# Patient Record
Sex: MALE | Race: WHITE | NOT HISPANIC OR LATINO | Employment: OTHER | URBAN - METROPOLITAN AREA
[De-identification: names, ages, dates, MRNs, and addresses within clinical notes are randomized per-mention and may not be internally consistent; named-entity substitution may affect disease eponyms.]

---

## 2017-01-03 ENCOUNTER — ALLSCRIPTS OFFICE VISIT (OUTPATIENT)
Dept: OTHER | Facility: OTHER | Age: 77
End: 2017-01-03

## 2017-06-26 ENCOUNTER — ALLSCRIPTS OFFICE VISIT (OUTPATIENT)
Dept: OTHER | Facility: OTHER | Age: 77
End: 2017-06-26

## 2017-07-03 DIAGNOSIS — H81.90 DISORDER OF VESTIBULAR FUNCTION: ICD-10-CM

## 2017-07-03 DIAGNOSIS — Z00.00 ENCOUNTER FOR GENERAL ADULT MEDICAL EXAMINATION WITHOUT ABNORMAL FINDINGS: ICD-10-CM

## 2017-07-03 DIAGNOSIS — E55.9 VITAMIN D DEFICIENCY: ICD-10-CM

## 2017-07-03 DIAGNOSIS — R73.01 IMPAIRED FASTING GLUCOSE: ICD-10-CM

## 2017-07-03 DIAGNOSIS — N52.9 MALE ERECTILE DYSFUNCTION: ICD-10-CM

## 2017-07-03 DIAGNOSIS — E78.5 HYPERLIPIDEMIA: ICD-10-CM

## 2017-07-03 DIAGNOSIS — I10 ESSENTIAL (PRIMARY) HYPERTENSION: ICD-10-CM

## 2017-07-05 ENCOUNTER — ALLSCRIPTS OFFICE VISIT (OUTPATIENT)
Dept: OTHER | Facility: OTHER | Age: 77
End: 2017-07-05

## 2018-01-01 DIAGNOSIS — E78.5 HYPERLIPIDEMIA: ICD-10-CM

## 2018-01-01 DIAGNOSIS — E55.9 VITAMIN D DEFICIENCY: ICD-10-CM

## 2018-01-01 DIAGNOSIS — R20.2 PARESTHESIA OF SKIN: ICD-10-CM

## 2018-01-01 DIAGNOSIS — I10 ESSENTIAL (PRIMARY) HYPERTENSION: ICD-10-CM

## 2018-01-01 DIAGNOSIS — N52.9 MALE ERECTILE DYSFUNCTION: ICD-10-CM

## 2018-01-01 DIAGNOSIS — R73.01 IMPAIRED FASTING GLUCOSE: ICD-10-CM

## 2018-01-09 NOTE — PROGRESS NOTES
Assessment    1  Erectile dysfunction (607 84) (N52 9)   2  Benign essential hypertension (401 1) (I10)   3  Vitamin D deficiency (268 9) (E55 9)   4  Hyperlipidemia (272 4) (E78 5)   5  Vestibular dizziness (386 9) (H81 90)   6  Encounter for preventive health examination (V70 0) (Z00 00)   7  Impaired fasting glucose (790 21) (R73 01)    Plan  Benign essential hypertension    · Atenolol-Chlorthalidone 50-25 MG Oral Tablet; TAKE 1 TABLET DAILY  Benign essential hypertension, Health Maintenance, Erectile dysfunction,  Hyperlipidemia, Impaired fasting glucose, Vestibular dizziness, Vitamin D deficiency    · (1) HEMOGLOBIN A1C; Status:Active; Requested for:64Jmv8727;   Benign essential hypertension, Health Maintenance, Erectile dysfunction,  Hyperlipidemia, Vestibular dizziness, Vitamin D deficiency    · (1) CBC/PLT/DIFF; Status:Active; Requested for:48Lui9172;    · (1) COMPREHENSIVE METABOLIC PANEL; Status:Active; Requested for:46Aab6935;    · (1) LIPID PANEL, FASTING; Status:Active; Requested for:41Xkz0302;    · (1) TSH WITH FT4 REFLEX; Status:Active; Requested for:67Nbr0747;    · *(Q) VITAMIN D, 25-HYDROXY, LC/MS/MS; Status:Active; Requested for:94Nhf4421;   Erectile dysfunction    · Cialis 20 MG Oral Tablet; TAKE 1 TABLET DAILY 1 HOUR BEFORE NEEDED    Discussion/Summary    - Subsequent annual wellness visit performed   -Hypertension is well controlled on current dose of atenolol/chlorthalidone  Refills provided  -Impaired fasting glucose remains the same  Patient's fasting glucose was 110  Check hemoglobin A1c with next set of labs in 6 months  -Patient is to followup with urologist as scheduled in May in regards to history of prostate cancer   - I did contact the gastroenterologist office and he is due for colonoscopy now  Last was done in 2012  - Refill provided for Cialis  - Followup in 6 loss of labs     Impression: Initial Annual Wellness Visit, with preventive exam as well as age and risk appropriate counseling completed  Cardiovascular screening and counseling: the risks and benefits of screening were discussed and screening is current  Diabetes screening and counseling: screening is current, counseling was given on maintaining a healthy diet, counseling was given on maintaining a healthy weight and counseling was given on ways to improve physical activity  Colorectal cancer screening and counseling: screening is current  Prostate cancer screening and counseling: screening is current  Glaucoma screening and counseling: screening is current  HIV screening and counseling: screening not indicated  History of Present Illness  HPI: 57-year-old male presents with his wife for subsequent annual wellness visit  Patient does have a history of hypertension, prostate cancer, hyperlipidemia and impaired fasting glucose  Patient generally feels well  Patient has been following up with [facility] in regards to his history of prostate cancer  PSAs have been undetectable  Patient does take Cialis on a daily basis for intercourse  Labs reviewed which showed essentially normal CMP within impaired fasting glucose of 110, total cholesterol 194, triglycerides 85, HDL 45,   Vitamin D slightly low at 29  He is taking 2000 international units of vitamin D on a daily basis  Last colonoscopy was in 2012   Welcome to Medicare and Wellness Visits: The patient is being seen for the subsequent annual wellness visit  Medicare Screening and Risk Factors   Hospitalizations: he has been previously hospitalizied  Once per lifetime medicare screening tests: ECG (sinus vijaya)  Medicare Screening Tests Risk Questions   Osteoporosis risk assessment: none indicated  HIV risk assessment: none indicated  Drug and Alcohol Use: The patient is a former cigarette smoker  The patient reports occasional alcohol use  He has never used illicit drugs     Diet and Physical Activity: Current diet includes well balanced meals, low fat food choices and low salt food choices  He exercises daily  Exercise: walking  Mood Disorder and Cognitive Impairment Screening: He denies feeling down, depressed, or hopeless over the past two weeks  He denies feeling little interest or pleasure in doing things over the past two weeks  Cognitive impairment screening: denies difficulty learning/retaining new information, denies difficulty handling complex tasks, denies difficulty with reasoning, denies difficulty with spatial ability and orientation, denies difficulty with language and denies difficulty with behavior  Functional Ability/Level of Safety: Hearing is normal bilaterally and a hearing aid is not used  The patient is currently able to do activities of daily living without limitations, able to do instrumental activities of daily living without limitations, able to participate in social activities without limitations and able to drive without limitations  Activities of daily living details: does not need help using the phone, no transportation help needed, does not need help shopping, no meal preparation help needed, does not need help doing housework, does not need help doing laundry, does not need help managing medications and does not need help managing money  Fall risk factors:  antihypertensive use, but no polypharmacy, no alcohol use, no mobility impairment, no antidepressant use, no deconditioning, no postural hypotension, no sedative use, no visual impairment, no urinary incontinence, no cognitive impairment, up and go test was normal and no previous fall  Home safety risk factors:  no unfamiliar surroundings, no loose rugs, no poor household lighting, no uneven floors, no household clutter, grab bars in the bathroom and handrails on the stairs  Advance Directives: Advance directives: living will and durable power of  for health care directives  end of life decisions were reviewed with the patient     Co-Managers and Medical Equipment/Suppliers: See Patient Care Team   Reviewed Updated [facility]:   Last Medicare Wellness Visit Information was reviewed, patient interviewed, no change since last AWV  Preventive Quality Program 65 and Older: Falls Risk: The patient fell none times in the past 12 months  The patient is currently asymptomatic Symptoms Include: The patient currently has no urinary incontinence symptoms  Patient Care Team    Care Team Member Role Specialty Office Number   Cameron Baird DO  Gastroenterology Adult [phone]   Crispin Lucas MD  Urology [phone]     Review of Systems    Constitutional: negative  Eyes: negative  ENT: negative  Cardiovascular: negative  Respiratory: negative  Gastrointestinal: negative  Genitourinary: negative  Musculoskeletal: negative  Integumentary and Breasts: negative  Neurological: negative  Psychiatric: negative  Endocrine: negative  Hematologic and Lymphatic: negative  Over the past 2 weeks, how often have you been bothered by the following problems? 1 ) Little interest or pleasure in doing things? Not at all    2 ) Feeling down, depressed or hopeless? Not at all    3 ) Trouble falling asleep or sleeping too much? Not at all    4 ) Feeling tired or having little energy? Not at all    5 ) Poor appetite or overeating? Not at all    6 ) Feeling bad about yourself, or that you are a failure, or have let yourself or your family down? Not at all    7 ) Trouble concentrating on things, such as reading a newspaper or watching television? Not at all    8 ) Moving or speaking so slowly that other people could have noticed, or the opposite, moving or speaking faster than usual? Not at all    9 ) Thoughts that you would be better off dead or of hurting yourself in some way? 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)   11  Vestibular dizziness (386 9) (H81 90)   12  Vitamin D deficiency (268 9) (E55 9)    Past Medical History    · History of actinic keratosis (V13 3) (Z87 2)   · History of sinus bradycardia (V12 59) (Z86 79)   · History of Hypokalemia (276 8) (E87 6)    The active problems and past medical history were reviewed and updated today  Surgical History    · History of Hernia Repair   · History of Sigmoidoscopy (Fiberoptic)    The surgical history was reviewed and updated today  Family History  Father    · Family history of Prostate Cancer (V16 39)  Brother    · Family history of Adenocarcinoma Of The Liver    The family history was reviewed and updated today  Social History    · Alcohol Use (History)   · Seldomly   · Caffeine Use   · 2 cups daily   · Former smoker (V15 82) (Z87 891)   · History of Marital History -    ·    · Occupation:   · Retired  The social history was reviewed and updated today  The social history was reviewed and is unchanged  Current Meds   1  Atenolol-Chlorthalidone 50-25 MG Oral Tablet; TAKE 1 TABLET DAILY  Requested for:   57BOF2285; Last Rx:29Jun2016 Ordered   2  Cialis 20 MG Oral Tablet; 1 po pre episode and not to use more than one per 48 hours; Therapy: 04GMO7274 to (Last Rx:90Xdt8064) Ordered   3  Cialis 20 MG Oral Tablet; TAKE 1 TABLET DAILY 1 HOUR BEFORE NEEDED; Therapy: 69Bjg4067 to (Cait Sabine)  Requested for: 80ZQS5044; Last   BX:[id number] Ordered   4  Omega-3-acid Ethyl Esters 1 GM Oral Capsule; 1gm twice daily; Therapy: 80ZFH2080 to (Last Rx:05Tzs9908)  Requested for: 39Xob1300 Ordered   5   Vitamin D3 2000 UNIT Oral Tablet; take one tablet once daily; Therapy: 37BRV3928 to (Last Rx:15Oct2014) Ordered    The medication list was reviewed and updated today  Allergies    1  No Known Drug Allergies    2  Soy    Immunizations   1 2    Influenza  20-Oct-2015 03-Oct-2016    PPSV  12-Feb-2014 03-Oct-2016    Zoster  02-Aug-2016      Vitals  Signs    O2 Saturation: 99  Temperature: 97 1 F  Heart Rate: 52  Respiration: 16  Systolic: 110  Diastolic: 64  Height: 5 ft 9 in  Weight: 172 lb   BMI Calculated: 25 4  BSA Calculated: 1 93    Physical Exam    Constitutional   General appearance: No acute distress, well appearing and well nourished  Eyes   Conjunctiva and lids: No erythema, swelling or discharge  Pupils and irises: Equal, round, reactive to light  Ophthalmoscopic examination: Normal fundi and optic discs  Ears, Nose, Mouth, and Throat   External inspection of ears and nose: Normal     Otoscopic examination: Tympanic membranes translucent with normal light reflex  Canals patent without erythema  Hearing: Normal     Nasal mucosa, septum, and turbinates: Normal without edema or erythema  Lips, teeth, and gums: Normal, good dentition  Oropharynx: Normal with no erythema, edema, exudate or lesions  Neck   Neck: Supple, symmetric, trachea midline, no masses  Thyroid: Normal, no thyromegaly  Pulmonary   Respiratory effort: No increased work of breathing or signs of respiratory distress  Percussion of chest: Normal     Palpation of chest: Normal     Auscultation of lungs: Clear to auscultation  Cardiovascular   Palpation of heart: Normal PMI, no thrills  Auscultation of heart: Normal rate and rhythm, normal S1 and S2, no murmurs  The heart rate was bradycardic at 55 bpm  The rhythm was regular  Carotid pulses: 2+ bilaterally  Abdominal aorta: Normal     Femoral pulses: 2+ bilaterally  Pedal pulses: 2+ bilaterally      Examination of extremities for edema and/or varicosities: Normal     Chest   Breasts: Normal, no dimpling or skin changes appreciated  Palpation of breasts and axillae: Normal, no masses palpated  Chest: Normal     Abdomen   Abdomen: Non-tender, no masses  Liver and spleen: No hepatomegaly or splenomegaly  Lymphatic   Palpation of lymph nodes in neck: No lymphadenopathy  Palpation of lymph nodes in axillae: No lymphadenopathy  Palpation of lymph nodes in groin: No lymphadenopathy  Palpation of lymph nodes in other areas: No lymphadenopathy  Musculoskeletal   Gait and station: Normal     Inspection/palpation of digits and nails: Normal without clubbing or cyanosis  Inspection/palpation of joints, bones, and muscles: Normal     Range of motion: Normal     Stability: Normal     Muscle strength/tone: Normal     Skin   Skin and subcutaneous tissue: Normal without rashes or lesions  Palpation of skin and subcutaneous tissue: Normal turgor  Neurologic   Cranial nerves: Cranial nerves 2-12 intact  Reflexes: 2+ and symmetric  Sensation: No sensory loss  Psychiatric   Judgment and insight: Normal     Orientation to person, place and time: Normal     Recent and remote memory: Intact  Mood and affect: Normal        Health Management  Health Maintenance   Medicare Annual Wellness Visit; every 1 year; Next Due: 39MXP4485;  Overdue    Future Appointments    Date/Time Provider Specialty Site   07/05/2017 10:40 AM Sowmya Toribio DO Family Medicine [facility]   06/26/2017 10:45 AM [facility]     Signatures   Electronically signed by : Karlie Lew DO; Peter  3 2017 12:38PM EST                       (Author)

## 2018-01-13 VITALS
BODY MASS INDEX: 25.48 KG/M2 | WEIGHT: 172 LBS | HEART RATE: 52 BPM | RESPIRATION RATE: 16 BRPM | HEIGHT: 69 IN | OXYGEN SATURATION: 99 % | DIASTOLIC BLOOD PRESSURE: 64 MMHG | SYSTOLIC BLOOD PRESSURE: 128 MMHG | TEMPERATURE: 97.1 F

## 2018-01-13 VITALS
HEART RATE: 66 BPM | DIASTOLIC BLOOD PRESSURE: 60 MMHG | SYSTOLIC BLOOD PRESSURE: 112 MMHG | BODY MASS INDEX: 25.48 KG/M2 | HEIGHT: 69 IN | WEIGHT: 172 LBS | RESPIRATION RATE: 16 BRPM

## 2018-01-13 VITALS
HEART RATE: 78 BPM | HEIGHT: 69 IN | BODY MASS INDEX: 25.45 KG/M2 | DIASTOLIC BLOOD PRESSURE: 82 MMHG | SYSTOLIC BLOOD PRESSURE: 122 MMHG | WEIGHT: 171.8 LBS

## 2018-01-16 ENCOUNTER — ALLSCRIPTS OFFICE VISIT (OUTPATIENT)
Dept: OTHER | Facility: OTHER | Age: 78
End: 2018-01-16

## 2018-01-19 ENCOUNTER — TRANSCRIBE ORDERS (OUTPATIENT)
Dept: ADMINISTRATIVE | Facility: HOSPITAL | Age: 78
End: 2018-01-19

## 2018-01-19 DIAGNOSIS — R06.83 SNORING: ICD-10-CM

## 2018-01-19 DIAGNOSIS — R06.81 APNEA: ICD-10-CM

## 2018-01-19 DIAGNOSIS — N52.9 IMPOTENCE OF ORGANIC ORIGIN: Primary | ICD-10-CM

## 2018-01-23 VITALS
SYSTOLIC BLOOD PRESSURE: 122 MMHG | TEMPERATURE: 96.8 F | RESPIRATION RATE: 16 BRPM | HEIGHT: 69 IN | DIASTOLIC BLOOD PRESSURE: 74 MMHG | HEART RATE: 68 BPM | BODY MASS INDEX: 25.48 KG/M2 | OXYGEN SATURATION: 98 % | WEIGHT: 172 LBS

## 2018-01-23 NOTE — PROGRESS NOTES
Assessment    1  Snoring (786 09) (R06 83)   2  Witnessed episode of apnea (786 03) (R06 81)   3  Paresthesia of right arm (782 0) (R20 2)   4  Benign essential hypertension (401 1) (I10)   5  Vitamin D deficiency (268 9) (E55 9)   6  Decreased radial pulse (785 9) (R09 89)   7  Hyperlipidemia (272 4) (E78 5)    Plan  Benign essential hypertension    · Atenolol-Chlorthalidone 50-25 MG Oral Tablet; TAKE 1 TABLET DAILY  Benign essential hypertension, Hyperlipidemia, Paresthesia of right arm, Vitamin D  deficiency    · (1) CBC/PLT/DIFF; Status:Active; Requested for:09Jul2018;    · (1) COMPREHENSIVE METABOLIC PANEL; Status:Active; Requested for:09Jul2018;    · (1) LIPID PANEL, FASTING; Status:Active; Requested for:09Jul2018;    · (1) TSH WITH FT4 REFLEX; Status:Active; Requested for:09Jul2018;    · *(Q) VITAMIN D, 25-HYDROXY, LC/MS/MS; Status:Active; Requested for:09Jul2018;   Erectile dysfunction, Snoring, Witnessed episode of apnea    · Sleep Study Unattended - Home; Status:Need Information - Financial Authorization; Requested JGS:[id number]; Health Maintenance    · *VB - Urinary Incontinence Screen (Dx Z13 89 Screen for UI); Status:Complete;   Done:  51MNX1211 09:30AM  Paresthesia of right arm    · VAS UPPER LIMB ARTERIAL DUPLEX, UNILATERAL/LIMITED, GRAFT; Unilateral  Side:Right; Status:Active; Requested EUO:[id number]; Discussion/Summary    - Subsequent annual wellness visit performed   -Hypertension is well controlled on current dose of atenolol/chlorthalidone  Refills provided  -Impaired fasting glucose remains the same  Patient's fasting glucose was 105  Hemoglobin A1c was 6 0%   -Patient is to followup with urologist as scheduled in May in regards to history of prostate cancer   -patient given referral for home-based sleep study to evaluate snoring and witnessed apneas  -patient given order for right upper extremity arterial Doppler for episodic decreased radial pulse   Will contact patient with results  -follow-up in 6 months with labs  Impression: Initial Annual Wellness Visit, with preventive exam as well as age and risk appropriate counseling completed  History of Present Illness  HPI: 80-year-old male presents with his wife for subsequent annual wellness visit  Patient does have a history of hypertension, prostate cancer, hyperlipidemia and impaired fasting glucose  Patient generally feels well  Patient has been following up with [facility] in regards to his history of prostate cancer  PSAs have been undetectable  Patient does take Cialis on a daily basis for intercourse  Labs reviewed which showed essentially normal CMP within impaired fasting glucose of 105 and well controlled total cholesterol  Patient does complain of 2 isolated episodes where his right arm became very pale and he had difficulty moving it  These were transient episodes  One episode occurred while he was sleeping  Last episode was approximately 2 months ago  Patient's wife also notes that the patient does snore heavily at night and does occasionally have episodes of apnea  Patient continues to volunteer at [employer]   Welcome to [employer] and Wellness Visits: The patient is being seen for the subsequent annual wellness visit  Medicare Screening and Risk Factors   Hospitalizations: he has been previously hospitalizied  Once per lifetime medicare screening tests: ECG (sinus vijaya)  Medicare Screening Tests Risk Questions   Osteoporosis risk assessment: none indicated  HIV risk assessment: none indicated  Drug and Alcohol Use: The patient is a former cigarette smoker  The patient reports occasional alcohol use  He has never used illicit drugs  Diet and Physical Activity: Current diet includes well balanced meals, low fat food choices and low salt food choices  He exercises daily  Exercise: walking     Mood Disorder and Cognitive Impairment Screening: He denies feeling down, depressed, or hopeless over the past two weeks  He denies feeling little interest or pleasure in doing things over the past two weeks  Cognitive impairment screening: denies difficulty learning/retaining new information, denies difficulty handling complex tasks, denies difficulty with reasoning, denies difficulty with spatial ability and orientation, denies difficulty with language and denies difficulty with behavior  Advance Directives: Advance directives: living will and durable power of  for health care directives  end of life decisions were reviewed with the patient  Co-Managers and Medical Equipment/Suppliers: See Patient Care Team   Reviewed Updated Geoffry Jeremie:   Last Medicare Wellness Visit Information was reviewed, patient interviewed, no change since last AWV  Preventive Quality Program 65 and Older: Falls Risk: The patient fell none times in the past 12 months  The patient is currently asymptomatic Symptoms Include: The patient currently has no urinary incontinence symptoms  Patient Care Team    Care Team Member Role Specialty Office Number   Redia Ee DO  Gastroenterology Adult [phone]   Pop Hernández MD  Urology [phone]   Nina Rae MD  Dermatology [phone]     Review of Systems    Constitutional: negative  Eyes: negative  ENT: negative  Cardiovascular: negative  Respiratory: negative  Gastrointestinal: negative  Genitourinary: negative  Musculoskeletal: negative  Integumentary and Breasts: negative  Neurological: negative  Psychiatric: negative  Endocrine: negative  Hematologic and Lymphatic: negative  Over the past 2 weeks, how often have you been bothered by the following problems? 1 ) Little interest or pleasure in doing things? Not at all    2 ) Feeling down, depressed or hopeless? Not at all    3 ) Trouble falling asleep or sleeping too much? Not at all    4 ) Feeling tired or having little energy?  Not at all    5 ) Poor appetite or overeating? Not at all    6 ) Feeling bad about yourself, or that you are a failure, or have let yourself or your family down? Not at all    7 ) Trouble concentrating on things, such as reading a newspaper or watching television? Not at all    8 ) Moving or speaking so slowly that other people could have noticed, or the opposite, moving or speaking faster than usual? Not at all    9 ) Thoughts that you would be better off dead or of hurting yourself in some way? 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)   11  Vestibular dizziness (386 9) (R42)   12  Vitamin D deficiency (268 9) (E55 9)    Past Medical History    · History of actinic keratosis (V13 3) (Z87 2)   · History of sinus bradycardia (V12 59) (Z86 79)   · History of Hypokalemia (276 8) (E87 6)    The active problems and past medical history were reviewed and updated today  Surgical History    · History of Hernia Repair   · History of Sigmoidoscopy (Fiberoptic, Therapeutic )    The surgical history was reviewed and updated today  Family History  Father    · Family history of Prostate Cancer (V16 39)  Brother    · Family history of Adenocarcinoma Of The Liver    The family history was reviewed and updated today  Social History    · Alcohol Use (History)   · Seldomly   · Caffeine Use   · 2 cups daily   · Former smoker (V15 82) (Z87 891)   · History of Marital History -    ·    · Occupation:   · Retired  The social history was reviewed and updated today  The social history was reviewed and is unchanged        Current Meds   1  Atenolol-Chlorthalidone 50-25 MG Oral Tablet; TAKE 1 TABLET DAILY  Requested for:   76SRL9357; Last AQ:07XBJ1109 Ordered   2  Cialis 20 MG Oral Tablet; TAKE 1 TABLET DAILY 1 HOUR BEFORE NEEDED; Therapy: 81Lhh0559 to (Blaze Simmering)  Requested for: 28FLW9768; Last   Rx:30Gwx4230 Ordered   3  Omega-3-acid Ethyl Esters 1 GM Oral Capsule; 1gm twice daily; Therapy: 12KSW3610 to (Last Rx:25Ilk8558)  Requested for: 32Pql0288 Ordered   4  Vitamin D3 2000 UNIT Oral Tablet; take one tablet once daily; Therapy: 87VJF7085 to (Last Rx:44Lyt8880) Ordered    The medication list was reviewed and updated today  Allergies    1  No Known Drug Allergies    2  Soy    Immunizations   1 2    Influenza  20-Oct-2015 03-Oct-2016    PPSV  12-Feb-2014 03-Oct-2016    Zoster  02-Aug-2016      Vitals  Signs    Temperature: 96 8 F  Heart Rate: 68  Respiration: 16  Systolic: 641  Diastolic: 74  Height: 5 ft 9 in  Weight: 172 lb   BMI Calculated: 25 4  BSA Calculated: 1 94  O2 Saturation: 98    Physical Exam    Constitutional   General appearance: No acute distress, well appearing and well nourished  Eyes   Conjunctiva and lids: No erythema, swelling or discharge  Pupils and irises: Equal, round, reactive to light  Ophthalmoscopic examination: Normal fundi and optic discs  Ears, Nose, Mouth, and Throat   External inspection of ears and nose: Normal     Otoscopic examination: Tympanic membranes translucent with normal light reflex  Canals patent without erythema  Hearing: Normal     Nasal mucosa, septum, and turbinates: Normal without edema or erythema  Lips, teeth, and gums: Normal, good dentition  Oropharynx: Normal with no erythema, edema, exudate or lesions  Neck   Neck: Supple, symmetric, trachea midline, no masses  Thyroid: Normal, no thyromegaly  Pulmonary   Respiratory effort: No increased work of breathing or signs of respiratory distress      Percussion of chest: Normal     Palpation of chest: Normal     Auscultation of lungs: Clear to auscultation  Cardiovascular   Palpation of heart: Normal PMI, no thrills  Auscultation of heart: Normal rate and rhythm, normal S1 and S2, no murmurs  The heart rate was bradycardic at 55 bpm  The rhythm was regular  Carotid pulses: 2+ bilaterally  Abdominal aorta: Normal     Femoral pulses: 2+ bilaterally  Pedal pulses: 2+ bilaterally  Examination of extremities for edema and/or varicosities: Normal     Chest   Breasts: Normal, no dimpling or skin changes appreciated  Palpation of breasts and axillae: Normal, no masses palpated  Chest: Normal     Abdomen   Abdomen: Non-tender, no masses  Liver and spleen: No hepatomegaly or splenomegaly  Lymphatic   Palpation of lymph nodes in neck: No lymphadenopathy  Palpation of lymph nodes in axillae: No lymphadenopathy  Palpation of lymph nodes in groin: No lymphadenopathy  Palpation of lymph nodes in other areas: No lymphadenopathy  Musculoskeletal   Gait and station: Normal     Inspection/palpation of digits and nails: Normal without clubbing or cyanosis  Inspection/palpation of joints, bones, and muscles: Normal     Range of motion: Normal     Stability: Normal     Muscle strength/tone: Normal     Skin   Skin and subcutaneous tissue: Normal without rashes or lesions  Palpation of skin and subcutaneous tissue: Normal turgor  Neurologic   Cranial nerves: Cranial nerves 2-12 intact  Reflexes: 2+ and symmetric  Sensation: No sensory loss  Psychiatric   Judgment and insight: Normal     Orientation to person, place and time: Normal     Recent and remote memory: Intact      Mood and affect: Normal        Results/Data  *VB - Urinary Incontinence Screen (Dx Z13 89 Screen for UI) [id number] 09:30AM Chauncey Butler     Test Name Result Flag Reference   Urinary Incontinence Assessment [id number]         Health Management  Health Maintenance   Medicare Annual Wellness Visit; every 1 year; Next Due: 17Jun2016;  Overdue    Signatures   Electronically signed by : Sebastian Mcqueen DO; Jan 16 2018 11:18AM EST                       (Author)

## 2018-01-31 ENCOUNTER — HOSPITAL ENCOUNTER (OUTPATIENT)
Dept: SLEEP CENTER | Facility: CLINIC | Age: 78
Discharge: HOME | End: 2018-01-31
Payer: COMMERCIAL

## 2018-01-31 DIAGNOSIS — G47.33 OSA (OBSTRUCTIVE SLEEP APNEA): ICD-10-CM

## 2018-01-31 DIAGNOSIS — R06.81 APNEA: ICD-10-CM

## 2018-01-31 DIAGNOSIS — N52.9 IMPOTENCE OF ORGANIC ORIGIN: ICD-10-CM

## 2018-01-31 DIAGNOSIS — R06.83 SNORING: ICD-10-CM

## 2018-01-31 PROCEDURE — G0399 HOME SLEEP TEST/TYPE 3 PORTA: HCPCS

## 2018-02-06 DIAGNOSIS — G47.33 OSA (OBSTRUCTIVE SLEEP APNEA): Primary | ICD-10-CM

## 2018-02-08 ENCOUNTER — TELEPHONE (OUTPATIENT)
Dept: SLEEP CENTER | Facility: CLINIC | Age: 78
End: 2018-02-08

## 2018-02-08 DIAGNOSIS — G47.33 OBSTRUCTIVE SLEEP APNEA HYPOPNEA, MILD: Primary | ICD-10-CM

## 2018-03-01 ENCOUNTER — TRANSCRIBE ORDERS (OUTPATIENT)
Dept: SLEEP CENTER | Facility: CLINIC | Age: 78
End: 2018-03-01

## 2018-03-27 ENCOUNTER — OFFICE VISIT (OUTPATIENT)
Dept: SLEEP CENTER | Facility: CLINIC | Age: 78
End: 2018-03-27
Payer: COMMERCIAL

## 2018-03-27 VITALS
BODY MASS INDEX: 25.89 KG/M2 | DIASTOLIC BLOOD PRESSURE: 72 MMHG | HEART RATE: 68 BPM | WEIGHT: 174.8 LBS | HEIGHT: 69 IN | SYSTOLIC BLOOD PRESSURE: 102 MMHG

## 2018-03-27 DIAGNOSIS — G47.33 OSA (OBSTRUCTIVE SLEEP APNEA): ICD-10-CM

## 2018-03-27 PROCEDURE — 99244 OFF/OP CNSLTJ NEW/EST MOD 40: CPT | Performed by: INTERNAL MEDICINE

## 2018-03-27 RX ORDER — TADALAFIL 20 MG/1
1 TABLET ORAL
COMMUNITY
Start: 2015-04-16 | End: 2019-03-05 | Stop reason: SDUPTHER

## 2018-03-27 RX ORDER — ATENOLOL AND CHLORTHALIDONE TABLET 50; 25 MG/1; MG/1
1 TABLET ORAL DAILY
Refills: 3 | COMMUNITY
Start: 2018-01-16 | End: 2018-07-17 | Stop reason: SDUPTHER

## 2018-03-27 NOTE — PROGRESS NOTES
Consultation - Sleep Center   Ludy Douglas : 1940  MRN: [id number]      Assessment:  The patient has mild obstructive sleep apnea with significant daytime sleepiness  He also has hypertension which is effectively treated with atenolol  He would benefit from positive airway pressure therapy  Plan:  Positive airway pressure titration study  That will be followed by initiation of treatment  Follow up: After testing    History of Present Illness:   66 y o male with a longstanding history of snoring and witnessed apneas  The patient is unaware of awakening during the night, but sometimes when falling asleep, he will experience a choking sensation  He has a remote history of hypertension, which is effectively treated with atenolol  He had suffered from migraine headaches, which were felt to be caused by hypertension and since being placed on beta blocker, he has had no further problems with headache  He denies any other respiratory or cardiac issues  He complains of daytime sleepiness  He will fall asleep at inappropriate times, when he is left undisturbed  For example, at his volunteer job, 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  He naps frequently during the day  On average she gets 6 to 7 hours of sleep per 24 hours  He does complain of GERD and erectile dysfunction  He underwent home sleep testing which demonstrated SIVAN = 8 7 with minimum oxygen saturation of 75%      Review of Systems:        Genitourinary erectile dysfunction   Cardiology none   Gastrointestinal heartburn/acid reflux   Neurology loss/change of vision   Constitutional none   Integumentary none   Psychiatry none   Musculoskeletal none   Pulmonary none   ENT ringing in ears and decreased hearing   Endocrine none   Hematological none             Historical Information    Past Medical History:  Hypertension    Past Surgical History:  Hernia repair    Social History  History   Alcohol use: Not on file History   Drug Use Not on file     History   Smoking Status    Not on file   Smokeless Tobacco    Not on file     Family History: non-contributory     Sleep History: See above     Sleep Schedule:   Unremarkable except for daily naps     Snoring/Apnea:   Yes to both    Medications/Allergies:    Current Outpatient Prescriptions:     Cholecalciferol (VITAMIN D3) 1000 UNIT/SPRAY LIQD, Take 1 tablet by mouth daily, Disp: , Rfl:     Omega-3-Acid Eth Est, Dietary, 1 g CAPS, Take by mouth, Disp: , Rfl:     tadalafil (CIALIS) 20 MG tablet, Take 1 tablet by mouth, Disp: , Rfl:     atenolol-chlorthalidone (TENORETIC) 50-25 mg per tablet, Take 1 tablet by mouth daily, Disp: , Rfl: 3        No notes on file                  Objective:    Vital Signs:   Vitals:    03/27/18 0800   BP: 102/72   Pulse: 68   Weight: 79 3 kg (174 lb 12 8 oz)   Height: 5' 9" (1 753 m)     Neck Circumference: 42      [place] Sleepiness Scale: Total score: 11    Physical Exam:    General: Alert, appropriate, cooperative, overweight    Head: NC/AT, moderate retrognathia    Nose:  Moderate septal deviation, nares partially obstructed, mucosa normal    Throat: Airway lumen reduced in height, tongue base thickened, no tonsils visualized    Neck: Normal, no thyromegaly or lymphadenopathy, no JVD    Heart: RR, normal S1 and S2, no murmurs    Chest: Clear bilaterally    Extremity: No clubbing, cyanosis, no edema    Skin: Warm, dry    Neuro: No motor abnormalities, cranial nerves appear intact    Sleep Study Results:   SIVAN= 8 7    Counseling / Coordination of Care  Total clinic time spent today 25 minutes  Greater than 50% of total time was spent with the patient and / or family counseling and / or coordination of care  A description of the counseling / coordination of care: We discussed the pathophysiology of obstructive sleep apnea as well as the possible treatment options   We also discussed the rationale for positive airway pressure therapy  JAMILA Chandra    Board Certified Sleep Specialist

## 2018-04-17 ENCOUNTER — TELEPHONE (OUTPATIENT)
Dept: FAMILY MEDICINE CLINIC | Facility: CLINIC | Age: 78
End: 2018-04-17

## 2018-04-17 DIAGNOSIS — M79.673 PAIN OF FOOT, UNSPECIFIED LATERALITY: Primary | ICD-10-CM

## 2018-04-17 NOTE — TELEPHONE ENCOUNTER
Patient called in requesting Dr Ching Stahl give him a call back  I asked what it was in regards to and he said he thinks he has plantar fasciitis and wanted to know if he would send him to an orthopedic doctor or if he would need to see him first  I advised that he would need to see him first and scheduled him with Ching Stahl for his next available appointment but he insisted I send a message to see if he will call him back

## 2018-04-18 NOTE — TELEPHONE ENCOUNTER
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

## 2018-04-30 ENCOUNTER — HOSPITAL ENCOUNTER (OUTPATIENT)
Dept: SLEEP CENTER | Facility: CLINIC | Age: 78
Discharge: HOME | End: 2018-04-30
Payer: COMMERCIAL

## 2018-04-30 DIAGNOSIS — G47.33 OSA (OBSTRUCTIVE SLEEP APNEA): ICD-10-CM

## 2018-04-30 PROCEDURE — 95811 POLYSOM 6/>YRS CPAP 4/> PARM: CPT

## 2018-05-01 DIAGNOSIS — G47.33 OSA (OBSTRUCTIVE SLEEP APNEA): Primary | ICD-10-CM

## 2018-05-01 NOTE — PROGRESS NOTES
Sleep Study Documentation    Pre-Sleep Study       Sleep testing procedure explained to patient:YES    Patient napped prior to study:NO    Caffeine:Dayshift worker after 12PM   Caffeine use:NO    Alcohol:Dayshift workers after 5PM: Alcohol use:NO    Typical day for patient:NO       Study Documentation  Treatment   Optimal PAP pressure:11 0 or 12 0  Leak:Small  Snore:Eliminated  REM Obtained:yes  Supplemental O2: no    Minimum SaO2 90%  Baseline SaO2 97%  PAP mask tried (list all)Resmed Air Fit F20 FFM size medium; Resmed Air Fit N20 nasal mask size medium  PAP mask choice (final)Resmed Air Fit F20 FFM Size Medium  PAP mask type:full face  PAP pressure at which snoring was eliminated 11 0  Minimum SaO2 at final PAP pressure 96%  Mode of Therapy:CPAP  ETCO2:No  CPAP changed to BiPAP:No    Mode of Therapy:CPAP    EKG abnormalities: no     EEG abnormalities: no    Study Terminated:no    Patient classification: retired       Post-Sleep Study    Medication used at bedtime or during sleep study:YES other prescription medications    Patient reports time it took to fall asleep:less than 20 minutes    Patient reports waking up during study:3 or more times  Patient reports returning to sleep in 10 to 30 minutes  Patient reports sleeping 4 to 6 hours without dreaming  Patient reports sleep during study:typical    Patient rated sleepiness: Not sleepy or tired    PAP treatment:yes: Post PAP treatment patient reports feeling unchanged and would wear PAP mask at home

## 2018-05-03 ENCOUNTER — TELEPHONE (OUTPATIENT)
Dept: SLEEP CENTER | Facility: CLINIC | Age: 78
End: 2018-05-03

## 2018-05-15 ENCOUNTER — HOSPITAL ENCOUNTER (OUTPATIENT)
Dept: SLEEP CENTER | Facility: CLINIC | Age: 78
Discharge: HOME | End: 2018-05-15

## 2018-05-16 ENCOUNTER — TELEPHONE (OUTPATIENT)
Dept: SLEEP CENTER | Facility: CLINIC | Age: 78
End: 2018-05-16

## 2018-05-16 DIAGNOSIS — G47.33 OSA (OBSTRUCTIVE SLEEP APNEA): Primary | ICD-10-CM

## 2018-06-25 ENCOUNTER — TELEPHONE (OUTPATIENT)
Dept: UROLOGY | Facility: CLINIC | Age: 78
End: 2018-06-25

## 2018-06-25 DIAGNOSIS — Z12.5 PROSTATE CANCER SCREENING: Primary | ICD-10-CM

## 2018-06-25 NOTE — TELEPHONE ENCOUNTER
Lex patient, managed by DR Erick Du,  Patient was scheduled for follow up tomorrow 6/26/18 with PSA prior to visit  Called patient to check if he had his PSA done  Patient was unaware he needed labwork for prior to appt  Rescheduled patient for 7/25/18 at 11:15 am with Blaire Cohen in [place] [address], mailed patient PSA order to be done prior to visit

## 2018-06-26 DIAGNOSIS — C61 MALIGNANT NEOPLASM OF PROSTATE (HCC): ICD-10-CM

## 2018-06-26 DIAGNOSIS — R20.2 PARESTHESIA OF SKIN: ICD-10-CM

## 2018-06-26 DIAGNOSIS — E55.9 VITAMIN D DEFICIENCY: ICD-10-CM

## 2018-06-26 DIAGNOSIS — I10 ESSENTIAL (PRIMARY) HYPERTENSION: ICD-10-CM

## 2018-06-26 DIAGNOSIS — E78.5 HYPERLIPIDEMIA: ICD-10-CM

## 2018-07-10 ENCOUNTER — TELEPHONE (OUTPATIENT)
Dept: UROLOGY | Facility: AMBULATORY SURGERY CENTER | Age: 78
End: 2018-07-10

## 2018-07-10 ENCOUNTER — APPOINTMENT (OUTPATIENT)
Dept: LAB | Facility: CLINIC | Age: 78
End: 2018-07-10
Payer: COMMERCIAL

## 2018-07-10 DIAGNOSIS — I10 ESSENTIAL (PRIMARY) HYPERTENSION: ICD-10-CM

## 2018-07-10 DIAGNOSIS — Z12.5 PROSTATE CANCER SCREENING: ICD-10-CM

## 2018-07-10 DIAGNOSIS — R20.2 PARESTHESIA OF SKIN: ICD-10-CM

## 2018-07-10 DIAGNOSIS — E78.5 HYPERLIPIDEMIA: ICD-10-CM

## 2018-07-10 DIAGNOSIS — E55.9 VITAMIN D DEFICIENCY: ICD-10-CM

## 2018-07-10 LAB
ALBUMIN SERPL BCP-MCNC: 3.7 G/DL (ref 3.5–5)
ALP SERPL-CCNC: 45 U/L (ref 46–116)
ALT SERPL W P-5'-P-CCNC: 22 U/L (ref 12–78)
ANION GAP SERPL CALCULATED.3IONS-SCNC: 2 MMOL/L (ref 4–13)
AST SERPL W P-5'-P-CCNC: 20 U/L (ref 5–45)
BASOPHILS # BLD AUTO: 0.05 THOUSANDS/ΜL (ref 0–0.1)
BASOPHILS NFR BLD AUTO: 1 % (ref 0–1)
BILIRUB SERPL-MCNC: 0.67 MG/DL (ref 0.2–1)
BUN SERPL-MCNC: 17 MG/DL (ref 5–25)
CALCIUM SERPL-MCNC: 8.8 MG/DL (ref 8.3–10.1)
CHLORIDE SERPL-SCNC: 106 MMOL/L (ref 100–108)
CHOLEST SERPL-MCNC: 193 MG/DL (ref 50–200)
CO2 SERPL-SCNC: 30 MMOL/L (ref 21–32)
CREAT SERPL-MCNC: 1.04 MG/DL (ref 0.6–1.3)
EOSINOPHIL # BLD AUTO: 0.17 THOUSAND/ΜL (ref 0–0.61)
EOSINOPHIL NFR BLD AUTO: 4 % (ref 0–6)
ERYTHROCYTE [DISTWIDTH] IN BLOOD BY AUTOMATED COUNT: 13.3 % (ref 11.6–15.1)
GFR SERPL CREATININE-BSD FRML MDRD: 68 ML/MIN/1.73SQ M
GLUCOSE P FAST SERPL-MCNC: 91 MG/DL (ref 65–99)
HCT VFR BLD AUTO: 37.4 % (ref 36.5–49.3)
HDLC SERPL-MCNC: 49 MG/DL (ref 40–60)
HGB BLD-MCNC: 12.6 G/DL (ref 12–17)
IMM GRANULOCYTES # BLD AUTO: 0.02 THOUSAND/UL (ref 0–0.2)
IMM GRANULOCYTES NFR BLD AUTO: 1 % (ref 0–2)
LDLC SERPL CALC-MCNC: 133 MG/DL (ref 0–100)
LYMPHOCYTES # BLD AUTO: 1.25 THOUSANDS/ΜL (ref 0.6–4.47)
LYMPHOCYTES NFR BLD AUTO: 31 % (ref 14–44)
MCH RBC QN AUTO: 31 PG (ref 26.8–34.3)
MCHC RBC AUTO-ENTMCNC: 33.7 G/DL (ref 31.4–37.4)
MCV RBC AUTO: 92 FL (ref 82–98)
MONOCYTES # BLD AUTO: 0.48 THOUSAND/ΜL (ref 0.17–1.22)
MONOCYTES NFR BLD AUTO: 12 % (ref 4–12)
NEUTROPHILS # BLD AUTO: 2.06 THOUSANDS/ΜL (ref 1.85–7.62)
NEUTS SEG NFR BLD AUTO: 51 % (ref 43–75)
NONHDLC SERPL-MCNC: 144 MG/DL
NRBC BLD AUTO-RTO: 0 /100 WBCS
PLATELET # BLD AUTO: 258 THOUSANDS/UL (ref 149–390)
PMV BLD AUTO: 9.7 FL (ref 8.9–12.7)
POTASSIUM SERPL-SCNC: 3.2 MMOL/L (ref 3.5–5.3)
PROT SERPL-MCNC: 6.9 G/DL (ref 6.4–8.2)
PSA SERPL-MCNC: 0.3 NG/ML (ref 0–4)
RBC # BLD AUTO: 4.06 MILLION/UL (ref 3.88–5.62)
SODIUM SERPL-SCNC: 138 MMOL/L (ref 136–145)
TRIGL SERPL-MCNC: 57 MG/DL
TSH SERPL DL<=0.05 MIU/L-ACNC: 1.37 UIU/ML (ref 0.36–3.74)
WBC # BLD AUTO: 4.03 THOUSAND/UL (ref 4.31–10.16)

## 2018-07-10 PROCEDURE — 80061 LIPID PANEL: CPT

## 2018-07-10 PROCEDURE — 84443 ASSAY THYROID STIM HORMONE: CPT

## 2018-07-10 PROCEDURE — 85025 COMPLETE CBC W/AUTO DIFF WBC: CPT

## 2018-07-10 PROCEDURE — 84153 ASSAY OF PSA TOTAL: CPT

## 2018-07-10 PROCEDURE — 36415 COLL VENOUS BLD VENIPUNCTURE: CPT

## 2018-07-10 PROCEDURE — 80053 COMPREHEN METABOLIC PANEL: CPT

## 2018-07-10 NOTE — TELEPHONE ENCOUNTER
The patient is in need of a insurance referral   Patient will be seen in Urology ([id number]) on 07/25/2018,  diagnosis code to be used is C61  Thank you for your time

## 2018-07-17 ENCOUNTER — OFFICE VISIT (OUTPATIENT)
Dept: FAMILY MEDICINE CLINIC | Facility: CLINIC | Age: 78
End: 2018-07-17
Payer: COMMERCIAL

## 2018-07-17 VITALS
SYSTOLIC BLOOD PRESSURE: 112 MMHG | RESPIRATION RATE: 16 BRPM | OXYGEN SATURATION: 96 % | DIASTOLIC BLOOD PRESSURE: 68 MMHG | WEIGHT: 171 LBS | HEART RATE: 74 BPM | HEIGHT: 69 IN | BODY MASS INDEX: 25.33 KG/M2

## 2018-07-17 DIAGNOSIS — E87.6 HYPOKALEMIA: ICD-10-CM

## 2018-07-17 DIAGNOSIS — G47.33 OSA (OBSTRUCTIVE SLEEP APNEA): ICD-10-CM

## 2018-07-17 DIAGNOSIS — I10 BENIGN ESSENTIAL HYPERTENSION: Primary | ICD-10-CM

## 2018-07-17 DIAGNOSIS — E78.2 MIXED HYPERLIPIDEMIA: ICD-10-CM

## 2018-07-17 PROCEDURE — 3074F SYST BP LT 130 MM HG: CPT | Performed by: FAMILY MEDICINE

## 2018-07-17 PROCEDURE — 1101F PT FALLS ASSESS-DOCD LE1/YR: CPT | Performed by: FAMILY MEDICINE

## 2018-07-17 PROCEDURE — 99214 OFFICE O/P EST MOD 30 MIN: CPT | Performed by: FAMILY MEDICINE

## 2018-07-17 PROCEDURE — 3078F DIAST BP <80 MM HG: CPT | Performed by: FAMILY MEDICINE

## 2018-07-17 PROCEDURE — 3725F SCREEN DEPRESSION PERFORMED: CPT | Performed by: FAMILY MEDICINE

## 2018-07-17 NOTE — PROGRESS NOTES
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 [employer]  Patient does walk approximately 6 miles per day throughout the hospital   No difficulty with strength in his knee when he is walking on flat ground  Past Medical History:   Diagnosis Date    Actinic keratosis     Last assessed - 10/15/14    Hypokalemia     Last assessed - 8/13/14    Sinus bradycardia     Last assessed - 8/13/14       Family History   Problem Relation Age of Onset    Prostate cancer Father     Liver cancer Brother         Adenocarcinoma        Past Surgical History:   Procedure Laterality Date    HERNIA REPAIR      SIGMOIDOSCOPY      (Fiberoptic, Therapeutic) 7/28/2014, 8/25/14        reports that he has never smoked  He has never used smokeless tobacco  He reports that he does not drink alcohol or use drugs        Current Outpatient Prescriptions:     atenolol-chlorthalidone (TENORETIC) 50-25 mg per tablet, Take 1 tablet by mouth daily, Disp: , Rfl: 3    Cholecalciferol (VITAMIN D3) 1000 UNIT/SPRAY LIQD, Take 1 tablet by mouth daily, Disp: , Rfl:     Omega-3-Acid Eth Est, Dietary, 1 g CAPS, Take by mouth, Disp: , Rfl:     tadalafil (CIALIS) 20 MG tablet, Take 1 tablet by mouth, Disp: , Rfl:     The following portions of the patient's history were reviewed and updated as appropriate: allergies, current medications, past family history, past medical history, past social history, past surgical history and problem list     Review of Systems   Constitutional: Negative  HENT: Negative  Eyes: Negative  Respiratory: Negative  Cardiovascular: Negative  Gastrointestinal: Negative  Endocrine: Negative  Genitourinary: Negative  Erectile dysfunction   Musculoskeletal: Positive for arthralgias  Negative for gait problem and joint swelling  Skin: Negative  Allergic/Immunologic: Negative  Neurological: Negative  Hematological: Negative  Psychiatric/Behavioral: Negative  All other systems reviewed and are negative  Objective:    /68   Pulse 74   Resp 16   Ht 5' 9" (1 753 m)   Wt 77 6 kg (171 lb)   SpO2 96%   BMI 25 25 kg/m²      Physical Exam   Constitutional: He is oriented to person, place, and time  He appears well-developed and well-nourished  HENT:   Head: Normocephalic  Eyes: Conjunctivae and EOM are normal  Pupils are equal, round, and reactive to light  Neck: Normal range of motion  Neck supple  Cardiovascular: Normal rate, regular rhythm and normal heart sounds  Pulmonary/Chest: Effort normal and breath sounds normal    Abdominal: Soft  Bowel sounds are normal    Musculoskeletal: Normal range of motion  Right knee: He exhibits normal range of motion, no swelling, no effusion, no ecchymosis, no deformity, no laceration, no erythema, normal alignment, no LCL laxity and no MCL laxity  Mild crepitation  Of the right knee   Neurological: He is alert and oriented to person, place, and time  Psychiatric: He has a normal mood and affect  His behavior is normal  Judgment and thought content normal    Nursing note and vitals reviewed          Recent Results (from the past 1008 hour(s))   CBC and differential    Collection Time: 07/10/18  7:57 AM   Result Value Ref Range    WBC 4 03 (L) 4 31 - 10 16 Thousand/uL    RBC 4 06 3 88 - 5 62 Million/uL    Hemoglobin 12 6 12 0 - 17 0 g/dL    Hematocrit 37 4 36 5 - 49 3 %    MCV 92 82 - 98 fL    MCH 31 0 26 8 - 34 3 pg    MCHC 33 7 31 4 - 37 4 g/dL    RDW 13 3 11 6 - 15 1 %    MPV 9 7 8 9 - 12 7 fL    Platelets 420 321 - 174 Thousands/uL    nRBC 0 /100 WBCs    Neutrophils Relative 51 43 - 75 %    Immat GRANS % 1 0 - 2 %    Lymphocytes Relative 31 14 - 44 %    Monocytes Relative 12 4 - 12 %    Eosinophils Relative 4 0 - 6 %    Basophils Relative 1 0 - 1 %    Neutrophils Absolute 2 06 1 85 - 7 62 Thousands/µL    Immature Grans Absolute 0 02 0 00 - 0 20 Thousand/uL    Lymphocytes Absolute 1 25 0 60 - 4 47 Thousands/µL    Monocytes Absolute 0 48 0 17 - 1 22 Thousand/µL    Eosinophils Absolute 0 17 0 00 - 0 61 Thousand/µL    Basophils Absolute 0 05 0 00 - 0 10 Thousands/µL   Comprehensive metabolic panel    Collection Time: 07/10/18  7:57 AM   Result Value Ref Range    Sodium 138 136 - 145 mmol/L    Potassium 3 2 (L) 3 5 - 5 3 mmol/L    Chloride 106 100 - 108 mmol/L    CO2 30 21 - 32 mmol/L    Anion Gap 2 (L) 4 - 13 mmol/L    BUN 17 5 - 25 mg/dL    Creatinine 1 04 0 60 - 1 30 mg/dL    Glucose, Fasting 91 65 - 99 mg/dL    Calcium 8 8 8 3 - 10 1 mg/dL    AST 20 5 - 45 U/L    ALT 22 12 - 78 U/L    Alkaline Phosphatase 45 (L) 46 - 116 U/L    Total Protein 6 9 6 4 - 8 2 g/dL    Albumin 3 7 3 5 - 5 0 g/dL    Total Bilirubin 0 67 0 20 - 1 00 mg/dL    eGFR 68 ml/min/1 73sq m   Lipid panel    Collection Time: 07/10/18  7:57 AM   Result Value Ref Range    Cholesterol 193 50 - 200 mg/dL    Triglycerides 57 <=150 mg/dL    HDL, Direct 49 40 - 60 mg/dL    LDL Calculated 133 (H) 0 - 100 mg/dL    Non-HDL-Chol (CHOL-HDL) 144 mg/dl   TSH, 3rd generation with T4 reflex    Collection Time: 07/10/18  7:57 AM   Result Value Ref Range    TSH 3RD GENERATON 1 370 0 358 - 3 740 uIU/mL   PSA Total, Diagnostic    Collection Time: 07/10/18  7:57 AM   Result Value Ref Range    PSA 0 3 0 0 - 4 0 ng/mL       Assessment/Plan:    No problem-specific Assessment & Plan notes found for this encounter  Problem List Items Addressed This Visit     ZAINAB (obstructive sleep apnea) - Primary      Patient does follow up with sleep specialist   Presently using titrating CPAP anywhere from 6-12 cm of water         Hypokalemia      Mild hypokalemia  Recommended the patient increase his potassium rich foods including bananas, oranges, dried fruits and orange juice  Repeat labs in 6 months  Hypokalemia is likely related to his chlorthalidone in his antihypertensive medication         Benign essential hypertension      Well controlled on atenolol/chlorthalidone  Continue same  Refill provided  Re-evaluate in 6 months  Hyperlipidemia       Essentially normal cholesterol profile through dietary modification  Continue to manage with diet alone  Repeat lipid profile in 6 months

## 2018-07-19 RX ORDER — ATENOLOL AND CHLORTHALIDONE TABLET 50; 25 MG/1; MG/1
1 TABLET ORAL DAILY
Qty: 90 TABLET | Refills: 3 | Status: SHIPPED | OUTPATIENT
Start: 2018-07-19 | End: 2019-07-09 | Stop reason: SDUPTHER

## 2018-07-19 NOTE — ASSESSMENT & PLAN NOTE
Essentially normal cholesterol profile through dietary modification  Continue to manage with diet alone  Repeat lipid profile in 6 months

## 2018-07-19 NOTE — ASSESSMENT & PLAN NOTE
Mild hypokalemia  Recommended the patient increase his potassium rich foods including bananas, oranges, dried fruits and orange juice  Repeat labs in 6 months    Hypokalemia is likely related to his chlorthalidone in his antihypertensive medication

## 2018-07-19 NOTE — ASSESSMENT & PLAN NOTE
Patient does follow up with sleep specialist   Presently using titrating CPAP anywhere from 6-12 cm of water

## 2018-07-19 NOTE — ASSESSMENT & PLAN NOTE
Well controlled on atenolol/chlorthalidone  Continue same  Refill provided  Re-evaluate in 6 months

## 2018-07-25 ENCOUNTER — OFFICE VISIT (OUTPATIENT)
Dept: UROLOGY | Facility: CLINIC | Age: 78
End: 2018-07-25
Payer: COMMERCIAL

## 2018-07-25 VITALS
DIASTOLIC BLOOD PRESSURE: 68 MMHG | HEART RATE: 74 BPM | WEIGHT: 169 LBS | BODY MASS INDEX: 25.03 KG/M2 | HEIGHT: 69 IN | SYSTOLIC BLOOD PRESSURE: 108 MMHG

## 2018-07-25 DIAGNOSIS — C61 PROSTATE CANCER (HCC): Primary | ICD-10-CM

## 2018-07-25 PROCEDURE — 99213 OFFICE O/P EST LOW 20 MIN: CPT | Performed by: PHYSICIAN ASSISTANT

## 2018-07-25 NOTE — PROGRESS NOTES
1  Prostate cancer ([facility] [address] )  PSA Total, Diagnostic       Assessment and plan:       1  Prostate cancer status post XRT (2010) -managed by Dr Gurmeet Deluna  - I was happy to review with the patient that his PSA remains stable  - follow-up in 1 year with a PSA prior to visit  - no refills needed on Cialis 20 mg at this time  - all questions answered  Louisa De La Rosa PA-C      Chief Complaint     F/u prostate cancer    History of Present Illness     Kilo Owusu is a 66 y o  male patient of Dr Gurmeet Deluna with a history of prostate cancer status post XRT (2010) presenting for 1 year follow-up  Patient's PSA remains stable at most recently 0 3 (07/10/2018)  Renal function remains stable at baseline at approximately 1 04  Patient states that urination remains absolutely stable  He feels like he has good stream, feels empty after urination, denies nocturia  Patient does note increase in bowel issues when he holds his bladder for long periods of time  He denies any dysuria, gross hematuria, suprapubic pressure, flank pain, bone pain, weight loss  Patient continues to use Cialis 20 mg as needed for sexual dysfunction  Denies any side effect profile  Denies any prolonged erections  Laboratory     Lab Results   Component Value Date    CREATININE 1 04 07/10/2018       Lab Results   Component Value Date    PSA 0 3 07/10/2018       Review of Systems     Review of Systems   Constitutional: Negative for activity change, appetite change, chills, diaphoresis, fatigue, fever and unexpected weight change  Respiratory: Negative for chest tightness and shortness of breath  Cardiovascular: Negative for chest pain, palpitations and leg swelling  Gastrointestinal: Negative for abdominal distention, abdominal pain, constipation, diarrhea, nausea and vomiting     Genitourinary: Negative for decreased urine volume, difficulty urinating, dysuria, enuresis, flank pain, frequency, genital sores, hematuria and urgency  Musculoskeletal: Negative for back pain, gait problem and myalgias  Skin: Negative for color change, pallor, rash and wound  Psychiatric/Behavioral: Negative for behavioral problems  The patient is not nervous/anxious  Allergies     Allergies   Allergen Reactions    Isoflavones        Physical Exam     Physical Exam   Constitutional: He is oriented to person, place, and time  He appears well-developed and well-nourished  No distress  HENT:   Head: Normocephalic and atraumatic  Eyes: Conjunctivae are normal    Neck: Normal range of motion  No tracheal deviation present  Pulmonary/Chest: Effort normal    Musculoskeletal: Normal range of motion  He exhibits no edema or deformity  Neurological: He is alert and oriented to person, place, and time  Skin: Skin is warm and dry  No rash noted  He is not diaphoretic  No erythema  Psychiatric: He has a normal mood and affect   His behavior is normal          Vital Signs     Vitals:    07/25/18 1111   BP: 108/68   BP Location: Left arm   Patient Position: Sitting   Cuff Size: Adult   Pulse: 74   Weight: 76 7 kg (169 lb)   Height: 5' 9" (1 753 m)         Current Medications       Current Outpatient Prescriptions:     atenolol-chlorthalidone (TENORETIC) 50-25 mg per tablet, Take 1 tablet by mouth daily, Disp: 90 tablet, Rfl: 3    Cholecalciferol (VITAMIN D3) 1000 UNIT/SPRAY LIQD, Take 1 tablet by mouth daily, Disp: , Rfl:     Omega-3-Acid Eth Est, Dietary, 1 g CAPS, Take by mouth, Disp: , Rfl:     tadalafil (CIALIS) 20 MG tablet, Take 1 tablet by mouth, Disp: , Rfl:       Active Problems     Patient Active Problem List   Diagnosis    Foot pain    ZAINAB (obstructive sleep apnea)    Hypokalemia    Benign essential hypertension    Hyperlipidemia    Impaired fasting glucose    Vitamin D deficiency         Past Medical History     Past Medical History:   Diagnosis Date    Actinic keratosis     Last assessed - 10/15/14    Hypokalemia Last assessed - 8/13/14    Sinus bradycardia     Last assessed - 8/13/14         Surgical History     Past Surgical History:   Procedure Laterality Date    HERNIA REPAIR      SIGMOIDOSCOPY      (Fiberoptic, Therapeutic) 7/28/2014, 8/25/14         Family History     Family History   Problem Relation Age of Onset    Prostate cancer Father     Liver cancer Brother         Adenocarcinoma          Social History     Social History       Radiology

## 2018-07-31 ENCOUNTER — OFFICE VISIT (OUTPATIENT)
Dept: SLEEP CENTER | Facility: CLINIC | Age: 78
End: 2018-07-31
Payer: COMMERCIAL

## 2018-07-31 VITALS
DIASTOLIC BLOOD PRESSURE: 62 MMHG | HEIGHT: 69 IN | SYSTOLIC BLOOD PRESSURE: 116 MMHG | BODY MASS INDEX: 25.18 KG/M2 | HEART RATE: 72 BPM | WEIGHT: 170 LBS

## 2018-07-31 DIAGNOSIS — G47.33 OSA (OBSTRUCTIVE SLEEP APNEA): Primary | ICD-10-CM

## 2018-07-31 PROCEDURE — 4040F PNEUMOC VAC/ADMIN/RCVD: CPT | Performed by: INTERNAL MEDICINE

## 2018-07-31 PROCEDURE — 99214 OFFICE O/P EST MOD 30 MIN: CPT | Performed by: INTERNAL MEDICINE

## 2018-07-31 NOTE — PROGRESS NOTES
Progress Note - Sleep Center   Ramone Vergara IML:[id number] MRN: [id number]      Reason for Visit:  66 y o male here for PAP compliance check    Assessment:  Doing well with new PAP device  Sleep quality is improved and the patient reports less daytime sleepiness  Compliance data show utilization for greater than or equal to 70% of nights for greater than or equal to 4 hours per night  Plan:  Change to CPAP 9 cm  Follow up: One year    History of Present Illness:  History of ZAINAB on PAP therapy  Fully compliant and deriving benefit  The patient's SIVAN = 8 7  He was somewhat uncomfortable on his initial pressure of 15 cm and was adjusted to APAP 6 to 15 cm  He feels that this pressure is also too high  When I reviewed the data it seemed that 9 cm may be the minimum sufficient pressure          Review of Systems      Genitourinary difficulty with erection   Cardiology none   Gastrointestinal none   Neurology difficulty with memory   Constitutional none   Integumentary none   Psychiatry none   Musculoskeletal joint pain   Pulmonary none   ENT none   Endocrine none   Hematological none         Historical Information    Past Medical History:   Past Medical History:   Diagnosis Date    Actinic keratosis     Last assessed - 10/15/14    Hypokalemia     Last assessed - 8/13/14    Sinus bradycardia     Last assessed - 8/13/14         Past Surgical History:   Past Surgical History:   Procedure Laterality Date    HERNIA REPAIR      SIGMOIDOSCOPY      (Fiberoptic, Therapeutic) 7/28/2014, 8/25/14             Family History:   Family History   Problem Relation Age of Onset    Prostate cancer Father     Liver cancer Brother         Adenocarcinoma        Medications/Allergies:      Current Outpatient Prescriptions:     atenolol-chlorthalidone (TENORETIC) 50-25 mg per tablet, Take 1 tablet by mouth daily, Disp: 90 tablet, Rfl: 3    Cholecalciferol (VITAMIN D3) 1000 UNIT/SPRAY LIQD, Take 1 tablet by mouth daily, Disp: , Rfl:     Omega-3-Acid Eth Est, Dietary, 1 g CAPS, Take by mouth, Disp: , Rfl:     tadalafil (CIALIS) 20 MG tablet, Take 1 tablet by mouth, Disp: , Rfl:       Objective    Vital Signs:   Vitals:    07/31/18 1500   BP: 116/62   Pulse: 72     [place] Sleepiness Scale: Total score: 8        Physical Exam:    General: Alert, appropriate, cooperative, overweight    Head: NC/AT    Skin: Warm, dry    Neuro: No motor abnormalities, cranial nerves appear intact    Extremity: No clubbing, cyanosis    PAP setting: To be changed to CPAP 9 cm  DME Provider: [facility]    Counseling / Coordination of Care  Total clinic time spent today 15 minutes  Greater than 50% of total time was spent with the patient and / or family counseling and / or coordination of care  A description of the counseling / coordination of care: Discussed equipment and response to treatment  JAMILA Meza    Board Certified Sleep Specialist

## 2019-01-04 ENCOUNTER — APPOINTMENT (OUTPATIENT)
Dept: LAB | Facility: CLINIC | Age: 79
End: 2019-01-04
Payer: COMMERCIAL

## 2019-01-04 DIAGNOSIS — E78.2 MIXED HYPERLIPIDEMIA: ICD-10-CM

## 2019-01-04 DIAGNOSIS — G47.33 OSA (OBSTRUCTIVE SLEEP APNEA): ICD-10-CM

## 2019-01-04 DIAGNOSIS — I10 BENIGN ESSENTIAL HYPERTENSION: ICD-10-CM

## 2019-01-04 DIAGNOSIS — E87.6 HYPOKALEMIA: ICD-10-CM

## 2019-01-04 LAB
ALBUMIN SERPL BCP-MCNC: 3.9 G/DL (ref 3.5–5)
ALP SERPL-CCNC: 50 U/L (ref 46–116)
ALT SERPL W P-5'-P-CCNC: 22 U/L (ref 12–78)
ANION GAP SERPL CALCULATED.3IONS-SCNC: 9 MMOL/L (ref 4–13)
AST SERPL W P-5'-P-CCNC: 17 U/L (ref 5–45)
BASOPHILS # BLD AUTO: 0.06 THOUSANDS/ΜL (ref 0–0.1)
BASOPHILS NFR BLD AUTO: 1 % (ref 0–1)
BILIRUB SERPL-MCNC: 0.62 MG/DL (ref 0.2–1)
BUN SERPL-MCNC: 23 MG/DL (ref 5–25)
CALCIUM SERPL-MCNC: 9 MG/DL (ref 8.3–10.1)
CHLORIDE SERPL-SCNC: 105 MMOL/L (ref 100–108)
CHOLEST SERPL-MCNC: 200 MG/DL (ref 50–200)
CO2 SERPL-SCNC: 27 MMOL/L (ref 21–32)
CREAT SERPL-MCNC: 1.01 MG/DL (ref 0.6–1.3)
EOSINOPHIL # BLD AUTO: 0.21 THOUSAND/ΜL (ref 0–0.61)
EOSINOPHIL NFR BLD AUTO: 5 % (ref 0–6)
ERYTHROCYTE [DISTWIDTH] IN BLOOD BY AUTOMATED COUNT: 13.2 % (ref 11.6–15.1)
GFR SERPL CREATININE-BSD FRML MDRD: 71 ML/MIN/1.73SQ M
GLUCOSE P FAST SERPL-MCNC: 88 MG/DL (ref 65–99)
HCT VFR BLD AUTO: 40.3 % (ref 36.5–49.3)
HDLC SERPL-MCNC: 55 MG/DL (ref 40–60)
HGB BLD-MCNC: 13.3 G/DL (ref 12–17)
IMM GRANULOCYTES # BLD AUTO: 0.01 THOUSAND/UL (ref 0–0.2)
IMM GRANULOCYTES NFR BLD AUTO: 0 % (ref 0–2)
LDLC SERPL CALC-MCNC: 132 MG/DL (ref 0–100)
LYMPHOCYTES # BLD AUTO: 1.32 THOUSANDS/ΜL (ref 0.6–4.47)
LYMPHOCYTES NFR BLD AUTO: 30 % (ref 14–44)
MCH RBC QN AUTO: 30.6 PG (ref 26.8–34.3)
MCHC RBC AUTO-ENTMCNC: 33 G/DL (ref 31.4–37.4)
MCV RBC AUTO: 93 FL (ref 82–98)
MONOCYTES # BLD AUTO: 0.56 THOUSAND/ΜL (ref 0.17–1.22)
MONOCYTES NFR BLD AUTO: 13 % (ref 4–12)
NEUTROPHILS # BLD AUTO: 2.32 THOUSANDS/ΜL (ref 1.85–7.62)
NEUTS SEG NFR BLD AUTO: 51 % (ref 43–75)
NONHDLC SERPL-MCNC: 145 MG/DL
NRBC BLD AUTO-RTO: 0 /100 WBCS
PLATELET # BLD AUTO: 225 THOUSANDS/UL (ref 149–390)
PMV BLD AUTO: 9.9 FL (ref 8.9–12.7)
POTASSIUM SERPL-SCNC: 3.1 MMOL/L (ref 3.5–5.3)
PROT SERPL-MCNC: 7 G/DL (ref 6.4–8.2)
RBC # BLD AUTO: 4.35 MILLION/UL (ref 3.88–5.62)
SODIUM SERPL-SCNC: 141 MMOL/L (ref 136–145)
TRIGL SERPL-MCNC: 65 MG/DL
TSH SERPL DL<=0.05 MIU/L-ACNC: 1.75 UIU/ML (ref 0.36–3.74)
WBC # BLD AUTO: 4.48 THOUSAND/UL (ref 4.31–10.16)

## 2019-01-04 PROCEDURE — 84443 ASSAY THYROID STIM HORMONE: CPT

## 2019-01-04 PROCEDURE — 36415 COLL VENOUS BLD VENIPUNCTURE: CPT

## 2019-01-04 PROCEDURE — 80061 LIPID PANEL: CPT

## 2019-01-04 PROCEDURE — 85025 COMPLETE CBC W/AUTO DIFF WBC: CPT

## 2019-01-04 PROCEDURE — 80053 COMPREHEN METABOLIC PANEL: CPT

## 2019-01-08 ENCOUNTER — OFFICE VISIT (OUTPATIENT)
Dept: FAMILY MEDICINE CLINIC | Facility: CLINIC | Age: 79
End: 2019-01-08
Payer: COMMERCIAL

## 2019-01-08 VITALS
WEIGHT: 171.2 LBS | HEIGHT: 69 IN | SYSTOLIC BLOOD PRESSURE: 110 MMHG | BODY MASS INDEX: 25.36 KG/M2 | RESPIRATION RATE: 16 BRPM | DIASTOLIC BLOOD PRESSURE: 66 MMHG | HEART RATE: 57 BPM | OXYGEN SATURATION: 97 %

## 2019-01-08 DIAGNOSIS — E55.9 VITAMIN D DEFICIENCY: ICD-10-CM

## 2019-01-08 DIAGNOSIS — E78.2 MIXED HYPERLIPIDEMIA: ICD-10-CM

## 2019-01-08 DIAGNOSIS — I10 BENIGN ESSENTIAL HYPERTENSION: ICD-10-CM

## 2019-01-08 DIAGNOSIS — E87.6 HYPOKALEMIA: ICD-10-CM

## 2019-01-08 DIAGNOSIS — Z00.00 MEDICARE ANNUAL WELLNESS VISIT, SUBSEQUENT: Primary | ICD-10-CM

## 2019-01-08 DIAGNOSIS — M62.81 QUADRICEPS WEAKNESS: ICD-10-CM

## 2019-01-08 PROBLEM — M79.673 FOOT PAIN: Status: RESOLVED | Noted: 2018-04-17 | Resolved: 2019-01-08

## 2019-01-08 PROCEDURE — 99213 OFFICE O/P EST LOW 20 MIN: CPT | Performed by: FAMILY MEDICINE

## 2019-01-08 PROCEDURE — G0439 PPPS, SUBSEQ VISIT: HCPCS | Performed by: FAMILY MEDICINE

## 2019-01-08 PROCEDURE — 1170F FXNL STATUS ASSESSED: CPT | Performed by: FAMILY MEDICINE

## 2019-01-08 PROCEDURE — 1125F AMNT PAIN NOTED PAIN PRSNT: CPT | Performed by: FAMILY MEDICINE

## 2019-01-08 RX ORDER — POTASSIUM CHLORIDE 750 MG/1
10 CAPSULE, EXTENDED RELEASE ORAL 2 TIMES DAILY
Refills: 0 | Status: CANCELLED | OUTPATIENT
Start: 2019-01-08

## 2019-01-08 NOTE — PROGRESS NOTES
Assessment and Plan:    Problem List Items Addressed This Visit     None        Health Maintenance Due   Topic Date Due    Medicare Annual Wellness Visit (AWV)  1940    SLP PLAN OF CARE  1940         HPI:  Isis Weldon is a 66 y o  male here for his Subsequent Wellness Visit  Patient Active Problem List   Diagnosis    Foot pain    ZAINAB (obstructive sleep apnea)    Hypokalemia    Benign essential hypertension    Hyperlipidemia    Impaired fasting glucose    Vitamin D deficiency     Past Medical History:   Diagnosis Date    Actinic keratosis     Last assessed - 10/15/14    Hypokalemia     Last assessed - 8/13/14    Sinus bradycardia     Last assessed - 8/13/14     Past Surgical History:   Procedure Laterality Date    HERNIA REPAIR      SIGMOIDOSCOPY      (Fiberoptic, Therapeutic) 7/28/2014, 8/25/14     Family History   Problem Relation Age of Onset    Prostate cancer Father     Liver cancer Brother         Adenocarcinoma      History   Smoking Status    Never Smoker   Smokeless Tobacco    Never Used     Comment: Former smoker per [employer]      History   Alcohol Use No     Comment: Seldomly per [employer]       History   Drug Use No       Current Outpatient Prescriptions   Medication Sig Dispense Refill    atenolol-chlorthalidone (TENORETIC) 50-25 mg per tablet Take 1 tablet by mouth daily 90 tablet 3    Cholecalciferol (VITAMIN D3) 1000 UNIT/SPRAY LIQD Take 1 tablet by mouth daily      Coenzyme Q10 (COQ-10) 10 MG CAPS Take 1 capsule by mouth daily      Omega-3-Acid Eth Est, Dietary, 1 g CAPS Take by mouth      POTASSIUM PO Take by mouth      tadalafil (CIALIS) 20 MG tablet Take 1 tablet by mouth       No current facility-administered medications for this visit        Allergies   Allergen Reactions    Isoflavones      Immunization History   Administered Date(s) Administered    Influenza 10/20/2015, 09/13/2016, 10/03/2016, 10/17/2017, 09/22/2018, 11/09/2018    Influenza Split High Dose Preservative Free IM 10/20/2015    Influenza TIV (IM) 10/03/2016    Pneumococcal Conjugate 13-Valent 12/18/2015    Pneumococcal Polysaccharide PPV23 02/12/2014, 10/03/2016, 03/27/2018    Tdap 01/01/2006, 10/17/2017    Zoster 08/02/2016    Zoster Vaccine Recombinant 07/03/2018, 11/09/2018       Patient Care Team:  Cristin Draper DO as PCP - General  MD Jose M Raymundo MD (Dermatology)    Medicare Screening Tests and Risk Assessments:  Yolanda Richardson is here for his Subsequent Wellness visit  Last Medicare Wellness visit information reviewed, patient interviewed, no change since last AWV  Health Risk Assessment:  Patient rates overall health as very good  Patient feels that their physical health rating is Same  Eyesight was rated as Same  Hearing was rated as Slightly worse  Patient feels that their emotional and mental health rating is Same  Pain experienced by patient in the last 7 days has been Some  Patient's pain rating has been 4/10  Emotional/Mental Health:  Patient has been feeling nervous/anxious  PHQ-9 Depression Screening:    Frequency of the following problems over the past two weeks:      1  Little interest or pleasure in doing things: 0 - not at all      2  Feeling down, depressed, or hopeless: 0 - not at all  PHQ-2 Score: 0          Broken Bones/Falls: Fall Risk Assessment:    In the past year, patient has experienced: No history of falling in past year          Bladder/Bowel:  Patient has not leaked urine accidently in the last six months  Patient reports no loss of bowel control  Immunizations:  Patient has had a flu vaccination within the last year  Patient has received a pneumonia shot  Patient has received a shingles shot  Patient has received tetanus/diphtheria shot  Home Safety:  Patient has trouble with stairs inside or outside of their home     Patient currently reports that there are no safety hazards present in home, working smoke alarms, no working carbon monoxide detectors  Preventative Screenings:   no colon cancer screen completed, cholesterol screen completed, 1/4/2019  glaucoma eye exam completed,     Nutrition:  Current diet: Regular and Limited junk food with servings of the following:    Medications:  Patient is currently taking over-the-counter supplements  Patient is able to manage medications  Lifestyle Choices:  Patient reports no tobacco use  Patient has not smoked or used tobacco in the past   Patient reports no alcohol use  Patient drives a vehicle  Patient wears seat belt  Current level of exercise of physical activity described by patient as: good  Activities of Daily Living:  Can get out of bed by his or her self, able to dress self, able to make own meals, able to do own shopping, able to bathe self, can do own laundry/housekeeping, can manage own money, pay bills and track expenses    Previous Hospitalizations:  No hospitalization or ED visit in past 12 months        Advanced Directives:  Patient has decided on a power of   Patient has spoken to designated power of   Patient has completed advanced directive          Preventative Screening/Counseling:      Cardiovascular:      General: Screening Current      Counseling: Healthy Diet, Healthy Weight and Improve Exercise Tolerance          Diabetes:      General: Screening Current and Risks and Benefits Discussed      Counseling: Healthy Diet and Healthy Weight          Colorectal Cancer:      General: Screening Current      Comments: Dr Tiffanie Deluna        Prostate Cancer:      General: Screening Current      Comments: Dr Rich Mccormack        Osteoporosis:      General: Screening Not Indicated          AAA:      General: Screening Current          Glaucoma:      General: Screening Current          HIV:      General: Screening Not Indicated          Hepatitis C:      General: Screening Not Indicated        Advanced Directives:   Patient has living will for healthcare, has durable POA for healthcare, patient has an advanced directive  Information on ACP and/or AD not provided  No 5 wishes given  End of life assessment reviewed with patient  Provider agrees with end of life decisions

## 2019-01-09 PROBLEM — Z00.00 MEDICARE ANNUAL WELLNESS VISIT, SUBSEQUENT: Status: ACTIVE | Noted: 2019-01-09

## 2019-01-09 NOTE — ASSESSMENT & PLAN NOTE
Mild hypokalemia  Recommended the patient increase his potassium rich foods including bananas, oranges, dried fruits and orange juice  Double his dose of over-the-counter potassium supplement  Repeat labs in 6 months    Hypokalemia is likely related to his chlorthalidone in his antihypertensive medication

## 2019-01-09 NOTE — PROGRESS NOTES
Subjective:      Patient ID: Diego Sebastian is a 66 y o  male  Patient presents with his wife for subsequent annual wellness visit and follow-up of chronic conditions  Patient does have history of chronic migraine headaches and hypertension for which she does take atenolol/hydrochlorothiazide  Patient is physically active as a transport at [employer]  Patient can walk upwards of 6 miles per day  Patient does complain of some decrease in strength in his right leg  He does have difficulty when going up stairs  He is not walking as many stairs as he once did  He would like to see physical therapy in regards to this  No falling  Patient does follow up with Urology in regards to his history of prostate cancer  He is current with screening colonoscopy  Labs were reviewed with patient  While we decreased potassium  Patient does have over-the-counter potassium supplement at home that he will start taking        Past Medical History:   Diagnosis Date    Actinic keratosis     Last assessed - 10/15/14    Hypokalemia     Last assessed - 8/13/14    Sinus bradycardia     Last assessed - 8/13/14       Family History   Problem Relation Age of Onset    Prostate cancer Father     Liver cancer Brother         Adenocarcinoma        Past Surgical History:   Procedure Laterality Date    HERNIA REPAIR      SIGMOIDOSCOPY      (Fiberoptic, Therapeutic) 7/28/2014, 8/25/14        reports that he has never smoked  He has never used smokeless tobacco  He reports that he does not drink alcohol or use drugs        Current Outpatient Prescriptions:     atenolol-chlorthalidone (TENORETIC) 50-25 mg per tablet, Take 1 tablet by mouth daily, Disp: 90 tablet, Rfl: 3    Cholecalciferol (VITAMIN D3) 1000 UNIT/SPRAY LIQD, Take 1 tablet by mouth daily, Disp: , Rfl:     Coenzyme Q10 (COQ-10) 10 MG CAPS, Take 1 capsule by mouth daily, Disp: , Rfl:     Omega-3-Acid Eth Est, Dietary, 1 g CAPS, Take by mouth, Disp: , Rfl:   tadalafil (CIALIS) 20 MG tablet, Take 1 tablet by mouth, Disp: , Rfl:     The following portions of the patient's history were reviewed and updated as appropriate: allergies, current medications, past family history, past medical history, past social history, past surgical history and problem list     Review of Systems   HENT: Negative  Eyes: Negative  Respiratory: Negative  Cardiovascular: Negative  Gastrointestinal: Negative  Endocrine: Negative  Genitourinary: Negative  Musculoskeletal: Negative  Skin: Negative  Negative for rash  Allergic/Immunologic: Negative  Neurological: Positive for weakness (Right leg)  Negative for headaches  Hematological: Negative  Psychiatric/Behavioral: Negative  All other systems reviewed and are negative  Objective:    /66   Pulse 57   Resp 16   Ht 5' 9" (1 753 m)   Wt 77 7 kg (171 lb 3 2 oz)   SpO2 97%   BMI 25 28 kg/m²      Physical Exam   Constitutional: He is oriented to person, place, and time  He appears well-developed and well-nourished  HENT:   Head: Normocephalic  Eyes: Pupils are equal, round, and reactive to light  Conjunctivae and EOM are normal    Neck: Normal range of motion  Neck supple  Cardiovascular: Normal rate, regular rhythm and normal heart sounds  Pulmonary/Chest: Effort normal and breath sounds normal    Abdominal: Soft  Bowel sounds are normal    Musculoskeletal: Normal range of motion  Neurological: He is alert and oriented to person, place, and time  He has normal reflexes  He displays normal reflexes  He exhibits normal muscle tone  Psychiatric: He has a normal mood and affect  His behavior is normal  Judgment and thought content normal    Nursing note and vitals reviewed          Recent Results (from the past 1008 hour(s))   CBC and differential    Collection Time: 01/04/19  7:36 AM   Result Value Ref Range    WBC 4 48 4 31 - 10 16 Thousand/uL    RBC 4 35 3 88 - 5 62 Million/uL Hemoglobin 13 3 12 0 - 17 0 g/dL    Hematocrit 40 3 36 5 - 49 3 %    MCV 93 82 - 98 fL    MCH 30 6 26 8 - 34 3 pg    MCHC 33 0 31 4 - 37 4 g/dL    RDW 13 2 11 6 - 15 1 %    MPV 9 9 8 9 - 12 7 fL    Platelets 440 139 - 329 Thousands/uL    nRBC 0 /100 WBCs    Neutrophils Relative 51 43 - 75 %    Immat GRANS % 0 0 - 2 %    Lymphocytes Relative 30 14 - 44 %    Monocytes Relative 13 (H) 4 - 12 %    Eosinophils Relative 5 0 - 6 %    Basophils Relative 1 0 - 1 %    Neutrophils Absolute 2 32 1 85 - 7 62 Thousands/µL    Immature Grans Absolute 0 01 0 00 - 0 20 Thousand/uL    Lymphocytes Absolute 1 32 0 60 - 4 47 Thousands/µL    Monocytes Absolute 0 56 0 17 - 1 22 Thousand/µL    Eosinophils Absolute 0 21 0 00 - 0 61 Thousand/µL    Basophils Absolute 0 06 0 00 - 0 10 Thousands/µL   Comprehensive metabolic panel    Collection Time: 01/04/19  7:36 AM   Result Value Ref Range    Sodium 141 136 - 145 mmol/L    Potassium 3 1 (L) 3 5 - 5 3 mmol/L    Chloride 105 100 - 108 mmol/L    CO2 27 21 - 32 mmol/L    ANION GAP 9 4 - 13 mmol/L    BUN 23 5 - 25 mg/dL    Creatinine 1 01 0 60 - 1 30 mg/dL    Glucose, Fasting 88 65 - 99 mg/dL    Calcium 9 0 8 3 - 10 1 mg/dL    AST 17 5 - 45 U/L    ALT 22 12 - 78 U/L    Alkaline Phosphatase 50 46 - 116 U/L    Total Protein 7 0 6 4 - 8 2 g/dL    Albumin 3 9 3 5 - 5 0 g/dL    Total Bilirubin 0 62 0 20 - 1 00 mg/dL    eGFR 71 ml/min/1 73sq m   Lipid panel    Collection Time: 01/04/19  7:36 AM   Result Value Ref Range    Cholesterol 200 50 - 200 mg/dL    Triglycerides 65 <=150 mg/dL    HDL, Direct 55 40 - 60 mg/dL    LDL Calculated 132 (H) 0 - 100 mg/dL    Non-HDL-Chol (CHOL-HDL) 145 mg/dl   TSH, 3rd generation with Free T4 reflex    Collection Time: 01/04/19  7:36 AM   Result Value Ref Range    TSH 3RD GENERATON 1 750 0 358 - 3 740 uIU/mL       Assessment/Plan:    Medicare annual wellness visit, subsequent  Subsequent annual wellness visit completed    Hypokalemia   Mild hypokalemia    Recommended the patient increase his potassium rich foods including bananas, oranges, dried fruits and orange juice  Double his dose of over-the-counter potassium supplement  Repeat labs in 6 months  Hypokalemia is likely related to his chlorthalidone in his antihypertensive medication    Hyperlipidemia    Essentially normal cholesterol profile through dietary modification  Continue to manage with diet alone  Repeat lipid profile in 6 months  Benign essential hypertension   Well controlled on atenolol/chlorthalidone  Continue same  Refill provided  Re-evaluate in 6 months  Quadriceps weakness  Referral to physical therapy for strengthening and evaluation  Patient does not have any significant neurologic deficit nor does he have significant weakness of his right lower extremity  Vitamin D deficiency  Patient should make certain that he is taking vitamin-D supplementation  Very fair complexion  He does avoid sunlight due to his history of skin cancer  Check vitamin-D level in 6 months          Problem List Items Addressed This Visit     Hypokalemia      Mild hypokalemia  Recommended the patient increase his potassium rich foods including bananas, oranges, dried fruits and orange juice  Double his dose of over-the-counter potassium supplement  Repeat labs in 6 months  Hypokalemia is likely related to his chlorthalidone in his antihypertensive medication         Benign essential hypertension      Well controlled on atenolol/chlorthalidone  Continue same  Refill provided  Re-evaluate in 6 months  Relevant Orders    CBC and differential    TSH, 3rd generation with Free T4 reflex    Hyperlipidemia       Essentially normal cholesterol profile through dietary modification  Continue to manage with diet alone  Repeat lipid profile in 6 months           Relevant Orders    Comprehensive metabolic panel    Lipid panel    Vitamin D deficiency     Patient should make certain that he is taking vitamin-D supplementation  Very fair complexion  He does avoid sunlight due to his history of skin cancer  Check vitamin-D level in 6 months         Relevant Orders    Vitamin D 1,25 dihydroxy    Quadriceps weakness     Referral to physical therapy for strengthening and evaluation  Patient does not have any significant neurologic deficit nor does he have significant weakness of his right lower extremity           Relevant Orders    Ambulatory referral to Physical Therapy    Medicare annual wellness visit, subsequent - Primary     Subsequent annual wellness visit completed

## 2019-01-09 NOTE — ASSESSMENT & PLAN NOTE
Patient should make certain that he is taking vitamin-D supplementation  Very fair complexion  He does avoid sunlight due to his history of skin cancer    Check vitamin-D level in 6 months

## 2019-01-09 NOTE — ASSESSMENT & PLAN NOTE
Referral to physical therapy for strengthening and evaluation  Patient does not have any significant neurologic deficit nor does he have significant weakness of his right lower extremity

## 2019-03-05 ENCOUNTER — OFFICE VISIT (OUTPATIENT)
Dept: FAMILY MEDICINE CLINIC | Facility: CLINIC | Age: 79
End: 2019-03-05
Payer: COMMERCIAL

## 2019-03-05 VITALS
OXYGEN SATURATION: 97 % | RESPIRATION RATE: 16 BRPM | WEIGHT: 170 LBS | HEART RATE: 58 BPM | DIASTOLIC BLOOD PRESSURE: 86 MMHG | HEIGHT: 69 IN | BODY MASS INDEX: 25.18 KG/M2 | SYSTOLIC BLOOD PRESSURE: 138 MMHG

## 2019-03-05 DIAGNOSIS — I10 BENIGN ESSENTIAL HYPERTENSION: Primary | ICD-10-CM

## 2019-03-05 DIAGNOSIS — N52.9 ERECTILE DYSFUNCTION, UNSPECIFIED ERECTILE DYSFUNCTION TYPE: ICD-10-CM

## 2019-03-05 PROCEDURE — 3075F SYST BP GE 130 - 139MM HG: CPT | Performed by: FAMILY MEDICINE

## 2019-03-05 PROCEDURE — 99213 OFFICE O/P EST LOW 20 MIN: CPT | Performed by: FAMILY MEDICINE

## 2019-03-05 PROCEDURE — 3079F DIAST BP 80-89 MM HG: CPT | Performed by: FAMILY MEDICINE

## 2019-03-05 PROCEDURE — 1160F RVW MEDS BY RX/DR IN RCRD: CPT | Performed by: FAMILY MEDICINE

## 2019-03-05 RX ORDER — TADALAFIL 20 MG/1
20 TABLET ORAL DAILY PRN
Qty: 32 TABLET | Refills: 3 | Status: SHIPPED | OUTPATIENT
Start: 2019-03-05 | End: 2020-07-24 | Stop reason: SDUPTHER

## 2019-03-05 RX ORDER — TADALAFIL 20 MG/1
20 TABLET ORAL DAILY PRN
Qty: 32 TABLET | Refills: 3 | Status: SHIPPED | OUTPATIENT
Start: 2019-03-05 | End: 2019-03-05 | Stop reason: SDUPTHER

## 2019-03-05 NOTE — PROGRESS NOTES
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  Patient's blood pressure readings on his home monitor were elevated  He did bring in copies of his readings as well as his machine to be calibrated  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, he did have his blood pressure checked at the hospital which was actually normal   When the patient did notice his blood pressures being elevated he stopped adding salt to his food  No chest pain, slight low level headache when his blood pressure was significantly elevated  Patient otherwise feels fine      Past Medical History:   Diagnosis Date    Actinic keratosis     Last assessed - 10/15/14    Hypertension     Hypokalemia     Last assessed - 8/13/14    Sinus bradycardia     Last assessed - 8/13/14       Family History   Problem Relation Age of Onset    Prostate cancer Father     Liver cancer Brother         Adenocarcinoma        Past Surgical History:   Procedure Laterality Date    HERNIA REPAIR      SIGMOIDOSCOPY      (Fiberoptic, Therapeutic) 7/28/2014, 8/25/14        reports that he has never smoked  He has never used smokeless tobacco  He reports that he does not drink alcohol or use drugs        Current Outpatient Medications:     atenolol-chlorthalidone (TENORETIC) 50-25 mg per tablet, Take 1 tablet by mouth daily, Disp: 90 tablet, Rfl: 3    Cholecalciferol (VITAMIN D3) 1000 UNIT/SPRAY LIQD, Take 1 tablet by mouth daily, Disp: , Rfl:     Coenzyme Q10 (COQ-10) 10 MG CAPS, Take 1 capsule by mouth daily, Disp: , Rfl:     Omega-3-Acid Eth Est, Dietary, 1 g CAPS, Take by mouth, Disp: , Rfl:     tadalafil (CIALIS) 20 MG tablet, Take 1 tablet (20 mg total) by mouth daily as needed for erectile dysfunction, Disp: 32 tablet, Rfl: 3    The following portions of the patient's history were reviewed and updated as appropriate: allergies, current medications, past family history, past medical history, past social history, past surgical history and problem list     Review of Systems   Constitutional: Negative  HENT: Negative  Eyes: Negative  Respiratory: Negative  Cardiovascular: Negative  Gastrointestinal: Negative  Endocrine: Negative  Genitourinary: Negative  Musculoskeletal: Negative  Skin: Negative  Allergic/Immunologic: Negative  Neurological: Positive for headaches  Hematological: Negative  Psychiatric/Behavioral: Negative  All other systems reviewed and are negative  Objective:    /86   Pulse 58   Resp 16   Ht 5' 9" (1 753 m)   Wt 77 1 kg (170 lb)   SpO2 97%   BMI 25 10 kg/m²      Physical Exam   Constitutional: He is oriented to person, place, and time  He appears well-developed and well-nourished  HENT:   Head: Normocephalic  Eyes: Pupils are equal, round, and reactive to light  Conjunctivae and EOM are normal    Neck: Normal range of motion  Neck supple  Cardiovascular: Normal rate, regular rhythm and normal heart sounds  Musculoskeletal: Normal range of motion  Neurological: He is alert and oriented to person, place, and time  Psychiatric: He has a normal mood and affect  His behavior is normal  Judgment and thought content normal    Nursing note and vitals reviewed  No results found for this or any previous visit (from the past 1008 hour(s))      Assessment/Plan:    Benign essential hypertension  Patient has been on atenolol/chlorthalidone for close to 30 years  His blood pressure has been very well controlled in the past   His blood pressure was checked here in the office and he did have normal blood pressure readings  I a repeat calibrated his home sphygmomanometer and showed both the patient and his wife how to recalibrate the device  The device was actually reading higher than normal   That is technology  Patient will follow up as regularly scheduled in July  Patient will continue on same dosage of Tenoretic which he has been on for the last 30 years          Problem List Items Addressed This Visit        Cardiovascular and Mediastinum    Benign essential hypertension - Primary     Patient has been on atenolol/chlorthalidone for close to 30 years  His blood pressure has been very well controlled in the past   His blood pressure was checked here in the office and he did have normal blood pressure readings  I a repeat calibrated his home sphygmomanometer and showed both the patient and his wife how to recalibrate the device  The device was actually reading higher than normal   That is technology  Patient will follow up as regularly scheduled in July    Patient will continue on same dosage of Tenoretic which he has been on for the last 30 years           Other Visit Diagnoses     Erectile dysfunction, unspecified erectile dysfunction type        Relevant Medications    tadalafil (CIALIS) 20 MG tablet

## 2019-03-06 NOTE — ASSESSMENT & PLAN NOTE
Patient has been on atenolol/chlorthalidone for close to 30 years  His blood pressure has been very well controlled in the past   His blood pressure was checked here in the office and he did have normal blood pressure readings  I a repeat calibrated his home sphygmomanometer and showed both the patient and his wife how to recalibrate the device  The device was actually reading higher than normal   That is technology  Patient will follow up as regularly scheduled in July    Patient will continue on same dosage of Tenoretic which he has been on for the last 30 years

## 2019-04-08 ENCOUNTER — OFFICE VISIT (OUTPATIENT)
Dept: URGENT CARE | Facility: CLINIC | Age: 79
End: 2019-04-08
Payer: COMMERCIAL

## 2019-04-08 VITALS
TEMPERATURE: 98.1 F | WEIGHT: 170 LBS | RESPIRATION RATE: 16 BRPM | OXYGEN SATURATION: 100 % | DIASTOLIC BLOOD PRESSURE: 72 MMHG | SYSTOLIC BLOOD PRESSURE: 136 MMHG | HEART RATE: 65 BPM | HEIGHT: 69 IN | BODY MASS INDEX: 25.18 KG/M2

## 2019-04-08 DIAGNOSIS — R05.9 COUGH: ICD-10-CM

## 2019-04-08 DIAGNOSIS — R19.7 DIARRHEA, UNSPECIFIED TYPE: Primary | ICD-10-CM

## 2019-04-08 PROCEDURE — 99213 OFFICE O/P EST LOW 20 MIN: CPT | Performed by: PHYSICIAN ASSISTANT

## 2019-04-08 PROCEDURE — S9088 SERVICES PROVIDED IN URGENT: HCPCS | Performed by: PHYSICIAN ASSISTANT

## 2019-04-08 RX ORDER — BENZONATATE 100 MG/1
100 CAPSULE ORAL 3 TIMES DAILY PRN
Qty: 30 CAPSULE | Refills: 0 | Status: SHIPPED | OUTPATIENT
Start: 2019-04-08 | End: 2019-07-09

## 2019-07-03 ENCOUNTER — TRANSCRIBE ORDERS (OUTPATIENT)
Dept: ADMINISTRATIVE | Facility: HOSPITAL | Age: 79
End: 2019-07-03

## 2019-07-03 ENCOUNTER — APPOINTMENT (OUTPATIENT)
Dept: LAB | Facility: CLINIC | Age: 79
End: 2019-07-03
Payer: COMMERCIAL

## 2019-07-03 DIAGNOSIS — E55.9 VITAMIN D DEFICIENCY: ICD-10-CM

## 2019-07-03 DIAGNOSIS — I10 BENIGN ESSENTIAL HYPERTENSION: ICD-10-CM

## 2019-07-03 DIAGNOSIS — E78.2 MIXED HYPERLIPIDEMIA: ICD-10-CM

## 2019-07-03 DIAGNOSIS — C61 PROSTATE CANCER (HCC): ICD-10-CM

## 2019-07-03 LAB
ALBUMIN SERPL BCP-MCNC: 3.9 G/DL (ref 3.5–5)
ALP SERPL-CCNC: 52 U/L (ref 46–116)
ALT SERPL W P-5'-P-CCNC: 23 U/L (ref 12–78)
ANION GAP SERPL CALCULATED.3IONS-SCNC: 9 MMOL/L (ref 4–13)
AST SERPL W P-5'-P-CCNC: 15 U/L (ref 5–45)
BASOPHILS # BLD AUTO: 0.05 THOUSANDS/ΜL (ref 0–0.1)
BASOPHILS NFR BLD AUTO: 1 % (ref 0–1)
BILIRUB SERPL-MCNC: 0.71 MG/DL (ref 0.2–1)
BUN SERPL-MCNC: 22 MG/DL (ref 5–25)
CALCIUM SERPL-MCNC: 8.8 MG/DL (ref 8.3–10.1)
CHLORIDE SERPL-SCNC: 104 MMOL/L (ref 100–108)
CHOLEST SERPL-MCNC: 197 MG/DL (ref 50–200)
CO2 SERPL-SCNC: 29 MMOL/L (ref 21–32)
CREAT SERPL-MCNC: 1.06 MG/DL (ref 0.6–1.3)
EOSINOPHIL # BLD AUTO: 0.15 THOUSAND/ΜL (ref 0–0.61)
EOSINOPHIL NFR BLD AUTO: 3 % (ref 0–6)
ERYTHROCYTE [DISTWIDTH] IN BLOOD BY AUTOMATED COUNT: 13.3 % (ref 11.6–15.1)
GFR SERPL CREATININE-BSD FRML MDRD: 66 ML/MIN/1.73SQ M
GLUCOSE P FAST SERPL-MCNC: 92 MG/DL (ref 65–99)
HCT VFR BLD AUTO: 41.2 % (ref 36.5–49.3)
HDLC SERPL-MCNC: 55 MG/DL (ref 40–60)
HGB BLD-MCNC: 13.9 G/DL (ref 12–17)
IMM GRANULOCYTES # BLD AUTO: 0.01 THOUSAND/UL (ref 0–0.2)
IMM GRANULOCYTES NFR BLD AUTO: 0 % (ref 0–2)
LDLC SERPL CALC-MCNC: 128 MG/DL (ref 0–100)
LYMPHOCYTES # BLD AUTO: 1.21 THOUSANDS/ΜL (ref 0.6–4.47)
LYMPHOCYTES NFR BLD AUTO: 27 % (ref 14–44)
MCH RBC QN AUTO: 30.8 PG (ref 26.8–34.3)
MCHC RBC AUTO-ENTMCNC: 33.7 G/DL (ref 31.4–37.4)
MCV RBC AUTO: 91 FL (ref 82–98)
MONOCYTES # BLD AUTO: 0.49 THOUSAND/ΜL (ref 0.17–1.22)
MONOCYTES NFR BLD AUTO: 11 % (ref 4–12)
NEUTROPHILS # BLD AUTO: 2.6 THOUSANDS/ΜL (ref 1.85–7.62)
NEUTS SEG NFR BLD AUTO: 58 % (ref 43–75)
NONHDLC SERPL-MCNC: 142 MG/DL
NRBC BLD AUTO-RTO: 0 /100 WBCS
PLATELET # BLD AUTO: 232 THOUSANDS/UL (ref 149–390)
PMV BLD AUTO: 10.3 FL (ref 8.9–12.7)
POTASSIUM SERPL-SCNC: 3 MMOL/L (ref 3.5–5.3)
PROT SERPL-MCNC: 7 G/DL (ref 6.4–8.2)
PSA SERPL-MCNC: 0.4 NG/ML (ref 0–4)
RBC # BLD AUTO: 4.51 MILLION/UL (ref 3.88–5.62)
SODIUM SERPL-SCNC: 142 MMOL/L (ref 136–145)
TRIGL SERPL-MCNC: 69 MG/DL
TSH SERPL DL<=0.05 MIU/L-ACNC: 1.48 UIU/ML (ref 0.36–3.74)
WBC # BLD AUTO: 4.51 THOUSAND/UL (ref 4.31–10.16)

## 2019-07-03 PROCEDURE — 84153 ASSAY OF PSA TOTAL: CPT

## 2019-07-03 PROCEDURE — 80053 COMPREHEN METABOLIC PANEL: CPT

## 2019-07-03 PROCEDURE — 82652 VIT D 1 25-DIHYDROXY: CPT

## 2019-07-03 PROCEDURE — 85025 COMPLETE CBC W/AUTO DIFF WBC: CPT

## 2019-07-03 PROCEDURE — 36415 COLL VENOUS BLD VENIPUNCTURE: CPT

## 2019-07-03 PROCEDURE — 84443 ASSAY THYROID STIM HORMONE: CPT

## 2019-07-03 PROCEDURE — 80061 LIPID PANEL: CPT

## 2019-07-04 LAB — 1,25(OH)2D3 SERPL-MCNC: 28.2 PG/ML (ref 19.9–79.3)

## 2019-07-09 ENCOUNTER — OFFICE VISIT (OUTPATIENT)
Dept: FAMILY MEDICINE CLINIC | Facility: CLINIC | Age: 79
End: 2019-07-09
Payer: COMMERCIAL

## 2019-07-09 VITALS
RESPIRATION RATE: 14 BRPM | HEART RATE: 52 BPM | SYSTOLIC BLOOD PRESSURE: 128 MMHG | BODY MASS INDEX: 25.33 KG/M2 | DIASTOLIC BLOOD PRESSURE: 62 MMHG | HEIGHT: 69 IN | OXYGEN SATURATION: 98 % | WEIGHT: 171 LBS

## 2019-07-09 DIAGNOSIS — R00.1 SINUS BRADYCARDIA: ICD-10-CM

## 2019-07-09 DIAGNOSIS — E78.2 MIXED HYPERLIPIDEMIA: ICD-10-CM

## 2019-07-09 DIAGNOSIS — I10 BENIGN ESSENTIAL HYPERTENSION: ICD-10-CM

## 2019-07-09 DIAGNOSIS — G47.33 OSA (OBSTRUCTIVE SLEEP APNEA): ICD-10-CM

## 2019-07-09 DIAGNOSIS — E87.6 HYPOKALEMIA: Primary | ICD-10-CM

## 2019-07-09 DIAGNOSIS — E55.9 VITAMIN D DEFICIENCY: ICD-10-CM

## 2019-07-09 PROBLEM — M62.81 QUADRICEPS WEAKNESS: Status: RESOLVED | Noted: 2019-01-08 | Resolved: 2019-07-09

## 2019-07-09 PROCEDURE — 3725F SCREEN DEPRESSION PERFORMED: CPT | Performed by: FAMILY MEDICINE

## 2019-07-09 PROCEDURE — 99214 OFFICE O/P EST MOD 30 MIN: CPT | Performed by: FAMILY MEDICINE

## 2019-07-09 RX ORDER — ATENOLOL AND CHLORTHALIDONE TABLET 50; 25 MG/1; MG/1
1 TABLET ORAL DAILY
Qty: 90 TABLET | Refills: 3 | Status: SHIPPED | OUTPATIENT
Start: 2019-07-09 | End: 2019-07-23

## 2019-07-09 NOTE — PROGRESS NOTES
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[facility] and is able to walk long distances without having any significant issues  Patient has had sinus bradycardia in the past   He has had numerous EKGs as he was a volunteer  for having EKGs performed on him  Past Medical History:   Diagnosis Date    Actinic keratosis     Last assessed - 10/15/14    Hypertension     Hypokalemia     Last assessed - 8/13/14    Sinus bradycardia     Last assessed - 8/13/14       Family History   Problem Relation Age of Onset    Prostate cancer Father     Liver cancer Brother         Adenocarcinoma        Past Surgical History:   Procedure Laterality Date    HERNIA REPAIR      SIGMOIDOSCOPY      (Fiberoptic, Therapeutic) 7/28/2014, 8/25/14        reports that he has never smoked  He has never used smokeless tobacco  He reports that he does not drink alcohol or use drugs        Current Outpatient Medications:     atenolol-chlorthalidone (TENORETIC) 50-25 mg per tablet, Take 1 tablet by mouth daily, Disp: 90 tablet, Rfl: 3    Cholecalciferol (VITAMIN D3) 1000 UNIT/SPRAY LIQD, Take 1 tablet by mouth daily, Disp: , Rfl:     Coenzyme Q10 (COQ-10) 10 MG CAPS, Take 1 capsule by mouth daily, Disp: , Rfl:     Coenzyme Q10 100 MG CHEW, Chew 200 mg, Disp: , Rfl:     Omega-3-Acid Eth Est, Dietary, 1 g CAPS, Take by mouth, Disp: , Rfl:     Potassium 99 MG TABS, Take 1 tablet by mouth 2 (two) times a day, Disp: , Rfl:     tadalafil (CIALIS) 20 MG tablet, Take 1 tablet (20 mg total) by mouth daily as needed for erectile dysfunction, Disp: 32 tablet, Rfl: 3    The following portions of the patient's history were reviewed and updated as appropriate: allergies, current medications, past family history, past medical history, past social history, past surgical history and problem list     Review of Systems   Constitutional: Negative  HENT: Negative  Eyes: Negative  Respiratory: Negative  Cardiovascular: Negative  Gastrointestinal: Negative  Endocrine: Negative  Genitourinary: Negative  Erectile dysfunction   Musculoskeletal: Negative  Skin: Negative  Allergic/Immunologic: Negative  Neurological: Positive for light-headedness  Negative for dizziness, syncope, weakness and headaches  Hematological: Negative  Psychiatric/Behavioral: Negative  All other systems reviewed and are negative  Objective:    /62   Pulse (!) 52   Resp 14   Ht 5' 9" (1 753 m)   Wt 77 6 kg (171 lb)   SpO2 98%   BMI 25 25 kg/m²      Physical Exam   Constitutional: He is oriented to person, place, and time  He appears well-developed and well-nourished  HENT:   Head: Normocephalic  Eyes: Pupils are equal, round, and reactive to light  Conjunctivae and EOM are normal    Neck: Normal range of motion  Neck supple  Cardiovascular: Normal rate, regular rhythm and normal heart sounds  No murmur heard  Pulmonary/Chest: Effort normal and breath sounds normal    Abdominal: Soft  Bowel sounds are normal    Musculoskeletal: Normal range of motion  He exhibits tenderness (Mild crepitation of the right shoulder)  Neurological: He is alert and oriented to person, place, and time  He displays normal reflexes  No cranial nerve deficit or sensory deficit  He exhibits normal muscle tone   Coordination normal    Psychiatric: He has a normal mood and affect  His behavior is normal  Judgment and thought content normal    Nursing note and vitals reviewed          Recent Results (from the past 1008 hour(s))   CBC and differential    Collection Time: 07/03/19  8:36 AM   Result Value Ref Range    WBC 4 51 4 31 - 10 16 Thousand/uL    RBC 4 51 3 88 - 5 62 Million/uL    Hemoglobin 13 9 12 0 - 17 0 g/dL    Hematocrit 41 2 36 5 - 49 3 %    MCV 91 82 - 98 fL    MCH 30 8 26 8 - 34 3 pg    MCHC 33 7 31 4 - 37 4 g/dL    RDW 13 3 11 6 - 15 1 %    MPV 10 3 8 9 - 12 7 fL    Platelets 571 115 - 418 Thousands/uL    nRBC 0 /100 WBCs    Neutrophils Relative 58 43 - 75 %    Immat GRANS % 0 0 - 2 %    Lymphocytes Relative 27 14 - 44 %    Monocytes Relative 11 4 - 12 %    Eosinophils Relative 3 0 - 6 %    Basophils Relative 1 0 - 1 %    Neutrophils Absolute 2 60 1 85 - 7 62 Thousands/µL    Immature Grans Absolute 0 01 0 00 - 0 20 Thousand/uL    Lymphocytes Absolute 1 21 0 60 - 4 47 Thousands/µL    Monocytes Absolute 0 49 0 17 - 1 22 Thousand/µL    Eosinophils Absolute 0 15 0 00 - 0 61 Thousand/µL    Basophils Absolute 0 05 0 00 - 0 10 Thousands/µL   Comprehensive metabolic panel    Collection Time: 07/03/19  8:36 AM   Result Value Ref Range    Sodium 142 136 - 145 mmol/L    Potassium 3 0 (L) 3 5 - 5 3 mmol/L    Chloride 104 100 - 108 mmol/L    CO2 29 21 - 32 mmol/L    ANION GAP 9 4 - 13 mmol/L    BUN 22 5 - 25 mg/dL    Creatinine 1 06 0 60 - 1 30 mg/dL    Glucose, Fasting 92 65 - 99 mg/dL    Calcium 8 8 8 3 - 10 1 mg/dL    AST 15 5 - 45 U/L    ALT 23 12 - 78 U/L    Alkaline Phosphatase 52 46 - 116 U/L    Total Protein 7 0 6 4 - 8 2 g/dL    Albumin 3 9 3 5 - 5 0 g/dL    Total Bilirubin 0 71 0 20 - 1 00 mg/dL    eGFR 66 ml/min/1 73sq m   Lipid panel    Collection Time: 07/03/19  8:36 AM   Result Value Ref Range    Cholesterol 197 50 - 200 mg/dL    Triglycerides 69 <=150 mg/dL    HDL, Direct 55 40 - 60 mg/dL    LDL Calculated 128 (H) 0 - 100 mg/dL    Non-HDL-Chol (CHOL-HDL) 142 mg/dl   Vitamin D 1,25 dihydroxy    Collection Time: 07/03/19  8:36 AM   Result Value Ref Range    Vit D, 1,25-Dihydroxy 28 2 19 9 - 79 3 pg/mL   TSH, 3rd generation with Free T4 reflex    Collection Time: 07/03/19  8:36 AM   Result Value Ref Range    TSH 3RD GENERATON 1 480 0 358 - 3 740 uIU/mL   PSA Total, Diagnostic    Collection Time: 07/03/19  8:36 AM   Result Value Ref Range    PSA, Diagnostic 0 4 0 0 - 4 0 ng/mL       Assessment/Plan:    ZAINAB (obstructive sleep apnea)  Patient does follow up with sleep specialist   Patient remains on CPAP    Sinus bradycardia  Patient does have mild sinus bradycardia  He is really asymptomatic with this  He is able to perform physical activity which is very labor intensive without having issues  I would like to decrease his dose of atenolol so that he is splitting his tablets in half and only taking 25 mg once daily  His migraine headaches have been very well controlled for years  Benign essential hypertension  Very well controlled blood pressure  Will have the patient split his tablets of atenolol/chlorthalidone in half  He will continue to monitor his blood pressures at home    Hypokalemia  Likely due to his chlorthalidone  Instead of discontinuing chlorthalidone altogether he will split his tablets in half so that he is only taking 12 5 mg once daily  He will repeat a potassium level in 1 month  Will address over the phone  Continue on potassium supplementation    Hyperlipidemia  Very well controlled with diet alone    Vitamin D deficiency  Advised to increase his vitamin-D intake to 4000 international units once daily  Repeat vitamin-D level in 6 months    Erectile dysfunction  Cialis has been very effective for patient  He does not need refills at this time            Problem List Items Addressed This Visit        Respiratory    ZAINAB (obstructive sleep apnea)     Patient does follow up with sleep specialist   Patient remains on CPAP            Cardiovascular and Mediastinum    Benign essential hypertension     Very well controlled blood pressure  Will have the patient split his tablets of atenolol/chlorthalidone in half  He will continue to monitor his blood pressures at home         Relevant Medications    atenolol-chlorthalidone (TENORETIC) 50-25 mg per tablet    Other Relevant Orders    CBC and differential    TSH, 3rd generation with Free T4 reflex    Sinus bradycardia     Patient does have mild sinus bradycardia  He is really asymptomatic with this  He is able to perform physical activity which is very labor intensive without having issues  I would like to decrease his dose of atenolol so that he is splitting his tablets in half and only taking 25 mg once daily  His migraine headaches have been very well controlled for years  Other    Hypokalemia - Primary     Likely due to his chlorthalidone  Instead of discontinuing chlorthalidone altogether he will split his tablets in half so that he is only taking 12 5 mg once daily  He will repeat a potassium level in 1 month  Will address over the phone  Continue on potassium supplementation         Relevant Orders    Potassium    Hyperlipidemia     Very well controlled with diet alone         Vitamin D deficiency     Advised to increase his vitamin-D intake to 4000 international units once daily  Repeat vitamin-D level in 6 months         Relevant Orders    Comprehensive metabolic panel    Lipid panel    Vitamin D 25 hydroxy            BMI Counseling: Body mass index is 25 25 kg/m²  Discussed the patient's BMI with him  The BMI is above average  BMI counseling and education was provided to the patient  Nutrition recommendations include reducing intake of cholesterol

## 2019-07-10 PROBLEM — R00.1 SINUS BRADYCARDIA: Status: ACTIVE | Noted: 2019-07-10

## 2019-07-10 NOTE — ASSESSMENT & PLAN NOTE
Patient does have mild sinus bradycardia  He is really asymptomatic with this  He is able to perform physical activity which is very labor intensive without having issues  I would like to decrease his dose of atenolol so that he is splitting his tablets in half and only taking 25 mg once daily  His migraine headaches have been very well controlled for years

## 2019-07-10 NOTE — ASSESSMENT & PLAN NOTE
Likely due to his chlorthalidone  Instead of discontinuing chlorthalidone altogether he will split his tablets in half so that he is only taking 12 5 mg once daily  He will repeat a potassium level in 1 month  Will address over the phone    Continue on potassium supplementation

## 2019-07-10 NOTE — ASSESSMENT & PLAN NOTE
Very well controlled blood pressure  Will have the patient split his tablets of atenolol/chlorthalidone in half    He will continue to monitor his blood pressures at home

## 2019-07-10 NOTE — ASSESSMENT & PLAN NOTE
Advised to increase his vitamin-D intake to 4000 international units once daily    Repeat vitamin-D level in 6 months

## 2019-07-12 ENCOUNTER — TRANSITIONAL CARE MANAGEMENT (OUTPATIENT)
Dept: FAMILY MEDICINE CLINIC | Facility: CLINIC | Age: 79
End: 2019-07-12

## 2019-07-23 ENCOUNTER — OFFICE VISIT (OUTPATIENT)
Dept: FAMILY MEDICINE CLINIC | Facility: CLINIC | Age: 79
End: 2019-07-23
Payer: COMMERCIAL

## 2019-07-23 VITALS
OXYGEN SATURATION: 98 % | BODY MASS INDEX: 25.62 KG/M2 | DIASTOLIC BLOOD PRESSURE: 62 MMHG | HEIGHT: 69 IN | SYSTOLIC BLOOD PRESSURE: 108 MMHG | RESPIRATION RATE: 16 BRPM | HEART RATE: 64 BPM | WEIGHT: 173 LBS

## 2019-07-23 DIAGNOSIS — E87.6 HYPOKALEMIA: ICD-10-CM

## 2019-07-23 DIAGNOSIS — I10 BENIGN ESSENTIAL HYPERTENSION: Primary | ICD-10-CM

## 2019-07-23 DIAGNOSIS — E83.42 HYPOMAGNESEMIA: ICD-10-CM

## 2019-07-23 PROBLEM — R00.1 SINUS BRADYCARDIA: Status: RESOLVED | Noted: 2019-07-10 | Resolved: 2019-07-23

## 2019-07-23 PROCEDURE — 99495 TRANSJ CARE MGMT MOD F2F 14D: CPT | Performed by: FAMILY MEDICINE

## 2019-07-23 PROCEDURE — 1111F DSCHRG MED/CURRENT MED MERGE: CPT | Performed by: FAMILY MEDICINE

## 2019-07-23 RX ORDER — ATENOLOL 50 MG/1
25 TABLET ORAL DAILY
Qty: 90 TABLET | Refills: 1 | Status: SHIPPED | OUTPATIENT
Start: 2019-07-23 | End: 2019-09-23 | Stop reason: SDUPTHER

## 2019-07-23 NOTE — PROGRESS NOTES
Subjective:      Patient ID: Kristina Finley is a 78 y o  male  TCM Call (since 6/23/2019)     Date and time call was made  7/12/2019  2:39 PM    Hospital care reviewed  Records reviewed    Patient was hospitialized at  [facility]    Date of Admission  07/10/19    Date of discharge  07/11/19    Diagnosis  GAIT DYSFUNCTION    Disposition  Home    Were the patients medications reviewed and updated  Yes    Current Symptoms  None      TCM Call (since 6/23/2019)     Post hospital issues  None    Should patient be enrolled in anticoag monitoring? No    Scheduled for follow up? Yes    Patients specialists  Neurologist    Did you obtain your prescribed medications  Yes    Do you need help managing your prescriptions or medications  No    Is transportation to your appointment needed  No    I have advised the patient to call PCP with any new or worsening symptoms    DINO GARDINER MA    Living Arrangements  Family members    Support System  Family    Do you have social support  Yes, as much as I need    Are you recieving any outpatient services  No    Are you recieving home care services  No    Are you using any community resources  No    Current waiver services  No    Have you fallen in the last 12 months  No    Interperter language line needed  No      55-year-old male presents with his wife for follow-up after admission to [facility] for disequilibrium, feeling dizzy and off balance for 1 day  Patient was advised to proceed to the hospital the day immediately following his office visit with me  He had called stating that he was feeling very lightheaded  I was concerned as the patient was bradycardic with a heart rate of 50 at my office visit however he had not been symptomatic with that bradycardia  He was able to perform physical activity and exercise  Patient was admitted for 1 day  He did have extensive testing performed  He was found to be hypokalemic and hypomagnesemic  Patient had CT scan of the brain that was unremarkable and an MRI/MRA of the brain which was also unremarkable  It was felt that the patient likely had become dehydrated while [place] in [place] which had also depleted his electrolytes  Patient was given 3 L of IV fluid along with potassium and magnesium supplementation and felt much better  The attending physician I question whether not he did need chlorthalidone in his antihypertensive regimen and it was recommended that this be discontinued  We were in the process of splitting his tablets in half  Patient feels completely normal today  Feels fine  No complaints or concerns  No further dizziness, weakness  While he was in the hospital he also had stress testing as well as echocardiogram which were reportedly unremarkable  I do not have copies of these  Patient was advised to take aspirin once daily for prophylaxis by neurologist even though his MRI was unremarkable      Past Medical History:   Diagnosis Date    Actinic keratosis     Last assessed - 10/15/14    Hypertension     Hypokalemia     Last assessed - 8/13/14    Sinus bradycardia     Last assessed - 8/13/14       Family History   Problem Relation Age of Onset    Prostate cancer Father     Liver cancer Brother         Adenocarcinoma        Past Surgical History:   Procedure Laterality Date    HERNIA REPAIR      SIGMOIDOSCOPY      (Fiberoptic, Therapeutic) 7/28/2014, 8/25/14        reports that he has never smoked  He has never used smokeless tobacco  He reports that he does not drink alcohol or use drugs        Current Outpatient Medications:     Cholecalciferol (VITAMIN D3) 1000 UNIT/SPRAY LIQD, Take 1 tablet by mouth daily, Disp: , Rfl:     Coenzyme Q10 100 MG CHEW, Chew 200 mg, Disp: , Rfl:     Omega-3-Acid Eth Est, Dietary, 1 g CAPS, Take by mouth, Disp: , Rfl:     Potassium 99 MG TABS, Take 1 tablet by mouth 2 (two) times a day, Disp: , Rfl:     tadalafil (CIALIS) 20 MG tablet, Take 1 tablet (20 mg total) by mouth daily as needed for erectile dysfunction, Disp: 32 tablet, Rfl: 3    atenolol (TENORMIN) 50 mg tablet, Take 0 5 tablets (25 mg total) by mouth daily, Disp: 90 tablet, Rfl: 1    The following portions of the patient's history were reviewed and updated as appropriate: allergies, current medications, past family history, past medical history, past social history, past surgical history and problem list     Review of Systems   Constitutional: Negative  Negative for fatigue  HENT: Negative  Eyes: Negative  Respiratory: Negative  Cardiovascular: Negative  Gastrointestinal: Negative  Endocrine: Negative  Genitourinary: Negative  Musculoskeletal: Negative  Skin: Negative  Allergic/Immunologic: Negative  Neurological: Negative  Negative for dizziness, syncope, facial asymmetry, weakness and light-headedness  Hematological: Negative  Psychiatric/Behavioral: Negative  All other systems reviewed and are negative  Objective:    /62   Pulse 64   Resp 16   Ht 5' 9" (1 753 m)   Wt 78 5 kg (173 lb)   SpO2 98%   BMI 25 55 kg/m²      Physical Exam   Constitutional: He is oriented to person, place, and time  He appears well-developed and well-nourished  HENT:   Head: Normocephalic  Eyes: Pupils are equal, round, and reactive to light  Conjunctivae and EOM are normal    Neck: Normal range of motion  Neck supple  Cardiovascular: Normal rate, regular rhythm and normal heart sounds  Pulmonary/Chest: Effort normal and breath sounds normal    Abdominal: Soft  Bowel sounds are normal    Musculoskeletal: Normal range of motion  Neurological: He is alert and oriented to person, place, and time  Psychiatric: He has a normal mood and affect  His behavior is normal  Judgment and thought content normal    Nursing note and vitals reviewed          Recent Results (from the past 1008 hour(s))   CBC and differential    Collection Time: 07/03/19  8:36 AM   Result Value Ref Range    WBC 4 51 4 31 - 10 16 Thousand/uL    RBC 4 51 3 88 - 5 62 Million/uL    Hemoglobin 13 9 12 0 - 17 0 g/dL    Hematocrit 41 2 36 5 - 49 3 %    MCV 91 82 - 98 fL    MCH 30 8 26 8 - 34 3 pg    MCHC 33 7 31 4 - 37 4 g/dL    RDW 13 3 11 6 - 15 1 %    MPV 10 3 8 9 - 12 7 fL    Platelets 657 028 - 738 Thousands/uL    nRBC 0 /100 WBCs    Neutrophils Relative 58 43 - 75 %    Immat GRANS % 0 0 - 2 %    Lymphocytes Relative 27 14 - 44 %    Monocytes Relative 11 4 - 12 %    Eosinophils Relative 3 0 - 6 %    Basophils Relative 1 0 - 1 %    Neutrophils Absolute 2 60 1 85 - 7 62 Thousands/µL    Immature Grans Absolute 0 01 0 00 - 0 20 Thousand/uL    Lymphocytes Absolute 1 21 0 60 - 4 47 Thousands/µL    Monocytes Absolute 0 49 0 17 - 1 22 Thousand/µL    Eosinophils Absolute 0 15 0 00 - 0 61 Thousand/µL    Basophils Absolute 0 05 0 00 - 0 10 Thousands/µL   Comprehensive metabolic panel    Collection Time: 07/03/19  8:36 AM   Result Value Ref Range    Sodium 142 136 - 145 mmol/L    Potassium 3 0 (L) 3 5 - 5 3 mmol/L    Chloride 104 100 - 108 mmol/L    CO2 29 21 - 32 mmol/L    ANION GAP 9 4 - 13 mmol/L    BUN 22 5 - 25 mg/dL    Creatinine 1 06 0 60 - 1 30 mg/dL    Glucose, Fasting 92 65 - 99 mg/dL    Calcium 8 8 8 3 - 10 1 mg/dL    AST 15 5 - 45 U/L    ALT 23 12 - 78 U/L    Alkaline Phosphatase 52 46 - 116 U/L    Total Protein 7 0 6 4 - 8 2 g/dL    Albumin 3 9 3 5 - 5 0 g/dL    Total Bilirubin 0 71 0 20 - 1 00 mg/dL    eGFR 66 ml/min/1 73sq m   Lipid panel    Collection Time: 07/03/19  8:36 AM   Result Value Ref Range    Cholesterol 197 50 - 200 mg/dL    Triglycerides 69 <=150 mg/dL    HDL, Direct 55 40 - 60 mg/dL    LDL Calculated 128 (H) 0 - 100 mg/dL    Non-HDL-Chol (CHOL-HDL) 142 mg/dl   Vitamin D 1,25 dihydroxy    Collection Time: 07/03/19  8:36 AM   Result Value Ref Range    Vit D, 1,25-Dihydroxy 28 2 19 9 - 79 3 pg/mL   TSH, 3rd generation with Free T4 reflex    Collection Time: 07/03/19  8:36 AM   Result Value Ref Range    TSH 3RD GENERATON 1 480 0 358 - 3 740 uIU/mL   PSA Total, Diagnostic    Collection Time: 07/03/19  8:36 AM   Result Value Ref Range    PSA, Diagnostic 0 4 0 0 - 4 0 ng/mL       Assessment/Plan:    Benign essential hypertension  It would seem that the patient's symptoms were likely related to dehydration and electrolyte depletion  Does symptoms had completely resolved with IV hydration and electrolyte supplementation     -as I was thinking and as was recommended by the physicians during his hospitalization we will discontinue his chlorthalidone altogether and I will actually only provide him with 25 mg of atenolol and will see how his blood pressure response and if he once again developed significant headaches  He was taking Tylenol for migraine prophylaxis but he has not had a migraine headache in decades    Hypomagnesemia  Likely the result of chlorthalidone  This is been discontinued  Repeat magnesium level in 3 weeks    Hypokalemia  Likely the result of chlorthalidone  This is been discontinued  Repeat potassium level in 3 weeks          Problem List Items Addressed This Visit        Cardiovascular and Mediastinum    Benign essential hypertension - Primary     It would seem that the patient's symptoms were likely related to dehydration and electrolyte depletion  Does symptoms had completely resolved with IV hydration and electrolyte supplementation     -as I was thinking and as was recommended by the physicians during his hospitalization we will discontinue his chlorthalidone altogether and I will actually only provide him with 25 mg of atenolol and will see how his blood pressure response and if he once again developed significant headaches    He was taking Tylenol for migraine prophylaxis but he has not had a migraine headache in decades         Relevant Medications    atenolol (TENORMIN) 50 mg tablet       Other    Hypokalemia     Likely the result of chlorthalidone  This is been discontinued  Repeat potassium level in 3 weeks         Hypomagnesemia     Likely the result of chlorthalidone  This is been discontinued    Repeat magnesium level in 3 weeks         Relevant Orders    Magnesium

## 2019-07-24 PROBLEM — E83.42 HYPOMAGNESEMIA: Status: ACTIVE | Noted: 2019-07-24

## 2019-07-24 NOTE — ASSESSMENT & PLAN NOTE
It would seem that the patient's symptoms were likely related to dehydration and electrolyte depletion  Does symptoms had completely resolved with IV hydration and electrolyte supplementation     -as I was thinking and as was recommended by the physicians during his hospitalization we will discontinue his chlorthalidone altogether and I will actually only provide him with 25 mg of atenolol and will see how his blood pressure response and if he once again developed significant headaches    He was taking Tylenol for migraine prophylaxis but he has not had a migraine headache in decades

## 2019-07-26 NOTE — PROGRESS NOTES
There are no diagnoses linked to this encounter  Assessment and plan:       1  Prostate cancer status post XRT (2010) -managed by Dr Sebas Patel  - I was happy to review with the patient that his PSA remains stable at 0 4  - Follow-up in 1 year with a PSA prior to visit  - No refills needed on Cialis 20 mg at this time  He has noticed an increase in time between taking the medication and its effectiveness  We discussed having him take the medication before dinner on an empty stomach for use that evening   - All questions answered  Luella Dakin, PA-C      Chief Complaint     Chief Complaint   Patient presents with    Prostate Cancer         History of Present Illness     Rashad Alejandra is a 78 y o  male patient known to Dr Sebas Patel for history of prostate cancer status post XRT (2010) presenting for 1 year follow-up  Patient's PSA continues to demonstrate stability and is currently 0 4 (07/03/2019)  Patient continues to report that his urination continues to be stable  He feels that he has a good stream, empty after urination, and denies nocturia  He denies any dysuria, gross hematuria, suprapubic pressure, flank pain, bone pain, or weight loss  He continues to use Cialis 20 mg as needed for sexual dysfunction  He does not suffer from any side effects from this medication  Laboratory     Lab Results   Component Value Date    CREATININE 1 06 07/03/2019       Lab Results   Component Value Date    PSA 0 4 07/03/2019    PSA 0 3 07/10/2018       No results found for this or any previous visit (from the past 1 hour(s))  Review of Systems     Review of Systems   Constitutional: Negative for diaphoresis and fever  HENT: Negative  Eyes: Negative  Respiratory: Negative for shortness of breath  Cardiovascular: Negative for chest pain  Gastrointestinal: Negative for constipation, diarrhea, nausea and vomiting     Genitourinary: Negative for difficulty urinating, dysuria, enuresis, flank pain, frequency, hematuria and urgency  Allergies     Allergies   Allergen Reactions    Isoflavones        Physical Exam     Physical Exam   Constitutional: He is oriented to person, place, and time  He appears well-developed and well-nourished  No distress  HENT:   Head: Normocephalic and atraumatic  Right Ear: External ear normal    Left Ear: External ear normal    Nose: Nose normal    Eyes: Right eye exhibits no discharge  Left eye exhibits no discharge  No scleral icterus  Cardiovascular: Normal rate  Pulmonary/Chest: Effort normal    Musculoskeletal:   Ambulates independently   Neurological: He is alert and oriented to person, place, and time  Skin: Skin is warm and dry  He is not diaphoretic  Psychiatric: He has a normal mood and affect  His behavior is normal  Judgment and thought content normal    Nursing note and vitals reviewed          Vital Signs     Vitals:    07/31/19 1002   BP: 128/72   BP Location: Right arm   Patient Position: Sitting   Cuff Size: Standard   Pulse: 60   Weight: 79 3 kg (174 lb 12 8 oz)   Height: 5' 9" (1 753 m)         Current Medications       Current Outpatient Medications:     atenolol (TENORMIN) 50 mg tablet, Take 0 5 tablets (25 mg total) by mouth daily, Disp: 90 tablet, Rfl: 1    Cholecalciferol (VITAMIN D3) 1000 UNIT/SPRAY LIQD, Take 1 tablet by mouth daily, Disp: , Rfl:     Coenzyme Q10 100 MG CHEW, Chew 200 mg, Disp: , Rfl:     Omega-3-Acid Eth Est, Dietary, 1 g CAPS, Take by mouth, Disp: , Rfl:     Potassium 99 MG TABS, Take 1 tablet by mouth 2 (two) times a day, Disp: , Rfl:     tadalafil (CIALIS) 20 MG tablet, Take 1 tablet (20 mg total) by mouth daily as needed for erectile dysfunction, Disp: 32 tablet, Rfl: 3      Active Problems     Patient Active Problem List   Diagnosis    ZAINAB (obstructive sleep apnea)    Hypokalemia    Benign essential hypertension    Hyperlipidemia    Vitamin D deficiency    Medicare annual wellness visit, subsequent    Erectile dysfunction    Hypomagnesemia         Past Medical History     Past Medical History:   Diagnosis Date    Actinic keratosis     Last assessed - 10/15/14    Hypertension     Hypokalemia     Last assessed - 8/13/14    Sinus bradycardia     Last assessed - 8/13/14         Surgical History     Past Surgical History:   Procedure Laterality Date    HERNIA REPAIR      SIGMOIDOSCOPY      (Fiberoptic, Therapeutic) 7/28/2014, 8/25/14         Family History     Family History   Problem Relation Age of Onset    Prostate cancer Father     Liver cancer Brother         Adenocarcinoma          Social History     Social History       Radiology

## 2019-07-30 ENCOUNTER — OFFICE VISIT (OUTPATIENT)
Dept: SLEEP CENTER | Facility: CLINIC | Age: 79
End: 2019-07-30
Payer: COMMERCIAL

## 2019-07-30 VITALS
SYSTOLIC BLOOD PRESSURE: 104 MMHG | HEIGHT: 69 IN | DIASTOLIC BLOOD PRESSURE: 68 MMHG | WEIGHT: 175.13 LBS | BODY MASS INDEX: 25.94 KG/M2

## 2019-07-30 DIAGNOSIS — G47.33 OSA (OBSTRUCTIVE SLEEP APNEA): Primary | ICD-10-CM

## 2019-07-30 PROCEDURE — 99213 OFFICE O/P EST LOW 20 MIN: CPT | Performed by: INTERNAL MEDICINE

## 2019-07-30 NOTE — PROGRESS NOTES
Progress Note - Sleep Center   Nba Powell [id number] MRN: [id number]      Reason for Visit:  78 y o male here for annual follow-up    Assessment:  Doing well on current therapy of CPAP 9 cm for previously diagnosed mild obstructive sleep apnea (SIVAN = 8 7)  Plan:  Continue same    Follow up: One year    History of Present Illness:  History of ZAINAB on PAP therapy  Fully compliant and deriving benefit  Review of Systems      Genitourinary difficulty with erection   Cardiology none   Gastrointestinal none   Neurology none   Constitutional none   Integumentary none   Psychiatry none   Musculoskeletal none   Pulmonary snoring   ENT ringing in ears   Endocrine none   Hematological blood donor         I have reviewed and updated the review of systems as necessary      Historical Information    Past Medical History:   Past Medical History:   Diagnosis Date    Actinic keratosis     Last assessed - 10/15/14    Hypertension     Hypokalemia     Last assessed - 8/13/14    Sinus bradycardia     Last assessed - 8/13/14         Past Surgical History:   Past Surgical History:   Procedure Laterality Date    HERNIA REPAIR      SIGMOIDOSCOPY      (Fiberoptic, Therapeutic) 7/28/2014, 8/25/14       Social History:   Social History     Socioeconomic History    Marital status:       Spouse name: None    Number of children: None    Years of education: None    Highest education level: None   Occupational History    Occupation: Retired   Social Needs    Financial resource strain: None    Food insecurity:     Worry: None     Inability: None    Transportation needs:     Medical: None     Non-medical: None   Tobacco Use    Smoking status: Never Smoker    Smokeless tobacco: Never Used    Tobacco comment: Former smoker per [employer]    Substance and Sexual Activity    Alcohol use: No     Comment: Seldomly per [employer]     Drug use: No    Sexual activity: None   Lifestyle    Physical activity:     Days per week: None     Minutes per session: None    Stress: None   Relationships    Social connections:     Talks on phone: None     Gets together: None     Attends Christianity service: None     Active member of club or organization: None     Attends meetings of clubs or organizations: None     Relationship status: None    Intimate partner violence:     Fear of current or ex partner: None     Emotionally abused: None     Physically abused: None     Forced sexual activity: None   Other Topics Concern    None   Social History Narrative    Caffeine use - 2 cups daily     and  noted per [employer]       Family History:   Family History   Problem Relation Age of Onset    Prostate cancer Father     Liver cancer Brother         Adenocarcinoma        Medications/Allergies:      Current Outpatient Medications:     atenolol (TENORMIN) 50 mg tablet, Take 0 5 tablets (25 mg total) by mouth daily, Disp: 90 tablet, Rfl: 1    Cholecalciferol (VITAMIN D3) 1000 UNIT/SPRAY LIQD, Take 1 tablet by mouth daily, Disp: , Rfl:     Coenzyme Q10 100 MG CHEW, Chew 200 mg, Disp: , Rfl:     Omega-3-Acid Eth Est, Dietary, 1 g CAPS, Take by mouth, Disp: , Rfl:     Potassium 99 MG TABS, Take 1 tablet by mouth 2 (two) times a day, Disp: , Rfl:     tadalafil (CIALIS) 20 MG tablet, Take 1 tablet (20 mg total) by mouth daily as needed for erectile dysfunction, Disp: 32 tablet, Rfl: 3          Objective      Vital Signs:   Vitals:    07/30/19 1057   BP: 104/68     Zoe Sleepiness Scale: Total score: 14        Physical Exam:    General: Alert, appropriate, cooperative, overweight    Head: NC/AT    Skin: Warm, dry    Neuro: No motor abnormalities, cranial nerves appear intact    Extremity: No clubbing, cyanosis      DME Provider:   [facility]        Counseling / Coordination of Care   I have spent 20 minutes with the patient today in which greater than 50% of this time was spent in counseling/coordination of care regarding: equipment and compliance  Board Certified Sleep Specialist    Portions of the record may have been created with voice recognition software  Occasional wrong word or "sound a like" substitutions may have occurred due to the inherent limitations of voice recognition software  Read the chart carefully and recognize, using context, where substitutions have occurred

## 2019-07-31 ENCOUNTER — OFFICE VISIT (OUTPATIENT)
Dept: UROLOGY | Facility: CLINIC | Age: 79
End: 2019-07-31
Payer: COMMERCIAL

## 2019-07-31 VITALS
HEIGHT: 69 IN | WEIGHT: 174.8 LBS | BODY MASS INDEX: 25.89 KG/M2 | SYSTOLIC BLOOD PRESSURE: 128 MMHG | DIASTOLIC BLOOD PRESSURE: 72 MMHG | HEART RATE: 60 BPM

## 2019-07-31 DIAGNOSIS — C61 PROSTATE CANCER (HCC): Primary | ICD-10-CM

## 2019-07-31 PROCEDURE — 99213 OFFICE O/P EST LOW 20 MIN: CPT | Performed by: PHYSICIAN ASSISTANT

## 2019-08-09 ENCOUNTER — TELEPHONE (OUTPATIENT)
Dept: SLEEP CENTER | Facility: CLINIC | Age: 79
End: 2019-08-09

## 2019-08-09 DIAGNOSIS — G47.33 OSA (OBSTRUCTIVE SLEEP APNEA): Primary | ICD-10-CM

## 2019-08-10 ENCOUNTER — LAB (OUTPATIENT)
Dept: LAB | Facility: CLINIC | Age: 79
End: 2019-08-10
Payer: COMMERCIAL

## 2019-08-10 DIAGNOSIS — E83.42 HYPOMAGNESEMIA: ICD-10-CM

## 2019-08-10 DIAGNOSIS — E87.6 HYPOKALEMIA: ICD-10-CM

## 2019-08-10 LAB
MAGNESIUM SERPL-MCNC: 2.3 MG/DL (ref 1.6–2.6)
POTASSIUM SERPL-SCNC: 4.2 MMOL/L (ref 3.5–5.3)

## 2019-08-10 PROCEDURE — 84132 ASSAY OF SERUM POTASSIUM: CPT

## 2019-08-10 PROCEDURE — 36415 COLL VENOUS BLD VENIPUNCTURE: CPT

## 2019-08-10 PROCEDURE — 83735 ASSAY OF MAGNESIUM: CPT

## 2019-08-11 NOTE — RESULT ENCOUNTER NOTE
Please call patient and notify that results are normal  No changes in medications  Normal potassium and normal magnesium  Hopefully his blood pressure is doing well without the hydrochlorothiazide    Please keep follow-up appointment as scheduled

## 2019-09-23 DIAGNOSIS — I10 BENIGN ESSENTIAL HYPERTENSION: ICD-10-CM

## 2019-09-23 RX ORDER — ATENOLOL 50 MG/1
25 TABLET ORAL DAILY
Qty: 90 TABLET | Refills: 1 | Status: CANCELLED | OUTPATIENT
Start: 2019-09-23

## 2019-09-23 RX ORDER — ATENOLOL 50 MG/1
50 TABLET ORAL DAILY
Qty: 90 TABLET | Refills: 1 | Status: SHIPPED | OUTPATIENT
Start: 2019-09-23 | End: 2020-03-17

## 2020-01-09 ENCOUNTER — APPOINTMENT (OUTPATIENT)
Dept: LAB | Facility: CLINIC | Age: 80
End: 2020-01-09
Payer: COMMERCIAL

## 2020-01-09 DIAGNOSIS — E55.9 VITAMIN D DEFICIENCY: ICD-10-CM

## 2020-01-09 DIAGNOSIS — I10 BENIGN ESSENTIAL HYPERTENSION: ICD-10-CM

## 2020-01-09 LAB
25(OH)D3 SERPL-MCNC: 44.5 NG/ML (ref 30–100)
ALBUMIN SERPL BCP-MCNC: 3.8 G/DL (ref 3.5–5)
ALP SERPL-CCNC: 66 U/L (ref 46–116)
ALT SERPL W P-5'-P-CCNC: 24 U/L (ref 12–78)
ANION GAP SERPL CALCULATED.3IONS-SCNC: 5 MMOL/L (ref 4–13)
AST SERPL W P-5'-P-CCNC: 19 U/L (ref 5–45)
BASOPHILS # BLD AUTO: 0.05 THOUSANDS/ΜL (ref 0–0.1)
BASOPHILS NFR BLD AUTO: 1 % (ref 0–1)
BILIRUB SERPL-MCNC: 0.66 MG/DL (ref 0.2–1)
BUN SERPL-MCNC: 22 MG/DL (ref 5–25)
CALCIUM SERPL-MCNC: 9.3 MG/DL (ref 8.3–10.1)
CHLORIDE SERPL-SCNC: 111 MMOL/L (ref 100–108)
CHOLEST SERPL-MCNC: 180 MG/DL (ref 50–200)
CO2 SERPL-SCNC: 26 MMOL/L (ref 21–32)
CREAT SERPL-MCNC: 1.09 MG/DL (ref 0.6–1.3)
EOSINOPHIL # BLD AUTO: 0.35 THOUSAND/ΜL (ref 0–0.61)
EOSINOPHIL NFR BLD AUTO: 6 % (ref 0–6)
ERYTHROCYTE [DISTWIDTH] IN BLOOD BY AUTOMATED COUNT: 13.4 % (ref 11.6–15.1)
GFR SERPL CREATININE-BSD FRML MDRD: 64 ML/MIN/1.73SQ M
GLUCOSE P FAST SERPL-MCNC: 85 MG/DL (ref 65–99)
HCT VFR BLD AUTO: 42.3 % (ref 36.5–49.3)
HDLC SERPL-MCNC: 49 MG/DL
HGB BLD-MCNC: 13.7 G/DL (ref 12–17)
IMM GRANULOCYTES # BLD AUTO: 0.02 THOUSAND/UL (ref 0–0.2)
IMM GRANULOCYTES NFR BLD AUTO: 0 % (ref 0–2)
LDLC SERPL CALC-MCNC: 115 MG/DL (ref 0–100)
LYMPHOCYTES # BLD AUTO: 1.31 THOUSANDS/ΜL (ref 0.6–4.47)
LYMPHOCYTES NFR BLD AUTO: 22 % (ref 14–44)
MCH RBC QN AUTO: 29.8 PG (ref 26.8–34.3)
MCHC RBC AUTO-ENTMCNC: 32.4 G/DL (ref 31.4–37.4)
MCV RBC AUTO: 92 FL (ref 82–98)
MONOCYTES # BLD AUTO: 0.74 THOUSAND/ΜL (ref 0.17–1.22)
MONOCYTES NFR BLD AUTO: 13 % (ref 4–12)
NEUTROPHILS # BLD AUTO: 3.46 THOUSANDS/ΜL (ref 1.85–7.62)
NEUTS SEG NFR BLD AUTO: 58 % (ref 43–75)
NONHDLC SERPL-MCNC: 131 MG/DL
NRBC BLD AUTO-RTO: 0 /100 WBCS
PLATELET # BLD AUTO: 223 THOUSANDS/UL (ref 149–390)
PMV BLD AUTO: 10.1 FL (ref 8.9–12.7)
POTASSIUM SERPL-SCNC: 4.1 MMOL/L (ref 3.5–5.3)
PROT SERPL-MCNC: 7.3 G/DL (ref 6.4–8.2)
RBC # BLD AUTO: 4.6 MILLION/UL (ref 3.88–5.62)
SODIUM SERPL-SCNC: 142 MMOL/L (ref 136–145)
TRIGL SERPL-MCNC: 80 MG/DL
TSH SERPL DL<=0.05 MIU/L-ACNC: 1.59 UIU/ML (ref 0.36–3.74)
WBC # BLD AUTO: 5.93 THOUSAND/UL (ref 4.31–10.16)

## 2020-01-09 PROCEDURE — 80061 LIPID PANEL: CPT

## 2020-01-09 PROCEDURE — 36415 COLL VENOUS BLD VENIPUNCTURE: CPT

## 2020-01-09 PROCEDURE — 82306 VITAMIN D 25 HYDROXY: CPT

## 2020-01-09 PROCEDURE — 84443 ASSAY THYROID STIM HORMONE: CPT

## 2020-01-09 PROCEDURE — 85025 COMPLETE CBC W/AUTO DIFF WBC: CPT

## 2020-01-09 PROCEDURE — 80053 COMPREHEN METABOLIC PANEL: CPT

## 2020-01-14 ENCOUNTER — OFFICE VISIT (OUTPATIENT)
Dept: FAMILY MEDICINE CLINIC | Facility: CLINIC | Age: 80
End: 2020-01-14
Payer: COMMERCIAL

## 2020-01-14 VITALS
SYSTOLIC BLOOD PRESSURE: 124 MMHG | DIASTOLIC BLOOD PRESSURE: 68 MMHG | HEIGHT: 68 IN | RESPIRATION RATE: 16 BRPM | BODY MASS INDEX: 26.98 KG/M2 | HEART RATE: 52 BPM | OXYGEN SATURATION: 97 % | WEIGHT: 178 LBS

## 2020-01-14 DIAGNOSIS — M79.674 TOE PAIN, RIGHT: ICD-10-CM

## 2020-01-14 DIAGNOSIS — Z00.00 MEDICARE ANNUAL WELLNESS VISIT, SUBSEQUENT: Primary | ICD-10-CM

## 2020-01-14 DIAGNOSIS — E87.6 HYPOKALEMIA: ICD-10-CM

## 2020-01-14 DIAGNOSIS — E55.9 VITAMIN D DEFICIENCY: ICD-10-CM

## 2020-01-14 DIAGNOSIS — E83.42 HYPOMAGNESEMIA: ICD-10-CM

## 2020-01-14 DIAGNOSIS — I10 BENIGN ESSENTIAL HYPERTENSION: ICD-10-CM

## 2020-01-14 DIAGNOSIS — Z12.11 COLON CANCER SCREENING: ICD-10-CM

## 2020-01-14 PROBLEM — S96.121A: Status: RESOLVED | Noted: 2020-01-14 | Resolved: 2020-01-14

## 2020-01-14 PROBLEM — S96.121A: Status: ACTIVE | Noted: 2020-01-14

## 2020-01-14 PROCEDURE — 3078F DIAST BP <80 MM HG: CPT | Performed by: FAMILY MEDICINE

## 2020-01-14 PROCEDURE — G0439 PPPS, SUBSEQ VISIT: HCPCS | Performed by: FAMILY MEDICINE

## 2020-01-14 PROCEDURE — 1170F FXNL STATUS ASSESSED: CPT | Performed by: FAMILY MEDICINE

## 2020-01-14 PROCEDURE — 99213 OFFICE O/P EST LOW 20 MIN: CPT | Performed by: FAMILY MEDICINE

## 2020-01-14 PROCEDURE — 3725F SCREEN DEPRESSION PERFORMED: CPT | Performed by: FAMILY MEDICINE

## 2020-01-14 PROCEDURE — 1125F AMNT PAIN NOTED PAIN PRSNT: CPT | Performed by: FAMILY MEDICINE

## 2020-01-14 PROCEDURE — 1101F PT FALLS ASSESS-DOCD LE1/YR: CPT | Performed by: FAMILY MEDICINE

## 2020-01-14 PROCEDURE — 3074F SYST BP LT 130 MM HG: CPT | Performed by: FAMILY MEDICINE

## 2020-01-14 PROCEDURE — 3288F FALL RISK ASSESSMENT DOCD: CPT | Performed by: FAMILY MEDICINE

## 2020-01-14 NOTE — PATIENT INSTRUCTIONS

## 2020-01-14 NOTE — PROGRESS NOTES
Assessment and Plan:     Problem List Items Addressed This Visit     None        BMI Counseling: Body mass index is 27 06 kg/m²  The BMI is above normal  Exercise recommendations include moderate physical activity 150 minutes/week  Preventive health issues were discussed with patient, and age appropriate screening tests were ordered as noted in patient's After Visit Summary  Personalized health advice and appropriate referrals for health education or preventive services given if needed, as noted in patient's After Visit Summary  History of Present Illness:     Patient presents for Medicare Annual Wellness visit    Patient Care Team:  Isrrael Andrade DO as PCP - General  Delta Keating DO as PCP - [address] [place] [address] (RTE)  MD Roselia Núñez MD (Dermatology)  Christopher Austin DO (Gastroenterology)     Problem List:     Patient Active Problem List   Diagnosis    ZAINAB (obstructive sleep apnea)    Hypokalemia    Benign essential hypertension    Hyperlipidemia    Vitamin D deficiency    Medicare annual wellness visit, subsequent    Erectile dysfunction    Hypomagnesemia      Past Medical and Surgical History:     Past Medical History:   Diagnosis Date    Actinic keratosis     Last assessed - 10/15/14    Hypertension     Hypokalemia     Last assessed - 8/13/14    Sinus bradycardia     Last assessed - 8/13/14     Past Surgical History:   Procedure Laterality Date    HERNIA REPAIR      SIGMOIDOSCOPY      (Fiberoptic, Therapeutic) 7/28/2014, 8/25/14      Family History:     Family History   Problem Relation Age of Onset    Prostate cancer Father     Liver cancer Brother         Adenocarcinoma       Social History:     Social History     Socioeconomic History    Marital status:       Spouse name: None    Number of children: None    Years of education: None    Highest education level: None   Occupational History    Occupation: Retired   Social Needs    Financial resource strain: None    Food insecurity:     Worry: None     Inability: None    Transportation needs:     Medical: None     Non-medical: None   Tobacco Use    Smoking status: Never Smoker    Smokeless tobacco: Never Used    Tobacco comment: Former smoker per [employer]    Substance and Sexual Activity    Alcohol use: No     Comment: Seldomly per [employer]     Drug use: No    Sexual activity: None   Lifestyle    Physical activity:     Days per week: None     Minutes per session: None    Stress: None   Relationships    Social connections:     Talks on phone: None     Gets together: None     Attends Sabianism service: None     Active member of club or organization: None     Attends meetings of clubs or organizations: None     Relationship status: None    Intimate partner violence:     Fear of current or ex partner: None     Emotionally abused: None     Physically abused: None     Forced sexual activity: None   Other Topics Concern    None   Social History Narrative    Caffeine use - 2 cups daily     and  noted per [employer]       Medications and Allergies:     Current Outpatient Medications   Medication Sig Dispense Refill    atenolol (TENORMIN) 50 mg tablet Take 1 tablet (50 mg total) by mouth daily 90 tablet 1    Cholecalciferol (VITAMIN D3) 1000 UNIT/SPRAY LIQD Take 1 tablet by mouth daily      Coenzyme Q10 100 MG CHEW Chew 200 mg      Omega-3-Acid Eth Est, Dietary, 1 g CAPS Take by mouth      Potassium 99 MG TABS Take 1 tablet by mouth 2 (two) times a day      tadalafil (CIALIS) 20 MG tablet Take 1 tablet (20 mg total) by mouth daily as needed for erectile dysfunction 32 tablet 3     No current facility-administered medications for this visit        Allergies   Allergen Reactions    Isoflavones       Immunizations:     Immunization History   Administered Date(s) Administered    INFLUENZA 10/20/2015, 09/13/2016, 10/03/2016, 10/17/2017, 09/22/2018, 11/09/2018, 10/09/2019  Influenza Split High Dose Preservative Free IM 10/20/2015    Influenza TIV (IM) 10/03/2016    Pneumococcal Conjugate 13-Valent 12/18/2015    Pneumococcal Polysaccharide PPV23 02/12/2014, 10/03/2016, 03/27/2018    Tdap 01/01/2006, 10/17/2017    Zoster 08/02/2016    Zoster Vaccine Recombinant 07/03/2018, 11/09/2018      Health Maintenance: There are no preventive care reminders to display for this patient  There are no preventive care reminders to display for this patient  Medicare Health Risk Assessment:     /68   Pulse (!) 52   Resp 16   Ht 5' 8" (1 727 m)   Wt 80 7 kg (178 lb)   SpO2 97%   BMI 27 06 kg/m²      Lord Aparicio is here for his Subsequent Wellness visit  Last Medicare Wellness visit information reviewed, patient interviewed, no change since last AWV  Health Risk Assessment:   Patient rates overall health as very good  Patient feels that their physical health rating is same  Eyesight was rated as slightly worse  Hearing was rated as slightly worse  Patient feels that their emotional and mental health rating is same  Pain experienced in the last 7 days has been some  Patient's pain rating has been 3/10  Patient states that he has experienced no weight loss or gain in last 6 months  Depression Screening:   PHQ-2 Score: 0      Fall Risk Screening: In the past year, patient has experienced: no history of falling in past year      Home Safety:  Patient has trouble with stairs inside or outside of their home  Patient has working smoke alarms and has no working carbon monoxide detector  Home safety hazards include: none  Nutrition:   Current diet is Regular and Limited junk food  Medications:   Patient is currently taking over-the-counter supplements  OTC medications include: see medication list  Patient is able to manage medications       Activities of Daily Living (ADLs)/Instrumental Activities of Daily Living (IADLs):   Walk and transfer into and out of bed and chair?: Yes  Dress and groom yourself?: Yes    Bathe or shower yourself?: Yes    Feed yourself? Yes  Do your laundry/housekeeping?: Yes  Manage your money, pay your bills and track your expenses?: Yes  Make your own meals?: Yes    Do your own shopping?: Yes    Previous Hospitalizations:   Any hospitalizations or ED visits within the last 12 months?: Yes    How many hospitalizations have you had in the last year?: 1-2    Advance Care Planning:   Living will: Yes    Durable POA for healthcare:  Yes    Advanced directive: Yes    Advanced directive counseling given: No    Five wishes given: No    Patient declined ACP directive: No    End of Life Decisions reviewed with patient: Yes    Provider agrees with end of life decisions: Yes      Cognitive Screening:   Provider or family/friend/caregiver concerned regarding cognition?: No    PREVENTIVE SCREENINGS      Cardiovascular Screening:    General: Screening Not Indicated and History Lipid Disorder      Diabetes Screening:     General: Screening Current      Colorectal Cancer Screening:     General: Screening Current      Prostate Cancer Screening:    General: History Prostate Cancer and Screening Not Indicated      Osteoporosis Screening:    General: Screening Current      Abdominal Aortic Aneurysm (AAA) Screening:        General: Screening Not Indicated      Hepatitis C Screening:    General: Screening Current      Shun Hutton,

## 2020-01-14 NOTE — PROGRESS NOTES
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  Also has been doing woodworking  Definitely keeping himself busy  Vitamin-D level 44 5  Patient does complain of occasional pain on the dorsum of the right great toe that can extend up the front of his calf      Past Medical History:   Diagnosis Date    Actinic keratosis     Last assessed - 10/15/14    Hypertension     Hypokalemia     Last assessed - 8/13/14    Sinus bradycardia     Last assessed - 8/13/14       Family History   Problem Relation Age of Onset    Prostate cancer Father     Liver cancer Brother         Adenocarcinoma        Past Surgical History:   Procedure Laterality Date    HERNIA REPAIR      SIGMOIDOSCOPY      (Fiberoptic, Therapeutic) 7/28/2014, 8/25/14        reports that he has never smoked  He has never used smokeless tobacco  He reports that he does not drink alcohol or use drugs        Current Outpatient Medications:     atenolol (TENORMIN) 50 mg tablet, Take 1 tablet (50 mg total) by mouth daily, Disp: 90 tablet, Rfl: 1    Cholecalciferol (VITAMIN D3) 1000 UNIT/SPRAY LIQD, Take 1 tablet by mouth daily, Disp: , Rfl:     Coenzyme Q10 100 MG CHEW, Chew 200 mg, Disp: , Rfl:     Omega-3-Acid Eth Est, Dietary, 1 g CAPS, Take by mouth, Disp: , Rfl:     Potassium 99 MG TABS, Take 1 tablet by mouth 2 (two) times a day, Disp: , Rfl:     tadalafil (CIALIS) 20 MG tablet, Take 1 tablet (20 mg total) by mouth daily as needed for erectile dysfunction, Disp: 32 tablet, Rfl: 3    The following portions of the patient's history were reviewed and updated as appropriate: allergies, current medications, past family history, past medical history, past social history, past surgical history and problem list     Review of Systems   Constitutional: Negative  HENT: Negative  Eyes: Negative  Respiratory: Negative  Cardiovascular: Negative  Gastrointestinal: Negative  Endocrine: Negative  Genitourinary: Negative  Musculoskeletal: Negative  Skin: Negative  Allergic/Immunologic: Negative  Neurological: Negative  Hematological: Negative  Psychiatric/Behavioral: Negative  All other systems reviewed and are negative  Objective:    /68   Pulse (!) 52   Resp 16   Ht 5' 8" (1 727 m)   Wt 80 7 kg (178 lb)   SpO2 97%   BMI 27 06 kg/m²      Physical Exam   Constitutional: He is oriented to person, place, and time  He appears well-developed and well-nourished  HENT:   Head: Normocephalic  Eyes: Pupils are equal, round, and reactive to light  Conjunctivae and EOM are normal    Neck: Normal range of motion  Neck supple  Cardiovascular: Normal rate, regular rhythm and normal heart sounds  Pulmonary/Chest: Effort normal and breath sounds normal    Abdominal: Soft  Bowel sounds are normal    Musculoskeletal: Normal range of motion  He exhibits tenderness (Mild tenderness along the extensor hallucis longus tendon)  Neurological: He is alert and oriented to person, place, and time  Psychiatric: He has a normal mood and affect  His behavior is normal  Judgment and thought content normal    Nursing note and vitals reviewed          Recent Results (from the past 1008 hour(s))   CBC and differential    Collection Time: 01/09/20  7:12 AM   Result Value Ref Range    WBC 5 93 4 31 - 10 16 Thousand/uL    RBC 4 60 3 88 - 5 62 Million/uL    Hemoglobin 13 7 12 0 - 17 0 g/dL    Hematocrit 42 3 36 5 - 49 3 %    MCV 92 82 - 98 fL    MCH 29 8 26 8 - 34 3 pg    MCHC 32 4 31 4 - 37 4 g/dL    RDW 13 4 11 6 - 15 1 %    MPV 10 1 8 9 - 12 7 fL    Platelets 612 559 - 737 Thousands/uL    nRBC 0 /100 WBCs    Neutrophils Relative 58 43 - 75 %    Immat GRANS % 0 0 - 2 %    Lymphocytes Relative 22 14 - 44 %    Monocytes Relative 13 (H) 4 - 12 %    Eosinophils Relative 6 0 - 6 %    Basophils Relative 1 0 - 1 %    Neutrophils Absolute 3 46 1 85 - 7 62 Thousands/µL    Immature Grans Absolute 0 02 0 00 - 0 20 Thousand/uL    Lymphocytes Absolute 1 31 0 60 - 4 47 Thousands/µL    Monocytes Absolute 0 74 0 17 - 1 22 Thousand/µL    Eosinophils Absolute 0 35 0 00 - 0 61 Thousand/µL    Basophils Absolute 0 05 0 00 - 0 10 Thousands/µL   Comprehensive metabolic panel    Collection Time: 01/09/20  7:12 AM   Result Value Ref Range    Sodium 142 136 - 145 mmol/L    Potassium 4 1 3 5 - 5 3 mmol/L    Chloride 111 (H) 100 - 108 mmol/L    CO2 26 21 - 32 mmol/L    ANION GAP 5 4 - 13 mmol/L    BUN 22 5 - 25 mg/dL    Creatinine 1 09 0 60 - 1 30 mg/dL    Glucose, Fasting 85 65 - 99 mg/dL    Calcium 9 3 8 3 - 10 1 mg/dL    AST 19 5 - 45 U/L    ALT 24 12 - 78 U/L    Alkaline Phosphatase 66 46 - 116 U/L    Total Protein 7 3 6 4 - 8 2 g/dL    Albumin 3 8 3 5 - 5 0 g/dL    Total Bilirubin 0 66 0 20 - 1 00 mg/dL    eGFR 64 ml/min/1 73sq m   Lipid panel    Collection Time: 01/09/20  7:12 AM   Result Value Ref Range    Cholesterol 180 50 - 200 mg/dL    Triglycerides 80 <=150 mg/dL    HDL, Direct 49 >=40 mg/dL    LDL Calculated 115 (H) 0 - 100 mg/dL    Non-HDL-Chol (CHOL-HDL) 131 mg/dl   TSH, 3rd generation with Free T4 reflex    Collection Time: 01/09/20  7:12 AM   Result Value Ref Range    TSH 3RD GENERATON 1 590 0 358 - 3 740 uIU/mL   Vitamin D 25 hydroxy    Collection Time: 01/09/20  7:12 AM   Result Value Ref Range    Vit D, 25-Hydroxy 44 5 30 0 - 100 0 ng/mL       Assessment/Plan:    Toe pain, right  Patient has extensor hallucis longus tendinitis which is likely related to all 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[facility] [employer]   At his request I did provide a referral to physical therapy  Could stretching exercises can help with this    Hypomagnesemia  Patient is no longer taking chlorthalidone and he is actually taking over-the-counter potassium supplement    Medicare annual wellness visit, subsequent  Subsequent annual wellness visit completed  Patient is current on screenings    Vitamin D deficiency  Therapeutic vitamin-D level on increased dose of over-the-counter vitamin-D supplement  Recheck vitamin-D level in 6 months    Benign essential hypertension  Excellent blood pressure on atenolol 50 mg once daily alone  Patient was taken off of chlorthalidone and has had no significant issues with discontinuation of medication          Problem List Items Addressed This Visit        Cardiovascular and Mediastinum    Benign essential hypertension     Excellent blood pressure on atenolol 50 mg once daily alone  Patient was taken off of chlorthalidone and has had no significant issues with discontinuation of medication         Relevant Orders    CBC and differential    Comprehensive metabolic panel    Lipid panel    TSH, 3rd generation with Free T4 reflex    Ambulatory referral to Physical Therapy       Other    RESOLVED: Hypokalemia    Hypomagnesemia     Patient is no longer taking chlorthalidone and he is actually taking over-the-counter potassium supplement         Medicare annual wellness visit, subsequent - Primary     Subsequent annual wellness visit completed  Patient is current on screenings         Toe pain, right     Patient has extensor hallucis longus tendinitis which is likely related to 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

## 2020-01-16 PROBLEM — E87.6 HYPOKALEMIA: Status: RESOLVED | Noted: 2018-07-17 | Resolved: 2020-01-16

## 2020-01-16 NOTE — ASSESSMENT & PLAN NOTE
Excellent blood pressure on atenolol 50 mg once daily alone    Patient was taken off of chlorthalidone and has had no significant issues with discontinuation of medication

## 2020-01-16 NOTE — ASSESSMENT & PLAN NOTE
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

## 2020-01-16 NOTE — ASSESSMENT & PLAN NOTE
Therapeutic vitamin-D level on increased dose of over-the-counter vitamin-D supplement    Recheck vitamin-D level in 6 months

## 2020-01-16 NOTE — ASSESSMENT & PLAN NOTE
Patient is no longer taking chlorthalidone and he is actually taking over-the-counter potassium supplement

## 2020-01-22 ENCOUNTER — OFFICE VISIT (OUTPATIENT)
Dept: GASTROENTEROLOGY | Facility: CLINIC | Age: 80
End: 2020-01-22
Payer: COMMERCIAL

## 2020-01-22 ENCOUNTER — TELEPHONE (OUTPATIENT)
Dept: GASTROENTEROLOGY | Facility: CLINIC | Age: 80
End: 2020-01-22

## 2020-01-22 VITALS
DIASTOLIC BLOOD PRESSURE: 72 MMHG | HEIGHT: 68 IN | HEART RATE: 68 BPM | BODY MASS INDEX: 27.16 KG/M2 | WEIGHT: 179.2 LBS | SYSTOLIC BLOOD PRESSURE: 130 MMHG

## 2020-01-22 DIAGNOSIS — Z86.010 HISTORY OF COLON POLYPS: Primary | ICD-10-CM

## 2020-01-22 PROCEDURE — 1160F RVW MEDS BY RX/DR IN RCRD: CPT | Performed by: PHYSICIAN ASSISTANT

## 2020-01-22 PROCEDURE — 99213 OFFICE O/P EST LOW 20 MIN: CPT | Performed by: PHYSICIAN ASSISTANT

## 2020-01-22 NOTE — PATIENT INSTRUCTIONS
Colorectal Polyps   WHAT YOU NEED TO KNOW:   Colorectal polyps are small growths of tissue in the lining of the colon and rectum  Most polyps are hyperplastic polyps and are usually benign (noncancerous)  Certain types of polyps, called adenomatous polyps, may turn into cancer  DISCHARGE INSTRUCTIONS:   Follow up with your healthcare provider or gastroenterologist as directed: You may need to return for more tests, such as another colonoscopy  Write down your questions so you remember to ask them during your visits  Reduce your risk for colorectal polyps:   · Eat a variety of healthy foods:  Healthy foods include fruit, vegetables, whole-grain breads, low-fat dairy products, beans, lean meat, and fish  Ask if you need to be on a special diet  · Maintain a healthy weight:  Ask your healthcare provider if you need to lose weight and how much you need to lose  Ask for help with a weight loss program     · Exercise:  Begin to exercise slowly and do more as you get stronger  Talk with your healthcare provider before you start an exercise program      · Limit alcohol:  Your risk for polyps increases the more you drink  · Do not smoke: If you smoke, it is never too late to quit  Ask for information about how to stop  For support and more information:   · Bryan Bautista ([facility])  [address], [place] [address]  Phone: [phone]  Web Address: www digestive  niddk nih gov  Contact your healthcare provider or gastroenterologist if:   · You have a fever  · You have chills, a cough, or feel weak and achy  · You have abdominal pain that does not go away or gets worse after you take medicine  · Your abdomen is swollen  · You are losing weight without trying  · You have questions or concerns about your condition or care  Seek care immediately or call 911 if:   · You have sudden shortness of breath       · You have a fast heart rate, fast breathing, or are too dizzy to stand up  · You have severe abdominal pain  · You see blood in your bowel movement  © 2017 [address] [facility] Information is for End User's use only and may not be sold, redistributed or otherwise used for commercial purposes  All illustrations and images included in CareNotes® are the copyrighted property of A D A M , Inc  or Glenn Rios  The above information is an  only  It is not intended as medical advice for individual conditions or treatments  Talk to your doctor, nurse or pharmacist before following any medical regimen to see if it is safe and effective for you

## 2020-01-22 NOTE — TELEPHONE ENCOUNTER
Patient called lmom - referred for colonoscopy  Referral in system   Please call Luci Proctor at [phone] ty

## 2020-01-22 NOTE — LETTER
January 22, 2020     Nancy Barlow DO  [address]    Patient: Katharine Nails   YOB: 1940   Date of Visit: 1/22/2020       Dear Dr Rito De La Cruz:    Thank you for referring Katharine Nails to me for evaluation  Below are my notes for this consultation  If you have questions, please do not hesitate to call me  I look forward to following your patient along with you  Sincerely,        Norma Durán PA-C        CC: No Recipients  Merrilyn Rocher, Army Scheuermann  1/22/2020  3:33 PM  Sign at close encounter  [address] [facility] - Outpatient Follow-up Note  Katharine Nails 78 y o  male MRN: [id number]  Encounter: [id number]          ASSESSMENT AND PLAN:      1  History of colon polyps  Will do colonoscopy    ______________________________________________________________________    SUBJECTIVE:   59-year-old male who is here for colonoscopy  Patient's last colonoscopy was in February of 2017 and he did have a tubular adenoma and hyperplastic polyp removed  Patient has had multiple colonoscopies in the past with adenomas removed  Patient denies any recent changes in his bowel routine  He does report that secondary to his radiation therapy for his prostate cancer he will occasionally have urgency when he needs to urinate  He reports that this is something that he has been struggling with for 10 years and he is just used to it  He denies any melena or rectal bleeding  He denies any significant weight loss  He denies any family history of colon cancer colon polyps  He denies any abdominal pain  REVIEW OF SYSTEMS IS OTHERWISE NEGATIVE        Historical Information   Past Medical History:   Diagnosis Date    Actinic keratosis     Last assessed - 10/15/14    Hypertension     Hypokalemia     Last assessed - 8/13/14    Sinus bradycardia     Last assessed - 8/13/14     Past Surgical History:   Procedure Laterality Date    HERNIA REPAIR      SIGMOIDOSCOPY (Fiberoptic, Therapeutic) 7/28/2014, 8/25/14     Social History   Social History     Substance and Sexual Activity   Alcohol Use No    Comment: Seldomly per [employer]      Social History     Substance and Sexual Activity   Drug Use No     Social History     Tobacco Use   Smoking Status Never Smoker   Smokeless Tobacco Never Used   Tobacco Comment    Former smoker per [employer]      Family History   Problem Relation Age of Onset    Prostate cancer Father     Liver cancer Brother         Adenocarcinoma        Meds/Allergies       Current Outpatient Medications:     atenolol (TENORMIN) 50 mg tablet    Cholecalciferol (VITAMIN D3) 1000 UNIT/SPRAY LIQD    Coenzyme Q10 100 MG CHEW    Omega-3-Acid Eth Est, Dietary, 1 g CAPS    Potassium 99 MG TABS    tadalafil (CIALIS) 20 MG tablet    Allergies   Allergen Reactions    Isoflavones            Objective     Blood pressure 130/72, pulse 68, height 5' 8" (1 727 m), weight 81 3 kg (179 lb 3 2 oz)  Body mass index is 27 25 kg/m²  PHYSICAL EXAM:      General Appearance:   Alert, cooperative, no distress   HEENT:   Normocephalic, atraumatic, anicteric      Neck:  Supple, symmetrical, trachea midline   Lungs:   Clear to auscultation bilaterally; no rales, rhonchi or wheezing; respirations unlabored    Heart[de-identified]   Regular rate and rhythm; no murmur, rub, or gallop  Abdomen:   Soft, non-tender, non-distended; normal bowel sounds; no masses, no organomegaly    Genitalia:   Deferred    Rectal:   Deferred    Extremities:  No cyanosis, clubbing or edema    Pulses:  2+ and symmetric    Skin:  No jaundice, rashes, or lesions    Lymph nodes:  No palpable cervical lymphadenopathy        Lab Results:   No visits with results within 1 Day(s) from this visit     Latest known visit with results is:   Appointment on 01/09/2020   Component Date Value    WBC 01/09/2020 5 93     RBC 01/09/2020 4 60     Hemoglobin 01/09/2020 13 7     Hematocrit 01/09/2020 42 3     MCV 01/09/2020 92  MCH 01/09/2020 29 8     MCHC 01/09/2020 32 4     RDW 01/09/2020 13 4     MPV 01/09/2020 10 1     Platelets 76/35/2886 223     nRBC 01/09/2020 0     Neutrophils Relative 01/09/2020 58     Immat GRANS % 01/09/2020 0     Lymphocytes Relative 01/09/2020 22     Monocytes Relative 01/09/2020 13*    Eosinophils Relative 01/09/2020 6     Basophils Relative 01/09/2020 1     Neutrophils Absolute 01/09/2020 3 46     Immature Grans Absolute 01/09/2020 0 02     Lymphocytes Absolute 01/09/2020 1 31     Monocytes Absolute 01/09/2020 0 74     Eosinophils Absolute 01/09/2020 0 35     Basophils Absolute 01/09/2020 0 05     Sodium 01/09/2020 142     Potassium 01/09/2020 4 1     Chloride 01/09/2020 111*    CO2 01/09/2020 26     ANION GAP 01/09/2020 5     BUN 01/09/2020 22     Creatinine 01/09/2020 1 09     Glucose, Fasting 01/09/2020 85     Calcium 01/09/2020 9 3     AST 01/09/2020 19     ALT 01/09/2020 24     Alkaline Phosphatase 01/09/2020 66     Total Protein 01/09/2020 7 3     Albumin 01/09/2020 3 8     Total Bilirubin 01/09/2020 0 66     eGFR 01/09/2020 64     Cholesterol 01/09/2020 180     Triglycerides 01/09/2020 80     HDL, Direct 01/09/2020 49     LDL Calculated 01/09/2020 115*    Non-HDL-Chol (CHOL-HDL) 01/09/2020 131     TSH 3RD GENERATON 01/09/2020 1 590     Vit D, 25-Hydroxy 01/09/2020 44 5          Radiology Results:   No results found

## 2020-01-22 NOTE — PROGRESS NOTES
[facility] - Outpatient Follow-up Note  Kumar Sanchez 78 y o  male MRN: [id number]  Encounter: [id number]          ASSESSMENT AND PLAN:      1  History of colon polyps  Will do colonoscopy    ______________________________________________________________________    SUBJECTIVE:   66-year-old male who is here for colonoscopy  Patient's last colonoscopy was in February of 2017 and he did have a tubular adenoma and hyperplastic polyp removed  Patient has had multiple colonoscopies in the past with adenomas removed  Patient denies any recent changes in his bowel routine  He does report that secondary to his radiation therapy for his prostate cancer he will occasionally have urgency when he needs to urinate  He reports that this is something that he has been struggling with for 10 years and he is just used to it  He denies any melena or rectal bleeding  He denies any significant weight loss  He denies any family history of colon cancer colon polyps  He denies any abdominal pain  REVIEW OF SYSTEMS IS OTHERWISE NEGATIVE        Historical Information   Past Medical History:   Diagnosis Date    Actinic keratosis     Last assessed - 10/15/14    Hypertension     Hypokalemia     Last assessed - 8/13/14    Sinus bradycardia     Last assessed - 8/13/14     Past Surgical History:   Procedure Laterality Date    HERNIA REPAIR      SIGMOIDOSCOPY      (Fiberoptic, Therapeutic) 7/28/2014, 8/25/14     Social History   Social History     Substance and Sexual Activity   Alcohol Use No    Comment: Seldomly per [employer]      Social History     Substance and Sexual Activity   Drug Use No     Social History     Tobacco Use   Smoking Status Never Smoker   Smokeless Tobacco Never Used   Tobacco Comment    Former smoker per [employer]      Family History   Problem Relation Age of Onset    Prostate cancer Father     Liver cancer Brother         Adenocarcinoma        Meds/Allergies Current Outpatient Medications:     atenolol (TENORMIN) 50 mg tablet    Cholecalciferol (VITAMIN D3) 1000 UNIT/SPRAY LIQD    Coenzyme Q10 100 MG CHEW    Omega-3-Acid Eth Est, Dietary, 1 g CAPS    Potassium 99 MG TABS    tadalafil (CIALIS) 20 MG tablet    Allergies   Allergen Reactions    Isoflavones            Objective     Blood pressure 130/72, pulse 68, height 5' 8" (1 727 m), weight 81 3 kg (179 lb 3 2 oz)  Body mass index is 27 25 kg/m²  PHYSICAL EXAM:      General Appearance:   Alert, cooperative, no distress   HEENT:   Normocephalic, atraumatic, anicteric      Neck:  Supple, symmetrical, trachea midline   Lungs:   Clear to auscultation bilaterally; no rales, rhonchi or wheezing; respirations unlabored    Heart[de-identified]   Regular rate and rhythm; no murmur, rub, or gallop  Abdomen:   Soft, non-tender, non-distended; normal bowel sounds; no masses, no organomegaly    Genitalia:   Deferred    Rectal:   Deferred    Extremities:  No cyanosis, clubbing or edema    Pulses:  2+ and symmetric    Skin:  No jaundice, rashes, or lesions    Lymph nodes:  No palpable cervical lymphadenopathy        Lab Results:   No visits with results within 1 Day(s) from this visit     Latest known visit with results is:   Appointment on 01/09/2020   Component Date Value    WBC 01/09/2020 5 93     RBC 01/09/2020 4 60     Hemoglobin 01/09/2020 13 7     Hematocrit 01/09/2020 42 3     MCV 01/09/2020 92     MCH 01/09/2020 29 8     MCHC 01/09/2020 32 4     RDW 01/09/2020 13 4     MPV 01/09/2020 10 1     Platelets 93/14/0984 223     nRBC 01/09/2020 0     Neutrophils Relative 01/09/2020 58     Immat GRANS % 01/09/2020 0     Lymphocytes Relative 01/09/2020 22     Monocytes Relative 01/09/2020 13*    Eosinophils Relative 01/09/2020 6     Basophils Relative 01/09/2020 1     Neutrophils Absolute 01/09/2020 3 46     Immature Grans Absolute 01/09/2020 0 02     Lymphocytes Absolute 01/09/2020 1 31     Monocytes Absolute 01/09/2020 0 74     Eosinophils Absolute 01/09/2020 0 35     Basophils Absolute 01/09/2020 0 05     Sodium 01/09/2020 142     Potassium 01/09/2020 4 1     Chloride 01/09/2020 111*    CO2 01/09/2020 26     ANION GAP 01/09/2020 5     BUN 01/09/2020 22     Creatinine 01/09/2020 1 09     Glucose, Fasting 01/09/2020 85     Calcium 01/09/2020 9 3     AST 01/09/2020 19     ALT 01/09/2020 24     Alkaline Phosphatase 01/09/2020 66     Total Protein 01/09/2020 7 3     Albumin 01/09/2020 3 8     Total Bilirubin 01/09/2020 0 66     eGFR 01/09/2020 64     Cholesterol 01/09/2020 180     Triglycerides 01/09/2020 80     HDL, Direct 01/09/2020 49     LDL Calculated 01/09/2020 115*    Non-HDL-Chol (CHOL-HDL) 01/09/2020 131     TSH 3RD GENERATON 01/09/2020 1 590     Vit D, 25-Hydroxy 01/09/2020 44 5          Radiology Results:   No results found

## 2020-01-24 ENCOUNTER — PREP FOR PROCEDURE (OUTPATIENT)
Dept: GASTROENTEROLOGY | Facility: CLINIC | Age: 80
End: 2020-01-24

## 2020-01-24 ENCOUNTER — TELEPHONE (OUTPATIENT)
Dept: GASTROENTEROLOGY | Facility: CLINIC | Age: 80
End: 2020-01-24

## 2020-01-24 DIAGNOSIS — Z86.010 HISTORY OF COLON POLYPS: Primary | ICD-10-CM

## 2020-01-24 NOTE — TELEPHONE ENCOUNTER
Luciana pt - Pt needs to switch his colon from Feb 4th to Feb 25th if possible  Please call [phone] ty

## 2020-01-29 ENCOUNTER — TELEPHONE (OUTPATIENT)
Dept: FAMILY MEDICINE CLINIC | Facility: CLINIC | Age: 80
End: 2020-01-29

## 2020-01-29 DIAGNOSIS — M54.2 CERVICALGIA: Primary | ICD-10-CM

## 2020-01-29 NOTE — TELEPHONE ENCOUNTER
Denia Schneider was seen on 1/14 by Dr John Almeida  States he discussed with Dr John Almeida his shoulder pain and how it was keeping him up at night and going for pt  Is requesting an other for pt

## 2020-02-04 ENCOUNTER — OFFICE VISIT (OUTPATIENT)
Dept: FAMILY MEDICINE CLINIC | Facility: CLINIC | Age: 80
End: 2020-02-04
Payer: COMMERCIAL

## 2020-02-04 VITALS
SYSTOLIC BLOOD PRESSURE: 142 MMHG | DIASTOLIC BLOOD PRESSURE: 68 MMHG | BODY MASS INDEX: 26.83 KG/M2 | OXYGEN SATURATION: 95 % | HEART RATE: 48 BPM | HEIGHT: 68 IN | RESPIRATION RATE: 16 BRPM | WEIGHT: 177 LBS

## 2020-02-04 DIAGNOSIS — H81.11 BENIGN PAROXYSMAL POSITIONAL VERTIGO OF RIGHT EAR: Primary | ICD-10-CM

## 2020-02-04 PROCEDURE — 1160F RVW MEDS BY RX/DR IN RCRD: CPT | Performed by: FAMILY MEDICINE

## 2020-02-04 PROCEDURE — 1036F TOBACCO NON-USER: CPT | Performed by: FAMILY MEDICINE

## 2020-02-04 PROCEDURE — 99213 OFFICE O/P EST LOW 20 MIN: CPT | Performed by: FAMILY MEDICINE

## 2020-02-04 PROCEDURE — 3077F SYST BP >= 140 MM HG: CPT | Performed by: FAMILY MEDICINE

## 2020-02-04 PROCEDURE — 3078F DIAST BP <80 MM HG: CPT | Performed by: FAMILY MEDICINE

## 2020-02-04 PROCEDURE — 1170F FXNL STATUS ASSESSED: CPT | Performed by: FAMILY MEDICINE

## 2020-02-04 PROCEDURE — 3008F BODY MASS INDEX DOCD: CPT | Performed by: FAMILY MEDICINE

## 2020-02-04 PROCEDURE — 4040F PNEUMOC VAC/ADMIN/RCVD: CPT | Performed by: FAMILY MEDICINE

## 2020-02-04 NOTE — ASSESSMENT & PLAN NOTE
- patient did seem to have more nystagmus with turning his head to the left  - patient did have reproduction of symptoms with change in position and his symptoms really did seem positional only  -referral to physical therapy for [facility] maneuvers    - advised that he can take over-the-counter meclizine    His wife does actually carry meclizine in her purse as she does occasionally get similar episodes

## 2020-02-04 NOTE — PROGRESS NOTES
Subjective:      Patient ID: Patrica Mckeon is a 78 y o  male  66-year-old male presents with his significant other complaining of a 1 day history of dizziness with change in position  Patient states that he was at work as a volunteer transport at [employer] and was not having any issues yesterday but at 3:00 p m  He had bent down to  something and when he came up he felt himself very lightheaded and dizzy  Patient did go home  He thought that the dizziness would pass as it was very transient  Patient went to bed last evening and when he got into bed he did feel lightheaded with laying down  When he went to get up this morning he can also felt lightheaded  No weakness  Patient did not fall  No change in medications  He has been drinking adequate amount of water throughout the course of the day  If patient remains in 1 position he does not have any issue  Patient did call the [facility] on-call last evening  They did spend time with him going through different symptomatology  He was noted to have elevated blood pressure but had not taken his atenolol  Took his atenolol and his blood pressure did normalize      Past Medical History:   Diagnosis Date    Actinic keratosis     Last assessed - 10/15/14    Hypertension     Hypokalemia     Last assessed - 8/13/14    Sinus bradycardia     Last assessed - 8/13/14       Family History   Problem Relation Age of Onset    Prostate cancer Father     Liver cancer Brother         Adenocarcinoma        Past Surgical History:   Procedure Laterality Date    HERNIA REPAIR      SIGMOIDOSCOPY      (Fiberoptic, Therapeutic) 7/28/2014, 8/25/14        reports that he has never smoked  He has never used smokeless tobacco  He reports that he does not drink alcohol or use drugs        Current Outpatient Medications:     atenolol (TENORMIN) 50 mg tablet, Take 1 tablet (50 mg total) by mouth daily, Disp: 90 tablet, Rfl: 1    Cholecalciferol (VITAMIN D3) 1000 UNIT/SPRAY LIQD, Take 1 tablet by mouth daily, Disp: , Rfl:     Coenzyme Q10 100 MG CHEW, Chew 200 mg, Disp: , Rfl:     Omega-3-Acid Eth Est, Dietary, 1 g CAPS, Take by mouth, Disp: , Rfl:     Potassium 99 MG TABS, Take 1 tablet by mouth 2 (two) times a day, Disp: , Rfl:     tadalafil (CIALIS) 20 MG tablet, Take 1 tablet (20 mg total) by mouth daily as needed for erectile dysfunction, Disp: 32 tablet, Rfl: 3    The following portions of the patient's history were reviewed and updated as appropriate: allergies, current medications, past family history, past medical history, past social history, past surgical history and problem list     Review of Systems   Constitutional: Negative  Eyes: Negative  Negative for visual disturbance  Neurological: Positive for dizziness and light-headedness  Negative for tremors, seizures, syncope, facial asymmetry, speech difficulty, weakness, numbness and headaches  Psychiatric/Behavioral: Negative  Objective:    /68   Pulse (!) 48   Resp 16   Ht 5' 8" (1 727 m)   Wt 80 3 kg (177 lb)   SpO2 95%   BMI 26 91 kg/m²      Physical Exam   Constitutional: He is oriented to person, place, and time  He appears well-developed and well-nourished  Cardiovascular: Regular rhythm, S1 normal, S2 normal and normal heart sounds  Bradycardia present  Exam reveals no gallop  No significant change in blood pressure in the lying, seated or standing position   Neurological: He is alert and oriented to person, place, and time  No cranial nerve deficit  He exhibits normal muscle tone  Coordination normal    Reproduction of dizziness with change in position  Hallpike Kavitha maneuver positive   Nursing note and vitals reviewed          Recent Results (from the past 1008 hour(s))   CBC and differential    Collection Time: 01/09/20  7:12 AM   Result Value Ref Range    WBC 5 93 4 31 - 10 16 Thousand/uL    RBC 4 60 3 88 - 5 62 Million/uL    Hemoglobin 13 7 12 0 - 17 0 g/dL    Hematocrit 42 3 36 5 - 49 3 %    MCV 92 82 - 98 fL    MCH 29 8 26 8 - 34 3 pg    MCHC 32 4 31 4 - 37 4 g/dL    RDW 13 4 11 6 - 15 1 %    MPV 10 1 8 9 - 12 7 fL    Platelets 947 106 - 352 Thousands/uL    nRBC 0 /100 WBCs    Neutrophils Relative 58 43 - 75 %    Immat GRANS % 0 0 - 2 %    Lymphocytes Relative 22 14 - 44 %    Monocytes Relative 13 (H) 4 - 12 %    Eosinophils Relative 6 0 - 6 %    Basophils Relative 1 0 - 1 %    Neutrophils Absolute 3 46 1 85 - 7 62 Thousands/µL    Immature Grans Absolute 0 02 0 00 - 0 20 Thousand/uL    Lymphocytes Absolute 1 31 0 60 - 4 47 Thousands/µL    Monocytes Absolute 0 74 0 17 - 1 22 Thousand/µL    Eosinophils Absolute 0 35 0 00 - 0 61 Thousand/µL    Basophils Absolute 0 05 0 00 - 0 10 Thousands/µL   Comprehensive metabolic panel    Collection Time: 01/09/20  7:12 AM   Result Value Ref Range    Sodium 142 136 - 145 mmol/L    Potassium 4 1 3 5 - 5 3 mmol/L    Chloride 111 (H) 100 - 108 mmol/L    CO2 26 21 - 32 mmol/L    ANION GAP 5 4 - 13 mmol/L    BUN 22 5 - 25 mg/dL    Creatinine 1 09 0 60 - 1 30 mg/dL    Glucose, Fasting 85 65 - 99 mg/dL    Calcium 9 3 8 3 - 10 1 mg/dL    AST 19 5 - 45 U/L    ALT 24 12 - 78 U/L    Alkaline Phosphatase 66 46 - 116 U/L    Total Protein 7 3 6 4 - 8 2 g/dL    Albumin 3 8 3 5 - 5 0 g/dL    Total Bilirubin 0 66 0 20 - 1 00 mg/dL    eGFR 64 ml/min/1 73sq m   Lipid panel    Collection Time: 01/09/20  7:12 AM   Result Value Ref Range    Cholesterol 180 50 - 200 mg/dL    Triglycerides 80 <=150 mg/dL    HDL, Direct 49 >=40 mg/dL    LDL Calculated 115 (H) 0 - 100 mg/dL    Non-HDL-Chol (CHOL-HDL) 131 mg/dl   TSH, 3rd generation with Free T4 reflex    Collection Time: 01/09/20  7:12 AM   Result Value Ref Range    TSH 3RD GENERATON 1 590 0 358 - 3 740 uIU/mL   Vitamin D 25 hydroxy    Collection Time: 01/09/20  7:12 AM   Result Value Ref Range    Vit D, 25-Hydroxy 44 5 30 0 - 100 0 ng/mL       Assessment/Plan:    Benign paroxysmal positional vertigo of right ear  - patient did seem to have more nystagmus with turning his head to the left  - patient did have reproduction of symptoms with change in position and his symptoms really did seem positional only  -referral to physical therapy for [facility] maneuvers    - advised that he can take over-the-counter meclizine  His wife does actually carry meclizine in her purse as she does occasionally get similar episodes          Problem List Items Addressed This Visit        Nervous and Auditory    Benign paroxysmal positional vertigo of right ear - Primary     - patient did seem to have more nystagmus with turning his head to the left  - patient did have reproduction of symptoms with change in position and his symptoms really did seem positional only  -referral to physical therapy for [facility] maneuvers    - advised that he can take over-the-counter meclizine    His wife does actually carry meclizine in her purse as she does occasionally get similar episodes         Relevant Orders    Ambulatory referral to Physical Therapy

## 2020-02-07 ENCOUNTER — TELEPHONE (OUTPATIENT)
Dept: GASTROENTEROLOGY | Facility: CLINIC | Age: 80
End: 2020-02-07

## 2020-02-07 NOTE — TELEPHONE ENCOUNTER
Patient called to change his colonoscopy from 02/25 to 03/03   Please call Hunter Rodrigues at [phone]

## 2020-02-10 ENCOUNTER — PREP FOR PROCEDURE (OUTPATIENT)
Dept: GASTROENTEROLOGY | Facility: CLINIC | Age: 80
End: 2020-02-10

## 2020-02-10 DIAGNOSIS — Z86.010 HISTORY OF COLON POLYPS: Primary | ICD-10-CM

## 2020-03-03 ENCOUNTER — ANESTHESIA EVENT (OUTPATIENT)
Dept: GASTROENTEROLOGY | Facility: HOSPITAL | Age: 80
End: 2020-03-03

## 2020-03-03 ENCOUNTER — ANESTHESIA (OUTPATIENT)
Dept: GASTROENTEROLOGY | Facility: HOSPITAL | Age: 80
End: 2020-03-03

## 2020-03-03 ENCOUNTER — HOSPITAL ENCOUNTER (OUTPATIENT)
Dept: GASTROENTEROLOGY | Facility: HOSPITAL | Age: 80
Setting detail: OUTPATIENT SURGERY
Discharge: HOME | End: 2020-03-03
Attending: INTERNAL MEDICINE | Admitting: INTERNAL MEDICINE
Payer: COMMERCIAL

## 2020-03-03 VITALS
WEIGHT: 175.93 LBS | BODY MASS INDEX: 26.06 KG/M2 | TEMPERATURE: 98.1 F | SYSTOLIC BLOOD PRESSURE: 123 MMHG | OXYGEN SATURATION: 97 % | HEIGHT: 69 IN | RESPIRATION RATE: 20 BRPM | HEART RATE: 62 BPM | DIASTOLIC BLOOD PRESSURE: 65 MMHG

## 2020-03-03 DIAGNOSIS — Z86.010 HISTORY OF COLON POLYPS: ICD-10-CM

## 2020-03-03 PROCEDURE — 45385 COLONOSCOPY W/LESION REMOVAL: CPT | Performed by: INTERNAL MEDICINE

## 2020-03-03 PROCEDURE — 88305 TISSUE EXAM BY PATHOLOGIST: CPT | Performed by: PATHOLOGY

## 2020-03-03 RX ORDER — SODIUM CHLORIDE, SODIUM LACTATE, POTASSIUM CHLORIDE, CALCIUM CHLORIDE 600; 310; 30; 20 MG/100ML; MG/100ML; MG/100ML; MG/100ML
125 INJECTION, SOLUTION INTRAVENOUS CONTINUOUS
Status: DISCONTINUED | OUTPATIENT
Start: 2020-03-03 | End: 2020-03-07 | Stop reason: HOSPADM

## 2020-03-03 RX ORDER — LIDOCAINE HYDROCHLORIDE 10 MG/ML
INJECTION, SOLUTION EPIDURAL; INFILTRATION; INTRACAUDAL; PERINEURAL AS NEEDED
Status: DISCONTINUED | OUTPATIENT
Start: 2020-03-03 | End: 2020-03-03 | Stop reason: SURG

## 2020-03-03 RX ORDER — PROPOFOL 10 MG/ML
INJECTION, EMULSION INTRAVENOUS AS NEEDED
Status: DISCONTINUED | OUTPATIENT
Start: 2020-03-03 | End: 2020-03-03 | Stop reason: SURG

## 2020-03-03 RX ORDER — LIDOCAINE HYDROCHLORIDE 10 MG/ML
0.5 INJECTION, SOLUTION EPIDURAL; INFILTRATION; INTRACAUDAL; PERINEURAL ONCE AS NEEDED
Status: DISCONTINUED | OUTPATIENT
Start: 2020-03-03 | End: 2020-03-07 | Stop reason: HOSPADM

## 2020-03-03 RX ADMIN — PROPOFOL 20 MG: 10 INJECTION, EMULSION INTRAVENOUS at 13:49

## 2020-03-03 RX ADMIN — LIDOCAINE HYDROCHLORIDE 50 MG: 10 INJECTION, SOLUTION EPIDURAL; INFILTRATION; INTRACAUDAL; PERINEURAL at 13:37

## 2020-03-03 RX ADMIN — PROPOFOL 40 MG: 10 INJECTION, EMULSION INTRAVENOUS at 13:47

## 2020-03-03 RX ADMIN — PROPOFOL 20 MG: 10 INJECTION, EMULSION INTRAVENOUS at 13:44

## 2020-03-03 RX ADMIN — PROPOFOL 20 MG: 10 INJECTION, EMULSION INTRAVENOUS at 13:42

## 2020-03-03 RX ADMIN — SODIUM CHLORIDE, SODIUM LACTATE, POTASSIUM CHLORIDE, AND CALCIUM CHLORIDE: .6; .31; .03; .02 INJECTION, SOLUTION INTRAVENOUS at 13:20

## 2020-03-03 RX ADMIN — PROPOFOL 80 MG: 10 INJECTION, EMULSION INTRAVENOUS at 13:37

## 2020-03-03 RX ADMIN — PROPOFOL 40 MG: 10 INJECTION, EMULSION INTRAVENOUS at 13:40

## 2020-03-03 NOTE — ANESTHESIA POSTPROCEDURE EVALUATION
Post-Op Assessment Note    CV Status:  Stable    Pain management: adequate     Mental Status:  Sleepy and arousable   Hydration Status:  Euvolemic   PONV Controlled:  Controlled   Airway Patency:  Patent   Post Op Vitals Reviewed: Yes      Staff: CRNA           /60 (03/03/20 1400)    Temp 98 1 °F (36 7 °C) (03/03/20 1400)    Pulse 61 (03/03/20 1400)   Resp 12 (03/03/20 1400)    SpO2 95 % (03/03/20 1400)

## 2020-03-03 NOTE — DISCHARGE INSTRUCTIONS
Colonoscopy   WHAT YOU NEED TO KNOW:   A colonoscopy is a procedure to examine the inside of your colon (intestine) with a scope  Polyps or tissue growths may have been removed during your colonoscopy  It is normal to feel bloated and to have some abdominal discomfort  You should be passing gas  If you have hemorrhoids or you had polyps removed, you may have a small amount of bleeding  DISCHARGE INSTRUCTIONS:   Seek care immediately if:   · You have a large amount of bright red blood in your bowel movements  · Your abdomen is hard and firm and you have severe pain  · You have sudden trouble breathing  Contact your healthcare provider if:   · You develop a rash or hives  · You have a fever within 24 hours of your procedure  · You have not had a bowel movement for 3 days after your procedure  · You have questions or concerns about your condition or care  Activity:   · Do not lift, strain, or run  for 3 days after your procedure  · Rest after your procedure  You have been given medicine to relax you  Do not  drive or make important decisions until the day after your procedure  Return to your normal activity as directed  · Relieve gas and discomfort from bloating  by lying on your right side with a heating pad on your abdomen  You may need to take short walks to help the gas move out  Eat small meals until bloating is relieved  If you had polyps removed: For 7 days after your procedure:  · Do not  take aspirin  · Do not  go on long car rides  Help prevent constipation:   · Eat a variety of healthy foods  Healthy foods include fruit, vegetables, whole-grain breads, low-fat dairy products, beans, lean meat, and fish  Ask if you need to be on a special diet  Your healthcare provider may recommend that you eat high-fiber foods such as cooked beans  Fiber helps you have regular bowel movements  · Drink liquids as directed    Adults should drink between 9 and 13 eight-ounce cups of liquid every day  Ask what amount is best for you  For most people, good liquids to drink are water, juice, and milk  · Exercise as directed  Talk to your healthcare provider about the best exercise plan for you  Exercise can help prevent constipation, decrease your blood pressure and improve your health  Follow up with your healthcare provider as directed:  Write down your questions so you remember to ask them during your visits  © 2017 [employer] Information is for End User's use only and may not be sold, redistributed or otherwise used for commercial purposes  All illustrations and images included in CareNotes® are the copyrighted property of [employer]  or [employer]  The above information is an  only  It is not intended as medical advice for individual conditions or treatments  Talk to your doctor, nurse or pharmacist before following any medical regimen to see if it is safe and effective for you

## 2020-03-03 NOTE — ANESTHESIA PREPROCEDURE EVALUATION
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, pushes wheelchairs no CP or ALONSO Hypertension , No angina , No ALONSO,    Pulmonary  Not a smoker , No recent URI , Sleep apnea CPAP,        GI/Hepatic    Bowel prep       Prostatic disorder, history of prostate cancer       Endo/Other     GYN       Hematology   Musculoskeletal  Back pain , lumbar pain, Osteoarthritis,   Arthritis     Neurology      Comment: [employer]           Physical Exam    Airway    Mallampati score: II  TM Distance: <3 FB  Neck ROM: full     Dental   upper dentures,     Cardiovascular  Cardiovascular exam normal    Pulmonary  Pulmonary exam normal     Other Findings        Anesthesia Plan  ASA Score- 2     Anesthesia Type- IV sedation with anesthesia with ASA Monitors  Additional Monitors:   Airway Plan:         Plan Factors-    Induction- intravenous  Postoperative Plan-     Informed Consent- Anesthetic plan and risks discussed with patient  I personally reviewed this patient with the CRNA  Discussed and agreed on the Anesthesia Plan with the CRNA  Piero Bowman

## 2020-03-10 ENCOUNTER — TELEPHONE (OUTPATIENT)
Dept: GASTROENTEROLOGY | Facility: CLINIC | Age: 80
End: 2020-03-10

## 2020-03-17 DIAGNOSIS — I10 BENIGN ESSENTIAL HYPERTENSION: ICD-10-CM

## 2020-03-17 RX ORDER — ATENOLOL 50 MG/1
TABLET ORAL
Qty: 90 TABLET | Refills: 1 | Status: SHIPPED | OUTPATIENT
Start: 2020-03-17 | End: 2020-07-24 | Stop reason: SDUPTHER

## 2020-07-10 ENCOUNTER — APPOINTMENT (OUTPATIENT)
Dept: LAB | Facility: CLINIC | Age: 80
End: 2020-07-10
Payer: COMMERCIAL

## 2020-07-10 DIAGNOSIS — I10 BENIGN ESSENTIAL HYPERTENSION: ICD-10-CM

## 2020-07-10 DIAGNOSIS — C61 PROSTATE CANCER (HCC): ICD-10-CM

## 2020-07-10 LAB
ALBUMIN SERPL BCP-MCNC: 3.7 G/DL (ref 3.5–5)
ALP SERPL-CCNC: 60 U/L (ref 46–116)
ALT SERPL W P-5'-P-CCNC: 25 U/L (ref 12–78)
ANION GAP SERPL CALCULATED.3IONS-SCNC: 3 MMOL/L (ref 4–13)
AST SERPL W P-5'-P-CCNC: 20 U/L (ref 5–45)
BASOPHILS # BLD AUTO: 0.05 THOUSANDS/ΜL (ref 0–0.1)
BASOPHILS NFR BLD AUTO: 1 % (ref 0–1)
BILIRUB SERPL-MCNC: 0.65 MG/DL (ref 0.2–1)
BUN SERPL-MCNC: 18 MG/DL (ref 5–25)
CALCIUM SERPL-MCNC: 8.9 MG/DL (ref 8.3–10.1)
CHLORIDE SERPL-SCNC: 113 MMOL/L (ref 100–108)
CHOLEST SERPL-MCNC: 179 MG/DL (ref 50–200)
CO2 SERPL-SCNC: 26 MMOL/L (ref 21–32)
CREAT SERPL-MCNC: 1.02 MG/DL (ref 0.6–1.3)
EOSINOPHIL # BLD AUTO: 0.13 THOUSAND/ΜL (ref 0–0.61)
EOSINOPHIL NFR BLD AUTO: 3 % (ref 0–6)
ERYTHROCYTE [DISTWIDTH] IN BLOOD BY AUTOMATED COUNT: 13.4 % (ref 11.6–15.1)
GFR SERPL CREATININE-BSD FRML MDRD: 69 ML/MIN/1.73SQ M
GLUCOSE P FAST SERPL-MCNC: 86 MG/DL (ref 65–99)
HCT VFR BLD AUTO: 41.1 % (ref 36.5–49.3)
HDLC SERPL-MCNC: 51 MG/DL
HGB BLD-MCNC: 13.7 G/DL (ref 12–17)
IMM GRANULOCYTES # BLD AUTO: 0.01 THOUSAND/UL (ref 0–0.2)
IMM GRANULOCYTES NFR BLD AUTO: 0 % (ref 0–2)
LDLC SERPL CALC-MCNC: 119 MG/DL (ref 0–100)
LYMPHOCYTES # BLD AUTO: 1.17 THOUSANDS/ΜL (ref 0.6–4.47)
LYMPHOCYTES NFR BLD AUTO: 27 % (ref 14–44)
MCH RBC QN AUTO: 30.8 PG (ref 26.8–34.3)
MCHC RBC AUTO-ENTMCNC: 33.3 G/DL (ref 31.4–37.4)
MCV RBC AUTO: 92 FL (ref 82–98)
MONOCYTES # BLD AUTO: 0.48 THOUSAND/ΜL (ref 0.17–1.22)
MONOCYTES NFR BLD AUTO: 11 % (ref 4–12)
NEUTROPHILS # BLD AUTO: 2.46 THOUSANDS/ΜL (ref 1.85–7.62)
NEUTS SEG NFR BLD AUTO: 58 % (ref 43–75)
NONHDLC SERPL-MCNC: 128 MG/DL
NRBC BLD AUTO-RTO: 0 /100 WBCS
PLATELET # BLD AUTO: 192 THOUSANDS/UL (ref 149–390)
PMV BLD AUTO: 9.9 FL (ref 8.9–12.7)
POTASSIUM SERPL-SCNC: 4.2 MMOL/L (ref 3.5–5.3)
PROT SERPL-MCNC: 6.9 G/DL (ref 6.4–8.2)
PSA SERPL-MCNC: 0.4 NG/ML (ref 0–4)
RBC # BLD AUTO: 4.45 MILLION/UL (ref 3.88–5.62)
SODIUM SERPL-SCNC: 142 MMOL/L (ref 136–145)
TRIGL SERPL-MCNC: 46 MG/DL
TSH SERPL DL<=0.05 MIU/L-ACNC: 1.2 UIU/ML (ref 0.36–3.74)
WBC # BLD AUTO: 4.3 THOUSAND/UL (ref 4.31–10.16)

## 2020-07-10 PROCEDURE — 85025 COMPLETE CBC W/AUTO DIFF WBC: CPT

## 2020-07-10 PROCEDURE — 84153 ASSAY OF PSA TOTAL: CPT

## 2020-07-10 PROCEDURE — 84443 ASSAY THYROID STIM HORMONE: CPT

## 2020-07-10 PROCEDURE — 80061 LIPID PANEL: CPT

## 2020-07-10 PROCEDURE — 80053 COMPREHEN METABOLIC PANEL: CPT

## 2020-07-10 PROCEDURE — 36415 COLL VENOUS BLD VENIPUNCTURE: CPT

## 2020-07-24 ENCOUNTER — OFFICE VISIT (OUTPATIENT)
Dept: FAMILY MEDICINE CLINIC | Facility: CLINIC | Age: 80
End: 2020-07-24
Payer: COMMERCIAL

## 2020-07-24 VITALS
HEIGHT: 69 IN | SYSTOLIC BLOOD PRESSURE: 142 MMHG | HEART RATE: 64 BPM | RESPIRATION RATE: 16 BRPM | TEMPERATURE: 98.1 F | OXYGEN SATURATION: 97 % | BODY MASS INDEX: 25.48 KG/M2 | WEIGHT: 172 LBS | DIASTOLIC BLOOD PRESSURE: 78 MMHG

## 2020-07-24 DIAGNOSIS — E83.42 HYPOMAGNESEMIA: ICD-10-CM

## 2020-07-24 DIAGNOSIS — Z91.09 ENVIRONMENTAL ALLERGIES: ICD-10-CM

## 2020-07-24 DIAGNOSIS — C61 PROSTATE CANCER (HCC): Primary | ICD-10-CM

## 2020-07-24 DIAGNOSIS — E55.9 VITAMIN D DEFICIENCY: ICD-10-CM

## 2020-07-24 DIAGNOSIS — R00.2 PALPITATIONS: ICD-10-CM

## 2020-07-24 DIAGNOSIS — I10 BENIGN ESSENTIAL HYPERTENSION: ICD-10-CM

## 2020-07-24 DIAGNOSIS — N52.9 ERECTILE DYSFUNCTION, UNSPECIFIED ERECTILE DYSFUNCTION TYPE: ICD-10-CM

## 2020-07-24 PROCEDURE — 3008F BODY MASS INDEX DOCD: CPT | Performed by: FAMILY MEDICINE

## 2020-07-24 PROCEDURE — 3077F SYST BP >= 140 MM HG: CPT | Performed by: FAMILY MEDICINE

## 2020-07-24 PROCEDURE — 4040F PNEUMOC VAC/ADMIN/RCVD: CPT | Performed by: FAMILY MEDICINE

## 2020-07-24 PROCEDURE — 3078F DIAST BP <80 MM HG: CPT | Performed by: FAMILY MEDICINE

## 2020-07-24 PROCEDURE — 99214 OFFICE O/P EST MOD 30 MIN: CPT | Performed by: FAMILY MEDICINE

## 2020-07-24 PROCEDURE — 1160F RVW MEDS BY RX/DR IN RCRD: CPT | Performed by: FAMILY MEDICINE

## 2020-07-24 PROCEDURE — 1036F TOBACCO NON-USER: CPT | Performed by: FAMILY MEDICINE

## 2020-07-24 RX ORDER — TADALAFIL 20 MG/1
20 TABLET ORAL DAILY PRN
Qty: 32 TABLET | Refills: 3 | Status: SHIPPED | OUTPATIENT
Start: 2020-07-24 | End: 2021-08-24 | Stop reason: SDUPTHER

## 2020-07-24 RX ORDER — AZELASTINE 1 MG/ML
1 SPRAY, METERED NASAL 2 TIMES DAILY
Qty: 1 BOTTLE | Refills: 1 | Status: SHIPPED | OUTPATIENT
Start: 2020-07-24 | End: 2021-01-26

## 2020-07-24 RX ORDER — ATENOLOL 50 MG/1
50 TABLET ORAL DAILY
Qty: 90 TABLET | Refills: 1 | Status: SHIPPED | OUTPATIENT
Start: 2020-07-24 | End: 2021-01-26 | Stop reason: SDUPTHER

## 2020-07-24 RX ORDER — SILDENAFIL 50 MG/1
50 TABLET, FILM COATED ORAL DAILY PRN
Qty: 5 TABLET | Refills: 0 | Status: SHIPPED | OUTPATIENT
Start: 2020-07-24 | End: 2022-03-01

## 2020-07-24 NOTE — ASSESSMENT & PLAN NOTE
Negative stress testing  P this does not happen with great frequency    If it does happen to be more frequent than the patient can be sent for Holter monitor or even Zio monitor

## 2020-07-24 NOTE — ASSESSMENT & PLAN NOTE
Patient has been on Cialis which has been effective  Refill provided    Patient would like to try limited supply of Viagra as he does get a significant headache following taking Cialis which does tend the last throughout the entire day

## 2020-07-24 NOTE — ASSESSMENT & PLAN NOTE
Patient did have prostate cancer resected  Diagnostic PSA remains detectable but has not increased    He does follow up with urologist once a year

## 2020-07-24 NOTE — PROGRESS NOTES
Subjective:      Patient ID: Christoph Cota is a [de-identified] y o  male  Six month follow-up of chronic conditions including hypertension, history of chronic migraines  Overall feeling well  Prior history of prostate cancer  Followed by Urology  Diagnostic PSA remains stable at 0 4  Patient did have repeat colonoscopy and 2 small benign polyps were removed  No further colonoscopies are needed due to age  Overall he has been feeling well  He is not volunteering at [facility] in [facility] due to corona virus pandemic  Labs were performed which showed normal CBC, CMP, TSH, diagnostic PSA  Patient does occasionally experience brief episodes of palpitations  He has had cardiac workup in the past which was unremarkable including stress testing recently  Patient does complain of environmental allergies with nasal congestion  Since having his prostate cancer treated he has had erectile dysfunction  He does get Cialis from [place] but would like to try taking Viagra  Past Medical History:   Diagnosis Date    Actinic keratosis     Last assessed - 10/15/14    Hypertension     Hypokalemia     Last assessed - 8/13/14    Sinus bradycardia     Last assessed - 8/13/14       Family History   Problem Relation Age of Onset    Prostate cancer Father     Liver cancer Brother         Adenocarcinoma        Past Surgical History:   Procedure Laterality Date    HERNIA REPAIR      SIGMOIDOSCOPY      (Fiberoptic, Therapeutic) 7/28/2014, 8/25/14        reports that he has never smoked  He has never used smokeless tobacco  He reports that he does not drink alcohol or use drugs        Current Outpatient Medications:     atenolol (TENORMIN) 50 mg tablet, Take 1 tablet (50 mg total) by mouth daily, Disp: 90 tablet, Rfl: 1    Cholecalciferol (VITAMIN D3) 1000 UNIT/SPRAY LIQD, Take 1 tablet by mouth daily, Disp: , Rfl:     Coenzyme Q10 100 MG CHEW, Chew 200 mg, Disp: , Rfl:     Omega-3-Acid Eth Est, Dietary, 1 g CAPS, Take by mouth, Disp: , Rfl:     Potassium 99 MG TABS, Take 1 tablet by mouth 2 (two) times a day, Disp: , Rfl:     tadalafil (Cialis) 20 MG tablet, Take 1 tablet (20 mg total) by mouth daily as needed for erectile dysfunction, Disp: 32 tablet, Rfl: 3    azelastine (ASTELIN) 0 1 % nasal spray, 1 spray into each nostril 2 (two) times a day Use in each nostril as directed, Disp: 1 Bottle, Rfl: 1    sildenafil (VIAGRA) 50 MG tablet, Take 1 tablet (50 mg total) by mouth daily as needed for erectile dysfunction, Disp: 5 tablet, Rfl: 0    The following portions of the patient's history were reviewed and updated as appropriate: allergies, current medications, past family history, past medical history, past social history, past surgical history and problem list     Review of Systems   Constitutional: Negative  HENT: Negative  Eyes: Negative  Respiratory: Negative  Cardiovascular: Negative  Gastrointestinal: Negative  Endocrine: Negative  Genitourinary: Negative  Erectile dysfunction   Musculoskeletal: Negative  Skin: Negative  History of skin cancer   Allergic/Immunologic: Negative  Neurological: Negative  Hematological: Negative  Psychiatric/Behavioral: Negative  All other systems reviewed and are negative  Objective:    /78   Pulse 64   Temp 98 1 °F (36 7 °C) (Tympanic)   Resp 16   Ht 5' 9" (1 753 m)   Wt 78 kg (172 lb)   SpO2 97%   BMI 25 40 kg/m²      Physical Exam   Constitutional: He is oriented to person, place, and time  He appears well-developed and well-nourished  HENT:   Head: Normocephalic and atraumatic  Right Ear: External ear normal    Left Ear: External ear normal    Nose: Nose normal    Mouth/Throat: Oropharynx is clear and moist    Eyes: Pupils are equal, round, and reactive to light  Conjunctivae and EOM are normal    Neck: Normal range of motion  Neck supple     Cardiovascular: Normal rate, regular rhythm and normal heart sounds  No murmur heard  Pulmonary/Chest: Effort normal and breath sounds normal    Abdominal: Soft  Bowel sounds are normal    Musculoskeletal: Normal range of motion  He exhibits no edema  Neurological: He is alert and oriented to person, place, and time  Psychiatric: He has a normal mood and affect  His behavior is normal  Judgment and thought content normal    Nursing note and vitals reviewed          Recent Results (from the past 1008 hour(s))   PSA Total, Diagnostic    Collection Time: 07/10/20  8:27 AM   Result Value Ref Range    PSA, Diagnostic 0 4 0 0 - 4 0 ng/mL   CBC and differential    Collection Time: 07/10/20  8:27 AM   Result Value Ref Range    WBC 4 30 (L) 4 31 - 10 16 Thousand/uL    RBC 4 45 3 88 - 5 62 Million/uL    Hemoglobin 13 7 12 0 - 17 0 g/dL    Hematocrit 41 1 36 5 - 49 3 %    MCV 92 82 - 98 fL    MCH 30 8 26 8 - 34 3 pg    MCHC 33 3 31 4 - 37 4 g/dL    RDW 13 4 11 6 - 15 1 %    MPV 9 9 8 9 - 12 7 fL    Platelets 570 398 - 470 Thousands/uL    nRBC 0 /100 WBCs    Neutrophils Relative 58 43 - 75 %    Immat GRANS % 0 0 - 2 %    Lymphocytes Relative 27 14 - 44 %    Monocytes Relative 11 4 - 12 %    Eosinophils Relative 3 0 - 6 %    Basophils Relative 1 0 - 1 %    Neutrophils Absolute 2 46 1 85 - 7 62 Thousands/µL    Immature Grans Absolute 0 01 0 00 - 0 20 Thousand/uL    Lymphocytes Absolute 1 17 0 60 - 4 47 Thousands/µL    Monocytes Absolute 0 48 0 17 - 1 22 Thousand/µL    Eosinophils Absolute 0 13 0 00 - 0 61 Thousand/µL    Basophils Absolute 0 05 0 00 - 0 10 Thousands/µL   Comprehensive metabolic panel    Collection Time: 07/10/20  8:27 AM   Result Value Ref Range    Sodium 142 136 - 145 mmol/L    Potassium 4 2 3 5 - 5 3 mmol/L    Chloride 113 (H) 100 - 108 mmol/L    CO2 26 21 - 32 mmol/L    ANION GAP 3 (L) 4 - 13 mmol/L    BUN 18 5 - 25 mg/dL    Creatinine 1 02 0 60 - 1 30 mg/dL    Glucose, Fasting 86 65 - 99 mg/dL    Calcium 8 9 8 3 - 10 1 mg/dL    AST 20 5 - 45 U/L    ALT 25 12 - 78 U/L    Alkaline Phosphatase 60 46 - 116 U/L    Total Protein 6 9 6 4 - 8 2 g/dL    Albumin 3 7 3 5 - 5 0 g/dL    Total Bilirubin 0 65 0 20 - 1 00 mg/dL    eGFR 69 ml/min/1 73sq m   Lipid panel    Collection Time: 07/10/20  8:27 AM   Result Value Ref Range    Cholesterol 179 50 - 200 mg/dL    Triglycerides 46 <=150 mg/dL    HDL, Direct 51 >=40 mg/dL    LDL Calculated 119 (H) 0 - 100 mg/dL    Non-HDL-Chol (CHOL-HDL) 128 mg/dl   TSH, 3rd generation with Free T4 reflex    Collection Time: 07/10/20  8:27 AM   Result Value Ref Range    TSH 3RD GENERATON 1 200 0 358 - 3 740 uIU/mL       Assessment/Plan:    Benign essential hypertension  Blood pressure is very well controlled on atenolol 50 mg once daily  He does need refills  Prostate cancer [facility])  Patient did have prostate cancer resected  Diagnostic PSA remains detectable but has not increased  He does follow up with urologist once a year    Environmental allergies  Astelin nasal spray prescribed    Erectile dysfunction  Patient has been on Cialis which has been effective  Refill provided  Patient would like to try limited supply of Viagra as he does get a significant headache following taking Cialis which does tend the last throughout the entire day    Palpitations  Negative stress testing  P this does not happen with great frequency  If it does happen to be more frequent than the patient can be sent for Holter monitor or even Zio monitor          Problem List Items Addressed This Visit        Cardiovascular and Mediastinum    Benign essential hypertension     Blood pressure is very well controlled on atenolol 50 mg once daily  He does need refills  Relevant Medications    atenolol (TENORMIN) 50 mg tablet    Other Relevant Orders    CBC and differential    Comprehensive metabolic panel    Lipid panel    TSH, 3rd generation with Free T4 reflex       Genitourinary    Prostate cancer ([facility] [address] ) - Primary     Patient did have prostate cancer resected  Diagnostic PSA remains detectable but has not increased  He does follow up with urologist once a year         Relevant Orders    Comprehensive metabolic panel    Lipid panel    PSA Total, Diagnostic       Other    Environmental allergies     Astelin nasal spray prescribed         Relevant Medications    azelastine (ASTELIN) 0 1 % nasal spray    Erectile dysfunction     Patient has been on Cialis which has been effective  Refill provided  Patient would like to try limited supply of Viagra as he does get a significant headache following taking Cialis which does tend the last throughout the entire day         Relevant Medications    sildenafil (VIAGRA) 50 MG tablet    tadalafil (Cialis) 20 MG tablet    Other Relevant Orders    Comprehensive metabolic panel    Lipid panel    Hypomagnesemia    Relevant Orders    Comprehensive metabolic panel    Lipid panel    Palpitations     Negative stress testing  P this does not happen with great frequency    If it does happen to be more frequent than the patient can be sent for Holter monitor or even Zio monitor         Relevant Orders    Comprehensive metabolic panel    Lipid panel    Vitamin D deficiency    Relevant Orders    Comprehensive metabolic panel    Lipid panel    Vitamin D 1,25 dihydroxy

## 2020-08-06 NOTE — PROGRESS NOTES
Assessment and plan:       1  Prostate cancer status post XRT (2010) -managed by Dr Hudson  - I was happy to review with the patient that his PSA remains stable at 0 4  - Follow-up in 1 year with a PSA prior to visit  - No refills needed on Cialis 20 mg at this time  - All questions answered      Malka Whitaker PA-C      Chief Complaint     Chief Complaint   Patient presents with    Prostate Cancer         History of Present Illness     Margaret Rasheed is a [de-identified] y o  male patient known to Dr Jaleel Lopez for history of prostate cancer status post XRT (2010) presenting for 1 year follow-up      Patient's PSA continues to demonstrate stability and is currently 0 4 (07/10/2020)     Patient continues to report that his urination continues to be stable  He does notice slowing of his 1st urination in the morning as well as some issues with possible incontinence if trying to postpone urination  He feels that he has a good stream, empty after urination, and denies nocturia  He denies any dysuria, gross hematuria, suprapubic pressure, flank pain, bone pain, or weight loss      He continues to use Cialis 20 mg as needed for sexual dysfunction  He does not suffer from any side effects from this medication  Laboratory     Lab Results   Component Value Date    CREATININE 1 02 07/10/2020       Lab Results   Component Value Date    PSA 0 4 07/10/2020    PSA 0 4 07/03/2019    PSA 0 3 07/10/2018       No results found for this or any previous visit (from the past 1 hour(s))  Review of Systems     Review of Systems   Constitutional: Negative for chills and fever  HENT: Negative  Eyes: Negative  Respiratory: Negative for cough and shortness of breath  Cardiovascular: Negative for chest pain  Gastrointestinal: Negative for constipation, diarrhea, nausea and vomiting  Endocrine: Negative      Genitourinary: Negative for difficulty urinating, dysuria, enuresis, flank pain, frequency, hematuria and urgency  Musculoskeletal: Negative  Skin: Negative  Allergies     Allergies   Allergen Reactions    Soy Isoflavones        Physical Exam     Physical Exam  Vitals signs and nursing note reviewed  Constitutional:       General: He is not in acute distress  Appearance: He is well-developed  He is not diaphoretic  HENT:      Head: Normocephalic and atraumatic  Right Ear: External ear normal       Left Ear: External ear normal       Nose: Nose normal    Eyes:      General: No scleral icterus  Right eye: No discharge  Left eye: No discharge  Cardiovascular:      Rate and Rhythm: Normal rate  Pulmonary:      Effort: Pulmonary effort is normal    Musculoskeletal:      Comments: Ambulates independently   Skin:     General: Skin is warm and dry  Neurological:      Mental Status: He is alert and oriented to person, place, and time  Psychiatric:         Behavior: Behavior normal          Thought Content:  Thought content normal          Judgment: Judgment normal            Vital Signs     Vitals:    08/11/20 1014   BP: 142/82   BP Location: Left arm   Patient Position: Sitting   Cuff Size: Standard   Pulse: 60   Temp: 97 9 °F (36 6 °C)   Weight: 79 3 kg (174 lb 12 8 oz)   Height: 5' 9" (1 753 m)         Current Medications       Current Outpatient Medications:     atenolol (TENORMIN) 50 mg tablet, Take 1 tablet (50 mg total) by mouth daily, Disp: 90 tablet, Rfl: 1    azelastine (ASTELIN) 0 1 % nasal spray, 1 spray into each nostril 2 (two) times a day Use in each nostril as directed, Disp: 1 Bottle, Rfl: 1    Cholecalciferol (VITAMIN D3) 1000 UNIT/SPRAY LIQD, Take 1 tablet by mouth daily, Disp: , Rfl:     Coenzyme Q10 100 MG CHEW, Chew 200 mg, Disp: , Rfl:     Omega-3-Acid Eth Est, Dietary, 1 g CAPS, Take by mouth, Disp: , Rfl:     Potassium 99 MG TABS, Take 1 tablet by mouth 2 (two) times a day, Disp: , Rfl:     sildenafil (VIAGRA) 50 MG tablet, Take 1 tablet (50 mg total) by mouth daily as needed for erectile dysfunction, Disp: 5 tablet, Rfl: 0    tadalafil (Cialis) 20 MG tablet, Take 1 tablet (20 mg total) by mouth daily as needed for erectile dysfunction, Disp: 32 tablet, Rfl: 3      Active Problems     Patient Active Problem List   Diagnosis    ZAINAB (obstructive sleep apnea)    Benign essential hypertension    Vitamin D deficiency    Medicare annual wellness visit, subsequent    Erectile dysfunction    Hypomagnesemia    Arthritis of lumbar spine    Low back pain    [facility] cancer ([facility] [address]    Squamous cell carcinoma of skin    Toe pain, right    Benign paroxysmal positional vertigo of right ear    Palpitations    Environmental allergies         Past Medical History     Past Medical History:   Diagnosis Date    Actinic keratosis     Last assessed - 10/15/14    Hypertension     Hypokalemia     Last assessed - 8/13/14    Sinus bradycardia     Last assessed - 8/13/14         Surgical History     Past Surgical History:   Procedure Laterality Date    HERNIA REPAIR      SIGMOIDOSCOPY      (Fiberoptic, Therapeutic) 7/28/2014, 8/25/14         Family History     Family History   Problem Relation Age of Onset    Prostate cancer Father     Liver cancer Brother         Adenocarcinoma          Social History     Social History       Radiology

## 2020-08-11 ENCOUNTER — OFFICE VISIT (OUTPATIENT)
Dept: UROLOGY | Facility: CLINIC | Age: 80
End: 2020-08-11
Payer: COMMERCIAL

## 2020-08-11 VITALS
HEIGHT: 69 IN | HEART RATE: 60 BPM | SYSTOLIC BLOOD PRESSURE: 142 MMHG | BODY MASS INDEX: 25.89 KG/M2 | DIASTOLIC BLOOD PRESSURE: 82 MMHG | TEMPERATURE: 97.9 F | WEIGHT: 174.8 LBS

## 2020-08-11 DIAGNOSIS — C61 PROSTATE CANCER (HCC): Primary | ICD-10-CM

## 2020-08-11 PROCEDURE — 3008F BODY MASS INDEX DOCD: CPT | Performed by: PHYSICIAN ASSISTANT

## 2020-08-11 PROCEDURE — 4040F PNEUMOC VAC/ADMIN/RCVD: CPT | Performed by: PHYSICIAN ASSISTANT

## 2020-08-11 PROCEDURE — 3077F SYST BP >= 140 MM HG: CPT | Performed by: PHYSICIAN ASSISTANT

## 2020-08-11 PROCEDURE — 99213 OFFICE O/P EST LOW 20 MIN: CPT | Performed by: PHYSICIAN ASSISTANT

## 2020-08-11 PROCEDURE — 3079F DIAST BP 80-89 MM HG: CPT | Performed by: PHYSICIAN ASSISTANT

## 2020-08-11 PROCEDURE — 1036F TOBACCO NON-USER: CPT | Performed by: PHYSICIAN ASSISTANT

## 2020-08-11 PROCEDURE — 1160F RVW MEDS BY RX/DR IN RCRD: CPT | Performed by: PHYSICIAN ASSISTANT

## 2021-01-12 ENCOUNTER — LAB (OUTPATIENT)
Dept: LAB | Facility: CLINIC | Age: 81
End: 2021-01-12
Payer: COMMERCIAL

## 2021-01-12 DIAGNOSIS — E83.42 HYPOMAGNESEMIA: ICD-10-CM

## 2021-01-12 DIAGNOSIS — E55.9 VITAMIN D DEFICIENCY: ICD-10-CM

## 2021-01-12 DIAGNOSIS — C61 PROSTATE CANCER (HCC): ICD-10-CM

## 2021-01-12 DIAGNOSIS — R00.2 PALPITATIONS: ICD-10-CM

## 2021-01-12 DIAGNOSIS — I10 BENIGN ESSENTIAL HYPERTENSION: ICD-10-CM

## 2021-01-12 DIAGNOSIS — N52.9 ERECTILE DYSFUNCTION, UNSPECIFIED ERECTILE DYSFUNCTION TYPE: ICD-10-CM

## 2021-01-12 LAB
ALBUMIN SERPL BCP-MCNC: 4 G/DL (ref 3.5–5)
ALP SERPL-CCNC: 64 U/L (ref 46–116)
ALT SERPL W P-5'-P-CCNC: 36 U/L (ref 12–78)
ANION GAP SERPL CALCULATED.3IONS-SCNC: 5 MMOL/L (ref 4–13)
AST SERPL W P-5'-P-CCNC: 23 U/L (ref 5–45)
BASOPHILS # BLD AUTO: 0.05 THOUSANDS/ΜL (ref 0–0.1)
BASOPHILS NFR BLD AUTO: 1 % (ref 0–1)
BILIRUB SERPL-MCNC: 0.73 MG/DL (ref 0.2–1)
BUN SERPL-MCNC: 24 MG/DL (ref 5–25)
CALCIUM SERPL-MCNC: 9.2 MG/DL (ref 8.3–10.1)
CHLORIDE SERPL-SCNC: 112 MMOL/L (ref 100–108)
CHOLEST SERPL-MCNC: 196 MG/DL (ref 50–200)
CO2 SERPL-SCNC: 27 MMOL/L (ref 21–32)
CREAT SERPL-MCNC: 0.99 MG/DL (ref 0.6–1.3)
EOSINOPHIL # BLD AUTO: 0.19 THOUSAND/ΜL (ref 0–0.61)
EOSINOPHIL NFR BLD AUTO: 3 % (ref 0–6)
ERYTHROCYTE [DISTWIDTH] IN BLOOD BY AUTOMATED COUNT: 13.3 % (ref 11.6–15.1)
GFR SERPL CREATININE-BSD FRML MDRD: 72 ML/MIN/1.73SQ M
GLUCOSE P FAST SERPL-MCNC: 93 MG/DL (ref 65–99)
HCT VFR BLD AUTO: 42.9 % (ref 36.5–49.3)
HDLC SERPL-MCNC: 56 MG/DL
HGB BLD-MCNC: 14.3 G/DL (ref 12–17)
IMM GRANULOCYTES # BLD AUTO: 0.02 THOUSAND/UL (ref 0–0.2)
IMM GRANULOCYTES NFR BLD AUTO: 0 % (ref 0–2)
LDLC SERPL CALC-MCNC: 127 MG/DL (ref 0–100)
LYMPHOCYTES # BLD AUTO: 1.45 THOUSANDS/ΜL (ref 0.6–4.47)
LYMPHOCYTES NFR BLD AUTO: 25 % (ref 14–44)
MCH RBC QN AUTO: 30.8 PG (ref 26.8–34.3)
MCHC RBC AUTO-ENTMCNC: 33.3 G/DL (ref 31.4–37.4)
MCV RBC AUTO: 92 FL (ref 82–98)
MONOCYTES # BLD AUTO: 0.61 THOUSAND/ΜL (ref 0.17–1.22)
MONOCYTES NFR BLD AUTO: 11 % (ref 4–12)
NEUTROPHILS # BLD AUTO: 3.38 THOUSANDS/ΜL (ref 1.85–7.62)
NEUTS SEG NFR BLD AUTO: 60 % (ref 43–75)
NONHDLC SERPL-MCNC: 140 MG/DL
NRBC BLD AUTO-RTO: 0 /100 WBCS
PLATELET # BLD AUTO: 211 THOUSANDS/UL (ref 149–390)
PMV BLD AUTO: 9.7 FL (ref 8.9–12.7)
POTASSIUM SERPL-SCNC: 3.8 MMOL/L (ref 3.5–5.3)
PROT SERPL-MCNC: 7.2 G/DL (ref 6.4–8.2)
PSA SERPL-MCNC: 0.4 NG/ML (ref 0–4)
RBC # BLD AUTO: 4.65 MILLION/UL (ref 3.88–5.62)
SODIUM SERPL-SCNC: 144 MMOL/L (ref 136–145)
TRIGL SERPL-MCNC: 67 MG/DL
TSH SERPL DL<=0.05 MIU/L-ACNC: 1.45 UIU/ML (ref 0.36–3.74)
WBC # BLD AUTO: 5.7 THOUSAND/UL (ref 4.31–10.16)

## 2021-01-12 PROCEDURE — 82652 VIT D 1 25-DIHYDROXY: CPT

## 2021-01-12 PROCEDURE — 84153 ASSAY OF PSA TOTAL: CPT

## 2021-01-12 PROCEDURE — 80053 COMPREHEN METABOLIC PANEL: CPT

## 2021-01-12 PROCEDURE — 85025 COMPLETE CBC W/AUTO DIFF WBC: CPT

## 2021-01-12 PROCEDURE — 80061 LIPID PANEL: CPT

## 2021-01-12 PROCEDURE — 84443 ASSAY THYROID STIM HORMONE: CPT

## 2021-01-12 PROCEDURE — 36415 COLL VENOUS BLD VENIPUNCTURE: CPT

## 2021-01-14 LAB — 1,25(OH)2D3 SERPL-MCNC: 42.2 PG/ML (ref 19.9–79.3)

## 2021-01-26 ENCOUNTER — OFFICE VISIT (OUTPATIENT)
Dept: FAMILY MEDICINE CLINIC | Facility: CLINIC | Age: 81
End: 2021-01-26
Payer: COMMERCIAL

## 2021-01-26 VITALS
WEIGHT: 179 LBS | HEART RATE: 68 BPM | HEIGHT: 69 IN | TEMPERATURE: 97.3 F | BODY MASS INDEX: 26.51 KG/M2 | RESPIRATION RATE: 16 BRPM | DIASTOLIC BLOOD PRESSURE: 78 MMHG | OXYGEN SATURATION: 98 % | SYSTOLIC BLOOD PRESSURE: 124 MMHG

## 2021-01-26 DIAGNOSIS — Z00.00 MEDICARE ANNUAL WELLNESS VISIT, SUBSEQUENT: Primary | ICD-10-CM

## 2021-01-26 DIAGNOSIS — I10 BENIGN ESSENTIAL HYPERTENSION: ICD-10-CM

## 2021-01-26 DIAGNOSIS — Z91.09 ENVIRONMENTAL ALLERGIES: ICD-10-CM

## 2021-01-26 DIAGNOSIS — E55.9 VITAMIN D DEFICIENCY: ICD-10-CM

## 2021-01-26 DIAGNOSIS — C61 PROSTATE CANCER (HCC): ICD-10-CM

## 2021-01-26 PROBLEM — E83.42 HYPOMAGNESEMIA: Status: RESOLVED | Noted: 2019-07-24 | Resolved: 2021-01-26

## 2021-01-26 PROCEDURE — 1036F TOBACCO NON-USER: CPT | Performed by: FAMILY MEDICINE

## 2021-01-26 PROCEDURE — 1100F PTFALLS ASSESS-DOCD GE2>/YR: CPT | Performed by: FAMILY MEDICINE

## 2021-01-26 PROCEDURE — 3074F SYST BP LT 130 MM HG: CPT | Performed by: FAMILY MEDICINE

## 2021-01-26 PROCEDURE — 3078F DIAST BP <80 MM HG: CPT | Performed by: FAMILY MEDICINE

## 2021-01-26 PROCEDURE — 1170F FXNL STATUS ASSESSED: CPT | Performed by: FAMILY MEDICINE

## 2021-01-26 PROCEDURE — 3288F FALL RISK ASSESSMENT DOCD: CPT | Performed by: FAMILY MEDICINE

## 2021-01-26 PROCEDURE — 3725F SCREEN DEPRESSION PERFORMED: CPT | Performed by: FAMILY MEDICINE

## 2021-01-26 PROCEDURE — 99213 OFFICE O/P EST LOW 20 MIN: CPT | Performed by: FAMILY MEDICINE

## 2021-01-26 PROCEDURE — 1160F RVW MEDS BY RX/DR IN RCRD: CPT | Performed by: FAMILY MEDICINE

## 2021-01-26 PROCEDURE — 1125F AMNT PAIN NOTED PAIN PRSNT: CPT | Performed by: FAMILY MEDICINE

## 2021-01-26 PROCEDURE — G0439 PPPS, SUBSEQ VISIT: HCPCS | Performed by: FAMILY MEDICINE

## 2021-01-26 RX ORDER — AZELASTINE 1 MG/ML
SPRAY, METERED NASAL
Qty: 1 BOTTLE | Refills: 1 | Status: SHIPPED | OUTPATIENT
Start: 2021-01-26

## 2021-01-26 RX ORDER — ATENOLOL 50 MG/1
50 TABLET ORAL DAILY
Qty: 90 TABLET | Refills: 3 | Status: SHIPPED | OUTPATIENT
Start: 2021-01-26 | End: 2022-03-01 | Stop reason: SDUPTHER

## 2021-01-26 NOTE — PATIENT INSTRUCTIONS

## 2021-01-26 NOTE — PROGRESS NOTES
Assessment and Plan:     Problem List Items Addressed This Visit     None        BMI Counseling: Body mass index is 26 43 kg/m²  The BMI is above normal  Nutrition recommendations include reducing intake of cholesterol  Exercise recommendations include moderate physical activity 150 minutes/week and exercising 3-5 times per week  Preventive health issues were discussed with patient, and age appropriate screening tests were ordered as noted in patient's After Visit Summary  Personalized health advice and appropriate referrals for health education or preventive services given if needed, as noted in patient's After Visit Summary       History of Present Illness:     Patient presents for Medicare Annual Wellness visit    Patient Care Team:  Andre aJffe DO as PCP - General  Andre Jaffe DO as PCP - [address] [place], [place] [address] [facility] ([facility])  MD Sheryl Baker MD (Dermatology)  Gema Be DO (Gastroenterology)  Darlene Hair PA-C as Physician Assistant (Physician Assistant)     Problem List:     Patient Active Problem List   Diagnosis    ZAINAB (obstructive sleep apnea)    Benign essential hypertension    Vitamin D deficiency    Medicare annual wellness visit, subsequent    Erectile dysfunction    Hypomagnesemia    Arthritis of lumbar spine    Low back pain    [facility] ([facility] [address]    Squamous cell carcinoma of skin    Toe pain, right    Benign paroxysmal positional vertigo of right ear    Palpitations    Environmental allergies      Past Medical and Surgical History:     Past Medical History:   Diagnosis Date    Actinic keratosis     Last assessed - 10/15/14    Hypertension     Hypokalemia     Last assessed - 8/13/14    Sinus bradycardia     Last assessed - 8/13/14     Past Surgical History:   Procedure Laterality Date    HERNIA REPAIR      SIGMOIDOSCOPY      (Fiberoptic, Therapeutic) 7/28/2014, 8/25/14      Family History:     Family History   Problem Relation Age of Onset  Prostate cancer Father     Liver cancer Brother         Adenocarcinoma       Social History:     Social History     Socioeconomic History    Marital status:       Spouse name: None    Number of children: None    Years of education: None    Highest education level: None   Occupational History    Occupation: Retired   Social Needs    Financial resource strain: None    Food insecurity     Worry: None     Inability: None    Transportation needs     Medical: None     Non-medical: None   Tobacco Use    Smoking status: Never Smoker    Smokeless tobacco: Never Used    Tobacco comment: Former smoker per [employer]    Substance and Sexual Activity    Alcohol use: No     Comment: Seldomly per [employer]     Drug use: No    Sexual activity: None   Lifestyle    Physical activity     Days per week: None     Minutes per session: None    Stress: None   Relationships    Social connections     Talks on phone: None     Gets together: None     Attends Jew service: None     Active member of club or organization: None     Attends meetings of clubs or organizations: None     Relationship status: None    Intimate partner violence     Fear of current or ex partner: None     Emotionally abused: None     Physically abused: None     Forced sexual activity: None   Other Topics Concern    None   Social History Narrative    Caffeine use - 2 cups daily     and  noted per [employer]       Medications and Allergies:     Current Outpatient Medications   Medication Sig Dispense Refill    atenolol (TENORMIN) 50 mg tablet Take 1 tablet (50 mg total) by mouth daily 90 tablet 1    azelastine (ASTELIN) 0 1 % nasal spray SPRAY 1 SPRAY INTO EACH NOSTRIL 2 (TWO) TIMES A DAY USE IN EACH NOSTRIL AS DIRECTED 1 Bottle 1    Cholecalciferol (VITAMIN D3) 1000 UNIT/SPRAY LIQD Take 1 tablet by mouth daily      Coenzyme Q10 100 MG CHEW Chew 200 mg      Omega-3-Acid Eth Est, Dietary, 1 g CAPS Take by mouth      Potassium 99 MG TABS Take 1 tablet by mouth 2 (two) times a day      sildenafil (VIAGRA) 50 MG tablet Take 1 tablet (50 mg total) by mouth daily as needed for erectile dysfunction 5 tablet 0    tadalafil (Cialis) 20 MG tablet Take 1 tablet (20 mg total) by mouth daily as needed for erectile dysfunction 32 tablet 3     No current facility-administered medications for this visit  Allergies   Allergen Reactions    Soy Isoflavones       Immunizations:     Immunization History   Administered Date(s) Administered    INFLUENZA 10/20/2015, 09/13/2016, 10/03/2016, 10/17/2017, 09/22/2018, 11/09/2018, 10/09/2019    Influenza Split High Dose Preservative Free IM 10/20/2015    Influenza, seasonal, injectable 10/03/2016    Pneumococcal Conjugate 13-Valent 12/18/2015    Pneumococcal Polysaccharide PPV23 02/12/2014, 10/03/2016, 03/27/2018    SARS-CoV-2 / COVID-19 mRNA IM (Moderna) 01/06/2021    Tdap 01/01/2006, 10/17/2017    Zoster 08/02/2016    Zoster Vaccine Recombinant 07/03/2018, 11/09/2018    influenza, trivalent, adjuvanted 10/09/2019      Health Maintenance:         Topic Date Due    Colonoscopy Surveillance  03/03/2023         Topic Date Due    Influenza Vaccine (1) 09/01/2020      Medicare Health Risk Assessment:     /78   Pulse 68   Temp (!) 97 3 °F (36 3 °C) (Tympanic)   Resp 16   Ht 5' 9" (1 753 m)   Wt 81 2 kg (179 lb)   SpO2 98%   BMI 26 43 kg/m²      Marija Browne is here for his Subsequent Wellness visit  Last Medicare Wellness visit information reviewed, patient interviewed, no change since last AWV  Health Risk Assessment:   Patient rates overall health as very good  Patient feels that their physical health rating is same  Eyesight was rated as same  Hearing was rated as same  Patient feels that their emotional and mental health rating is same  Pain experienced in the last 7 days has been some  Patient's pain rating has been 3/10   Patient states that he has experienced no weight loss or gain in last 6 months  Fall Risk Screening: In the past year, patient has experienced: no history of falling in past year      Home Safety:  Patient has trouble with stairs inside or outside of their home  Patient has working smoke alarms and has no working carbon monoxide detector  Home safety hazards include: none  Nutrition:   Current diet is Regular and Limited junk food  Medications:   Patient is currently taking over-the-counter supplements  OTC medications include: see medication list  Patient is able to manage medications  Activities of Daily Living (ADLs)/Instrumental Activities of Daily Living (IADLs):   Walk and transfer into and out of bed and chair?: Yes  Dress and groom yourself?: Yes    Bathe or shower yourself?: Yes    Feed yourself? Yes  Do your laundry/housekeeping?: Yes  Manage your money, pay your bills and track your expenses?: Yes  Make your own meals?: Yes    Do your own shopping?: Yes    Previous Hospitalizations:   Any hospitalizations or ED visits within the last 12 months?: Yes    How many hospitalizations have you had in the last year?: 1-2    Advance Care Planning:   Living will: Yes    Durable POA for healthcare:  Yes    Advanced directive: Yes    Advanced directive counseling given: No    Five wishes given: No    Patient declined ACP directive: No    End of Life Decisions reviewed with patient: Yes    Provider agrees with end of life decisions: Yes      Cognitive Screening:   Provider or family/friend/caregiver concerned regarding cognition?: No    PREVENTIVE SCREENINGS      Cardiovascular Screening:    General: Screening Current      Diabetes Screening:     General: Screening Current      Colorectal Cancer Screening:     General: Screening Current      Prostate Cancer Screening:    General: History Prostate Cancer and Screening Not Indicated      Osteoporosis Screening:    General: Screening Current      Abdominal Aortic Aneurysm (AAA) Screening:        General: Screening Not Indicated      Lung Cancer Screening:     General: Screening Not Indicated      Hepatitis C Screening:    General: Screening Current      Claude Crease, DO

## 2021-01-26 NOTE — ASSESSMENT & PLAN NOTE
Subsequent annual wellness visit completed  Patient is current on screenings    He is in the process of receiving COVID-19 vaccine

## 2021-01-26 NOTE — ASSESSMENT & PLAN NOTE
History of prostate resection  Diagnostic PSA remains at 0 4    Follow-up with urologist once per year

## 2021-01-26 NOTE — PROGRESS NOTES
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 [employer]  He has not been volunteering since pandemic was declared in March due to his age in increased risk  He has remained home with his partner  Occasionally he goes out to the store  He is not getting the exercise that he normally does when he is a volunteer  Usually he has anywhere from [phone] steps per day  He notes that he has gained some weight  He did receive 1st dose of COVID-19 vaccination  Labs reviewed which showed vitamin-D 42 2, normal CBC, CMP, total cholesterol 196, HDL 56, , triglycerides 67, PSA remains stable at 0 4  He does have history of prostate cancer status post resection  PSA has remained stable  He does follow up with Urology  Patient has been working on balancing exercises  He does complain of tinnitus in his left ear  He has seen audiology in the past in regards to this  There are no successful treatments  Patient did have 3 falls in the past year  Two of them while he was cutting wood  He tripped over a root and another time when he was installing a garage  and he was on a small step ladder and lost balance    No actual injury related to this      Past Medical History:   Diagnosis Date    Actinic keratosis     Last assessed - 10/15/14    Hypertension     Hypokalemia     Last assessed - 8/13/14    Sinus bradycardia     Last assessed - 8/13/14       Family History   Problem Relation Age of Onset    Prostate cancer Father     Liver cancer Brother         Adenocarcinoma        Past Surgical History:   Procedure Laterality Date    HERNIA REPAIR      SIGMOIDOSCOPY (Fiberoptic, Therapeutic) 7/28/2014, 8/25/14        reports that he has never smoked  He has never used smokeless tobacco  He reports that he does not drink alcohol or use drugs  Current Outpatient Medications:     atenolol (TENORMIN) 50 mg tablet, Take 1 tablet (50 mg total) by mouth daily, Disp: 90 tablet, Rfl: 3    azelastine (ASTELIN) 0 1 % nasal spray, SPRAY 1 SPRAY INTO EACH NOSTRIL 2 (TWO) TIMES A DAY USE IN EACH NOSTRIL AS DIRECTED, Disp: 1 Bottle, Rfl: 1    Cholecalciferol (VITAMIN D3) 1000 UNIT/SPRAY LIQD, Take 1 tablet by mouth daily, Disp: , Rfl:     Coenzyme Q10 100 MG CHEW, Chew 200 mg, Disp: , Rfl:     Omega-3-Acid Eth Est, Dietary, 1 g CAPS, Take by mouth, Disp: , Rfl:     Potassium 99 MG TABS, Take 1 tablet by mouth 2 (two) times a day, Disp: , Rfl:     tadalafil (Cialis) 20 MG tablet, Take 1 tablet (20 mg total) by mouth daily as needed for erectile dysfunction, Disp: 32 tablet, Rfl: 3    sildenafil (VIAGRA) 50 MG tablet, Take 1 tablet (50 mg total) by mouth daily as needed for erectile dysfunction, Disp: 5 tablet, Rfl: 0    The following portions of the patient's history were reviewed and updated as appropriate: allergies, current medications, past family history, past medical history, past social history, past surgical history and problem list     Review of Systems   Constitutional: Negative  HENT: Positive for congestion and tinnitus  Eyes: Negative  Respiratory: Negative  Cardiovascular: Negative  Gastrointestinal: Negative  Endocrine: Negative  Genitourinary: Negative  Musculoskeletal: Negative  Skin: Negative  Allergic/Immunologic: Negative  Neurological: Negative  Hematological: Negative  Psychiatric/Behavioral: Negative  All other systems reviewed and are negative            Objective:    /78   Pulse 68   Temp (!) 97 3 °F (36 3 °C) (Tympanic)   Resp 16   Ht 5' 9" (1 753 m)   Wt 81 2 kg (179 lb)   SpO2 98%   BMI 26 43 kg/m² Physical Exam  Vitals signs and nursing note reviewed  Constitutional:       General: He is not in acute distress  Appearance: Normal appearance  He is well-developed and normal weight  He is not ill-appearing  HENT:      Head: Normocephalic and atraumatic  Right Ear: Tympanic membrane, ear canal and external ear normal       Left Ear: Ear canal and external ear normal       Ears:      Comments: Small amount of serous fluid behind the left tympanic membrane     Nose: Nose normal       Mouth/Throat:      Mouth: Mucous membranes are moist    Eyes:      Extraocular Movements: Extraocular movements intact  Conjunctiva/sclera: Conjunctivae normal       Pupils: Pupils are equal, round, and reactive to light  Neck:      Musculoskeletal: Normal range of motion and neck supple  Cardiovascular:      Rate and Rhythm: Normal rate and regular rhythm  Pulses: Normal pulses  Heart sounds: Normal heart sounds  No murmur  Pulmonary:      Effort: Pulmonary effort is normal       Breath sounds: Normal breath sounds  Abdominal:      General: Abdomen is flat  Bowel sounds are normal       Palpations: Abdomen is soft  Musculoskeletal: Normal range of motion  Skin:     General: Skin is warm and dry  Neurological:      General: No focal deficit present  Mental Status: He is alert and oriented to person, place, and time  Psychiatric:         Mood and Affect: Mood normal          Behavior: Behavior normal          Thought Content:  Thought content normal          Judgment: Judgment normal            Recent Results (from the past 1008 hour(s))   CBC and differential    Collection Time: 01/12/21  8:22 AM   Result Value Ref Range    WBC 5 70 4 31 - 10 16 Thousand/uL    RBC 4 65 3 88 - 5 62 Million/uL    Hemoglobin 14 3 12 0 - 17 0 g/dL    Hematocrit 42 9 36 5 - 49 3 %    MCV 92 82 - 98 fL    MCH 30 8 26 8 - 34 3 pg    MCHC 33 3 31 4 - 37 4 g/dL    RDW 13 3 11 6 - 15 1 %    MPV 9 7 8 9 - 12 7 fL    Platelets 872 916 - 775 Thousands/uL    nRBC 0 /100 WBCs    Neutrophils Relative 60 43 - 75 %    Immat GRANS % 0 0 - 2 %    Lymphocytes Relative 25 14 - 44 %    Monocytes Relative 11 4 - 12 %    Eosinophils Relative 3 0 - 6 %    Basophils Relative 1 0 - 1 %    Neutrophils Absolute 3 38 1 85 - 7 62 Thousands/µL    Immature Grans Absolute 0 02 0 00 - 0 20 Thousand/uL    Lymphocytes Absolute 1 45 0 60 - 4 47 Thousands/µL    Monocytes Absolute 0 61 0 17 - 1 22 Thousand/µL    Eosinophils Absolute 0 19 0 00 - 0 61 Thousand/µL    Basophils Absolute 0 05 0 00 - 0 10 Thousands/µL   Comprehensive metabolic panel    Collection Time: 01/12/21  8:22 AM   Result Value Ref Range    Sodium 144 136 - 145 mmol/L    Potassium 3 8 3 5 - 5 3 mmol/L    Chloride 112 (H) 100 - 108 mmol/L    CO2 27 21 - 32 mmol/L    ANION GAP 5 4 - 13 mmol/L    BUN 24 5 - 25 mg/dL    Creatinine 0 99 0 60 - 1 30 mg/dL    Glucose, Fasting 93 65 - 99 mg/dL    Calcium 9 2 8 3 - 10 1 mg/dL    AST 23 5 - 45 U/L    ALT 36 12 - 78 U/L    Alkaline Phosphatase 64 46 - 116 U/L    Total Protein 7 2 6 4 - 8 2 g/dL    Albumin 4 0 3 5 - 5 0 g/dL    Total Bilirubin 0 73 0 20 - 1 00 mg/dL    eGFR 72 ml/min/1 73sq m   Lipid panel    Collection Time: 01/12/21  8:22 AM   Result Value Ref Range    Cholesterol 196 50 - 200 mg/dL    Triglycerides 67 <=150 mg/dL    HDL, Direct 56 >=40 mg/dL    LDL Calculated 127 (H) 0 - 100 mg/dL    Non-HDL-Chol (CHOL-HDL) 140 mg/dl   PSA Total, Diagnostic    Collection Time: 01/12/21  8:22 AM   Result Value Ref Range    PSA, Diagnostic 0 4 0 0 - 4 0 ng/mL   TSH, 3rd generation with Free T4 reflex    Collection Time: 01/12/21  8:22 AM   Result Value Ref Range    TSH 3RD GENERATON 1 450 0 358 - 3 740 uIU/mL   Vitamin D 1,25 dihydroxy    Collection Time: 01/12/21  8:22 AM   Result Value Ref Range    Vit D, 1,25-Dihydroxy 42 2 19 9 - 79 3 pg/mL       Assessment/Plan:    Benign essential hypertension  Remains well controlled on atenolol 50 mg once daily  Refill provided  Patient does check his blood pressure readings at home as well  Prostate cancer [facility])  History of prostate resection  Diagnostic PSA remains at 0 4  Follow-up with urologist once per year    Environmental allergies  Recommended using Astelin nasal spray more routinely    Medicare annual wellness visit, subsequent  Subsequent annual wellness visit completed  Patient is current on screenings  He is in the process of receiving COVID-19 vaccine    Vitamin D deficiency  Therapeutic vitamin-D level on over-the-counter vitamin-D supplementation  Recheck vitamin-D level in 6 months          Problem List Items Addressed This Visit        Cardiovascular and Mediastinum    Benign essential hypertension     Remains well controlled on atenolol 50 mg once daily  Refill provided  Patient does check his blood pressure readings at home as well  Relevant Medications    atenolol (TENORMIN) 50 mg tablet    Other Relevant Orders    CBC and differential    Comprehensive metabolic panel    Lipid panel    TSH, 3rd generation with Free T4 reflex       Genitourinary    Prostate cancer ([facility] [address] )     History of prostate resection  Diagnostic PSA remains at 0 4  Follow-up with urologist once per year            Other    Environmental allergies     Recommended using Astelin nasal spray more routinely         Medicare annual wellness visit, subsequent - Primary     Subsequent annual wellness visit completed  Patient is current on screenings  He is in the process of receiving COVID-19 vaccine         Vitamin D deficiency     Therapeutic vitamin-D level on over-the-counter vitamin-D supplementation    Recheck vitamin-D level in 6 months         Relevant Orders    Vitamin D 1,25 dihydroxy

## 2021-01-26 NOTE — ASSESSMENT & PLAN NOTE
Therapeutic vitamin-D level on over-the-counter vitamin-D supplementation    Recheck vitamin-D level in 6 months

## 2021-01-26 NOTE — ASSESSMENT & PLAN NOTE
Remains well controlled on atenolol 50 mg once daily  Refill provided  Patient does check his blood pressure readings at home as well

## 2021-06-24 ENCOUNTER — TELEPHONE (OUTPATIENT)
Dept: DERMATOLOGY | Facility: CLINIC | Age: 81
End: 2021-06-24

## 2021-08-11 ENCOUNTER — APPOINTMENT (OUTPATIENT)
Dept: LAB | Facility: CLINIC | Age: 81
End: 2021-08-11
Payer: COMMERCIAL

## 2021-08-11 DIAGNOSIS — E55.9 VITAMIN D DEFICIENCY: ICD-10-CM

## 2021-08-11 DIAGNOSIS — I10 BENIGN ESSENTIAL HYPERTENSION: ICD-10-CM

## 2021-08-11 DIAGNOSIS — C61 PROSTATE CANCER (HCC): ICD-10-CM

## 2021-08-11 LAB
ALBUMIN SERPL BCP-MCNC: 3.6 G/DL (ref 3.5–5)
ALP SERPL-CCNC: 60 U/L (ref 46–116)
ALT SERPL W P-5'-P-CCNC: 27 U/L (ref 12–78)
ANION GAP SERPL CALCULATED.3IONS-SCNC: 4 MMOL/L (ref 4–13)
AST SERPL W P-5'-P-CCNC: 18 U/L (ref 5–45)
BASOPHILS # BLD AUTO: 0.06 THOUSANDS/ΜL (ref 0–0.1)
BASOPHILS NFR BLD AUTO: 1 % (ref 0–1)
BILIRUB SERPL-MCNC: 0.74 MG/DL (ref 0.2–1)
BUN SERPL-MCNC: 18 MG/DL (ref 5–25)
CALCIUM SERPL-MCNC: 8.8 MG/DL (ref 8.3–10.1)
CHLORIDE SERPL-SCNC: 112 MMOL/L (ref 100–108)
CHOLEST SERPL-MCNC: 200 MG/DL (ref 50–200)
CO2 SERPL-SCNC: 27 MMOL/L (ref 21–32)
CREAT SERPL-MCNC: 0.99 MG/DL (ref 0.6–1.3)
EOSINOPHIL # BLD AUTO: 0.21 THOUSAND/ΜL (ref 0–0.61)
EOSINOPHIL NFR BLD AUTO: 4 % (ref 0–6)
ERYTHROCYTE [DISTWIDTH] IN BLOOD BY AUTOMATED COUNT: 13.4 % (ref 11.6–15.1)
GFR SERPL CREATININE-BSD FRML MDRD: 71 ML/MIN/1.73SQ M
GLUCOSE P FAST SERPL-MCNC: 89 MG/DL (ref 65–99)
HCT VFR BLD AUTO: 42.4 % (ref 36.5–49.3)
HDLC SERPL-MCNC: 58 MG/DL
HGB BLD-MCNC: 14.5 G/DL (ref 12–17)
IMM GRANULOCYTES # BLD AUTO: 0.02 THOUSAND/UL (ref 0–0.2)
IMM GRANULOCYTES NFR BLD AUTO: 0 % (ref 0–2)
LDLC SERPL CALC-MCNC: 131 MG/DL (ref 0–100)
LYMPHOCYTES # BLD AUTO: 1.32 THOUSANDS/ΜL (ref 0.6–4.47)
LYMPHOCYTES NFR BLD AUTO: 27 % (ref 14–44)
MCH RBC QN AUTO: 31.5 PG (ref 26.8–34.3)
MCHC RBC AUTO-ENTMCNC: 34.2 G/DL (ref 31.4–37.4)
MCV RBC AUTO: 92 FL (ref 82–98)
MONOCYTES # BLD AUTO: 0.51 THOUSAND/ΜL (ref 0.17–1.22)
MONOCYTES NFR BLD AUTO: 10 % (ref 4–12)
NEUTROPHILS # BLD AUTO: 2.83 THOUSANDS/ΜL (ref 1.85–7.62)
NEUTS SEG NFR BLD AUTO: 58 % (ref 43–75)
NONHDLC SERPL-MCNC: 142 MG/DL
NRBC BLD AUTO-RTO: 0 /100 WBCS
PLATELET # BLD AUTO: 212 THOUSANDS/UL (ref 149–390)
PMV BLD AUTO: 9.9 FL (ref 8.9–12.7)
POTASSIUM SERPL-SCNC: 3.8 MMOL/L (ref 3.5–5.3)
PROT SERPL-MCNC: 7.3 G/DL (ref 6.4–8.2)
PSA SERPL-MCNC: 0.5 NG/ML (ref 0–4)
RBC # BLD AUTO: 4.61 MILLION/UL (ref 3.88–5.62)
SODIUM SERPL-SCNC: 143 MMOL/L (ref 136–145)
TRIGL SERPL-MCNC: 57 MG/DL
TSH SERPL DL<=0.05 MIU/L-ACNC: 1.98 UIU/ML (ref 0.36–3.74)
WBC # BLD AUTO: 4.95 THOUSAND/UL (ref 4.31–10.16)

## 2021-08-11 PROCEDURE — 80053 COMPREHEN METABOLIC PANEL: CPT

## 2021-08-11 PROCEDURE — 85025 COMPLETE CBC W/AUTO DIFF WBC: CPT

## 2021-08-11 PROCEDURE — 84153 ASSAY OF PSA TOTAL: CPT

## 2021-08-11 PROCEDURE — 80061 LIPID PANEL: CPT

## 2021-08-11 PROCEDURE — 84443 ASSAY THYROID STIM HORMONE: CPT

## 2021-08-11 PROCEDURE — 36415 COLL VENOUS BLD VENIPUNCTURE: CPT

## 2021-08-11 PROCEDURE — 82652 VIT D 1 25-DIHYDROXY: CPT

## 2021-08-13 LAB — 1,25(OH)2D3 SERPL-MCNC: 44.3 PG/ML (ref 19.9–79.3)

## 2021-08-18 ENCOUNTER — RA CDI HCC (OUTPATIENT)
Dept: OTHER | Facility: HOSPITAL | Age: 81
End: 2021-08-18

## 2021-08-24 ENCOUNTER — OFFICE VISIT (OUTPATIENT)
Dept: FAMILY MEDICINE CLINIC | Facility: CLINIC | Age: 81
End: 2021-08-24
Payer: COMMERCIAL

## 2021-08-24 VITALS
RESPIRATION RATE: 16 BRPM | OXYGEN SATURATION: 98 % | DIASTOLIC BLOOD PRESSURE: 72 MMHG | HEART RATE: 58 BPM | BODY MASS INDEX: 26.07 KG/M2 | SYSTOLIC BLOOD PRESSURE: 118 MMHG | WEIGHT: 176 LBS | HEIGHT: 69 IN

## 2021-08-24 DIAGNOSIS — G47.33 OSA (OBSTRUCTIVE SLEEP APNEA): ICD-10-CM

## 2021-08-24 DIAGNOSIS — E55.9 VITAMIN D DEFICIENCY: ICD-10-CM

## 2021-08-24 DIAGNOSIS — N52.9 ERECTILE DYSFUNCTION, UNSPECIFIED ERECTILE DYSFUNCTION TYPE: ICD-10-CM

## 2021-08-24 DIAGNOSIS — Z91.09 ENVIRONMENTAL ALLERGIES: ICD-10-CM

## 2021-08-24 DIAGNOSIS — L30.9 DERMATITIS OF RIGHT FOOT: Primary | ICD-10-CM

## 2021-08-24 DIAGNOSIS — I10 BENIGN ESSENTIAL HYPERTENSION: ICD-10-CM

## 2021-08-24 PROCEDURE — 3074F SYST BP LT 130 MM HG: CPT | Performed by: FAMILY MEDICINE

## 2021-08-24 PROCEDURE — 3078F DIAST BP <80 MM HG: CPT | Performed by: FAMILY MEDICINE

## 2021-08-24 PROCEDURE — 1036F TOBACCO NON-USER: CPT | Performed by: FAMILY MEDICINE

## 2021-08-24 PROCEDURE — 1160F RVW MEDS BY RX/DR IN RCRD: CPT | Performed by: FAMILY MEDICINE

## 2021-08-24 PROCEDURE — 3725F SCREEN DEPRESSION PERFORMED: CPT | Performed by: FAMILY MEDICINE

## 2021-08-24 PROCEDURE — 99214 OFFICE O/P EST MOD 30 MIN: CPT | Performed by: FAMILY MEDICINE

## 2021-08-24 RX ORDER — TADALAFIL 20 MG/1
20 TABLET ORAL DAILY PRN
Qty: 32 TABLET | Refills: 3 | Status: SHIPPED | OUTPATIENT
Start: 2021-08-24 | End: 2022-03-01 | Stop reason: SDUPTHER

## 2021-08-24 NOTE — PROGRESS NOTES
Subjective:      Patient ID: Karolina Miller is a 80 y o  male  78-year-old male presents with his wife for follow-up of chronic conditions including chronic headaches, hypertension, hypokalemia  Patient does have prior history of prostate cancer status post treatment  He is seen by Urology routinely for follow-up  Will be seeing Urology next month  Patient has been taking over-the-counter water pill which does help him urinate fully during the day so he is not urinating at night  Patient does develop some urge but no incontinence  Patient does request refill of p r n  Cialis which is effective for him  Labs reviewed which showed normal CBC, CMP, TSH, lipid profile and PSA at 0 4  Patient is back to working as a volunteer at [employer]  Patient does complain of a pruritic rash on the dorsum of the right foot  Not certain if he was bit by something  He does do yd work outside but usually wears heavy boots and long socks      Past Medical History:   Diagnosis Date    Actinic keratosis     Last assessed - 10/15/14    Hypertension     Hypokalemia     Last assessed - 8/13/14    Sinus bradycardia     Last assessed - 8/13/14       Family History   Problem Relation Age of Onset    Prostate cancer Father     Liver cancer Brother         Adenocarcinoma        Past Surgical History:   Procedure Laterality Date    HERNIA REPAIR      SIGMOIDOSCOPY      (Fiberoptic, Therapeutic) 7/28/2014, 8/25/14        reports that he has never smoked  He has never used smokeless tobacco  He reports that he does not drink alcohol and does not use drugs        Current Outpatient Medications:     atenolol (TENORMIN) 50 mg tablet, Take 1 tablet (50 mg total) by mouth daily, Disp: 90 tablet, Rfl: 3    azelastine (ASTELIN) 0 1 % nasal spray, SPRAY 1 SPRAY INTO EACH NOSTRIL 2 (TWO) TIMES A DAY USE IN EACH NOSTRIL AS DIRECTED, Disp: 1 Bottle, Rfl: 1    Cholecalciferol (VITAMIN D3) 1000 UNIT/SPRAY LIQD, Take 1 tablet by mouth daily, Disp: , Rfl:     Coenzyme Q10 100 MG CHEW, Chew 200 mg, Disp: , Rfl:     Omega-3-Acid Eth Est, Dietary, 1 g CAPS, Take by mouth, Disp: , Rfl:     Potassium 99 MG TABS, Take 1 tablet by mouth 2 (two) times a day, Disp: , Rfl:     tadalafil (Cialis) 20 MG tablet, Take 1 tablet (20 mg total) by mouth daily as needed for erectile dysfunction, Disp: 32 tablet, Rfl: 3    betamethasone valerate (VALISONE) 0 1 % cream, Apply topically 2 (two) times a day, Disp: 45 g, Rfl: 0    sildenafil (VIAGRA) 50 MG tablet, Take 1 tablet (50 mg total) by mouth daily as needed for erectile dysfunction (Patient not taking: Reported on 8/24/2021), Disp: 5 tablet, Rfl: 0    The following portions of the patient's history were reviewed and updated as appropriate: allergies, current medications, past family history, past medical history, past social history, past surgical history and problem list     Review of Systems   Constitutional: Negative  HENT: Negative  Eyes: Negative  Negative for visual disturbance  Respiratory: Negative  Cardiovascular: Negative  Gastrointestinal: Negative  Endocrine: Negative  Genitourinary: Negative  Musculoskeletal: Negative  Skin: Positive for rash  Allergic/Immunologic: Negative  Neurological: Negative  Negative for dizziness  Hematological: Negative  Psychiatric/Behavioral: Negative  All other systems reviewed and are negative  Objective:    /72   Pulse 58   Resp 16   Ht 5' 9" (1 753 m)   Wt 79 8 kg (176 lb)   SpO2 98%   BMI 25 99 kg/m²      Physical Exam  Vitals and nursing note reviewed  Constitutional:       General: He is not in acute distress  Appearance: Normal appearance  He is well-developed and normal weight  He is not ill-appearing  HENT:      Head: Normocephalic and atraumatic        Right Ear: Tympanic membrane, ear canal and external ear normal       Left Ear: Tympanic membrane, ear canal and external ear normal       Nose: Nose normal       Mouth/Throat:      Mouth: Mucous membranes are moist    Eyes:      Extraocular Movements: Extraocular movements intact  Conjunctiva/sclera: Conjunctivae normal       Pupils: Pupils are equal, round, and reactive to light  Cardiovascular:      Rate and Rhythm: Normal rate and regular rhythm  Pulses: Normal pulses  Heart sounds: Normal heart sounds  No murmur heard  Pulmonary:      Effort: Pulmonary effort is normal       Breath sounds: Normal breath sounds  Abdominal:      General: Abdomen is flat  Bowel sounds are normal       Palpations: Abdomen is soft  Musculoskeletal:         General: Normal range of motion  Cervical back: Normal range of motion and neck supple  Skin:     General: Skin is warm and dry  Findings: Rash (Dorsum of the right foot there is a small vesicle with surrounding erythema  No significant induration or drainage) present  Neurological:      General: No focal deficit present  Mental Status: He is alert and oriented to person, place, and time  Psychiatric:         Mood and Affect: Mood normal          Behavior: Behavior normal          Thought Content:  Thought content normal          Judgment: Judgment normal            Recent Results (from the past 1008 hour(s))   PSA Total, Diagnostic    Collection Time: 08/11/21  7:47 AM   Result Value Ref Range    PSA, Diagnostic 0 5 0 0 - 4 0 ng/mL   CBC and differential    Collection Time: 08/11/21  7:47 AM   Result Value Ref Range    WBC 4 95 4 31 - 10 16 Thousand/uL    RBC 4 61 3 88 - 5 62 Million/uL    Hemoglobin 14 5 12 0 - 17 0 g/dL    Hematocrit 42 4 36 5 - 49 3 %    MCV 92 82 - 98 fL    MCH 31 5 26 8 - 34 3 pg    MCHC 34 2 31 4 - 37 4 g/dL    RDW 13 4 11 6 - 15 1 %    MPV 9 9 8 9 - 12 7 fL    Platelets 180 742 - 451 Thousands/uL    nRBC 0 /100 WBCs    Neutrophils Relative 58 43 - 75 %    Immat GRANS % 0 0 - 2 %    Lymphocytes Relative 27 14 - 44 %    Monocytes Relative 10 4 - 12 %    Eosinophils Relative 4 0 - 6 %    Basophils Relative 1 0 - 1 %    Neutrophils Absolute 2 83 1 85 - 7 62 Thousands/µL    Immature Grans Absolute 0 02 0 00 - 0 20 Thousand/uL    Lymphocytes Absolute 1 32 0 60 - 4 47 Thousands/µL    Monocytes Absolute 0 51 0 17 - 1 22 Thousand/µL    Eosinophils Absolute 0 21 0 00 - 0 61 Thousand/µL    Basophils Absolute 0 06 0 00 - 0 10 Thousands/µL   Comprehensive metabolic panel    Collection Time: 08/11/21  7:47 AM   Result Value Ref Range    Sodium 143 136 - 145 mmol/L    Potassium 3 8 3 5 - 5 3 mmol/L    Chloride 112 (H) 100 - 108 mmol/L    CO2 27 21 - 32 mmol/L    ANION GAP 4 4 - 13 mmol/L    BUN 18 5 - 25 mg/dL    Creatinine 0 99 0 60 - 1 30 mg/dL    Glucose, Fasting 89 65 - 99 mg/dL    Calcium 8 8 8 3 - 10 1 mg/dL    AST 18 5 - 45 U/L    ALT 27 12 - 78 U/L    Alkaline Phosphatase 60 46 - 116 U/L    Total Protein 7 3 6 4 - 8 2 g/dL    Albumin 3 6 3 5 - 5 0 g/dL    Total Bilirubin 0 74 0 20 - 1 00 mg/dL    eGFR 71 ml/min/1 73sq m   Lipid panel    Collection Time: 08/11/21  7:47 AM   Result Value Ref Range    Cholesterol 200 50 - 200 mg/dL    Triglycerides 57 <=150 mg/dL    HDL, Direct 58 >=40 mg/dL    LDL Calculated 131 (H) 0 - 100 mg/dL    Non-HDL-Chol (CHOL-HDL) 142 mg/dl   TSH, 3rd generation with Free T4 reflex    Collection Time: 08/11/21  7:47 AM   Result Value Ref Range    TSH 3RD GENERATON 1 980 0 358 - 3 740 uIU/mL   Vitamin D 1,25 dihydroxy    Collection Time: 08/11/21  7:47 AM   Result Value Ref Range    Vit D, 1,25-Dihydroxy 44 3 19 9 - 79 3 pg/mL       Assessment/Plan:    ZAINAB (obstructive sleep apnea)  Patient has been seen by sleep specialist   He does have CPAP machine however his CPAP machine was on recall  Not presently using    Benign essential hypertension  Well controlled on current dose of atenolol 50 mg once daily  Refill not necessary at this time    Dermatitis of right foot  Really does not look like an insect bite    Appears to be almost a contact dermatitis in small area  Topical betamethasone valerate prescribed    Vitamin D deficiency  Therapeutic vitamin-D level on over-the-counter vitamin-D supplementation  Repeat vitamin-D level in 6 months    Erectile dysfunction  Cialis has been very effective and the prices come down for generic Cialis  Problem List Items Addressed This Visit        Respiratory    ZAINAB (obstructive sleep apnea)     Patient has been seen by sleep specialist   He does have CPAP machine however his CPAP machine was on recall  Not presently using            Cardiovascular and Mediastinum    Benign essential hypertension     Well controlled on current dose of atenolol 50 mg once daily  Refill not necessary at this time         Relevant Orders    CBC and differential    Comprehensive metabolic panel    Lipid panel    TSH, 3rd generation with Free T4 reflex       Musculoskeletal and Integument    Dermatitis of right foot - Primary     Really does not look like an insect bite  Appears to be almost a contact dermatitis in small area  Topical betamethasone valerate prescribed         Relevant Medications    betamethasone valerate (VALISONE) 0 1 % cream       Other    Environmental allergies    Erectile dysfunction     Cialis has been very effective and the prices come down for generic Cialis  Relevant Medications    tadalafil (Cialis) 20 MG tablet    Vitamin D deficiency     Therapeutic vitamin-D level on over-the-counter vitamin-D supplementation    Repeat vitamin-D level in 6 months         Relevant Orders    Vitamin D 1,25 dihydroxy

## 2021-08-24 NOTE — ASSESSMENT & PLAN NOTE
Patient has been seen by sleep specialist   He does have CPAP machine however his CPAP machine was on recall    Not presently using

## 2021-08-24 NOTE — ASSESSMENT & PLAN NOTE
Really does not look like an insect bite  Appears to be almost a contact dermatitis in small area    Topical betamethasone valerate prescribed

## 2021-08-24 NOTE — ASSESSMENT & PLAN NOTE
Therapeutic vitamin-D level on over-the-counter vitamin-D supplementation    Repeat vitamin-D level in 6 months

## 2021-09-20 ENCOUNTER — TELEPHONE (OUTPATIENT)
Dept: UROLOGY | Facility: MEDICAL CENTER | Age: 81
End: 2021-09-20

## 2021-09-21 ENCOUNTER — OFFICE VISIT (OUTPATIENT)
Dept: UROLOGY | Facility: CLINIC | Age: 81
End: 2021-09-21
Payer: COMMERCIAL

## 2021-09-21 VITALS
DIASTOLIC BLOOD PRESSURE: 86 MMHG | SYSTOLIC BLOOD PRESSURE: 118 MMHG | HEIGHT: 69 IN | HEART RATE: 49 BPM | BODY MASS INDEX: 25.77 KG/M2 | WEIGHT: 174 LBS

## 2021-09-21 DIAGNOSIS — C61 PROSTATE CANCER (HCC): Primary | ICD-10-CM

## 2021-09-21 PROCEDURE — 3079F DIAST BP 80-89 MM HG: CPT | Performed by: PHYSICIAN ASSISTANT

## 2021-09-21 PROCEDURE — 1160F RVW MEDS BY RX/DR IN RCRD: CPT | Performed by: PHYSICIAN ASSISTANT

## 2021-09-21 PROCEDURE — 1036F TOBACCO NON-USER: CPT | Performed by: PHYSICIAN ASSISTANT

## 2021-09-21 PROCEDURE — 3074F SYST BP LT 130 MM HG: CPT | Performed by: PHYSICIAN ASSISTANT

## 2021-09-21 PROCEDURE — 99213 OFFICE O/P EST LOW 20 MIN: CPT | Performed by: PHYSICIAN ASSISTANT

## 2021-09-21 NOTE — PROGRESS NOTES
9/21/2021      Chief Complaint   Patient presents with    Prostate Cancer         Assessment and Plan  1  Prostate cancer status post XRT (2010) -managed by Dr Hudson  - PSA (8/11/21) 0 5, stable  - Follow up in 1 year with PSA prior     2  Erectile dysfunction  - Using Cialis 20 mg at this time        History of Present Illness  Kilo Owusu is a 80 y o  male patient known to Dr Gurmeet Deluna for history of prostate cancer status post XRT (2010) presenting for 1 year follow-up      Patient's PSA continues to demonstrate stability and is currently 0 5      Patient continues to report that his urination continues to be stable   He does notice slowing of his 1st urination in the morning as well as some issues with possible incontinence if trying to postpone urination  [facility] feels that he has a good stream, empty after urination, and denies nocturia   He denies any dysuria, gross hematuria, suprapubic pressure, flank pain, bone pain, or weight loss      He continues to use Cialis 20 mg as needed for sexual dysfunction  Cindy Valero does not suffer from any side effects from this medication  Review of Systems   Constitutional: Negative for activity change, appetite change, chills and fever  HENT: Negative for congestion and trouble swallowing  Respiratory: Negative for cough and shortness of breath  Cardiovascular: Negative for chest pain, palpitations and leg swelling  Gastrointestinal: Negative for abdominal pain, constipation, diarrhea, nausea and vomiting  Genitourinary: Negative for difficulty urinating, dysuria, flank pain, frequency, hematuria and urgency  Musculoskeletal: Negative for back pain and gait problem  Skin: Negative for wound  Allergic/Immunologic: Negative for immunocompromised state  Neurological: Negative for dizziness and syncope  Hematological: Does not bruise/bleed easily  Psychiatric/Behavioral: Negative for confusion     All other systems reviewed and are negative  Vitals  Vitals:    09/21/21 0905   BP: 118/86   Pulse: (!) 49   Weight: 78 9 kg (174 lb)   Height: 5' 9" (1 753 m)       Physical Exam  Constitutional:       Appearance: Normal appearance  HENT:      Head: Normocephalic  Pulmonary:      Effort: Pulmonary effort is normal    Musculoskeletal:      Cervical back: Normal range of motion  Skin:     General: Skin is warm and dry  Neurological:      General: No focal deficit present  Mental Status: He is alert and oriented to person, place, and time  Psychiatric:         Mood and Affect: Mood normal          Behavior: Behavior normal          Thought Content: Thought content normal          Judgment: Judgment normal            Past History  Past Medical History:   Diagnosis Date    Actinic keratosis     Last assessed - 10/15/14    Hypertension     Hypokalemia     Last assessed - 8/13/14    Sinus bradycardia     Last assessed - 8/13/14     Social History     Socioeconomic History    Marital status:      Spouse name: None    Number of children: None    Years of education: None    Highest education level: None   Occupational History    Occupation: Retired   Tobacco Use    Smoking status: Never Smoker    Smokeless tobacco: Never Used    Tobacco comment: Former smoker per [employer]    Substance and Sexual Activity    Alcohol use: No     Comment: Seldomly per [employer]     Drug use: No    Sexual activity: None   Other Topics Concern    None   Social History Narrative    Caffeine use - 2 cups daily     and  noted per [employer]     Social Determinants of Health     Financial Resource Strain:     Difficulty of Paying Living Expenses:    Food Insecurity:     Worried About Running Out of Food in the Last Year:     [address] in the Last Year:    Transportation Needs:     Lack of Transportation (Medical):      Lack of Transportation (Non-Medical):    Physical Activity:     Days of Exercise per Week:     Minutes of Exercise per Session:    Stress:     Feeling of Stress :    Social Connections:     Frequency of Communication with Friends and Family:     Frequency of Social Gatherings with Friends and Family:     Attends [employer] Services:     Active Member of Clubs or Organizations:     Attends Club or Organization Meetings:     Marital Status:    Intimate Partner Violence:     Fear of Current or Ex-Partner:     Emotionally Abused:     Physically Abused:     Sexually Abused:      Social History     Tobacco Use   Smoking Status Never Smoker   Smokeless Tobacco Never Used   Tobacco Comment    Former smoker per [employer]      Family History   Problem Relation Age of Onset    Prostate cancer Father     Liver cancer Brother         Adenocarcinoma        The following portions of the patient's history were reviewed and updated as appropriate: allergies, current medications, past medical history, past social history, past surgical history and problem list     Results  No results found for this or any previous visit (from the past 1 hour(s)) ]  Lab Results   Component Value Date    PSA 0 5 08/11/2021    PSA 0 4 01/12/2021    PSA 0 4 07/10/2020    PSA 0 4 07/03/2019     Lab Results   Component Value Date    CALCIUM 8 8 08/11/2021    K 3 8 08/11/2021    CO2 27 08/11/2021     (H) 08/11/2021    BUN 18 08/11/2021    CREATININE 0 99 08/11/2021     Lab Results   Component Value Date    WBC 4 95 08/11/2021    HGB 14 5 08/11/2021    HCT 42 4 08/11/2021    MCV 92 08/11/2021     08/11/2021       Acacia Duong PA-C

## 2022-02-23 ENCOUNTER — APPOINTMENT (OUTPATIENT)
Dept: LAB | Facility: CLINIC | Age: 82
End: 2022-02-23
Payer: COMMERCIAL

## 2022-02-23 DIAGNOSIS — E55.9 VITAMIN D DEFICIENCY: ICD-10-CM

## 2022-02-23 DIAGNOSIS — C61 PROSTATE CANCER (HCC): ICD-10-CM

## 2022-02-23 DIAGNOSIS — I10 BENIGN ESSENTIAL HYPERTENSION: ICD-10-CM

## 2022-02-23 LAB
ALBUMIN SERPL BCP-MCNC: 3.6 G/DL (ref 3.5–5)
ALP SERPL-CCNC: 57 U/L (ref 46–116)
ALT SERPL W P-5'-P-CCNC: 22 U/L (ref 12–78)
ANION GAP SERPL CALCULATED.3IONS-SCNC: 4 MMOL/L (ref 4–13)
AST SERPL W P-5'-P-CCNC: 19 U/L (ref 5–45)
BASOPHILS # BLD AUTO: 0.06 THOUSANDS/ΜL (ref 0–0.1)
BASOPHILS NFR BLD AUTO: 1 % (ref 0–1)
BILIRUB SERPL-MCNC: 0.72 MG/DL (ref 0.2–1)
BUN SERPL-MCNC: 18 MG/DL (ref 5–25)
CALCIUM SERPL-MCNC: 9.3 MG/DL (ref 8.3–10.1)
CHLORIDE SERPL-SCNC: 112 MMOL/L (ref 100–108)
CHOLEST SERPL-MCNC: 188 MG/DL
CO2 SERPL-SCNC: 25 MMOL/L (ref 21–32)
CREAT SERPL-MCNC: 1.1 MG/DL (ref 0.6–1.3)
EOSINOPHIL # BLD AUTO: 0.22 THOUSAND/ΜL (ref 0–0.61)
EOSINOPHIL NFR BLD AUTO: 5 % (ref 0–6)
ERYTHROCYTE [DISTWIDTH] IN BLOOD BY AUTOMATED COUNT: 13.1 % (ref 11.6–15.1)
GFR SERPL CREATININE-BSD FRML MDRD: 62 ML/MIN/1.73SQ M
GLUCOSE P FAST SERPL-MCNC: 97 MG/DL (ref 65–99)
HCT VFR BLD AUTO: 41.3 % (ref 36.5–49.3)
HDLC SERPL-MCNC: 48 MG/DL
HGB BLD-MCNC: 14.1 G/DL (ref 12–17)
IMM GRANULOCYTES # BLD AUTO: 0.03 THOUSAND/UL (ref 0–0.2)
IMM GRANULOCYTES NFR BLD AUTO: 1 % (ref 0–2)
LDLC SERPL CALC-MCNC: 128 MG/DL (ref 0–100)
LYMPHOCYTES # BLD AUTO: 1.16 THOUSANDS/ΜL (ref 0.6–4.47)
LYMPHOCYTES NFR BLD AUTO: 25 % (ref 14–44)
MCH RBC QN AUTO: 31.1 PG (ref 26.8–34.3)
MCHC RBC AUTO-ENTMCNC: 34.1 G/DL (ref 31.4–37.4)
MCV RBC AUTO: 91 FL (ref 82–98)
MONOCYTES # BLD AUTO: 0.49 THOUSAND/ΜL (ref 0.17–1.22)
MONOCYTES NFR BLD AUTO: 11 % (ref 4–12)
NEUTROPHILS # BLD AUTO: 2.68 THOUSANDS/ΜL (ref 1.85–7.62)
NEUTS SEG NFR BLD AUTO: 57 % (ref 43–75)
NONHDLC SERPL-MCNC: 140 MG/DL
NRBC BLD AUTO-RTO: 0 /100 WBCS
PLATELET # BLD AUTO: 200 THOUSANDS/UL (ref 149–390)
PMV BLD AUTO: 10.2 FL (ref 8.9–12.7)
POTASSIUM SERPL-SCNC: 4.4 MMOL/L (ref 3.5–5.3)
PROT SERPL-MCNC: 7 G/DL (ref 6.4–8.2)
RBC # BLD AUTO: 4.54 MILLION/UL (ref 3.88–5.62)
SODIUM SERPL-SCNC: 141 MMOL/L (ref 136–145)
TRIGL SERPL-MCNC: 61 MG/DL
TSH SERPL DL<=0.05 MIU/L-ACNC: 1.55 UIU/ML (ref 0.36–3.74)
WBC # BLD AUTO: 4.64 THOUSAND/UL (ref 4.31–10.16)

## 2022-02-23 PROCEDURE — 85025 COMPLETE CBC W/AUTO DIFF WBC: CPT

## 2022-02-23 PROCEDURE — 84443 ASSAY THYROID STIM HORMONE: CPT

## 2022-02-23 PROCEDURE — 80061 LIPID PANEL: CPT

## 2022-02-23 PROCEDURE — 36415 COLL VENOUS BLD VENIPUNCTURE: CPT

## 2022-02-23 PROCEDURE — 80053 COMPREHEN METABOLIC PANEL: CPT

## 2022-02-23 PROCEDURE — 82652 VIT D 1 25-DIHYDROXY: CPT

## 2022-02-25 LAB — 1,25(OH)2D3 SERPL-MCNC: 47.4 PG/ML (ref 19.9–79.3)

## 2022-03-01 ENCOUNTER — OFFICE VISIT (OUTPATIENT)
Dept: FAMILY MEDICINE CLINIC | Facility: CLINIC | Age: 82
End: 2022-03-01
Payer: COMMERCIAL

## 2022-03-01 VITALS
BODY MASS INDEX: 26.66 KG/M2 | WEIGHT: 180 LBS | DIASTOLIC BLOOD PRESSURE: 64 MMHG | OXYGEN SATURATION: 99 % | RESPIRATION RATE: 16 BRPM | HEART RATE: 56 BPM | HEIGHT: 69 IN | SYSTOLIC BLOOD PRESSURE: 138 MMHG

## 2022-03-01 DIAGNOSIS — M25.561 ARTHRALGIA OF BOTH KNEES: ICD-10-CM

## 2022-03-01 DIAGNOSIS — E55.9 VITAMIN D DEFICIENCY: ICD-10-CM

## 2022-03-01 DIAGNOSIS — I73.00 RAYNAUD'S PHENOMENON WITHOUT GANGRENE: ICD-10-CM

## 2022-03-01 DIAGNOSIS — N52.9 ERECTILE DYSFUNCTION, UNSPECIFIED ERECTILE DYSFUNCTION TYPE: ICD-10-CM

## 2022-03-01 DIAGNOSIS — M25.562 ARTHRALGIA OF BOTH KNEES: ICD-10-CM

## 2022-03-01 DIAGNOSIS — I10 BENIGN ESSENTIAL HYPERTENSION: ICD-10-CM

## 2022-03-01 DIAGNOSIS — Z00.00 MEDICARE ANNUAL WELLNESS VISIT, SUBSEQUENT: Primary | ICD-10-CM

## 2022-03-01 DIAGNOSIS — C61 PROSTATE CANCER (HCC): ICD-10-CM

## 2022-03-01 PROCEDURE — 3725F SCREEN DEPRESSION PERFORMED: CPT | Performed by: FAMILY MEDICINE

## 2022-03-01 PROCEDURE — 1125F AMNT PAIN NOTED PAIN PRSNT: CPT | Performed by: FAMILY MEDICINE

## 2022-03-01 PROCEDURE — 99214 OFFICE O/P EST MOD 30 MIN: CPT | Performed by: FAMILY MEDICINE

## 2022-03-01 PROCEDURE — 1170F FXNL STATUS ASSESSED: CPT | Performed by: FAMILY MEDICINE

## 2022-03-01 PROCEDURE — G0439 PPPS, SUBSEQ VISIT: HCPCS | Performed by: FAMILY MEDICINE

## 2022-03-01 PROCEDURE — 1160F RVW MEDS BY RX/DR IN RCRD: CPT | Performed by: FAMILY MEDICINE

## 2022-03-01 RX ORDER — ATENOLOL 50 MG/1
50 TABLET ORAL DAILY
Qty: 90 TABLET | Refills: 3 | Status: SHIPPED | OUTPATIENT
Start: 2022-03-01

## 2022-03-01 RX ORDER — TADALAFIL 20 MG/1
20 TABLET ORAL DAILY PRN
Qty: 32 TABLET | Refills: 3 | Status: SHIPPED | OUTPATIENT
Start: 2022-03-01

## 2022-03-01 NOTE — PATIENT INSTRUCTIONS

## 2022-03-01 NOTE — PROGRESS NOTES
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 [employer]  He has not been volunteering since pandemic was declared in March due to his age in increased risk  He has remained home with his partner  Occasionally he goes out to the store  He is not getting the exercise that he normally does when he is a volunteer  Usually he has anywhere from [phone] steps per day  He notes that he has gained some weight  He did receive 1st dose of COVID-19 vaccination  Labs reviewed which showed vitamin-D 42 2, normal CBC, CMP, total cholesterol 196, HDL 56, , triglycerides 67, PSA remains stable at 0 4  He does have history of prostate cancer status post resection  PSA has remained stable  He does follow up with Urology  Patient has been working on balancing exercises  He does complain of tinnitus in his left ear  He has seen audiology in the past in regards to this  There are no successful treatments  Patient did have 3 falls in the past year  Two of them while he was cutting wood  He tripped over a root and another time when he was installing a garage  and he was on a small step ladder and lost balance    No actual injury related to this      Past Medical History:   Diagnosis Date    Actinic keratosis     Last assessed - 10/15/14    Hypertension     Hypokalemia     Last assessed - 8/13/14    Sinus bradycardia     Last assessed - 8/13/14       Family History   Problem Relation Age of Onset    Prostate cancer Father     Liver cancer Brother         Adenocarcinoma        Past Surgical History:   Procedure Laterality Date    HERNIA REPAIR      SIGMOIDOSCOPY (Fiberoptic, Therapeutic) 7/28/2014, 8/25/14        reports that he has never smoked  He has never used smokeless tobacco  He reports that he does not drink alcohol and does not use drugs  Current Outpatient Medications:     atenolol (TENORMIN) 50 mg tablet, Take 1 tablet (50 mg total) by mouth daily, Disp: 90 tablet, Rfl: 3    azelastine (ASTELIN) 0 1 % nasal spray, SPRAY 1 SPRAY INTO EACH NOSTRIL 2 (TWO) TIMES A DAY USE IN EACH NOSTRIL AS DIRECTED, Disp: 1 Bottle, Rfl: 1    betamethasone valerate (VALISONE) 0 1 % cream, Apply topically 2 (two) times a day, Disp: 45 g, Rfl: 0    Cholecalciferol (VITAMIN D3) 1000 UNIT/SPRAY LIQD, Take 1 tablet by mouth daily, Disp: , Rfl:     Coenzyme Q10 100 MG CHEW, Chew 200 mg, Disp: , Rfl:     Omega-3-Acid Eth Est, Dietary, 1 g CAPS, Take by mouth, Disp: , Rfl:     Potassium 99 MG TABS, Take 1 tablet by mouth 2 (two) times a day, Disp: , Rfl:     sildenafil (VIAGRA) 50 MG tablet, Take 1 tablet (50 mg total) by mouth daily as needed for erectile dysfunction (Patient not taking: Reported on 8/24/2021), Disp: 5 tablet, Rfl: 0    tadalafil (Cialis) 20 MG tablet, Take 1 tablet (20 mg total) by mouth daily as needed for erectile dysfunction, Disp: 32 tablet, Rfl: 3    The following portions of the patient's history were reviewed and updated as appropriate: allergies, current medications, past family history, past medical history, past social history, past surgical history and problem list     Review of Systems   Constitutional: Negative  HENT: Positive for congestion and tinnitus  Eyes: Negative  Respiratory: Negative  Cardiovascular: Negative  Gastrointestinal: Negative  Endocrine: Negative  Genitourinary: Negative  Musculoskeletal: Negative  Skin: Negative  Allergic/Immunologic: Negative  Neurological: Negative  Hematological: Negative  Psychiatric/Behavioral: Negative  All other systems reviewed and are negative  Objective: There were no vitals taken for this visit  Physical Exam  Vitals and nursing note reviewed  Constitutional:       General: He is not in acute distress  Appearance: Normal appearance  He is well-developed and normal weight  He is not ill-appearing  HENT:      Head: Normocephalic and atraumatic  Right Ear: Tympanic membrane, ear canal and external ear normal       Left Ear: Ear canal and external ear normal       Ears:      Comments: Small amount of serous fluid behind the left tympanic membrane     Nose: Nose normal       Mouth/Throat:      Mouth: Mucous membranes are moist    Eyes:      Extraocular Movements: Extraocular movements intact  Conjunctiva/sclera: Conjunctivae normal       Pupils: Pupils are equal, round, and reactive to light  Cardiovascular:      Rate and Rhythm: Normal rate and regular rhythm  Pulses: Normal pulses  Heart sounds: Normal heart sounds  No murmur heard  Pulmonary:      Effort: Pulmonary effort is normal       Breath sounds: Normal breath sounds  Abdominal:      General: Abdomen is flat  Bowel sounds are normal       Palpations: Abdomen is soft  Musculoskeletal:         General: Normal range of motion  Cervical back: Normal range of motion and neck supple  Skin:     General: Skin is warm and dry  Neurological:      General: No focal deficit present  Mental Status: He is alert and oriented to person, place, and time  Psychiatric:         Mood and Affect: Mood normal          Behavior: Behavior normal          Thought Content: Thought content normal          Judgment: Judgment normal          Assessment and Plan:     Problem List Items Addressed This Visit     None        BMI Counseling: Body mass index is 26 43 kg/m²  The BMI is above normal  Nutrition recommendations include reducing intake of cholesterol   Exercise recommendations include moderate physical activity 150 minutes/week and exercising 3-5 times per week          Preventive health issues were discussed with patient, and age appropriate screening tests were ordered as noted in patient's After Visit Summary  Personalized health advice and appropriate referrals for health education or preventive services given if needed, as noted in patient's After Visit Summary  History of Present Illness:     Patient presents for Medicare Annual Wellness visit    Patient Care Team:  Saida Whipple DO as PCP - General  Saida Whipple DO as PCP - [address] [place] [address] (RTE)  Saida Whipple DO as PCP - [facility] (RTE)  MD Lourdes Lara MD (Dermatology)  Uriah Abbott DO (Gastroenterology)  Marc Amador PA-C as Physician Assistant (Physician Assistant)     Problem List:     Patient Active Problem List   Diagnosis    ZAINAB (obstructive sleep apnea)    Benign essential hypertension    Vitamin D deficiency    Medicare annual wellness visit, subsequent    Erectile dysfunction    Arthritis of lumbar spine    Low back pain    Prostate cancer ([facility] [address] [facility]    Squamous cell carcinoma of skin    Toe pain, right    Benign paroxysmal positional vertigo of right ear    Palpitations    Environmental allergies    Dermatitis of right foot      Past Medical and Surgical History:     Past Medical History:   Diagnosis Date    Actinic keratosis     Last assessed - 10/15/14    Hypertension     Hypokalemia     Last assessed - 8/13/14    Sinus bradycardia     Last assessed - 8/13/14     Past Surgical History:   Procedure Laterality Date    HERNIA REPAIR      SIGMOIDOSCOPY      (Fiberoptic, Therapeutic) 7/28/2014, 8/25/14      Family History:     Family History   Problem Relation Age of Onset    Prostate cancer Father     Liver cancer Brother         Adenocarcinoma       Social History:     Social History     Socioeconomic History    Marital status:       Spouse name: Not on file    Number of children: Not on file    Years of education: Not on file    Highest education level: Not on file   Occupational History    Occupation: Retired   Tobacco Use    Smoking status: Never Smoker    Smokeless tobacco: Never Used    Tobacco comment: Former smoker per [employer]    Substance and Sexual Activity    Alcohol use: No     Comment: Seldomly per [employer]     Drug use: No    Sexual activity: Not on file   Other Topics Concern    Not on file   Social History Narrative    Caffeine use - 2 cups daily     and  noted per [employer]     Social Determinants of Health     Financial Resource Strain: Not on file   Food Insecurity: Not on file   Transportation Needs: Not on file   Physical Activity: Not on file   Stress: Not on file   Social Connections: Not on file   Intimate Partner Violence: Not on file   Housing Stability: Not on file       Medications and Allergies:     Current Outpatient Medications   Medication Sig Dispense Refill    atenolol (TENORMIN) 50 mg tablet Take 1 tablet (50 mg total) by mouth daily 90 tablet 3    azelastine (ASTELIN) 0 1 % nasal spray SPRAY 1 SPRAY INTO EACH NOSTRIL 2 (TWO) TIMES A DAY USE IN EACH NOSTRIL AS DIRECTED 1 Bottle 1    betamethasone valerate (VALISONE) 0 1 % cream Apply topically 2 (two) times a day 45 g 0    Cholecalciferol (VITAMIN D3) 1000 UNIT/SPRAY LIQD Take 1 tablet by mouth daily      Coenzyme Q10 100 MG CHEW Chew 200 mg      Omega-3-Acid Eth Est, Dietary, 1 g CAPS Take by mouth      Potassium 99 MG TABS Take 1 tablet by mouth 2 (two) times a day      sildenafil (VIAGRA) 50 MG tablet Take 1 tablet (50 mg total) by mouth daily as needed for erectile dysfunction (Patient not taking: Reported on 8/24/2021) 5 tablet 0    tadalafil (Cialis) 20 MG tablet Take 1 tablet (20 mg total) by mouth daily as needed for erectile dysfunction 32 tablet 3     No current facility-administered medications for this visit       Allergies   Allergen Reactions    Soy Isoflavones       Immunizations:     Immunization History   Administered Date(s) Administered    COVID-19 MODERNA VACC 0 5 ML IM 01/06/2021, 02/03/2021, 10/29/2021    INFLUENZA 10/20/2015, 09/13/2016, 10/03/2016, 10/17/2017, 09/22/2018, 11/09/2018, 10/09/2019, 10/15/2021    Influenza Split High Dose Preservative Free IM 10/20/2015    Influenza, seasonal, injectable 10/03/2016    Pneumococcal Conjugate 13-Valent 12/18/2015    Pneumococcal Polysaccharide PPV23 02/12/2014, 10/03/2016, 03/27/2018    Tdap 01/01/2006, 10/17/2017    Zoster 08/02/2016    Zoster Vaccine Recombinant 07/03/2018, 11/09/2018    influenza, trivalent, adjuvanted 10/09/2019      Health Maintenance:         Topic Date Due    Colorectal Cancer Screening  03/03/2023     There are no preventive care reminders to display for this patient  Medicare Health Risk Assessment:     There were no vitals taken for this visit  Christi Bah is here for his Subsequent Wellness visit  Last Medicare Wellness visit information reviewed, patient interviewed, no change since last AWV  Health Risk Assessment:   Patient rates overall health as very good  Patient feels that their physical health rating is same  Eyesight was rated as same  Hearing was rated as same  Patient feels that their emotional and mental health rating is same  Pain experienced in the last 7 days has been some  Patient's pain rating has been 3/10  Patient states that he has experienced no weight loss or gain in last 6 months  Fall Risk Screening: In the past year, patient has experienced: no history of falling in past year      Home Safety:  Patient has trouble with stairs inside or outside of their home  Patient has working smoke alarms and has no working carbon monoxide detector  Home safety hazards include: none  Nutrition:   Current diet is Regular and Limited junk food  Medications:   Patient is currently taking over-the-counter supplements   OTC medications include: see medication list  Patient is able to manage medications  Activities of Daily Living (ADLs)/Instrumental Activities of Daily Living (IADLs):   Walk and transfer into and out of bed and chair?: Yes  Dress and groom yourself?: Yes    Bathe or shower yourself?: Yes    Feed yourself? Yes  Do your laundry/housekeeping?: Yes  Manage your money, pay your bills and track your expenses?: Yes  Make your own meals?: Yes    Do your own shopping?: Yes    Previous Hospitalizations:   Any hospitalizations or ED visits within the last 12 months?: Yes    How many hospitalizations have you had in the last year?: 1-2    Advance Care Planning:   Living will: Yes    Durable POA for healthcare: Yes    Advanced directive: Yes    Advanced directive counseling given: No    Five wishes given: No    Patient declined ACP directive: No    End of Life Decisions reviewed with patient: Yes    Provider agrees with end of life decisions: Yes      Cognitive Screening:   Provider or family/friend/caregiver concerned regarding cognition?: No    PREVENTIVE SCREENINGS      Cardiovascular Screening:    General: Screening Current      Diabetes Screening:     General: Screening Current      Colorectal Cancer Screening:     General: Screening Current      Prostate Cancer Screening:    General: History Prostate Cancer and Screening Not Indicated      Osteoporosis Screening:    General: Screening Current      Abdominal Aortic Aneurysm (AAA) Screening:        General: Screening Not Indicated      Lung Cancer Screening:     General: Screening Not Indicated      Hepatitis C Screening:    General: Screening Current      Madelyn Conway DO   Assessment and Plan:     Problem List Items Addressed This Visit     None        BMI Counseling: Body mass index is 26 43 kg/m²  The BMI is above normal  Nutrition recommendations include reducing intake of cholesterol  Exercise recommendations include moderate physical activity 150 minutes/week and exercising 3-5 times per week           Preventive health issues were discussed with patient, and age appropriate screening tests were ordered as noted in patient's After Visit Summary  Personalized health advice and appropriate referrals for health education or preventive services given if needed, as noted in patient's After Visit Summary  History of Present Illness:     Patient presents for Medicare Annual Wellness visit    Patient Care Team:  Sarthak Rodrigues DO as PCP - General  Sarthak Rodrigues DO as PCP - [address] [place] [address] (RTE)  Sarthak Rodrigues DO as PCP - [facility] ([facility]E)  MD Andrey Gomez MD (Dermatology)  Maurice Malik DO (Gastroenterology)  Faustino Bae PA-C as Physician Assistant (Physician Assistant)     Problem List:     Patient Active Problem List   Diagnosis    ZAINAB (obstructive sleep apnea)    Benign essential hypertension    Vitamin D deficiency    Medicare annual wellness visit, subsequent    Erectile dysfunction    Arthritis of lumbar spine    Low back pain    Prostate cancer ([facility] [address] [facility]    Squamous cell carcinoma of skin    Toe pain, right    Benign paroxysmal positional vertigo of right ear    Palpitations    Environmental allergies    Dermatitis of right foot      Past Medical and Surgical History:     Past Medical History:   Diagnosis Date    Actinic keratosis     Last assessed - 10/15/14    Hypertension     Hypokalemia     Last assessed - 8/13/14    Sinus bradycardia     Last assessed - 8/13/14     Past Surgical History:   Procedure Laterality Date    HERNIA REPAIR      SIGMOIDOSCOPY      (Fiberoptic, Therapeutic) 7/28/2014, 8/25/14      Family History:     Family History   Problem Relation Age of Onset    Prostate cancer Father     Liver cancer Brother         Adenocarcinoma       Social History:     Social History     Socioeconomic History    Marital status:       Spouse name: Not on file    Number of children: Not on file    Years of education: Not on file    Highest education level: Not on file Occupational History    Occupation: Retired   Tobacco Use    Smoking status: Never Smoker    Smokeless tobacco: Never Used    Tobacco comment: Former smoker per [employer]    Substance and Sexual Activity    Alcohol use: No     Comment: Seldomly per [employer]     Drug use: No    Sexual activity: Not on file   Other Topics Concern    Not on file   Social History Narrative    Caffeine use - 2 cups daily     and  noted per [employer]     Social Determinants of Health     Financial Resource Strain: Not on file   Food Insecurity: Not on file   Transportation Needs: Not on file   Physical Activity: Not on file   Stress: Not on file   Social Connections: Not on file   Intimate Partner Violence: Not on file   Housing Stability: Not on file       Medications and Allergies:     Current Outpatient Medications   Medication Sig Dispense Refill    atenolol (TENORMIN) 50 mg tablet Take 1 tablet (50 mg total) by mouth daily 90 tablet 3    azelastine (ASTELIN) 0 1 % nasal spray SPRAY 1 SPRAY INTO EACH NOSTRIL 2 (TWO) TIMES A DAY USE IN EACH NOSTRIL AS DIRECTED 1 Bottle 1    betamethasone valerate (VALISONE) 0 1 % cream Apply topically 2 (two) times a day 45 g 0    Cholecalciferol (VITAMIN D3) 1000 UNIT/SPRAY LIQD Take 1 tablet by mouth daily      Coenzyme Q10 100 MG CHEW Chew 200 mg      Omega-3-Acid Eth Est, Dietary, 1 g CAPS Take by mouth      Potassium 99 MG TABS Take 1 tablet by mouth 2 (two) times a day      sildenafil (VIAGRA) 50 MG tablet Take 1 tablet (50 mg total) by mouth daily as needed for erectile dysfunction (Patient not taking: Reported on 8/24/2021) 5 tablet 0    tadalafil (Cialis) 20 MG tablet Take 1 tablet (20 mg total) by mouth daily as needed for erectile dysfunction 32 tablet 3     No current facility-administered medications for this visit       Allergies   Allergen Reactions    Soy Isoflavones       Immunizations:     Immunization History   Administered Date(s) Administered  COVID-19 MODERNA VACC 0 5 ML IM 01/06/2021, 02/03/2021, 10/29/2021    INFLUENZA 10/20/2015, 09/13/2016, 10/03/2016, 10/17/2017, 09/22/2018, 11/09/2018, 10/09/2019, 10/15/2021    Influenza Split High Dose Preservative Free IM 10/20/2015    Influenza, seasonal, injectable 10/03/2016    Pneumococcal Conjugate 13-Valent 12/18/2015    Pneumococcal Polysaccharide PPV23 02/12/2014, 10/03/2016, 03/27/2018    Tdap 01/01/2006, 10/17/2017    Zoster 08/02/2016    Zoster Vaccine Recombinant 07/03/2018, 11/09/2018    influenza, trivalent, adjuvanted 10/09/2019      Health Maintenance:         Topic Date Due    Colorectal Cancer Screening  03/03/2023     There are no preventive care reminders to display for this patient  Medicare Health Risk Assessment:     There were no vitals taken for this visit  Kristan Wilson is here for his Subsequent Wellness visit  Last Medicare Wellness visit information reviewed, patient interviewed, no change since last AWV  Health Risk Assessment:   Patient rates overall health as very good  Patient feels that their physical health rating is same  Eyesight was rated as same  Hearing was rated as same  Patient feels that their emotional and mental health rating is same  Pain experienced in the last 7 days has been some  Patient's pain rating has been 3/10  Patient states that he has experienced no weight loss or gain in last 6 months  Fall Risk Screening: In the past year, patient has experienced: no history of falling in past year      Home Safety:  Patient has trouble with stairs inside or outside of their home  Patient has working smoke alarms and has no working carbon monoxide detector  Home safety hazards include: none  Nutrition:   Current diet is Regular and Limited junk food  Medications:   Patient is currently taking over-the-counter supplements  OTC medications include: see medication list  Patient is able to manage medications       Activities of Daily Living (ADLs)/Instrumental Activities of Daily Living (IADLs):   Walk and transfer into and out of bed and chair?: Yes  Dress and groom yourself?: Yes    Bathe or shower yourself?: Yes    Feed yourself? Yes  Do your laundry/housekeeping?: Yes  Manage your money, pay your bills and track your expenses?: Yes  Make your own meals?: Yes    Do your own shopping?: Yes    Previous Hospitalizations:   Any hospitalizations or ED visits within the last 12 months?: Yes    How many hospitalizations have you had in the last year?: 1-2    Advance Care Planning:   Living will: Yes    Durable POA for healthcare:  Yes    Advanced directive: Yes    Advanced directive counseling given: No    Five wishes given: No    Patient declined ACP directive: No    End of Life Decisions reviewed with patient: Yes    Provider agrees with end of life decisions: Yes      Cognitive Screening:   Provider or family/friend/caregiver concerned regarding cognition?: No    PREVENTIVE SCREENINGS      Cardiovascular Screening:    General: Screening Current      Diabetes Screening:     General: Screening Current      Colorectal Cancer Screening:     General: Screening Current      Prostate Cancer Screening:    General: History Prostate Cancer and Screening Not Indicated      Osteoporosis Screening:    General: Screening Current      Abdominal Aortic Aneurysm (AAA) Screening:        General: Screening Not Indicated      Lung Cancer Screening:     General: Screening Not Indicated      Hepatitis C Screening:    General: Screening Current      Vasquez Sanchez DO     Recent Results (from the past 1008 hour(s))   CBC and differential    Collection Time: 02/23/22  8:38 AM   Result Value Ref Range    WBC 4 64 4 31 - 10 16 Thousand/uL    RBC 4 54 3 88 - 5 62 Million/uL    Hemoglobin 14 1 12 0 - 17 0 g/dL    Hematocrit 41 3 36 5 - 49 3 %    MCV 91 82 - 98 fL    MCH 31 1 26 8 - 34 3 pg    MCHC 34 1 31 4 - 37 4 g/dL    RDW 13 1 11 6 - 15 1 %    MPV 10 2 8 9 - 12 7 fL    Platelets 200 149 - 390 Thousands/uL    nRBC 0 /100 WBCs    Neutrophils Relative 57 43 - 75 %    Immat GRANS % 1 0 - 2 %    Lymphocytes Relative 25 14 - 44 %    Monocytes Relative 11 4 - 12 %    Eosinophils Relative 5 0 - 6 %    Basophils Relative 1 0 - 1 %    Neutrophils Absolute 2 68 1 85 - 7 62 Thousands/µL    Immature Grans Absolute 0 03 0 00 - 0 20 Thousand/uL    Lymphocytes Absolute 1 16 0 60 - 4 47 Thousands/µL    Monocytes Absolute 0 49 0 17 - 1 22 Thousand/µL    Eosinophils Absolute 0 22 0 00 - 0 61 Thousand/µL    Basophils Absolute 0 06 0 00 - 0 10 Thousands/µL   Comprehensive metabolic panel    Collection Time: 02/23/22  8:38 AM   Result Value Ref Range    Sodium 141 136 - 145 mmol/L    Potassium 4 4 3 5 - 5 3 mmol/L    Chloride 112 (H) 100 - 108 mmol/L    CO2 25 21 - 32 mmol/L    ANION GAP 4 4 - 13 mmol/L    BUN 18 5 - 25 mg/dL    Creatinine 1 10 0 60 - 1 30 mg/dL    Glucose, Fasting 97 65 - 99 mg/dL    Calcium 9 3 8 3 - 10 1 mg/dL    AST 19 5 - 45 U/L    ALT 22 12 - 78 U/L    Alkaline Phosphatase 57 46 - 116 U/L    Total Protein 7 0 6 4 - 8 2 g/dL    Albumin 3 6 3 5 - 5 0 g/dL    Total Bilirubin 0 72 0 20 - 1 00 mg/dL    eGFR 62 ml/min/1 73sq m   Lipid panel    Collection Time: 02/23/22  8:38 AM   Result Value Ref Range    Cholesterol 188 See Comment mg/dL    Triglycerides 61 See Comment mg/dL    HDL, Direct 48 >=40 mg/dL    LDL Calculated 128 (H) 0 - 100 mg/dL    Non-HDL-Chol (CHOL-HDL) 140 mg/dl   TSH, 3rd generation with Free T4 reflex    Collection Time: 02/23/22  8:38 AM   Result Value Ref Range    TSH 3RD GENERATON 1 550 0 358 - 3 740 uIU/mL   Vitamin D 1,25 dihydroxy    Collection Time: 02/23/22  8:38 AM   Result Value Ref Range    Vit D, 1,25-Dihydroxy 47 4 19 9 - 79 3 pg/mL       Assessment/Plan:    No problem-specific Assessment & Plan notes found for this encounter  Problem List Items Addressed This Visit     None                 Cristobalstephenie Miller is here for his Subsequent Wellness visit       Health Risk Assessment:   Patient rates overall health as very good  Patient feels that their physical health rating is same  Patient is very satisfied with their life  Eyesight was rated as same  Hearing was rated as same  Patient feels that their emotional and mental health rating is same  Patients states they are never, rarely angry  Patient states they are never, rarely unusually tired/fatigued  Pain experienced in the last 7 days has been none  Patient states that he has experienced no weight loss or gain in last 6 months  Depression Screening:   PHQ-2 Score: 0      Fall Risk Screening: In the past year, patient has experienced: no history of falling in past year      Home Safety:  Patient has trouble with stairs inside or outside of their home  Patient has working smoke alarms and has no working carbon monoxide detector  Home safety hazards include: none  Medications:   Patient is currently taking over-the-counter supplements  OTC medications include: see medication list  Patient is not able to manage medications  Activities of Daily Living (ADLs)/Instrumental Activities of Daily Living (IADLs):   Walk and transfer into and out of bed and chair?: Yes  Dress and groom yourself?: Yes    Bathe or shower yourself?: Yes    Feed yourself?  Yes  Do your laundry/housekeeping?: Yes  Manage your money, pay your bills and track your expenses?: Yes  Make your own meals?: Yes    Do your own shopping?: Yes    Previous Hospitalizations:   Any hospitalizations or ED visits within the last 12 months?: No      Advance Care Planning:   Living will: Yes    Durable POA for healthcare: No    Advanced directive: Yes      PREVENTIVE SCREENINGS      Cardiovascular Screening:    General: Screening Current      Diabetes Screening:     General: Screening Current      Colorectal Cancer Screening:     General: Screening Current      Prostate Cancer Screening:    General: History Prostate Cancer and Screening Not Indicated      Lung Cancer Screening:     General: Screening Not Indicated    Screening, Brief Intervention, and Referral to Treatment (SBIRT)    Screening  Typical number of drinks in a day: 0  Typical number of drinks in a week: 0  Interpretation: Low risk drinking behavior      AUDIT-C Screenin) How often did you have a drink containing alcohol in the past year? never  2) How many drinks did you have on a typical day when you were drinking in the past year? 0  3) How often did you have 6 or more drinks on one occasion in the past year? never    AUDIT-C Score: 0  Interpretation: Score 0-3 (male): Negative screen for alcohol misuse    Single Item Drug Screening:  How often have you used an illegal drug (including marijuana) or a prescription medication for non-medical reasons in the past year? never    Single Item Drug Screen Score: 0  Interpretation: Negative screen for possible drug use disorder

## 2022-03-02 NOTE — ASSESSMENT & PLAN NOTE
Likely has some element of osteoarthritis  Patient was given sample of Voltaren gel  If that is effective for him that he will contact me and I will provide a formal prescription    Patient also given referral to physical therapy

## 2022-03-02 NOTE — PROGRESS NOTES
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  Patient occasionally does complain of pain in both anterior knees especially when going up a flight of stairs  If he is walking on flat surfaces he has no problem and no pain  Patient does complain of her nauseous phenomenon during the winter with whiteness in discoloration of both hands  Patient has not had any migraine headaches ever since he was placed on atenolol many years back      Past Medical History:   Diagnosis Date    Actinic keratosis     Last assessed - 10/15/14    Hypertension     Hypokalemia     Last assessed - 8/13/14    Sinus bradycardia     Last assessed - 8/13/14       Family History   Problem Relation Age of Onset    Prostate cancer Father     Liver cancer Brother         Adenocarcinoma        Past Surgical History:   Procedure Laterality Date    HERNIA REPAIR      SIGMOIDOSCOPY      (Fiberoptic, Therapeutic) 7/28/2014, 8/25/14        reports that he has never smoked  He has never used smokeless tobacco  He reports that he does not drink alcohol and does not use drugs        Current Outpatient Medications:     atenolol (TENORMIN) 50 mg tablet, Take 1 tablet (50 mg total) by mouth daily, Disp: 90 tablet, Rfl: 3    azelastine (ASTELIN) 0 1 % nasal spray, SPRAY 1 SPRAY INTO EACH NOSTRIL 2 (TWO) TIMES A DAY USE IN EACH NOSTRIL AS DIRECTED, Disp: 1 Bottle, Rfl: 1    betamethasone valerate (VALISONE) 0 1 % cream, Apply topically 2 (two) times a day, Disp: 45 g, Rfl: 0    Cholecalciferol (VITAMIN D3) 1000 UNIT/SPRAY LIQD, Take 1 tablet by mouth daily, Disp: , Rfl:     Coenzyme Q10 100 MG CHEW, Chew 200 mg, Disp: , Rfl:    Omega-3-Acid Eth Est, Dietary, 1 g CAPS, Take by mouth, Disp: , Rfl:     Potassium 99 MG TABS, Take 1 tablet by mouth 2 (two) times a day, Disp: , Rfl:     tadalafil (Cialis) 20 MG tablet, Take 1 tablet (20 mg total) by mouth daily as needed for erectile dysfunction, Disp: 32 tablet, Rfl: 3    Diclofenac Sodium (VOLTAREN) 1 %, Apply 2 g topically 4 (four) times a day, Disp: 100 g, Rfl: 3    The following portions of the patient's history were reviewed and updated as appropriate: allergies, current medications, past family history, past medical history, past social history, past surgical history and problem list     Review of Systems   Constitutional: Negative  HENT: Negative  Eyes: Negative  Respiratory: Negative  Cardiovascular: Negative  Gastrointestinal: Negative  Endocrine: Negative  Genitourinary: Negative  Musculoskeletal: Positive for arthralgias (Bilateral knees)  Skin: Positive for color change ( Raynaud's phenomena bilateral hands)  Allergic/Immunologic: Negative  Neurological: Negative  Hematological: Negative  Psychiatric/Behavioral: Negative  All other systems reviewed and are negative  Objective:    /64   Pulse 56   Resp 16   Ht 5' 9" (1 753 m)   Wt 81 6 kg (180 lb)   SpO2 99%   BMI 26 58 kg/m²      Physical Exam  Vitals and nursing note reviewed  Constitutional:       General: He is not in acute distress  Appearance: Normal appearance  He is well-developed and normal weight  He is not ill-appearing  HENT:      Head: Normocephalic and atraumatic  Right Ear: Tympanic membrane, ear canal and external ear normal       Left Ear: Tympanic membrane, ear canal and external ear normal    Eyes:      Extraocular Movements: Extraocular movements intact  Conjunctiva/sclera: Conjunctivae normal       Pupils: Pupils are equal, round, and reactive to light  Cardiovascular:      Rate and Rhythm: Normal rate and regular rhythm  Pulses: Normal pulses  Heart sounds: Normal heart sounds  No murmur heard  Pulmonary:      Effort: Pulmonary effort is normal       Breath sounds: Normal breath sounds  Abdominal:      General: Abdomen is flat  Bowel sounds are normal       Palpations: Abdomen is soft  Musculoskeletal:         General: No tenderness  Normal range of motion  Cervical back: Normal range of motion and neck supple  Comments: Relatively normal knee examination  Minimal crepitation  Patient indicates that his area of discomfort when he is going up stairs seems to be located just inferior to the patella bilaterally   Skin:     General: Skin is warm and dry  Neurological:      General: No focal deficit present  Mental Status: He is alert and oriented to person, place, and time  Psychiatric:         Mood and Affect: Mood normal          Behavior: Behavior normal          Thought Content:  Thought content normal          Judgment: Judgment normal            Recent Results (from the past 1008 hour(s))   CBC and differential    Collection Time: 02/23/22  8:38 AM   Result Value Ref Range    WBC 4 64 4 31 - 10 16 Thousand/uL    RBC 4 54 3 88 - 5 62 Million/uL    Hemoglobin 14 1 12 0 - 17 0 g/dL    Hematocrit 41 3 36 5 - 49 3 %    MCV 91 82 - 98 fL    MCH 31 1 26 8 - 34 3 pg    MCHC 34 1 31 4 - 37 4 g/dL    RDW 13 1 11 6 - 15 1 %    MPV 10 2 8 9 - 12 7 fL    Platelets 629 578 - 005 Thousands/uL    nRBC 0 /100 WBCs    Neutrophils Relative 57 43 - 75 %    Immat GRANS % 1 0 - 2 %    Lymphocytes Relative 25 14 - 44 %    Monocytes Relative 11 4 - 12 %    Eosinophils Relative 5 0 - 6 %    Basophils Relative 1 0 - 1 %    Neutrophils Absolute 2 68 1 85 - 7 62 Thousands/µL    Immature Grans Absolute 0 03 0 00 - 0 20 Thousand/uL    Lymphocytes Absolute 1 16 0 60 - 4 47 Thousands/µL    Monocytes Absolute 0 49 0 17 - 1 22 Thousand/µL    Eosinophils Absolute 0 22 0 00 - 0 61 Thousand/µL    Basophils Absolute 0 06 0 00 - 0 10 Thousands/µL   Comprehensive metabolic panel    Collection Time: 02/23/22  8:38 AM   Result Value Ref Range    Sodium 141 136 - 145 mmol/L    Potassium 4 4 3 5 - 5 3 mmol/L    Chloride 112 (H) 100 - 108 mmol/L    CO2 25 21 - 32 mmol/L    ANION GAP 4 4 - 13 mmol/L    BUN 18 5 - 25 mg/dL    Creatinine 1 10 0 60 - 1 30 mg/dL    Glucose, Fasting 97 65 - 99 mg/dL    Calcium 9 3 8 3 - 10 1 mg/dL    AST 19 5 - 45 U/L    ALT 22 12 - 78 U/L    Alkaline Phosphatase 57 46 - 116 U/L    Total Protein 7 0 6 4 - 8 2 g/dL    Albumin 3 6 3 5 - 5 0 g/dL    Total Bilirubin 0 72 0 20 - 1 00 mg/dL    eGFR 62 ml/min/1 73sq m   Lipid panel    Collection Time: 02/23/22  8:38 AM   Result Value Ref Range    Cholesterol 188 See Comment mg/dL    Triglycerides 61 See Comment mg/dL    HDL, Direct 48 >=40 mg/dL    LDL Calculated 128 (H) 0 - 100 mg/dL    Non-HDL-Chol (CHOL-HDL) 140 mg/dl   TSH, 3rd generation with Free T4 reflex    Collection Time: 02/23/22  8:38 AM   Result Value Ref Range    TSH 3RD GENERATON 1 550 0 358 - 3 740 uIU/mL   Vitamin D 1,25 dihydroxy    Collection Time: 02/23/22  8:38 AM   Result Value Ref Range    Vit D, 1,25-Dihydroxy 47 4 19 9 - 79 3 pg/mL       Assessment/Plan:    Benign essential hypertension  Very well controlled on current dose of atenolol 50 mg once daily  We will revisit his antihypertensive treatment in 6 months  Since he does complain of Raynaud's phenomenon he may do well on calcium channel blocker    Prostate cancer ([facility] [address] )  History of prostatectomy  Patient does follow-up with urologist once a year    Vitamin D deficiency  Therapeutic vitamin-D level on over-the-counter supplementation    Repeat vitamin-D level to be done in 6 months    Medicare annual wellness visit, subsequent  Subsequent annual wellness visit completed    -patient is current on screenings and immunizations    Erectile dysfunction  Cialis has been very effective and affordable for patient    Arthralgia of both knees  Likely has some element of osteoarthritis  Patient was given sample of Voltaren gel  If that is effective for him that he will contact me and I will provide a formal prescription  Patient also given referral to physical therapy    Raynaud's phenomenon without gangrene  Patient does complain of Raynaud's phenomenon in both hands during the winter  I did suggest that we may be able to switch him from his beta-blocker which she is on for hypertension and frequent migraine headaches to a calcium channel blocker which would be effective for hypertension, migraine prophylaxis as well as recalls phenomena          Problem List Items Addressed This Visit        Cardiovascular and Mediastinum    Benign essential hypertension     Very well controlled on current dose of atenolol 50 mg once daily  We will revisit his antihypertensive treatment in 6 months  Since he does complain of Raynaud's phenomenon he may do well on calcium channel blocker         Relevant Medications    atenolol (TENORMIN) 50 mg tablet    Other Relevant Orders    CBC and differential    Comprehensive metabolic panel    Lipid panel    TSH, 3rd generation with Free T4 reflex       Genitourinary    Prostate cancer ([facility] [address] )     History of prostatectomy  Patient does follow-up with urologist once a year            Other    Arthralgia of both knees     Likely has some element of osteoarthritis  Patient was given sample of Voltaren gel  If that is effective for him that he will contact me and I will provide a formal prescription    Patient also given referral to physical therapy         Relevant Medications    Diclofenac Sodium (VOLTAREN) 1 %    Other Relevant Orders    Ambulatory Referral to Physical Therapy    Erectile dysfunction     Cialis has been very effective and affordable for patient         Relevant Medications    tadalafil (Cialis) 20 MG tablet    Medicare annual wellness visit, subsequent     Subsequent annual wellness visit completed    -patient is current on screenings and immunizations         Raynaud's phenomenon without gangrene     Patient does complain of Raynaud's phenomenon in both hands during the winter  I did suggest that we may be able to switch him from his beta-blocker which she is on for hypertension and frequent migraine headaches to a calcium channel blocker which would be effective for hypertension, migraine prophylaxis as well as recalls phenomena         Vitamin D deficiency - Primary     Therapeutic vitamin-D level on over-the-counter supplementation    Repeat vitamin-D level to be done in 6 months         Relevant Orders    Vitamin D 1,25 dihydroxy

## 2022-03-02 NOTE — ASSESSMENT & PLAN NOTE
Very well controlled on current dose of atenolol 50 mg once daily  We will revisit his antihypertensive treatment in 6 months    Since he does complain of Raynaud's phenomenon he may do well on calcium channel blocker

## 2022-03-02 NOTE — ASSESSMENT & PLAN NOTE
Patient does complain of Raynaud's phenomenon in both hands during the winter    I did suggest that we may be able to switch him from his beta-blocker which she is on for hypertension and frequent migraine headaches to a calcium channel blocker which would be effective for hypertension, migraine prophylaxis as well as recalls phenomena

## 2022-03-02 NOTE — PROGRESS NOTES
Assessment and Plan:     Problem List Items Addressed This Visit        Cardiovascular and Mediastinum    Benign essential hypertension    Relevant Medications    atenolol (TENORMIN) 50 mg tablet    Other Relevant Orders    CBC and differential    Comprehensive metabolic panel    Lipid panel    TSH, 3rd generation with Free T4 reflex       Genitourinary    Prostate cancer ([facility] [address] )       Other    Vitamin D deficiency - Primary    Relevant Orders    Vitamin D 1,25 dihydroxy    Erectile dysfunction    Relevant Medications    tadalafil (Cialis) 20 MG tablet    Arthralgia of both knees    Relevant Medications    Diclofenac Sodium (VOLTAREN) 1 %    Other Relevant Orders    Ambulatory Referral to Physical Therapy    Raynaud's phenomenon without gangrene        BMI Counseling: Body mass index is 26 58 kg/m²  The BMI is above normal  Exercise recommendations include moderate physical activity 150 minutes/week  Rationale for BMI follow-up plan is due to patient being overweight or obese  Depression Screening and Follow-up Plan: Patient was screened for depression during today's encounter  They screened negative with a PHQ-2 score of 0  Preventive health issues were discussed with patient, and age appropriate screening tests were ordered as noted in patient's After Visit Summary  Personalized health advice and appropriate referrals for health education or preventive services given if needed, as noted in patient's After Visit Summary       History of Present Illness:     Patient presents for Medicare Annual Wellness visit    Patient Care Team:  Noreen Delacruz DO as PCP - General  Noreen Delacruz DO as PCP - [address] [place] [address] (RTE)  Noreen Delacruz DO as PCP - PCP-[facility] [address] ([place]E)  MD Veena Durbin MD (Dermatology)  Vipul Us DO (Gastroenterology)  Pablo Mariscal PA-C as Physician Assistant (Physician Assistant)     Problem List:     Patient Active Problem List   Diagnosis    ZAINAB (obstructive sleep apnea)    Benign essential hypertension    Vitamin D deficiency    Medicare annual wellness visit, subsequent    Erectile dysfunction    Arthritis of lumbar spine    Low back pain    Prostate cancer ([facility] [address] )    Squamous cell carcinoma of skin    Toe pain, right    Benign paroxysmal positional vertigo of right ear    Palpitations    Environmental allergies    Dermatitis of right foot    Arthralgia of both knees    Raynaud's phenomenon without gangrene      Past Medical and Surgical History:     Past Medical History:   Diagnosis Date    Actinic keratosis     Last assessed - 10/15/14    Hypertension     Hypokalemia     Last assessed - 8/13/14    Sinus bradycardia     Last assessed - 8/13/14     Past Surgical History:   Procedure Laterality Date    HERNIA REPAIR      SIGMOIDOSCOPY      (Fiberoptic, Therapeutic) 7/28/2014, 8/25/14      Family History:     Family History   Problem Relation Age of Onset    Prostate cancer Father     Liver cancer Brother         Adenocarcinoma       Social History:     Social History     Socioeconomic History    Marital status:       Spouse name: Not on file    Number of children: Not on file    Years of education: Not on file    Highest education level: Not on file   Occupational History    Occupation: Retired   Tobacco Use    Smoking status: Never Smoker    Smokeless tobacco: Never Used    Tobacco comment: Former smoker per [employer]    Substance and Sexual Activity    Alcohol use: No     Comment: Seldomly per [employer]     Drug use: No    Sexual activity: Not on file   Other Topics Concern    Not on file   Social History Narrative    Caffeine use - 2 cups daily     and  noted per [email or web address]     Social Determinants of Health     Financial Resource Strain: Not on file   Food Insecurity: Not on file   Transportation Needs: Not on file   Physical Activity: Not on file   Stress: Not on file   Social Connections: Not on file   Intimate Partner Violence: Not on file   Housing Stability: Not on file      Medications and Allergies:     Current Outpatient Medications   Medication Sig Dispense Refill    atenolol (TENORMIN) 50 mg tablet Take 1 tablet (50 mg total) by mouth daily 90 tablet 3    azelastine (ASTELIN) 0 1 % nasal spray SPRAY 1 SPRAY INTO EACH NOSTRIL 2 (TWO) TIMES A DAY USE IN EACH NOSTRIL AS DIRECTED 1 Bottle 1    betamethasone valerate (VALISONE) 0 1 % cream Apply topically 2 (two) times a day 45 g 0    Cholecalciferol (VITAMIN D3) 1000 UNIT/SPRAY LIQD Take 1 tablet by mouth daily      Coenzyme Q10 100 MG CHEW Chew 200 mg      Omega-3-Acid Eth Est, Dietary, 1 g CAPS Take by mouth      Potassium 99 MG TABS Take 1 tablet by mouth 2 (two) times a day      tadalafil (Cialis) 20 MG tablet Take 1 tablet (20 mg total) by mouth daily as needed for erectile dysfunction 32 tablet 3    Diclofenac Sodium (VOLTAREN) 1 % Apply 2 g topically 4 (four) times a day 100 g 3     No current facility-administered medications for this visit  Allergies   Allergen Reactions    Soy Isoflavones       Immunizations:     Immunization History   Administered Date(s) Administered    COVID-19 MODERNA VACC 0 5 ML IM 01/06/2021, 02/03/2021, 10/29/2021    INFLUENZA 10/20/2015, 09/13/2016, 10/03/2016, 10/17/2017, 09/22/2018, 11/09/2018, 10/09/2019, 10/15/2021    Influenza Split High Dose Preservative Free IM 10/20/2015    Influenza, seasonal, injectable 10/03/2016    Pneumococcal Conjugate 13-Valent 12/18/2015    Pneumococcal Polysaccharide PPV23 02/12/2014, 10/03/2016, 03/27/2018    Tdap 01/01/2006, 10/17/2017    Zoster 08/02/2016    Zoster Vaccine Recombinant 07/03/2018, 11/09/2018    influenza, trivalent, adjuvanted 10/09/2019      Health Maintenance:         Topic Date Due    Colorectal Cancer Screening  03/03/2023     There are no preventive care reminders to display for this patient     Medicare Health Risk Assessment:     BP 138/64   Pulse 56   Resp 16   Ht 5' 9" (1 753 m)   Wt 81 6 kg (180 lb)   SpO2 99%   BMI 26 58 kg/m²      Tiara Hayward is here for his Subsequent Wellness visit  Last Medicare Wellness visit information reviewed, patient interviewed, no change since last AWV  Health Risk Assessment:   Patient rates overall health as very good  Patient feels that their physical health rating is same  Patient is very satisfied with their life  Eyesight was rated as same  Hearing was rated as same  Patient feels that their emotional and mental health rating is same  Patients states they are never, rarely angry  Patient states they are never, rarely unusually tired/fatigued  Pain experienced in the last 7 days has been none  Patient states that he has experienced no weight loss or gain in last 6 months  Depression Screening:   PHQ-2 Score: 0      Fall Risk Screening: In the past year, patient has experienced: no history of falling in past year      Home Safety:  Patient has trouble with stairs inside or outside of their home  Patient has working smoke alarms and has no working carbon monoxide detector  Home safety hazards include: none  Nutrition:   Current diet is Regular and Limited junk food  Medications:   Patient is currently taking over-the-counter supplements  OTC medications include: see medication list  Patient is able to manage medications  Activities of Daily Living (ADLs)/Instrumental Activities of Daily Living (IADLs):   Walk and transfer into and out of bed and chair?: Yes  Dress and groom yourself?: Yes    Bathe or shower yourself?: Yes    Feed yourself? Yes  Do your laundry/housekeeping?: Yes  Manage your money, pay your bills and track your expenses?: Yes  Make your own meals?: Yes    Do your own shopping?: Yes    Previous Hospitalizations:   Any hospitalizations or ED visits within the last 12 months?: No      Advance Care Planning:   Living will: Yes    Durable POA for healthcare:  Yes    Advanced directive: Yes    Advanced directive counseling given: No    Five wishes given: No    Patient declined ACP directive: No    End of Life Decisions reviewed with patient: Yes    Provider agrees with end of life decisions: Yes      PREVENTIVE SCREENINGS      Cardiovascular Screening:    General: Screening Current      Diabetes Screening:     General: Screening Current      Colorectal Cancer Screening:     General: Screening Current      Prostate Cancer Screening:    General: History Prostate Cancer and Screening Not Indicated      Osteoporosis Screening:    General: Screening Not Indicated      Abdominal Aortic Aneurysm (AAA) Screening:        General: Screening Not Indicated      Lung Cancer Screening:     General: Screening Not Indicated    Screening, Brief Intervention, and Referral to Treatment (SBIRT)    Screening  Typical number of drinks in a day: 0  Typical number of drinks in a week: 0  Interpretation: Low risk drinking behavior      AUDIT-C Screenin) How often did you have a drink containing alcohol in the past year? never  2) How many drinks did you have on a typical day when you were drinking in the past year? 0  3) How often did you have 6 or more drinks on one occasion in the past year? never    AUDIT-C Score: 0  Interpretation: Score 0-3 (male): Negative screen for alcohol misuse    Single Item Drug Screening:  How often have you used an illegal drug (including marijuana) or a prescription medication for non-medical reasons in the past year? never    Single Item Drug Screen Score: 0  Interpretation: Negative screen for possible drug use disorder      Lizzy Yin, DO

## 2022-03-15 NOTE — PROGRESS NOTES
PT Evaluation     Today's date: 3/15/2022  Patient name: Zack Roe  : 1940  MRN: [id number]  Referring provider: Roselia Marcelino DO  Dx:   Encounter Diagnosis     ICD-10-CM    1  Arthralgia of both knees  M25 561     M25 562                   Assessment  Assessment details: Zack Roe is a 80 y o  male referred with primary diagnosis of Arthralgia of both knees  (primary encounter diagnosis)   Patient presents with the following functional limitations: Pain going up/down stairs  Patient demonstrates bilateral knee strength WFL  Symptoms consistent with bilateral patellar tendonitis  Significant tightness present in bilateral quadriceps and hamstrings  Treatment to include: Manual therapy techniques, extremity/core strengthening, neuromuscular control exercises, instruction in a comprehensive HEP, and modalities as needed  They will benefit from skilled PT services to address the above functional deficits and to decrease pain to promote a return to their premorbid level of function  Impairments: pain with function  Functional limitations: Pain going up/down stairs  Understanding of Dx/Px/POC: good  Goals  STG (3 weeks)  1  Patient will report 50% decrease in intensity of pain with going up stairs  2  Patient will perform HEP with supervision  LTG (6 weeks)  1  Patient will report pain as a 0-1/10 at worst with going up stairs  2  Patient will be independent and compliant with a HEP in order to maintain gains made with skilled PT services      Plan  Patient would benefit from: skilled physical therapy  Planned therapy interventions: joint mobilization, manual therapy, neuromuscular re-education, patient education, strengthening, stretching, therapeutic activities, therapeutic exercise and home exercise program  Frequency: 2x week  Duration in weeks: 6  Plan of Care beginning date: 3/16/2022  Plan of Care expiration date: 2022  Treatment plan discussed with: patient        Subjective Evaluation    History of Present Illness  Mechanism of injury: Patient states his knee pain started about a month ago  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  Has pain only with going up stairs  Doesn't have pain going down stairs or ambulating on level surfaces  Patient saw his PCP and is referred now to outpatient PT services  Pain  Current pain ratin  At best pain ratin  At worst pain ratin  Location: Bilateral patellar tendons  Quality: sharp  Aggravating factors: stair climbing  Progression: no change    Social Support  Steps to enter house: no  Stairs in house: yes (7+7 bilateral HR)   Lives in: multiple-level home  Lives with: spouse    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 )  Exercise history: Sedentary      Diagnostic Tests  No diagnostic tests performed    FCE comments: (-) sleep disturbances  Pain at bilateral patellar tendons only going up stairs  Treatments  Current treatment: medication  Patient Goals  Patient goals for therapy: decreased pain  Patient goal: To negotiate stairs without pain  Objective     Observations   Left Knee   Negative for edema  Right Knee   Negative for edema  Palpation   Left   No palpable tenderness to the rectus femoris, vastus lateralis and vastus medialis  Right   No palpable tenderness to the rectus femoris, vastus lateralis and vastus medialis  Tenderness   Left Knee   No tenderness in the lateral joint line, lateral patella, medial joint line, medial patella and patellar tendon  Right Knee   No tenderness in the lateral joint line, lateral patella, medial joint line, medial patella and patellar tendon       Neurological Testing     Sensation     Knee   Left Knee   Intact: light touch    Right Knee   Intact: light touch     Active Range of Motion   Left Knee   Normal active range of motion    Right Knee   Normal active range of motion    Mobility   Patellar Mobility:   Left Knee   WFL: medial, lateral, superior and inferior  Right Knee   WFL: medial, lateral, superior and inferior    Patellar Static Positioning   Left Knee: WFL  Right Knee: [facility]    Strength/Myotome Testing     Left Knee   Flexion: 4+  Extension: 4+  Quadriceps contraction: good    Right Knee   Flexion: 4+  Extension: 4+  Quadriceps contraction: good    Additional Strength Details  Bilateral hips grossly 4/5     Tests     Left Knee   Negative anterior drawer, Apley's compression, Apley's distraction, lateral Tan, medial Tan, patellar compression, posterior drawer, valgus stress test at 0 degrees, valgus stress test at 30 degrees, varus stress test at 0 degrees and varus stress test at 30 degrees  Right Knee   Negative anterior drawer, Apley's compression, Apley's distraction, lateral Tan, medial Tan, patellar compression, posterior drawer, valgus stress test at 0 degrees, valgus stress test at 30 degrees, varus stress test at 0 degrees and varus stress test at 30 degrees  Additional Tests Details  (+) bilateral hamstring tightness (45 degrees) and bilateral quad tightness    Ambulation   Weight-Bearing Status   Weight-Bearing Status (Left): full weight bearing   Weight-Bearing Status (Right): full weight-bearing    Assistive device used: none    Ambulation: Level Surfaces   Ambulation without assistive device: independent             Precautions: HTN, Bradycardia  Access Code: [email or web address]  URL: [email or web address]  Date: 03/16/2022  Prepared by: Rome Kong       Manuals 3/16            Prone quad stretch ezio   JF 3x30"            Supine HS stretch ezio JF 3x30"            IASTM distal quads and patellar tendon ezio NV                         Neuro Re-Ed                                                                                                        Ther Ex             Pt education Knee anatomy, pathology and patellar tendonitis            Bike             Matrix quads Matrix HS             Standing HS stretch ezio   Instructed            Prone quad stretch with strap ezio Instructed                                                                                          Ther Activity             Step-ups F/L             Lateral step-down             Bkwd lunge on slider             STS no UE                          Gait Training                                       Modalities

## 2022-03-16 ENCOUNTER — EVALUATION (OUTPATIENT)
Dept: PHYSICAL THERAPY | Facility: CLINIC | Age: 82
End: 2022-03-16
Payer: COMMERCIAL

## 2022-03-16 DIAGNOSIS — M25.562 ARTHRALGIA OF BOTH KNEES: Primary | ICD-10-CM

## 2022-03-16 DIAGNOSIS — M25.561 ARTHRALGIA OF BOTH KNEES: Primary | ICD-10-CM

## 2022-03-16 PROCEDURE — 97161 PT EVAL LOW COMPLEX 20 MIN: CPT | Performed by: PHYSICAL THERAPIST

## 2022-03-16 PROCEDURE — 97110 THERAPEUTIC EXERCISES: CPT | Performed by: PHYSICAL THERAPIST

## 2022-03-16 PROCEDURE — 97140 MANUAL THERAPY 1/> REGIONS: CPT | Performed by: PHYSICAL THERAPIST

## 2022-03-18 ENCOUNTER — RA CDI HCC (OUTPATIENT)
Dept: OTHER | Facility: HOSPITAL | Age: 82
End: 2022-03-18

## 2022-03-19 NOTE — PROGRESS NOTES
[facility]  coding opportunities       Chart reviewed, no opportunity found:   [address]        Patients Insurance     Medicare Insurance: [employer]

## 2022-03-21 ENCOUNTER — OFFICE VISIT (OUTPATIENT)
Dept: PHYSICAL THERAPY | Facility: CLINIC | Age: 82
End: 2022-03-21
Payer: COMMERCIAL

## 2022-03-21 DIAGNOSIS — M25.561 ARTHRALGIA OF BOTH KNEES: Primary | ICD-10-CM

## 2022-03-21 DIAGNOSIS — M25.562 ARTHRALGIA OF BOTH KNEES: Primary | ICD-10-CM

## 2022-03-21 PROCEDURE — 97110 THERAPEUTIC EXERCISES: CPT | Performed by: PHYSICAL THERAPIST

## 2022-03-21 PROCEDURE — 97140 MANUAL THERAPY 1/> REGIONS: CPT | Performed by: PHYSICAL THERAPIST

## 2022-03-21 RX ORDER — DIFLUPREDNATE 0.5 MG/ML
EMULSION OPHTHALMIC
COMMUNITY
Start: 2022-03-08

## 2022-03-21 RX ORDER — OFLOXACIN 3 MG/ML
SOLUTION/ DROPS OPHTHALMIC
COMMUNITY
Start: 2022-03-08

## 2022-03-21 NOTE — PROGRESS NOTES
Daily Note     Today's date: 3/21/2022  Patient name: Sabine Matthews  : 1940  MRN: [id number]  Referring provider: Glenna Lua DO  Dx:   Encounter Diagnosis     ICD-10-CM    1  Arthralgia of both knees  M25 561     M25 562                   Subjective: Patient reports his knees felt better climbing stairs after stretching at home      Objective: See treatment diary below      Assessment: Tolerated treatment well  Patient would benefit from continued PT   Had some discomfort in left knee with step ups, but not nearly as painful as it was a week ago  Felt like he got a good workout today  Plan: Continue per plan of care  Precautions: HTN, Bradycardia  Access Code: [email or web address]  URL: [email or web address]  Date: 2022  Prepared by: Oralia Jimenez       Manuals 3/16 3/21           Prone quad stretch ezio  JF 3x30"            Supine HS stretch ezio JF 3x30"            IASTM distal quads and patellar tendon ezio NV JF                        Neuro Re-Ed                                                                                                        Ther Ex             Pt education Knee anatomy, pathology and patellar tendonitis            Bike  L1x10'           Matrix quads  20# 2x10           Matrix HS  30# 2x10           Standing HS stretch ezio   Instructed 3x30"           Prone quad stretch with strap ezio Instructed 3x30"                                                                                         Ther Activity             Step-ups F/L  fwd 6" 2x10           Lateral step-down             Bkwd lunge on slider             STS no UE  NV                        Gait Training                                       Modalities

## 2022-03-22 ENCOUNTER — OFFICE VISIT (OUTPATIENT)
Dept: FAMILY MEDICINE CLINIC | Facility: CLINIC | Age: 82
End: 2022-03-22
Payer: COMMERCIAL

## 2022-03-22 VITALS
WEIGHT: 179 LBS | SYSTOLIC BLOOD PRESSURE: 138 MMHG | OXYGEN SATURATION: 99 % | DIASTOLIC BLOOD PRESSURE: 78 MMHG | HEART RATE: 52 BPM | HEIGHT: 69 IN | RESPIRATION RATE: 16 BRPM | BODY MASS INDEX: 26.51 KG/M2

## 2022-03-22 DIAGNOSIS — R00.1 BRADYCARDIA: Primary | ICD-10-CM

## 2022-03-22 DIAGNOSIS — R19.7 FREQUENT DIARRHEA: ICD-10-CM

## 2022-03-22 DIAGNOSIS — I10 BENIGN ESSENTIAL HYPERTENSION: ICD-10-CM

## 2022-03-22 DIAGNOSIS — H25.013 CORTICAL AGE-RELATED CATARACT OF BOTH EYES: ICD-10-CM

## 2022-03-22 DIAGNOSIS — Z01.818 PRE-OP EXAMINATION: ICD-10-CM

## 2022-03-22 PROCEDURE — 3075F SYST BP GE 130 - 139MM HG: CPT | Performed by: FAMILY MEDICINE

## 2022-03-22 PROCEDURE — 1036F TOBACCO NON-USER: CPT | Performed by: FAMILY MEDICINE

## 2022-03-22 PROCEDURE — 99214 OFFICE O/P EST MOD 30 MIN: CPT | Performed by: FAMILY MEDICINE

## 2022-03-22 PROCEDURE — 93000 ELECTROCARDIOGRAM COMPLETE: CPT | Performed by: FAMILY MEDICINE

## 2022-03-22 PROCEDURE — 1160F RVW MEDS BY RX/DR IN RCRD: CPT | Performed by: FAMILY MEDICINE

## 2022-03-22 PROCEDURE — 3078F DIAST BP <80 MM HG: CPT | Performed by: FAMILY MEDICINE

## 2022-03-22 NOTE — ASSESSMENT & PLAN NOTE
Hypertension remains very well controlled on atenolol 50 mg once daily  Continue same    Continue on day of surgery as well

## 2022-03-22 NOTE — PROGRESS NOTES
PRE-OPERATIVE EVALUATION  [facility]    NAME: Corie Gotti  AGE: 80 y o  SEX: male  : 1940     DATE: 3/22/2022     Pre-Operative Evaluation      Chief Complaint: Pre-operative Evaluation     Surgery:  Left cataract extraction with intra-ocular lens placement on  and right cataract extraction with intra-ocular lens placement on  year  Anticipated Date of Surgery:  2022 and 2022  Referring Provider: Dr Ankit Mckeon     History of Present Illness:     Corie Gotti is a 80 y o  male who presents to the office today for a preoperative consultation at the request of surgeon, Dr Ankit Mckeon, who plans on performing cataract extraction with intra-ocular lens placement on  and   Planned anesthesia is IV sedation  Patient has a bleeding risk of: no recent abnormal bleeding  Patient does not have objections to receiving blood products if needed  Current anti-platelet/anti-coagulation medications that the patient is prescribed includes: None  Assessment of Chronic Conditions:   - Hypertension: Optimized on atenolol     Assessment of Cardiac Risk:  · Denies unstable or severe angina or MI in the last 6 weeks or history of stent placement in the last year   · Denies decompensated heart failure (e g  New onset heart failure, NYHA functional class IV heart failure, or worsening existing heart failure)  · Denies significant arrhythmias such as high grade AV block, symptomatic ventricular arrhythmia, newly recognized ventricular tachycardia, supraventricular tachycardia with resting heart rate >100, or symptomatic bradycardia  · Denies severe heart valve disease including aortic stenosis or symptomatic mitral stenosis     Exercise Capacity:  · Able to walk 4 blocks without symptoms?: Yes  · Able to walk 2 flights without symptoms?: Yes    Prior Anesthesia Reactions: No     Personal history of venous thromboembolic disease?  No    History of steroid use for >2 weeks within last year? No    STOP-BANG Sleep Apnea Screening Questionnaire:         Review of Systems:     Review of Systems   Constitutional: Negative  HENT: Negative  Eyes: Positive for visual disturbance  Respiratory: Negative  Cardiovascular: Negative  Gastrointestinal: Positive for diarrhea (Chronic diarrhea on a daily basis since having radiation therapy for prostate cancer years ago)  Endocrine: Negative  Genitourinary: Negative  Musculoskeletal: Negative  Skin: Negative  Allergic/Immunologic: Negative  Neurological: Negative  Hematological: Negative  Psychiatric/Behavioral: Negative  All other systems reviewed and are negative  Current Problem List:     Patient Active Problem List   Diagnosis    ZAINAB (obstructive sleep apnea)    Benign essential hypertension    Vitamin D deficiency    Medicare annual wellness visit, subsequent    Erectile dysfunction    Bradycardia    Arthritis of lumbar spine    Low back pain    Prostate cancer ([facility] [address] )    Squamous cell carcinoma of skin    Toe pain, right    Benign paroxysmal positional vertigo of right ear    Palpitations    Environmental allergies    Dermatitis of right foot    Arthralgia of both knees    Raynaud's phenomenon without gangrene    Cortical age-related cataract of both eyes    Pre-op examination    Frequent diarrhea       Allergies:      Allergies   Allergen Reactions    Soy Isoflavones        Current Medications:       Current Outpatient Medications:     atenolol (TENORMIN) 50 mg tablet, Take 1 tablet (50 mg total) by mouth daily, Disp: 90 tablet, Rfl: 3    azelastine (ASTELIN) 0 1 % nasal spray, SPRAY 1 SPRAY INTO EACH NOSTRIL 2 (TWO) TIMES A DAY USE IN EACH NOSTRIL AS DIRECTED, Disp: 1 Bottle, Rfl: 1    betamethasone valerate (VALISONE) 0 1 % cream, Apply topically 2 (two) times a day, Disp: 45 g, Rfl: 0    Cholecalciferol (VITAMIN D3) 1000 UNIT/SPRAY LIQD, Take 1 tablet by mouth daily, Disp: , Rfl:     Coenzyme Q10 100 MG CHEW, Chew 200 mg, Disp: , Rfl:     Diclofenac Sodium (VOLTAREN) 1 %, Apply 2 g topically 4 (four) times a day, Disp: 100 g, Rfl: 3    Potassium 99 MG TABS, Take 1 tablet by mouth 2 (two) times a day, Disp: , Rfl:     tadalafil (Cialis) 20 MG tablet, Take 1 tablet (20 mg total) by mouth daily as needed for erectile dysfunction, Disp: 32 tablet, Rfl: 3    Difluprednate 0 05 % EMUL, INSTILL 1 DROP INTO LEFT EYE FOUR TIMES A DAY AS DIRECTED FOR USE AFTER SURGERY (Patient not taking: Reported on 3/22/2022), Disp: , Rfl:     ofloxacin (OCUFLOX) 0 3 % ophthalmic solution, INSTILL 1 DROP INTO LEFT EYE FOUR TIMES A DAY START THREE DAYS PRIOR TO SURGERY IN OPERATIVE EYE (Patient not taking: Reported on 3/22/2022), Disp: , Rfl:     Omega-3-Acid Eth Est, Dietary, 1 g CAPS, Take by mouth, Disp: , Rfl:     Past Medical History:       Past Medical History:   Diagnosis Date    Actinic keratosis     Last assessed - 10/15/14    Hypertension     Hypokalemia     Last assessed - 8/13/14    Sinus bradycardia     Last assessed - 8/13/14        Past Surgical History:   Procedure Laterality Date    HERNIA REPAIR      SIGMOIDOSCOPY      (Fiberoptic, Therapeutic) 7/28/2014, 8/25/14        Family History   Problem Relation Age of Onset    Prostate cancer Father     Liver cancer Brother         Adenocarcinoma         Social History     Socioeconomic History    Marital status:       Spouse name: Not on file    Number of children: Not on file    Years of education: Not on file    Highest education level: Not on file   Occupational History    Occupation: Retired   Tobacco Use    Smoking status: Never Smoker    Smokeless tobacco: Never Used    Tobacco comment: Former smoker per [employer]    Substance and Sexual Activity    Alcohol use: No     Comment: Seldomly per [employer]     Drug use: No    Sexual activity: Not on file   Other Topics Concern    Not on file Social History Narrative    Caffeine use - 2 cups daily     and  noted per [employer]     Social Determinants of Health     Financial Resource Strain: Not on file   Food Insecurity: Not on file   Transportation Needs: Not on file   Physical Activity: Not on file   Stress: Not on file   Social Connections: Not on file   Intimate Partner Violence: Not on file   Housing Stability: Not on file        Physical Exam:     Physical Exam  Vitals and nursing note reviewed  Constitutional:       General: He is not in acute distress  Appearance: Normal appearance  He is well-developed and normal weight  He is not ill-appearing  HENT:      Head: Normocephalic and atraumatic  Mouth/Throat:      Comments: Upper dentures, no loose teeth  Eyes:      Extraocular Movements: Extraocular movements intact  Conjunctiva/sclera: Conjunctivae normal       Pupils: Pupils are equal, round, and reactive to light  Cardiovascular:      Rate and Rhythm: Regular rhythm  Bradycardia present  Pulses: Normal pulses  Heart sounds: Normal heart sounds  No murmur heard  Pulmonary:      Effort: Pulmonary effort is normal       Breath sounds: Normal breath sounds  Abdominal:      General: Abdomen is flat  Bowel sounds are normal       Palpations: Abdomen is soft  Musculoskeletal:         General: Normal range of motion  Cervical back: Normal range of motion and neck supple  Skin:     General: Skin is warm and dry  Neurological:      General: No focal deficit present  Mental Status: He is alert and oriented to person, place, and time  Psychiatric:         Mood and Affect: Mood normal          Behavior: Behavior normal          Thought Content: Thought content normal          Judgment: Judgment normal           Data:     Pre-operative work-up    Laboratory Results: I have personally reviewed the pertinent laboratory results/reports     EKG: I have personally reviewed pertinent reports  Sinus bradycardia which is chronic with a heart rate of 50 beats per min      Assessment & Recommendations:     1  Bradycardia  POCT ECG   2  Benign essential hypertension     3  Cortical age-related cataract of both eyes     4  Frequent diarrhea     5  Pre-op examination         Pre-Op Evaluation Assessment  80 y o  male with planned surgery:  Cataract surgery with intra-ocular lens placement of both eyes  Known risk factors for perioperative complications: None  Cardiac Risk Estimation: per the Revised Cardiac Risk Index (Circ  100:1043, 1999), the patient's risk factors for cardiac complications include None, putting him in: RCI RISK CLASS I (0 risk factors, risk of major cardiac compl  appr  0 5%)  Current medications which may produce withdrawal symptoms if withheld perioperatively:  None  Pre-Op Evaluation Plan  1  Further preoperative workup as follows:   - None; no further preoperative work-up is required    2  Medication Management/Recommendations:   - None, continue medication regimen including morning of surgery, with sip of water    3  Prophylaxis for cardiac events with perioperative beta-blockers:  Already on beta-blocker  4  Patient requires further consultation with: None    Clearance  Patient is CLEARED for surgery without any additional cardiac testing       Polo Easton DO  [facility]  [address] [address]  Phone#  [phone]  Fax#  [phone]

## 2022-03-22 NOTE — ASSESSMENT & PLAN NOTE
Patient is medically cleared for bilateral cataract surgery under MAC anesthesia    Clearance note will be sent to ophthalmologist

## 2022-03-22 NOTE — ASSESSMENT & PLAN NOTE
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

## 2022-03-22 NOTE — ASSESSMENT & PLAN NOTE
Advised patient to add more fiber into his diet  Try adding more fiber to the diet  If that does not work after 1 month he can contact the office and I could potentially place him on cholestyramine which will add bulk to his stool  Has had diarrhea ever since having radiation therapy for prostate cancer    He had a colonoscopy in March of 2020 that showed few polyps and colonic diverticulosis

## 2022-03-23 ENCOUNTER — OFFICE VISIT (OUTPATIENT)
Dept: PHYSICAL THERAPY | Facility: CLINIC | Age: 82
End: 2022-03-23
Payer: COMMERCIAL

## 2022-03-23 DIAGNOSIS — M25.562 ARTHRALGIA OF BOTH KNEES: Primary | ICD-10-CM

## 2022-03-23 DIAGNOSIS — M25.561 ARTHRALGIA OF BOTH KNEES: Primary | ICD-10-CM

## 2022-03-23 PROCEDURE — 97140 MANUAL THERAPY 1/> REGIONS: CPT

## 2022-03-23 PROCEDURE — 97530 THERAPEUTIC ACTIVITIES: CPT

## 2022-03-23 PROCEDURE — 97110 THERAPEUTIC EXERCISES: CPT

## 2022-03-23 NOTE — PROGRESS NOTES
Daily Note     Today's date: 3/23/2022  Patient name: Christoph Cota  : 1940  MRN: [id number]  Referring provider: Latasha Avila DO  Dx:   Encounter Diagnosis     ICD-10-CM    1  Arthralgia of both knees  M25 561     M25 562        Start Time: 930  Stop Time: 1015  Total time in clinic (min): 45 minutes    Subjective: pt noted that he felt pretty good and felt better after last treatment session  Objective: See treatment diary below      Assessment:  Continued with treatment session, progressed to STS with no UE today noted some stiffness  Responded will with IASTM noted some relief  Tolerated treatment fair  Patient exhibited good technique with therapeutic exercises and would benefit from continued PT s/p treatment session, feeling no significant changes  DOMS reviewed and acknowledged by patient may have 24 to 48 hours of muscle soreness following treatment session    HEP educated and reviewed at end of treatment session  Plan: Continue per plan of care  Precautions: HTN, Bradycardia  Access Code: [email or web address]  URL: [email or web address]  Date: 2022  Prepared by: Lainey Coppola       Manuals 3/16 3/21 3/23          Prone quad stretch ezio  JF 3x30"  Self           Supine HS stretch ezio JF 3x30"  Self           IASTM distal quads and patellar tendon ezio NV JF SC                       Neuro Re-Ed                                                                                                        Ther Ex             Pt education Knee anatomy, pathology and patellar tendonitis            Bike  L1x10' nustep L5 10min           Matrix quads  20# 2x10 20# 2x 10          Matrix HS  30# 2x10 30#  2x 10           Standing HS stretch ezio   Instructed 3x30" 3x 30"          Prone quad stretch with strap ezio Instructed 3x30"                                                                                         Ther Activity             Step-ups F/L  fwd 6" 2x10 F 6" 2x 10 Lateral step-down             Bkwd lunge on slider             STS no UE  NV 2x 10                        Gait Training                                       Modalities

## 2022-03-28 ENCOUNTER — OFFICE VISIT (OUTPATIENT)
Dept: PHYSICAL THERAPY | Facility: CLINIC | Age: 82
End: 2022-03-28
Payer: COMMERCIAL

## 2022-03-28 DIAGNOSIS — M25.561 ARTHRALGIA OF BOTH KNEES: Primary | ICD-10-CM

## 2022-03-28 DIAGNOSIS — M25.562 ARTHRALGIA OF BOTH KNEES: Primary | ICD-10-CM

## 2022-03-28 PROCEDURE — 97110 THERAPEUTIC EXERCISES: CPT | Performed by: PHYSICAL THERAPIST

## 2022-03-28 PROCEDURE — 97140 MANUAL THERAPY 1/> REGIONS: CPT | Performed by: PHYSICAL THERAPIST

## 2022-03-28 NOTE — PROGRESS NOTES
Daily Note     Today's date: 3/28/2022  Patient name: Zack Roe  : 1940  MRN: [id number]  Referring provider: Roselia Marcelino DO  Dx:   Encounter Diagnosis     ICD-10-CM    1  Arthralgia of both knees  M25 561     M25 562                   Subjective: Patient states his bilateral quads were very tired after last session  Only had one incident of knee pain since last PT session, but brief and much less intense  Objective: See treatment diary below      Assessment: Tolerated treatment well  Patient would benefit from continued PT   Very fatigued after session  Had mild discomfort with lateral step downs and sit to stand transfers    Plan: Continue per plan of care  Precautions: HTN, Bradycardia  Access Code: [email or web address]  URL: [email or web address]  Date: 2022  Prepared by: Radha Cohen       Manuals 3/16 3/21 3/23 3/28         Prone quad stretch ezio  JF 3x30"  Self           Supine HS stretch ezio JF 3x30"  Self           IASTM distal quads and patellar tendon ezio NV JF SC                       Neuro Re-Ed                                                                                                        Ther Ex             Pt education Knee anatomy, pathology and patellar tendonitis            Bike  L1x10' nustep L5 10min  Bike L1x10'         Matrix quads  20# 2x10 20# 2x 10 20# 2x 10         Matrix HS  30# 2x10 30#  2x 10  30#  2x 10          Standing HS stretch ezio   Instructed 3x30" 3x 30" 3x30"         Prone quad stretch with strap ezio Instructed 3x30"  3x30"                                                                                       Ther Activity             Step-ups F/L  fwd 6" 2x10 F 6" 2x 10  F 6" 2x 10          Lateral step-down    4"x10 ea         Bkwd lunge on slider             STS no UE  NV 2x 10  2x10                      Gait Training                                       Modalities

## 2022-03-29 NOTE — PROGRESS NOTES
Daily Note     Today's date: 3/29/2022  Patient name: Lolly Troncoso  : 1940  MRN: [id number]  Referring provider: Chauncey Butler DO  Dx:   Encounter Diagnosis     ICD-10-CM    1  Arthralgia of both knees  M25 561     M25 562                   Subjective: Knee continues to feel better  Occasional discomfort in left knee, but less frequent and less intense  Objective: See treatment diary below      Assessment: Tolerated treatment well  Patient would benefit from continued PT  Only mild discomfort with left forward step-ups today  Feeling much better  Plan: Continue per plan of care  Precautions: HTN, Bradycardia  Access Code: [email or web address]  URL: [email or web address]  Date: 2022  Prepared by: Shruti Petresen       Manuals 3/16 3/21 3/23 3/28 3        Prone quad stretch ezio  JF 3x30"  Self           Supine HS stretch ezio JF 3x30"  Self           IASTM distal quads and patellar tendon ezio NV JF SC JF JF                     Neuro Re-Ed                                                                                                        Ther Ex             Pt education Knee anatomy, pathology and patellar tendonitis            Bike  L1x10' nustep L5 10min  Bike L1x10' Bike L1x10'        Matrix quads  20# 2x10 20# 2x 10 20# 2x 10 20# 2x 10        Matrix HS  30# 2x10 30#  2x 10  30#  2x 10  30#  2x 10         Standing HS stretch ezio   Instructed 3x30" 3x 30" 3x30" 3x30"        Prone quad stretch with strap ezio Instructed 3x30"  3x30" 3x30"                                                                                      Ther Activity             Step-ups F/L  fwd 6" 2x10 F 6" 2x 10  F 6" 2x 10  F 6" 2x 10         Lateral step-down    4"x10 ea 4"x15 ea        Bkwd lunge on slider             STS no UE  NV 2x 10  2x10 2x10                     Gait Training                                       Modalities

## 2022-03-30 ENCOUNTER — OFFICE VISIT (OUTPATIENT)
Dept: PHYSICAL THERAPY | Facility: CLINIC | Age: 82
End: 2022-03-30
Payer: COMMERCIAL

## 2022-03-30 DIAGNOSIS — M25.562 ARTHRALGIA OF BOTH KNEES: Primary | ICD-10-CM

## 2022-03-30 DIAGNOSIS — M25.561 ARTHRALGIA OF BOTH KNEES: Primary | ICD-10-CM

## 2022-03-30 PROCEDURE — 97140 MANUAL THERAPY 1/> REGIONS: CPT | Performed by: PHYSICAL THERAPIST

## 2022-03-30 PROCEDURE — 97530 THERAPEUTIC ACTIVITIES: CPT | Performed by: PHYSICAL THERAPIST

## 2022-03-30 PROCEDURE — 97110 THERAPEUTIC EXERCISES: CPT | Performed by: PHYSICAL THERAPIST

## 2022-04-04 ENCOUNTER — OFFICE VISIT (OUTPATIENT)
Dept: PHYSICAL THERAPY | Facility: CLINIC | Age: 82
End: 2022-04-04
Payer: COMMERCIAL

## 2022-04-04 DIAGNOSIS — M25.562 ARTHRALGIA OF BOTH KNEES: Primary | ICD-10-CM

## 2022-04-04 DIAGNOSIS — M25.561 ARTHRALGIA OF BOTH KNEES: Primary | ICD-10-CM

## 2022-04-04 PROCEDURE — 97110 THERAPEUTIC EXERCISES: CPT | Performed by: PHYSICAL THERAPIST

## 2022-04-04 PROCEDURE — 97530 THERAPEUTIC ACTIVITIES: CPT | Performed by: PHYSICAL THERAPIST

## 2022-04-04 NOTE — PROGRESS NOTES
Daily Note     Today's date: 2022  Patient name: Lord Lozano  : 1940  MRN: [id number]  Referring provider: Moraima Rojas DO  Dx:   Encounter Diagnosis     ICD-10-CM    1  Arthralgia of both knees  M25 561     M25 562                   Subjective: Patient reports having only mild discomfort in left knee going up stairs occasionally  Feeling much better overall  Had cataract surgery and is to reduce resistance with TE today  Objective: See treatment diary below      Assessment: Tolerated treatment well  Patient would benefit from continued PT  Decreased resistance with quads and hamstrings today due to surgery, so increased repetitions  Very minimal discomfort left patellar tendon with step-ups in clinic today  Symptoms resolve immediately when exercise completed  Plan: Continue per plan of care  Precautions: HTN, Bradycardia  Access Code: [email or web address]  URL: [email or web address]  Date: 2022  Prepared by: Samuel Bowens       Manuals 3/16 3/21 3/23 3/28 3/30 4/4       Prone quad stretch ezio  JF 3x30"  Self           Supine HS stretch ezio JF 3x30"  Self           IASTM distal quads and patellar tendon ezio NV JF SC JF JF JF                    Neuro Re-Ed                                                                                                        Ther Ex             Pt education Knee anatomy, pathology and patellar tendonitis            Bike  L1x10' nustep L5 10min  Bike L1x10' Bike L1x10' Upright L1x10'       Matrix quads  20# 2x10 20# 2x 10 20# 2x 10 20# 2x 10 10# 3x10       Matrix HS  30# 2x10 30#  2x 10  30#  2x 10  30#  2x 10  20# 3x10       Standing HS stretch ezio   Instructed 3x30" 3x 30" 3x30" 3x30" 3x30"       Prone quad stretch with strap ezio Instructed 3x30"  3x30" 3x30" 3x30"                                                                                     Ther Activity             Step-ups F/L  fwd 6" 2x10 F 6" 2x 10  F 6" 2x 10  F 6" 2x 10  F 6" 2x 10        Lateral step-down    4"x10 ea 4"x15 ea 4"x15 ea       Bkwd lunge on slider      NV       STS no UE  NV 2x 10  2x10 2x10 2x10                    Gait Training                                       Modalities

## 2022-04-06 ENCOUNTER — OFFICE VISIT (OUTPATIENT)
Dept: PHYSICAL THERAPY | Facility: CLINIC | Age: 82
End: 2022-04-06
Payer: COMMERCIAL

## 2022-04-06 DIAGNOSIS — M25.562 ARTHRALGIA OF BOTH KNEES: Primary | ICD-10-CM

## 2022-04-06 DIAGNOSIS — M25.561 ARTHRALGIA OF BOTH KNEES: Primary | ICD-10-CM

## 2022-04-06 PROCEDURE — 97530 THERAPEUTIC ACTIVITIES: CPT | Performed by: PHYSICAL THERAPIST

## 2022-04-06 PROCEDURE — 97140 MANUAL THERAPY 1/> REGIONS: CPT | Performed by: PHYSICAL THERAPIST

## 2022-04-06 PROCEDURE — 97110 THERAPEUTIC EXERCISES: CPT | Performed by: PHYSICAL THERAPIST

## 2022-04-06 NOTE — PROGRESS NOTES
Daily Note     Today's date: 2022  Patient name: Keyla Escalante  : 1940  MRN: [id number]  Referring provider: Wali Yuan DO  Dx:   Encounter Diagnosis     ICD-10-CM    1  Arthralgia of both knees  M25 561     M25 562                   Subjective: Knee is feeling pretty good      Objective: See treatment diary below      Assessment: Tolerated treatment well  Patient would benefit from continued PT  Had some stiffness and soreness when he started on the bike, but it loosened up after a few minutes and he felt better  Plan: Continue per plan of care  Precautions: HTN, Bradycardia  Access Code: [email or web address]  URL: [email or web address]  Date: 2022  Prepared by: Belkys Saldivar       Manuals 3/16 3/21 3/23 3/28 3/30 4/4 4/6      Prone quad stretch ezio  JF 3x30"  Self           Supine HS stretch ezio JF 3x30"  Self           IASTM distal quads and patellar tendon ezio NV JF SC JF JF JF JF                   Neuro Re-Ed                                                                                                        Ther Ex             Pt education Knee anatomy, pathology and patellar tendonitis            Bike  L1x10' nustep L5 10min  Bike L1x10' Bike L1x10' Upright L1x10' Bike L1x10'      Matrix quads  20# 2x10 20# 2x 10 20# 2x 10 20# 2x 10 10# 3x10 10# 3x10      Matrix HS  30# 2x10 30#  2x 10  30#  2x 10  30#  2x 10  20# 3x10 20# 3x10      Standing HS stretch ezio   Instructed 3x30" 3x 30" 3x30" 3x30" 3x30" 3x30"      Prone quad stretch with strap ezio Instructed 3x30"  3x30" 3x30" 3x30" 3x30"                                                                                    Ther Activity             Step-ups F/L  fwd 6" 2x10 F 6" 2x 10  F 6" 2x 10  F 6" 2x 10  F 6" 2x 10  F 6" 2x 10       Lateral step-down    4"x10 ea 4"x15 ea 4"x15 ea       Bkwd lunge on slider      NV NV      STS no UE  NV 2x 10  2x10 2x10 2x10 2x10                   Gait Training Modalities

## 2022-04-11 ENCOUNTER — OFFICE VISIT (OUTPATIENT)
Dept: PHYSICAL THERAPY | Facility: CLINIC | Age: 82
End: 2022-04-11
Payer: COMMERCIAL

## 2022-04-11 DIAGNOSIS — M25.562 ARTHRALGIA OF BOTH KNEES: Primary | ICD-10-CM

## 2022-04-11 DIAGNOSIS — M25.561 ARTHRALGIA OF BOTH KNEES: Primary | ICD-10-CM

## 2022-04-11 PROCEDURE — 97110 THERAPEUTIC EXERCISES: CPT | Performed by: PHYSICAL THERAPIST

## 2022-04-11 PROCEDURE — 97140 MANUAL THERAPY 1/> REGIONS: CPT | Performed by: PHYSICAL THERAPIST

## 2022-04-11 NOTE — PROGRESS NOTES
Daily Note     Today's date: 2022  Patient name: Yoel Tierney  : 1940  MRN: [id number]  Referring provider: Moe Hardwick DO  Dx:   Encounter Diagnosis     ICD-10-CM    1  Arthralgia of both knees  M25 561     M25 562                   Subjective: Still gets some occasional slight discomfort in left anterior knee with stairs  Objective: See treatment diary below      Assessment: Tolerated treatment well  Patient would benefit from continued PT   Trial of Shay taping did not relieve patient pain  Symptoms in left knee are intermittent and <2-3/10 at worst on stairs  Plan: Continue per plan of care  Precautions: HTN, Bradycardia  Access Code: [email or web address]  URL: [email or web address]  Date: 2022  Prepared by: Agnituss 3/16 3/21 3/23 3/28 3/30 4/4 4/6 4/11     Prone quad stretch ezio  JF 3x30"  Self           Supine HS stretch ezio JF 3x30"  Self           IASTM distal quads and patellar tendon ezio NV JF SC JF JF JF JF JF     Jaspal taping left knee for lateral glide        Not helpful  DC     Neuro Re-Ed                                                                                                        Ther Ex             Pt education Knee anatomy, pathology and patellar tendonitis            Bike  L1x10' nustep L5 10min  Bike L1x10' Bike L1x10' Upright L1x10' Bike L1x10' Bike L1x10'     Matrix quads  20# 2x10 20# 2x 10 20# 2x 10 20# 2x 10 10# 3x10 10# 3x10 10# 3x10     Matrix HS  30# 2x10 30#  2x 10  30#  2x 10  30#  2x 10  20# 3x10 20# 3x10 20# 3x10     Standing HS stretch ezio   Instructed 3x30" 3x 30" 3x30" 3x30" 3x30" 3x30" 3x30"     Prone quad stretch with strap ezio Instructed 3x30"  3x30" 3x30" 3x30" 3x30"                                                                                    Ther Activity             Step-ups F/L  fwd 6" 2x10 F 6" 2x 10  F 6" 2x 10  F 6" 2x 10  F 6" 2x 10  F 6" 2x 10        Lateral step-down    4"x10 ea 4"x15 ea 4"x15 ea       Bkwd lunge on slider      NV NV      STS no UE  NV 2x 10  2x10 2x10 2x10 2x10      TG squats        L22 2x15     Gait Training                                       Modalities

## 2022-04-13 ENCOUNTER — OFFICE VISIT (OUTPATIENT)
Dept: PHYSICAL THERAPY | Facility: CLINIC | Age: 82
End: 2022-04-13
Payer: COMMERCIAL

## 2022-04-13 DIAGNOSIS — M25.562 ARTHRALGIA OF BOTH KNEES: Primary | ICD-10-CM

## 2022-04-13 DIAGNOSIS — M25.561 ARTHRALGIA OF BOTH KNEES: Primary | ICD-10-CM

## 2022-04-13 PROCEDURE — 97530 THERAPEUTIC ACTIVITIES: CPT

## 2022-04-13 PROCEDURE — 97110 THERAPEUTIC EXERCISES: CPT

## 2022-04-13 PROCEDURE — 97140 MANUAL THERAPY 1/> REGIONS: CPT

## 2022-04-13 NOTE — PROGRESS NOTES
Daily Note     Today's date: 2022  Patient name: Patrica Mckeon  : 1940  MRN: [id number]  Referring provider: Herlinda Johnson DO  Dx:   Encounter Diagnosis     ICD-10-CM    1  Arthralgia of both knees  M25 561     M25 562                   Subjective: Pt noted that he was going up and down a ladder and noted that his knees were very sore     Objective: See treatment diary below      Assessment:  Continued with treatment session, Progressed some exercises with no increase in difficulty  Pt noted his L knee is painful on the stairs  Trialted K tape to help patellar tracking on L knee  Pt noted that he feels a major difference with L knee front step ups  Advised patient on benefits from K tape and also advised if he starts to get any form of allergic reactions to take off immediately  Tolerated treatment well  Patient demonstrated fatigue post treatment, exhibited good technique with therapeutic exercises and would benefit from continued PT  DOMS reviewed and acknowledged by patient may have 24 to 48 hours of muscle soreness following treatment session   Pt continues to demonstrate great understanding of HEP importance  Follow through noted with HEP               Plan: Continue per plan of care  Precautions: HTN, Bradycardia  Access Code: [email or web address]  URL: [email or web address]  Date: 2022  Prepared by: Dustin Rey       Manuals 3/16 3/21 3/23 3/28 3/30 4/4 4/6 4/11 4/13    Prone quad stretch ezio  JF 3x30"  Self           Supine HS stretch ezio JF 3x30"  Self           IASTM distal quads and patellar tendon ezio NV JF SC JF ALFREDO Shay taping left knee for lateral glide        Not helpful  DC     K tape with active knee flexion with glide            SC                                            Neuro Re-Ed                                                                                                        Ther Ex             Pt education Knee anatomy, pathology and patellar tendonitis            Bike  L1x10' nustep L5 10min  Bike L1x10' Bike L1x10' Upright L1x10' Bike L1x10' Bike L1x10' nustep 5 10 min     Matrix quads  20# 2x10 20# 2x 10 20# 2x 10 20# 2x 10 10# 3x10 10# 3x10 10# 3x10 10# 3x 10     Matrix HS  30# 2x10 30#  2x 10  30#  2x 10  30#  2x 10  20# 3x10 20# 3x10 20# 3x10 20# 3x 10     Standing HS stretch ezio   Instructed 3x30" 3x 30" 3x30" 3x30" 3x30" 3x30" 3x30"     Prone quad stretch with strap ezio Instructed 3x30"  3x30" 3x30" 3x30" 3x30"  3x 30"                                                                                   Ther Activity             Step-ups F/L  fwd 6" 2x10 F 6" 2x 10  F 6" 2x 10  F 6" 2x 10  F 6" 2x 10  F 6" 2x 10   F 6" 3x 10      Lateral step-down    4"x10 ea 4"x15 ea 4"x15 ea   4" 15x     Bkwd lunge on slider      NV NV      STS no UE  NV 2x 10  2x10 2x10 2x10 2x10      TG squats        L22 2x15 L22 2x 15     Gait Training                                       Modalities

## 2022-04-20 ENCOUNTER — OFFICE VISIT (OUTPATIENT)
Dept: PHYSICAL THERAPY | Facility: CLINIC | Age: 82
End: 2022-04-20
Payer: COMMERCIAL

## 2022-04-20 ENCOUNTER — APPOINTMENT (OUTPATIENT)
Dept: PHYSICAL THERAPY | Facility: CLINIC | Age: 82
End: 2022-04-20
Payer: COMMERCIAL

## 2022-04-20 DIAGNOSIS — M25.561 ARTHRALGIA OF BOTH KNEES: Primary | ICD-10-CM

## 2022-04-20 DIAGNOSIS — M25.562 ARTHRALGIA OF BOTH KNEES: Primary | ICD-10-CM

## 2022-04-20 PROCEDURE — 97110 THERAPEUTIC EXERCISES: CPT | Performed by: PHYSICAL THERAPIST

## 2022-04-20 PROCEDURE — 97530 THERAPEUTIC ACTIVITIES: CPT | Performed by: PHYSICAL THERAPIST

## 2022-04-20 NOTE — PROGRESS NOTES
Daily Note     Today's date: 2022  Patient name: Phong Gilbert  : 1940  MRN: [id number]  Referring provider: Farhat Jones DO  Dx:   Encounter Diagnosis     ICD-10-CM    1  Arthralgia of both knees  M25 561     M25 562        Start Time:   Stop Time: 164  Total time in clinic (min): 59 minutes    Subjective: Patient reports that when his left knee was first taped he had between 0-1/10 pain, but over the past couple of days it has returned to a 3-4/10  Patient has no pain on his right  Objective: See treatment diary below      Assessment: Tolerated treatment well  Patient demonstrated fatigue post treatment and would benefit from continued PT  Patient reports having relief of left knee pain following KT taping ; denies benefit from IASTM  Plan: Continue per plan of care  Progress treatment as tolerated  Precautions: HTN, Bradycardia  Access Code: [email or web address]  URL: [email or web address]  Date: 2022  Prepared by: Mike Padilla       Manuals 3/16 3/21 3/23 3/28 3/30 4/4 4/6 4/11 4/13 4/20   Prone quad stretch ezio  JF 3x30"  Self           Supine HS stretch ezio JF 3x30"  Self           IASTM distal quads and patellar tendon ezio NV JF SC JF JF JF JF JF  Held   Shay taping left knee for lateral glide        Not helpful  DC     K tape with active knee flexion with glide            SC  GR                                          Neuro Re-Ed                                                                                                        Ther Ex             Pt education Knee anatomy, pathology and patellar tendonitis            Bike  L1x10' nustep L5 10min  Bike L1x10' Bike L1x10' Upright L1x10' Bike L1x10' Bike L1x10' nustep 5 10 min  Nustep L5 10 min   Matrix quads  20# 2x10 20# 2x 10 20# 2x 10 20# 2x 10 10# 3x10 10# 3x10 10# 3x10 10# 3x 10  15# 3x10   Matrix HS  30# 2x10 30#  2x 10  30#  2x 10  30#  2x 10  20# 3x10 20# 3x10 20# 3x10 20# 3x 10  20# 3x10 Standing HS stretch ezio  Instructed 3x30" 3x 30" 3x30" 3x30" 3x30" 3x30" 3x30"  3x30"   Prone quad stretch with strap ezio Instructed 3x30"  3x30" 3x30" 3x30" 3x30"  3x 30"  3x30"                                                                                 Ther Activity             Step-ups F/L  fwd 6" 2x10 F 6" 2x 10  F 6" 2x 10  F 6" 2x 10  F 6" 2x 10  F 6" 2x 10   F 6" 3x 10   F 6" 3x10 ea  Lateral step-down    4"x10 ea 4"x15 ea 4"x15 ea   4" 15x  4" x20 ea     Bkwd lunge on slider      NV NV      STS no UE  NV 2x 10  2x10 2x10 2x10 2x10      TG squats        L22 2x15 L22 2x 15  L22 2x15   Gait Training                                       Modalities

## 2022-04-26 ENCOUNTER — EVALUATION (OUTPATIENT)
Dept: PHYSICAL THERAPY | Facility: CLINIC | Age: 82
End: 2022-04-26
Payer: COMMERCIAL

## 2022-04-26 DIAGNOSIS — M25.561 ARTHRALGIA OF BOTH KNEES: Primary | ICD-10-CM

## 2022-04-26 DIAGNOSIS — M25.562 ARTHRALGIA OF BOTH KNEES: Primary | ICD-10-CM

## 2022-04-26 PROCEDURE — 97110 THERAPEUTIC EXERCISES: CPT | Performed by: PHYSICAL THERAPIST

## 2022-04-26 PROCEDURE — 97530 THERAPEUTIC ACTIVITIES: CPT | Performed by: PHYSICAL THERAPIST

## 2022-04-26 NOTE — PROGRESS NOTES
PT Re-Evaluation     Today's date: 2022  Patient name: Parish Rodrigues  : 1940  MRN: [id number]  Referring provider: Aneesh Holguin DO  Dx:   Encounter Diagnosis     ICD-10-CM    1  Arthralgia of both knees  M25 561     M25 562                   Assessment  Assessment details: Patient reports feeling 75% improved to date  He has little to no pain in his right knee when climbing stairs, and he states pain in left knee is greatly reduced  He reports daily compliance with HEP  Trial of kinesiotaping for lateral patellar tracking of left knee abolished pain  Patient demonstrates good knee ROM and improved knee strength  He continues to have bilateral HS and quad tightness  He will benefit from continuation of PT services in order to further decrease his pain with stairs  Impairments: pain with function  Functional limitations: Pain going up/down stairs  Understanding of Dx/Px/POC: good  Goals  STG (3 weeks)  1  Patient will report 50% decrease in intensity of pain with going up stairs - met  2  Patient will perform HEP with supervision - met  LTG (6 weeks)  1  Patient will report pain as a 0-1/10 at worst with going up stairs - not met  2  Patient will be independent and compliant with a HEP in order to maintain gains made with skilled PT services  - partially met    Plan  Patient would benefit from: skilled physical therapy  Planned therapy interventions: joint mobilization, manual therapy, neuromuscular re-education, patient education, strengthening, stretching, therapeutic activities, therapeutic exercise and home exercise program  Frequency: 2x week  Duration in weeks: 4  Plan of Care beginning date: 3/16/2022  Plan of Care expiration date: 2022  Treatment plan discussed with: patient        Subjective Evaluation    History of Present Illness  Mechanism of injury: Patient reports feeling 75% improved since starting PT services    Patient states bilateral knee pain is much less intense since starting PT services  Right knee has less pain than the left  Able to climb the stairs with less pain  Pain  Current pain ratin  At best pain ratin  At worst pain ratin (Right <1/10 and left 3/10)  Location: Bilateral patellar tendons  Quality: sharp  Aggravating factors: stair climbing  Progression: improved    Social Support  Steps to enter house: no  Stairs in house: yes (7+7 bilateral HR)   Lives in: multiple-level home  Lives with: spouse    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 )  Exercise history: Sedentary      Diagnostic Tests  No diagnostic tests performed    FCE comments: (-) sleep disturbances  Pain at bilateral patellar tendons only going up stairs  Treatments  Current treatment: medication  Patient Goals  Patient goals for therapy: decreased pain  Patient goal: To negotiate stairs without pain  Objective     Observations   Left Knee   Negative for edema  Right Knee   Negative for edema  Palpation   Left   No palpable tenderness to the rectus femoris, vastus lateralis and vastus medialis  Right   No palpable tenderness to the rectus femoris, vastus lateralis and vastus medialis  Tenderness   Left Knee   No tenderness in the lateral joint line, lateral patella, medial joint line, medial patella and patellar tendon  Right Knee   No tenderness in the lateral joint line, lateral patella, medial joint line, medial patella and patellar tendon  Neurological Testing     Sensation     Knee   Left Knee   Intact: light touch    Right Knee   Intact: light touch     Active Range of Motion   Left Knee   Normal active range of motion    Right Knee   Normal active range of motion    Mobility   Patellar Mobility:   Left Knee   WFL: medial, lateral, superior and inferior       Right Knee   WFL: medial, lateral, superior and inferior    Patellar Static Positioning   Left Knee: WFL  Right Knee: [facility]    Strength/Myotome Testing     Left Knee   Flexion: 4+  Extension: 4+  Quadriceps contraction: good    Right Knee   Flexion: 4+  Extension: 4+  Quadriceps contraction: good    Additional Strength Details  Bilateral hips grossly 4/5     Tests     Left Knee   Negative anterior drawer, Apley's compression, Apley's distraction, lateral Tan, medial Tan, patellar compression, posterior drawer, valgus stress test at 0 degrees, valgus stress test at 30 degrees, varus stress test at 0 degrees and varus stress test at 30 degrees  Right Knee   Negative anterior drawer, Apley's compression, Apley's distraction, lateral Tan, medial Tan, patellar compression, posterior drawer, valgus stress test at 0 degrees, valgus stress test at 30 degrees, varus stress test at 0 degrees and varus stress test at 30 degrees  Additional Tests Details  (+) bilateral hamstring tightness (45 degrees) and bilateral quad tightness    Ambulation   Weight-Bearing Status   Weight-Bearing Status (Left): full weight bearing   Weight-Bearing Status (Right): full weight-bearing    Assistive device used: none    Ambulation: Level Surfaces   Ambulation without assistive device: independent             Precautions: HTN, Bradycardia  Access Code: [email or web address]  URL: [email or web address]  Date: 03/16/2022  Prepared by: Pawan Chin       Manuals 3/16 3/21 3/23 3/28 3/30 4/4 4/6 4/11 4/13 4/20   Prone quad stretch ezio  JF 3x30"  Self           Supine HS stretch ezio JF 3x30"  Self           IASTM distal quads and patellar tendon ezio NV JF SC JF JF JF ALFREDO Shay taping left knee for lateral glide        Not helpful  DC     K tape with active knee flexion with glide            SC  GR                                          Neuro Re-Ed                                                                                                        Ther Ex             Pt education Knee anatomy, pathology and patellar tendonitis            Bike  L1x10' nustep L5 10min  Bike L1x10' Bike L1x10' Upright L1x10' Bike L1x10' Bike L1x10' nustep 5 10 min  Nustep L5 10 min   Matrix quads  20# 2x10 20# 2x 10 20# 2x 10 20# 2x 10 10# 3x10 10# 3x10 10# 3x10 10# 3x 10  15# 3x10   Matrix HS  30# 2x10 30#  2x 10  30#  2x 10  30#  2x 10  20# 3x10 20# 3x10 20# 3x10 20# 3x 10  20# 3x10   Standing HS stretch ezio  Instructed 3x30" 3x 30" 3x30" 3x30" 3x30" 3x30" 3x30"  3x30"   Prone quad stretch with strap ezio Instructed 3x30"  3x30" 3x30" 3x30" 3x30"  3x 30"  3x30"                                                                                 Ther Activity             Step-ups F/L  fwd 6" 2x10 F 6" 2x 10  F 6" 2x 10  F 6" 2x 10  F 6" 2x 10  F 6" 2x 10   F 6" 3x 10   F 6" 3x10 ea  Lateral step-down    4"x10 ea 4"x15 ea 4"x15 ea   4" 15x  4" x20 ea     Bkwd lunge on slider      NV NV      STS no UE  NV 2x 10  2x10 2x10 2x10 2x10      TG squats        L22 2x15 L22 2x 15  L22 2x15   Gait Training                                       Modalities

## 2022-05-02 ENCOUNTER — OFFICE VISIT (OUTPATIENT)
Dept: PHYSICAL THERAPY | Facility: CLINIC | Age: 82
End: 2022-05-02
Payer: COMMERCIAL

## 2022-05-02 DIAGNOSIS — M25.561 ARTHRALGIA OF BOTH KNEES: Primary | ICD-10-CM

## 2022-05-02 DIAGNOSIS — M25.562 ARTHRALGIA OF BOTH KNEES: Primary | ICD-10-CM

## 2022-05-02 PROCEDURE — 97530 THERAPEUTIC ACTIVITIES: CPT

## 2022-05-02 PROCEDURE — 97110 THERAPEUTIC EXERCISES: CPT

## 2022-05-02 NOTE — PROGRESS NOTES
Daily Note    Today's date: 22  Patient name: Zainab Tate  : 1940  MRN: [id number]  Referring provider: Columba Santos DO  Dx:   Encounter Diagnosis     ICD-10-CM    1  Arthralgia of both knees  M25 561     M25 562        Start Time:   Stop Time:   Total time in clinic (min): 45 minutes      Subjective: Scar Leyva reports no pain today  He says that he has only had two exacerbations in the last week; once while pushing a heavy wheelchair, and once when going up the stairs  He notes no pain otherwise and feels mostly better  Scar Leyva says that the tape on his knee has helped a lot  Objective: See treatment diary below  Assessment: Tolerated treatment well with minimal cuing, and maintained the same resistance and reps with TE's  Patient demonstrated fatigue post treatment, exhibited good technique with therapeutic exercises and would benefit from continued PT  Plan: Continue per plan of care  Progress treatment as tolerated  Precautions: HTN, Bradycardia  Access Code: [email or web address]  URL: [email or web address]  Date: 2022  Prepared by: Angelique Robin       Manuals 5/2 3/21 3/23 3/28 3/30 4/4 4/6 4/11 4/13 4/20   Prone quad stretch ezio  Self           Supine HS stretch ezio   Self           IASTM distal quads and patellar tendon ezio  JF SC JF JF JF ALFREDO Shay taping left knee for lateral glide        Not helpful  DC     K tape with active knee flexion with glide    SC        SC  GR                                          Neuro Re-Ed                                                                                                        Ther Ex             Pt education             Bike Nustep 10' L5 L1x10' nustep L5 10min  Bike L1x10' Bike L1x10' Upright L1x10' Bike L1x10' Bike L1x10' nustep 5 10 min  Nustep L5 10 min   Matrix quads 15# 3x10 20# 2x10 20# 2x 10 20# 2x 10 20# 2x 10 10# 3x10 10# 3x10 10# 3x10 10# 3x 10  15# 3x10   Matrix HS 20# 3x10 30# 2x10 30# 2x 10  30#  2x 10  30#  2x 10  20# 3x10 20# 3x10 20# 3x10 20# 3x 10  20# 3x10   Standing HS stretch ezio  3x30" 3x30" 3x 30" 3x30" 3x30" 3x30" 3x30" 3x30"  3x30"   Prone quad stretch with strap ezio 3x30" 3x30"  3x30" 3x30" 3x30" 3x30"  3x 30"  3x30"   Monster walk 10 trips RTB            STS walk 10 trips RTB                                                                Ther Activity             Step-ups F/L F 6" 3x10 ea fwd 6" 2x10 F 6" 2x 10  F 6" 2x 10  F 6" 2x 10  F 6" 2x 10  F 6" 2x 10   F 6" 3x 10   F 6" 3x10 ea  Lateral step-down 4" x20 ea   4"x10 ea 4"x15 ea 4"x15 ea   4" 15x  4" x20 ea     Bkwd lunge on slider 2x10 ea leg     NV NV      STS no UE  NV 2x 10  2x10 2x10 2x10 2x10      TG squats L22 2x15       L22 2x15 L22 2x 15  L22 2x15   Gait Training                                       Modalities                                              Letitia Slater, PT  5/2/2022,6:13 PM

## 2022-05-04 ENCOUNTER — OFFICE VISIT (OUTPATIENT)
Dept: PHYSICAL THERAPY | Facility: CLINIC | Age: 82
End: 2022-05-04
Payer: COMMERCIAL

## 2022-05-04 DIAGNOSIS — M25.562 ARTHRALGIA OF BOTH KNEES: Primary | ICD-10-CM

## 2022-05-04 DIAGNOSIS — M25.561 ARTHRALGIA OF BOTH KNEES: Primary | ICD-10-CM

## 2022-05-04 PROCEDURE — 97530 THERAPEUTIC ACTIVITIES: CPT | Performed by: PHYSICAL THERAPIST

## 2022-05-04 PROCEDURE — 97110 THERAPEUTIC EXERCISES: CPT | Performed by: PHYSICAL THERAPIST

## 2022-05-04 NOTE — PROGRESS NOTES
Daily Note     Today's date: 2022  Patient name: Alisson Mariscal  : 1940  MRN: [id number]  Referring provider: Maribel Guerrero DO  Dx:   Encounter Diagnosis     ICD-10-CM    1  Arthralgia of both knees  M25 561     M25 562                   Subjective: Doing exercises this AM, had some discomfort 1-1 5/10 in left knee, but symptoms subsided  Objective: See treatment diary below      Assessment: Tolerated treatment well  Patient would benefit from continued PT  Patient continues to get symptom relief left knee with KT tape  Symptoms left knee with step ups are <1/10  Plan: Continue per plan of care  Precautions: HTN, Bradycardia  Access Code: [email or web address]  URL: [email or web address]  Date: 2022  Prepared by: Frances Thackers 5/2 5/4 3/23 3/28 3/30 4/4 4/6 4/11 4/13 4/20   Prone quad stretch ezio  Self           Supine HS stretch ezio   Self           IASTM distal quads and patellar tendon ezio   SC JF JF JF JF ALFREDO  Held   Shay taping left knee for lateral glide        Not helpful  DC     K tape with active knee flexion with glide  SC JF       SC  GR                                          Neuro Re-Ed                                                                                                        Ther Ex             Pt education             Bike Nustep 10' L5 L1x10' nustep L5 10min  Bike L1x10' Bike L1x10' Upright L1x10' Bike L1x10' Bike L1x10' nustep 5 10 min  Nustep L5 10 min   Matrix quads 15# 3x10 15# 3x10 20# 2x 10 20# 2x 10 20# 2x 10 10# 3x10 10# 3x10 10# 3x10 10# 3x 10  15# 3x10   Matrix HS 20# 3x10 20# 3x10 30#  2x 10  30#  2x 10  30#  2x 10  20# 3x10 20# 3x10 20# 3x10 20# 3x 10  20# 3x10   Standing HS stretch ezio   3x30" 3x30" 3x 30" 3x30" 3x30" 3x30" 3x30" 3x30"  3x30"   Prone quad stretch with strap ezio 3x30"   3x30" 3x30" 3x30" 3x30"  3x 30"  3x30"   Monster walk 10 trips RTB 10 trips RTB           STS walk 10 trips RTB 10 trips RTB Ther Activity             Step-ups F/L F 6" 3x10 ea fwd 6" 3x10 F 6" 2x 10  F 6" 2x 10  F 6" 2x 10  F 6" 2x 10  F 6" 2x 10   F 6" 3x 10   F 6" 3x10 ea  Lateral step-down 4" x20 ea 4" x20 ea  4"x10 ea 4"x15 ea 4"x15 ea   4" 15x  4" x20 ea     Bkwd lunge on slider 2x10 ea leg     NV NV      STS no UE  2x10 2x 10  2x10 2x10 2x10 2x10      TG squats L22 2x15 np      L22 2x15 L22 2x 15  L22 2x15   Gait Training                                       Modalities

## 2022-05-09 ENCOUNTER — OFFICE VISIT (OUTPATIENT)
Dept: PHYSICAL THERAPY | Facility: CLINIC | Age: 82
End: 2022-05-09
Payer: COMMERCIAL

## 2022-05-09 DIAGNOSIS — M25.561 ARTHRALGIA OF BOTH KNEES: Primary | ICD-10-CM

## 2022-05-09 DIAGNOSIS — M25.562 ARTHRALGIA OF BOTH KNEES: Primary | ICD-10-CM

## 2022-05-09 PROCEDURE — 97110 THERAPEUTIC EXERCISES: CPT | Performed by: PHYSICAL THERAPIST

## 2022-05-09 PROCEDURE — 97530 THERAPEUTIC ACTIVITIES: CPT | Performed by: PHYSICAL THERAPIST

## 2022-05-09 NOTE — PROGRESS NOTES
Daily Note     Today's date: 2022  Patient name: Zainab Tate  : 1940  MRN: [id number]  Referring provider: Columba Santos DO  Dx:   Encounter Diagnosis     ICD-10-CM    1  Arthralgia of both knees  M25 561     M25 562                   Subjective: Patient felt good since last session  No complaints of pain with stairs or with walking 4 5 miles at work  Legs felt fatigued after work, but not painful  Objective: See treatment diary below      Assessment: Tolerated treatment well  Patient would benefit from continued PT  Had very minimal right knee discomfort at patellar tendon with Matrix LAQ  [place] good with KT taping to correct lateral tilt/glide of left patella  Fatigued after session  Plan: Continue per plan of care  Precautions: HTN, Bradycardia  Access Code: [email or web address]  URL: [email or web address]  Date: 2022  Prepared by: Angelique Robin       Manuals 5/2 5/4 5/9 3/28 3/30 4/4 4/6 4/11 4/13 4/20   Prone quad stretch ezio  Supine HS stretch ezio             IASTM distal quads and patellar tendon ezio    JF JF JF JF JF  Held   Shay taping left knee for lateral glide        Not helpful  DC     K tape with active knee flexion with glide  SC JF JF      SC  GR                                          Neuro Re-Ed                                                                                                        Ther Ex             Pt education             Bike Nustep 10' L5 L1x10' L1x10' Bike L1x10' Bike L1x10' Upright L1x10' Bike L1x10' Bike L1x10' nustep 5 10 min  Nustep L5 10 min   Matrix quads 15# 3x10 15# 3x10 20# 2x 10 20# 2x 10 20# 2x 10 10# 3x10 10# 3x10 10# 3x10 10# 3x 10  15# 3x10   Matrix HS 20# 3x10 20# 3x10 30#  2x 10  30#  2x 10  30#  2x 10  20# 3x10 20# 3x10 20# 3x10 20# 3x 10  20# 3x10   Standing HS stretch ezio   3x30" 3x30" 3x 30" 3x30" 3x30" 3x30" 3x30" 3x30"  3x30"   Prone quad stretch with strap ezio 3x30"   3x30" 3x30" 3x30" 3x30"  3x 30" 3x30"   Monster walk 10 trips RTB 10 trips RTB 10 trips RTB          STS walk 10 trips RTB 10 trips RTB 10 trips RTB                                                              Ther Activity             Step-ups F/L F 6" 3x10 ea fwd 6" 3x10 F 6" 2x 10  F 6" 2x 10  F 6" 2x 10  F 6" 2x 10  F 6" 2x 10   F 6" 3x 10   F 6" 3x10 ea  Lateral step-down 4" x20 ea 4" x20 ea L4x20" 4"x10 ea 4"x15 ea 4"x15 ea   4" 15x  4" x20 ea     Bkwd lunge on slider 2x10 ea leg     NV NV      STS no UE  2x10 2x 10  2x10 2x10 2x10 2x10      TG squats L22 2x15 np      L22 2x15 L22 2x 15  L22 2x15   Gait Training                                       Modalities

## 2022-05-11 ENCOUNTER — OFFICE VISIT (OUTPATIENT)
Dept: PHYSICAL THERAPY | Facility: CLINIC | Age: 82
End: 2022-05-11
Payer: COMMERCIAL

## 2022-05-11 DIAGNOSIS — M25.561 ARTHRALGIA OF BOTH KNEES: Primary | ICD-10-CM

## 2022-05-11 DIAGNOSIS — M25.562 ARTHRALGIA OF BOTH KNEES: Primary | ICD-10-CM

## 2022-05-11 PROCEDURE — 97110 THERAPEUTIC EXERCISES: CPT

## 2022-05-11 PROCEDURE — 97140 MANUAL THERAPY 1/> REGIONS: CPT

## 2022-05-11 NOTE — PROGRESS NOTES
Daily Note     Today's date: 2022  Patient name: Yumi Sanders  : 1940  MRN: [id number]  Referring provider: Sahil Guido DO  Dx:   Encounter Diagnosis     ICD-10-CM    1  Arthralgia of both knees  M25 561     M25 562        Start Time:   Stop Time: 1118  Total time in clinic (min): 36 13 minutes    Subjective: Pt noted upon arrival that his knees feels pretty stiff but overall feels okay  Pt noted that he just has another dose of the covid vaccine and not sure if that has anything to deal with the ache that he is feeling  Pt noted that the R knee has been giving him a problem today  Objective: See treatment diary below      Assessment: Continued with treatment session, overall patient able to complete all exercises with proper mechanics  Pt noted some discomfort in his R knee while performing Matrix knee /  Tolerated treatment fair  Patient exhibited good technique with therapeutic exercises and would benefit from continued PT  Pt noted that the pain decreased with K tape  Noted minimal pain with step ups 1-2/10  In L and no p! In R knee  DOMS reviewed and acknowledged by patient may have 24 to 48 hours of muscle soreness following treatment session         Plan: Continue per plan of care  Precautions: HTN, Bradycardia  Access Code: [email or web address]  URL: [email or web address]  Date: 2022  Prepared by: Trinidad Willett       Manuals    Prone quad stretch ezio  Supine HS stretch ezio             IASTM distal quads and patellar tendon ezio      JF ALFREDO Shay taping left knee for lateral glide        Not helpful  DC     K tape with active L knee flexion with glide    SC JF JF SC     SC  GR                                          Neuro Re-Ed                                                                                                        Ther Ex             Pt education             Bike Nustep 10' L5 L1x10' L1x10' L1 10 min   Upright L1x10' Bike L1x10' Bike L1x10' nustep 5 10 min  Nustep L5 10 min   Matrix quads 15# 3x10 15# 3x10 20# 2x 10 20# 3x 10   10# 3x10 10# 3x10 10# 3x10 10# 3x 10  15# 3x10   Matrix HS 20# 3x10 20# 3x10 30#  2x 10  30# 3x 10   20# 3x10 20# 3x10 20# 3x10 20# 3x 10  20# 3x10   Standing HS stretch ezio  3x30" 3x30" 3x 30"   3x30" 3x30" 3x30"  3x30"   Prone quad stretch with strap ezio 3x30"     3x30" 3x30"  3x 30"  3x30"   Monster walk 10 trips RTB 10 trips RTB 10 trips RTB NV         STS walk 10 trips RTB 10 trips RTB 10 trips RTB NV                                                             Ther Activity             Step-ups F/L F 6" 3x10 ea fwd 6" 3x10 F 6" 2x 10  F 6" 2x 10   F 6" 2x 10  F 6" 2x 10   F 6" 3x 10   F 6" 3x10 ea  Lateral step-down 4" x20 ea 4" x20 ea L4x20" 4" 2x 10   4"x15 ea   4" 15x  4" x20 ea     Bkwd lunge on slider 2x10 ea leg     NV NV      STS no UE  2x10 2x 10  2x 10   2x10 2x10      TG squats L22 2x15 np      L22 2x15 L22 2x 15  L22 2x15   Gait Training                                       Modalities

## 2022-05-16 ENCOUNTER — OFFICE VISIT (OUTPATIENT)
Dept: PHYSICAL THERAPY | Facility: CLINIC | Age: 82
End: 2022-05-16
Payer: COMMERCIAL

## 2022-05-16 DIAGNOSIS — M25.562 ARTHRALGIA OF BOTH KNEES: Primary | ICD-10-CM

## 2022-05-16 DIAGNOSIS — M25.561 ARTHRALGIA OF BOTH KNEES: Primary | ICD-10-CM

## 2022-05-16 PROCEDURE — 97110 THERAPEUTIC EXERCISES: CPT

## 2022-05-16 PROCEDURE — 97140 MANUAL THERAPY 1/> REGIONS: CPT

## 2022-05-16 PROCEDURE — 97530 THERAPEUTIC ACTIVITIES: CPT

## 2022-05-16 NOTE — PROGRESS NOTES
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  Pt noted that the tape still feels like it is helping as well  1/10 only on stairs in L knee  Pt noted that he walks 4 and a half miles today volunteering and noted no pain in his knees noted  Objective: See treatment diary below      Assessment: Continued with treatment session, K tape still pt noted a positive benefit at this time  Pt noted, " a little more than 1/10" in regards to his L knee pain with increase step height but noted was able to continue to perform  Added stationary lunges with focus on LLE due  No pain noted required UE support  For balance only  Tolerated treatment fair  Patient exhibited good technique with therapeutic exercises and would benefit from continued PT  S/p treatment session, Pt noted feeling no changes  DOMS reviewed and acknowledged by patient may have 24 to 48 hours of muscle soreness following treatment session     Instructed With HEP on every other day performance, unless noted otherwise and/or having increased discomfort or pain       Plan: Continue per plan of care  Precautions: HTN, Bradycardia  Access Code: [email or web address]  URL: [email or web address]  Date: 2022  Prepared by: Bubba Hunt       Manuals    Prone quad stretch ezio  Supine HS stretch ezio             IASTM distal quads and patellar tendon ezio       ALFREDO Shay taping left knee for lateral glide        Not helpful    MAIDA K tape with active L knee flexion with glide  SC JF JF SC SC    SC  GR                                          Neuro Re-Ed                                                                                                        Ther Ex             Pt education             Bike Nustep 10' L5 L1x10' L1x10' L1 10 min  L1 10 min   Bike L1x10' Bike L1x10' nustep 5 10 min  Nustep L5 10 min   Matrix quads 15# 3x10 15# 3x10 20# 2x 10 20# 3x 10  20# 3x 10   10# 3x10 10# 3x10 10# 3x 10  15# 3x10   Matrix HS 20# 3x10 20# 3x10 30#  2x 10  30# 3x 10  30# 3x 10  20# 3x10 20# 3x10 20# 3x 10  20# 3x10   Standing HS stretch ezio  3x30" 3x30" 3x 30"    3x30" 3x30"  3x30"   Prone quad stretch with strap ezio 3x30"      3x30"  3x 30"  3x30"   Monster walk 10 trips RTB 10 trips RTB 10 trips RTB NV 5 laps RTB         STS walk 10 trips RTB 10 trips RTB 10 trips RTB NV Side step RTB 5 laps                                                             Ther Activity             Step-ups F/L F 6" 3x10 ea fwd 6" 3x10 F 6" 2x 10  F 6" 2x 10  F 8" 2x 10 LLE only   F 6" 2x 10   F 6" 3x 10   F 6" 3x10 ea  Lateral step-down 4" x20 ea 4" x20 ea L4x20" 4" 2x 10  4" 2x 10     4" 15x  4" x20 ea  Bkwd lunge on slider 2x10 ea leg      NV      STS no UE  2x10 2x 10  2x 10    2x10      TG squats L22 2x15 np   L22 single LE 10x ea      L22 2x15 L22 2x 15  L22 2x15   Stationary lunges      L only 2x 10                                                             Gait Training                                       Modalities

## 2022-05-18 ENCOUNTER — OFFICE VISIT (OUTPATIENT)
Dept: PHYSICAL THERAPY | Facility: CLINIC | Age: 82
End: 2022-05-18
Payer: COMMERCIAL

## 2022-05-18 DIAGNOSIS — M25.561 ARTHRALGIA OF BOTH KNEES: Primary | ICD-10-CM

## 2022-05-18 DIAGNOSIS — M25.562 ARTHRALGIA OF BOTH KNEES: Primary | ICD-10-CM

## 2022-05-18 PROCEDURE — 97530 THERAPEUTIC ACTIVITIES: CPT | Performed by: PHYSICAL THERAPIST

## 2022-05-18 PROCEDURE — 97110 THERAPEUTIC EXERCISES: CPT | Performed by: PHYSICAL THERAPIST

## 2022-05-18 NOTE — PROGRESS NOTES
Daily Note     Today's date: 2022  Patient name: Yoel Tierney  : 1940  MRN: [id number]  Referring provider: Moe Hardwick DO  Dx:   Encounter Diagnosis     ICD-10-CM    1  Arthralgia of both knees  M25 561     M25 562                   Subjective: Patient has no pain in right knee with activity  Concentrating only on left knee at home with HEP  Objective: See treatment diary below      Assessment: Tolerated treatment well  Patient would benefit from continued PT   Fatigued with TE today  Able to perform only 9 repetitions of second set of lunges  Plan: Continue per plan of care  Precautions: HTN, Bradycardia  Access Code: [email or web address]  URL: [email or web address]  Date: 2022  Prepared by: Fadi Mydeo       Manuals    Prone quad stretch ezio  Supine HS stretch ezio             IASTM distal quads and patellar tendon ezio       JF JF  Held   Shay taping left knee for lateral glide        Not helpful  DC     K tape with active L knee flexion with glide  SC JF JF SC SC JF   SC  GR                                          Neuro Re-Ed                                                                                                        Ther Ex             Pt education             Bike Nustep 10' L5 L1x10' L1x10' L1 10 min  L1 10 min  L1 10 min  Bike L1x10' Bike L1x10' nustep 5 10 min  Nustep L5 10 min   Matrix quads 15# 3x10 15# 3x10 20# 2x 10 20# 3x 10  20# 3x 10  20# 3x 10  10# 3x10 10# 3x10 10# 3x 10  15# 3x10   Matrix HS 20# 3x10 20# 3x10 30#  2x 10  30# 3x 10  30# 3x 10 30# 3x 10 20# 3x10 20# 3x10 20# 3x 10  20# 3x10   Standing HS stretch ezio   3x30" 3x30" 3x 30"    3x30" 3x30"  3x30"   Prone quad stretch with strap ezio 3x30"      3x30"  3x 30"  3x30"   Monster walk 10 trips RTB 10 trips RTB 10 trips RTB NV 5 laps RTB         STS walk 10 trips RTB 10 trips RTB 10 trips RTB NV Side step RTB 5 laps Ther Activity             Step-ups F/L F 6" 3x10 ea fwd 6" 3x10 F 6" 2x 10  F 6" 2x 10  F 8" 2x 10 LLE only  F 8" 2x 10 LLE only  F 6" 2x 10   F 6" 3x 10   F 6" 3x10 ea  Lateral step-down 4" x20 ea 4" x20 ea L4x20" 4" 2x 10  4" 2x 10  4" 2x 10    4" 15x  4" x20 ea  Bkwd lunge on slider 2x10 ea leg      NV      STS no UE  2x10 2x 10  2x 10   2x 10  2x10      TG squats L22 2x15 np   L22 single LE 10x ea  L22 single LE 10x ea     L22 2x15 L22 2x 15  L22 2x15   Stationary lunges      L only 2x 10  L only 1x 10, 1x9                                                            Gait Training                                       Modalities

## 2022-05-23 ENCOUNTER — OFFICE VISIT (OUTPATIENT)
Dept: PHYSICAL THERAPY | Facility: CLINIC | Age: 82
End: 2022-05-23
Payer: COMMERCIAL

## 2022-05-23 DIAGNOSIS — M25.561 ARTHRALGIA OF BOTH KNEES: Primary | ICD-10-CM

## 2022-05-23 DIAGNOSIS — M25.562 ARTHRALGIA OF BOTH KNEES: Primary | ICD-10-CM

## 2022-05-23 PROCEDURE — 97110 THERAPEUTIC EXERCISES: CPT

## 2022-05-23 PROCEDURE — 97530 THERAPEUTIC ACTIVITIES: CPT

## 2022-05-23 NOTE — PROGRESS NOTES
Daily Note     Today's date: 2022  Patient name: Davey Goldmann  : 1940  MRN: [id number]  Referring provider: Gene Salmon DO  Dx:   Encounter Diagnosis     ICD-10-CM    1  Arthralgia of both knees  M25 561     M25 562                   Subjective: Pt noted that he trialed to put the brace on and it was too small  Pt noted that he took the tape off and noted feeling good for the past 2 days having no pain with stairs  Objective: See treatment diary below      Assessment: Pt noted he would like to trial without K tape this visit  No progressions in exercises noted today  Pt noted minimal to no pain with step ups  Tolerated treatment fair  Patient exhibited good technique with therapeutic exercises and would benefit from continued PT  S/p treatment session, pt noted feeling no immediate changes  Plan: Continue per plan of care  Precautions: HTN, Bradycardia  Access Code: [email or web address]  URL: [email or web address]  Date: 2022  Prepared by: Estuardo Jean-Baptiste       Manuals    Prone quad stretch ezio  Supine HS stretch ezio             IASTM distal quads and patellar tendon ezio          Held   Shay taping left knee for lateral glide             K tape with active L knee flexion with glide  SC JF JF SC SC JF declined  SC  GR                                          Neuro Re-Ed                                                                                                        Ther Ex             Pt education             Bike Nustep 10' L5 L1x10' L1x10' L1 10 min  L1 10 min  L1 10 min  L1 10 min   nustep 5 10 min  Nustep L5 10 min   Matrix quads 15# 3x10 15# 3x10 20# 2x 10 20# 3x 10  20# 3x 10  20# 3x 10  20# 3x 10   10# 3x 10  15# 3x10   Matrix HS 20# 3x10 20# 3x10 30#  2x 10  30# 3x 10  30# 3x 10 30# 3x 10 30# 3x 10   20# 3x 10  20# 3x10   Standing HS stretch ezio   3x30" 3x30" 3x 30"       3x30"   Prone quad stretch with strap ezio 3x30"        3x 30"  3x30"   Monster walk 10 trips RTB 10 trips RTB 10 trips RTB NV 5 laps RTB         STS walk 10 trips RTB 10 trips RTB 10 trips RTB NV Side step RTB 5 laps                                                             Ther Activity             Step-ups F/L F 6" 3x10 ea fwd 6" 3x10 F 6" 2x 10  F 6" 2x 10  F 8" 2x 10 LLE only  F 8" 2x 10 LLE only  F 8 min 2x 10   F 6" 3x 10   F 6" 3x10 ea  Lateral step-down 4" x20 ea 4" x20 ea L4x20" 4" 2x 10  4" 2x 10  4" 2x 10    4" 15x  4" x20 ea  Bkwd lunge on slider 2x10 ea leg            STS no UE  2x10 2x 10  2x 10   2x 10        TG squats L22 2x15 np   L22 single LE 10x ea  L22 single LE 10x ea  L22 single Le 2x 10 ea     L22 2x 15  L22 2x15   Stationary lunges      L only 2x 10  L only 1x 10, 1x9  L only  2x 10                                                           Gait Training                                       Modalities

## 2022-05-24 NOTE — PROGRESS NOTES
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  He reports daily compliance with HEP  Knee ROM is WFL, as are bilateral LE strength  All goals met and patient requires no further skilled PT services  Impairments: pain with function  Functional limitations: Pain going up/down stairs  Understanding of Dx/Px/POC: good  Goals  STG (3 weeks)  1  Patient will report 50% decrease in intensity of pain with going up stairs - met  2  Patient will perform HEP with supervision - met  LTG (6 weeks)  1  Patient will report pain as a 0-1/10 at worst with going up stairs - met  2  Patient will be independent and compliant with a HEP in order to maintain gains made with skilled PT services  - met    Plan  Planned therapy interventions: patient education and home exercise program  Plan of Care beginning date: 3/16/2022  Plan of Care expiration date: 2022  Treatment plan discussed with: patient        Subjective Evaluation    History of Present Illness  Mechanism of injury: Patient reports feeling 99% improved since starting PT services  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  No pain in right knee on stairs, and has intermittent feeling of fatigue in left knee when climbing stairs  Patient is independent and compliant with his HEP at this time  Ordered a left lateral knee buttress brace which he has not yet used         Pain  Current pain ratin  At best pain ratin  At worst pain ratin (0 5/10 left only on stairs )  Location: left patella (fatigued)  Aggravating factors: stair climbing  Progression: resolved    Social Support  Steps to enter house: no  Stairs in house: yes (7+7 bilateral HR)   Lives in: multiple-level home  Lives with: spouse    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 )  Exercise history: Sedentary      Diagnostic Tests  No diagnostic tests performed    FCE comments: (-) sleep disturbances  Treatments  Current treatment: medication  Patient Goals  Patient goals for therapy: decreased pain  Patient goal: To negotiate stairs without pain  Objective     Observations   Left Knee   Negative for edema  Right Knee   Negative for edema  Palpation   Left   No palpable tenderness to the rectus femoris, vastus lateralis and vastus medialis  Right   No palpable tenderness to the rectus femoris, vastus lateralis and vastus medialis  Tenderness   Left Knee   No tenderness in the lateral joint line, lateral patella, medial joint line, medial patella and patellar tendon  Right Knee   No tenderness in the lateral joint line, lateral patella, medial joint line, medial patella and patellar tendon  Neurological Testing     Sensation     Knee   Left Knee   Intact: light touch    Right Knee   Intact: light touch     Active Range of Motion   Left Knee   Normal active range of motion    Right Knee   Normal active range of motion    Mobility   Patellar Mobility:   Left Knee   WFL: medial, lateral, superior and inferior       Right Knee   WFL: medial, lateral, superior and inferior    Patellar Static Positioning   Left Knee: WFL  Right Knee: [facility]    Strength/Myotome Testing     Left Knee   Flexion: 5  Extension: 5  Quadriceps contraction: good    Right Knee   Flexion: 5  Extension: 5  Quadriceps contraction: good    Additional Strength Details  Bilateral hips grossly 4+/5     Tests     Left Knee   Negative anterior drawer, Apley's compression, Apley's distraction, lateral Tan, medial Tan, patellar compression, posterior drawer, valgus stress test at 0 degrees, valgus stress test at 30 degrees, varus stress test at 0 degrees and varus stress test at 30 degrees  Right Knee   Negative anterior drawer, Apley's compression, Apley's distraction, lateral Tan, medial Tan, patellar compression, posterior drawer, valgus stress test at 0 degrees, valgus stress test at 30 degrees, varus stress test at 0 degrees and varus stress test at 30 degrees  Additional Tests Details  (+) bilateral hamstring tightness (45 degrees) and bilateral quad tightness    Ambulation   Weight-Bearing Status   Weight-Bearing Status (Left): full weight bearing   Weight-Bearing Status (Right): full weight-bearing    Assistive device used: none    Ambulation: Level Surfaces   Ambulation without assistive device: independent             Precautions: HTN, Bradycardia  Access Code: [email or web address]  URL: [email or web address]  Date: 03/16/2022  Prepared by: Ramone Fletcher       Manuals 5/2 5/4 5/9 5/11 5/16 5/18 5/23 5/25 4/13 4/20   Prone quad stretch ezio  Supine HS stretch ezio             IASTM distal quads and patellar tendon ezio          Held   Jaspal taping left knee for lateral glide             K tape with active L knee flexion with glide  SC JF JF SC SC JF declined  SC  GR   RE performed        JF                               Neuro Re-Ed                                                                                                        Ther Ex             Pt education             Bike Nustep 10' L5 L1x10' L1x10' L1 10 min  L1 10 min  L1 10 min  L1 10 min  L1 10 min  nustep 5 10 min  Nustep L5 10 min   Matrix quads 15# 3x10 15# 3x10 20# 2x 10 20# 3x 10  20# 3x 10  20# 3x 10  20# 3x 10   10# 3x 10  15# 3x10   Matrix HS 20# 3x10 20# 3x10 30#  2x 10  30# 3x 10  30# 3x 10 30# 3x 10 30# 3x 10   20# 3x 10  20# 3x10   Standing HS stretch ezio   3x30" 3x30" 3x 30"       3x30"   Prone quad stretch with strap ezio 3x30"        3x 30"  3x30"   Monster walk 10 trips RTB 10 trips RTB 10 trips RTB NV 5 laps RTB         STS walk 10 trips RTB 10 trips RTB 10 trips RTB NV Side step RTB 5 laps                                                             Ther Activity             Step-ups F/L F 6" 3x10 ea fwd 6" 3x10 F 6" 2x 10  F 6" 2x 10  F 8" 2x 10 LLE only  F 8" 2x 10 LLE only  F 8 min 2x 10   F 6" 3x 10   F 6" 3x10 ea  Lateral step-down 4" x20 ea 4" x20 ea L4x20" 4" 2x 10  4" 2x 10  4" 2x 10    4" 15x  4" x20 ea  Bkwd lunge on slider 2x10 ea leg            STS no UE  2x10 2x 10  2x 10   2x 10        TG squats L22 2x15 np   L22 single LE 10x ea  L22 single LE 10x ea  L22 single Le 2x 10 ea     L22 2x 15  L22 2x15   Stationary lunges      L only 2x 10  L only 1x 10, 1x9  L only  2x 10                                                           Gait Training                                       Modalities

## 2022-05-25 ENCOUNTER — EVALUATION (OUTPATIENT)
Dept: PHYSICAL THERAPY | Facility: CLINIC | Age: 82
End: 2022-05-25
Payer: COMMERCIAL

## 2022-05-25 DIAGNOSIS — M25.561 ARTHRALGIA OF BOTH KNEES: Primary | ICD-10-CM

## 2022-05-25 DIAGNOSIS — M25.562 ARTHRALGIA OF BOTH KNEES: Primary | ICD-10-CM

## 2022-05-25 PROCEDURE — 97110 THERAPEUTIC EXERCISES: CPT | Performed by: PHYSICAL THERAPIST

## 2022-08-31 ENCOUNTER — APPOINTMENT (OUTPATIENT)
Dept: LAB | Facility: CLINIC | Age: 82
End: 2022-08-31
Payer: COMMERCIAL

## 2022-08-31 DIAGNOSIS — E55.9 VITAMIN D DEFICIENCY: ICD-10-CM

## 2022-08-31 DIAGNOSIS — I10 BENIGN ESSENTIAL HYPERTENSION: ICD-10-CM

## 2022-08-31 LAB
ALBUMIN SERPL BCP-MCNC: 3.6 G/DL (ref 3.5–5)
ALP SERPL-CCNC: 58 U/L (ref 46–116)
ALT SERPL W P-5'-P-CCNC: 24 U/L (ref 12–78)
ANION GAP SERPL CALCULATED.3IONS-SCNC: 6 MMOL/L (ref 4–13)
AST SERPL W P-5'-P-CCNC: 20 U/L (ref 5–45)
BASOPHILS # BLD AUTO: 0.04 THOUSANDS/ΜL (ref 0–0.1)
BASOPHILS NFR BLD AUTO: 1 % (ref 0–1)
BILIRUB SERPL-MCNC: 0.67 MG/DL (ref 0.2–1)
BUN SERPL-MCNC: 17 MG/DL (ref 5–25)
CALCIUM SERPL-MCNC: 8.9 MG/DL (ref 8.3–10.1)
CHLORIDE SERPL-SCNC: 112 MMOL/L (ref 96–108)
CHOLEST SERPL-MCNC: 175 MG/DL
CO2 SERPL-SCNC: 25 MMOL/L (ref 21–32)
CREAT SERPL-MCNC: 1.05 MG/DL (ref 0.6–1.3)
EOSINOPHIL # BLD AUTO: 0.17 THOUSAND/ΜL (ref 0–0.61)
EOSINOPHIL NFR BLD AUTO: 4 % (ref 0–6)
ERYTHROCYTE [DISTWIDTH] IN BLOOD BY AUTOMATED COUNT: 13.2 % (ref 11.6–15.1)
GFR SERPL CREATININE-BSD FRML MDRD: 65 ML/MIN/1.73SQ M
GLUCOSE P FAST SERPL-MCNC: 94 MG/DL (ref 65–99)
HCT VFR BLD AUTO: 40.7 % (ref 36.5–49.3)
HDLC SERPL-MCNC: 49 MG/DL
HGB BLD-MCNC: 13.7 G/DL (ref 12–17)
IMM GRANULOCYTES # BLD AUTO: 0.01 THOUSAND/UL (ref 0–0.2)
IMM GRANULOCYTES NFR BLD AUTO: 0 % (ref 0–2)
LDLC SERPL CALC-MCNC: 115 MG/DL (ref 0–100)
LYMPHOCYTES # BLD AUTO: 1.2 THOUSANDS/ΜL (ref 0.6–4.47)
LYMPHOCYTES NFR BLD AUTO: 26 % (ref 14–44)
MCH RBC QN AUTO: 30.9 PG (ref 26.8–34.3)
MCHC RBC AUTO-ENTMCNC: 33.7 G/DL (ref 31.4–37.4)
MCV RBC AUTO: 92 FL (ref 82–98)
MONOCYTES # BLD AUTO: 0.48 THOUSAND/ΜL (ref 0.17–1.22)
MONOCYTES NFR BLD AUTO: 10 % (ref 4–12)
NEUTROPHILS # BLD AUTO: 2.75 THOUSANDS/ΜL (ref 1.85–7.62)
NEUTS SEG NFR BLD AUTO: 59 % (ref 43–75)
NONHDLC SERPL-MCNC: 126 MG/DL
NRBC BLD AUTO-RTO: 0 /100 WBCS
PLATELET # BLD AUTO: 210 THOUSANDS/UL (ref 149–390)
PMV BLD AUTO: 10.1 FL (ref 8.9–12.7)
POTASSIUM SERPL-SCNC: 4.1 MMOL/L (ref 3.5–5.3)
PROT SERPL-MCNC: 6.9 G/DL (ref 6.4–8.4)
PSA SERPL-MCNC: 0.4 NG/ML (ref 0–4)
RBC # BLD AUTO: 4.44 MILLION/UL (ref 3.88–5.62)
SODIUM SERPL-SCNC: 143 MMOL/L (ref 135–147)
TRIGL SERPL-MCNC: 54 MG/DL
TSH SERPL DL<=0.05 MIU/L-ACNC: 1.3 UIU/ML (ref 0.45–4.5)
WBC # BLD AUTO: 4.65 THOUSAND/UL (ref 4.31–10.16)

## 2022-08-31 PROCEDURE — 82652 VIT D 1 25-DIHYDROXY: CPT

## 2022-08-31 PROCEDURE — 85025 COMPLETE CBC W/AUTO DIFF WBC: CPT

## 2022-08-31 PROCEDURE — 80053 COMPREHEN METABOLIC PANEL: CPT

## 2022-08-31 PROCEDURE — 80061 LIPID PANEL: CPT

## 2022-08-31 PROCEDURE — 36415 COLL VENOUS BLD VENIPUNCTURE: CPT

## 2022-08-31 PROCEDURE — 84443 ASSAY THYROID STIM HORMONE: CPT

## 2022-09-01 ENCOUNTER — RA CDI HCC (OUTPATIENT)
Dept: OTHER | Facility: HOSPITAL | Age: 82
End: 2022-09-01

## 2022-09-01 NOTE — PROGRESS NOTES
[facility] 75  coding opportunities       Chart reviewed, no opportunity found:   [address]        Patients Insurance     Medicare Insurance: [employer]

## 2022-09-02 LAB — 1,25(OH)2D3 SERPL-MCNC: 45 PG/ML (ref 24.8–81.5)

## 2022-09-07 ENCOUNTER — OFFICE VISIT (OUTPATIENT)
Dept: FAMILY MEDICINE CLINIC | Facility: CLINIC | Age: 82
End: 2022-09-07
Payer: COMMERCIAL

## 2022-09-07 VITALS
DIASTOLIC BLOOD PRESSURE: 68 MMHG | WEIGHT: 177 LBS | OXYGEN SATURATION: 98 % | HEIGHT: 69 IN | HEART RATE: 54 BPM | RESPIRATION RATE: 16 BRPM | BODY MASS INDEX: 26.22 KG/M2 | SYSTOLIC BLOOD PRESSURE: 126 MMHG

## 2022-09-07 DIAGNOSIS — C61 PROSTATE CANCER (HCC): ICD-10-CM

## 2022-09-07 DIAGNOSIS — R00.2 PALPITATIONS: ICD-10-CM

## 2022-09-07 DIAGNOSIS — N52.1 ERECTILE DYSFUNCTION DUE TO DISEASES CLASSIFIED ELSEWHERE: ICD-10-CM

## 2022-09-07 DIAGNOSIS — Z23 ENCOUNTER FOR IMMUNIZATION: Primary | ICD-10-CM

## 2022-09-07 DIAGNOSIS — R00.1 BRADYCARDIA: ICD-10-CM

## 2022-09-07 DIAGNOSIS — E55.9 VITAMIN D DEFICIENCY: ICD-10-CM

## 2022-09-07 DIAGNOSIS — I10 BENIGN ESSENTIAL HYPERTENSION: ICD-10-CM

## 2022-09-07 DIAGNOSIS — N52.31 ERECTILE DYSFUNCTION AFTER RADICAL PROSTATECTOMY: ICD-10-CM

## 2022-09-07 PROBLEM — H25.013 CORTICAL AGE-RELATED CATARACT OF BOTH EYES: Status: RESOLVED | Noted: 2022-03-22 | Resolved: 2022-09-07

## 2022-09-07 PROBLEM — Z01.818 PRE-OP EXAMINATION: Status: RESOLVED | Noted: 2022-03-22 | Resolved: 2022-09-07

## 2022-09-07 PROCEDURE — 99214 OFFICE O/P EST MOD 30 MIN: CPT | Performed by: FAMILY MEDICINE

## 2022-09-07 RX ORDER — SILDENAFIL 100 MG/1
100 TABLET, FILM COATED ORAL DAILY PRN
Qty: 30 TABLET | Refills: 1 | Status: SHIPPED | OUTPATIENT
Start: 2022-09-07

## 2022-09-07 NOTE — PROGRESS NOTES
Subjective:      Patient ID: Corie Gotti is a 80 y o  male  40-year-old male presents with his wife for follow-up of chronic conditions including chronic headaches, hypertension, hypokalemia  Patient does have prior history of prostate cancer status post treatment  Overall feels well  He does follow up with Urology  PSA has not changed  Patient remains on atenolol both for treatment of hypertension as well as chronic headaches which she really has not experienced  Labs reviewed in detail  Past Medical History:   Diagnosis Date    Actinic keratosis     Last assessed - 10/15/14    Cortical age-related cataract of both eyes 3/22/2022    Hypertension     Hypokalemia     Last assessed - 8/13/14    Sinus bradycardia     Last assessed - 8/13/14       Family History   Problem Relation Age of Onset    Prostate cancer Father     Liver cancer Brother         Adenocarcinoma        Past Surgical History:   Procedure Laterality Date    HERNIA REPAIR      SIGMOIDOSCOPY      (Fiberoptic, Therapeutic) 7/28/2014, 8/25/14        reports that he has never smoked  He has never used smokeless tobacco  He reports that he does not drink alcohol and does not use drugs        Current Outpatient Medications:     atenolol (TENORMIN) 50 mg tablet, Take 1 tablet (50 mg total) by mouth daily, Disp: 90 tablet, Rfl: 3    azelastine (ASTELIN) 0 1 % nasal spray, SPRAY 1 SPRAY INTO EACH NOSTRIL 2 (TWO) TIMES A DAY USE IN EACH NOSTRIL AS DIRECTED, Disp: 1 Bottle, Rfl: 1    betamethasone valerate (VALISONE) 0 1 % cream, Apply topically 2 (two) times a day, Disp: 45 g, Rfl: 0    Cholecalciferol (VITAMIN D3) 1000 UNIT/SPRAY LIQD, Take 1 tablet by mouth daily, Disp: , Rfl:     Coenzyme Q10 100 MG CHEW, Chew 200 mg, Disp: , Rfl:     Diclofenac Sodium (VOLTAREN) 1 %, Apply 2 g topically 4 (four) times a day, Disp: 100 g, Rfl: 3    Difluprednate 0 05 % EMUL, INSTILL 1 DROP INTO LEFT EYE FOUR TIMES A DAY AS DIRECTED FOR USE AFTER SURGERY, Disp: , Rfl:     Omega-3-Acid Eth Est, Dietary, 1 g CAPS, Take by mouth, Disp: , Rfl:     Potassium 99 MG TABS, Take 1 tablet by mouth 2 (two) times a day, Disp: , Rfl:     sildenafil (VIAGRA) 100 mg tablet, Take 1 tablet (100 mg total) by mouth daily as needed for erectile dysfunction, Disp: 30 tablet, Rfl: 1    ofloxacin (OCUFLOX) 0 3 % ophthalmic solution, INSTILL 1 DROP INTO LEFT EYE FOUR TIMES A DAY START THREE DAYS PRIOR TO SURGERY IN OPERATIVE EYE (Patient not taking: Reported on 9/7/2022), Disp: , Rfl:     The following portions of the patient's history were reviewed and updated as appropriate: allergies, current medications, past family history, past medical history, past social history, past surgical history and problem list     Review of Systems   Constitutional: Negative  HENT: Negative  Eyes: Negative  Respiratory: Negative  Cardiovascular: Negative  Gastrointestinal: Negative  Endocrine: Negative  Genitourinary: Negative  Musculoskeletal: Negative  Skin: Negative  Allergic/Immunologic: Negative  Neurological: Negative  Hematological: Negative  Psychiatric/Behavioral: Negative  All other systems reviewed and are negative  Objective:    /68   Pulse (!) 54   Resp 16   Ht 5' 9" (1 753 m)   Wt 80 3 kg (177 lb)   SpO2 98%   BMI 26 14 kg/m²      Physical Exam  Vitals and nursing note reviewed  Constitutional:       General: He is not in acute distress  Appearance: Normal appearance  He is well-developed and normal weight  He is not ill-appearing  HENT:      Head: Normocephalic and atraumatic  Right Ear: Tympanic membrane, ear canal and external ear normal       Left Ear: Tympanic membrane, ear canal and external ear normal       Nose: Nose normal       Mouth/Throat:      Mouth: Mucous membranes are moist    Eyes:      Extraocular Movements: Extraocular movements intact        Conjunctiva/sclera: Conjunctivae normal  Pupils: Pupils are equal, round, and reactive to light  Cardiovascular:      Rate and Rhythm: Regular rhythm  Bradycardia present  Pulses: Normal pulses  Heart sounds: Normal heart sounds  No murmur heard  Pulmonary:      Effort: Pulmonary effort is normal       Breath sounds: Normal breath sounds  Abdominal:      General: Abdomen is flat  Bowel sounds are normal       Palpations: Abdomen is soft  Musculoskeletal:         General: Normal range of motion  Cervical back: Normal range of motion and neck supple  Skin:     General: Skin is warm and dry  Neurological:      General: No focal deficit present  Mental Status: He is alert and oriented to person, place, and time  Psychiatric:         Mood and Affect: Mood normal          Behavior: Behavior normal          Thought Content:  Thought content normal          Judgment: Judgment normal            Recent Results (from the past 1008 hour(s))   PSA Total, Diagnostic    Collection Time: 08/31/22  8:13 AM   Result Value Ref Range    PSA, Diagnostic 0 4 0 0 - 4 0 ng/mL   CBC and differential    Collection Time: 08/31/22  8:13 AM   Result Value Ref Range    WBC 4 65 4 31 - 10 16 Thousand/uL    RBC 4 44 3 88 - 5 62 Million/uL    Hemoglobin 13 7 12 0 - 17 0 g/dL    Hematocrit 40 7 36 5 - 49 3 %    MCV 92 82 - 98 fL    MCH 30 9 26 8 - 34 3 pg    MCHC 33 7 31 4 - 37 4 g/dL    RDW 13 2 11 6 - 15 1 %    MPV 10 1 8 9 - 12 7 fL    Platelets 748 738 - 099 Thousands/uL    nRBC 0 /100 WBCs    Neutrophils Relative 59 43 - 75 %    Immat GRANS % 0 0 - 2 %    Lymphocytes Relative 26 14 - 44 %    Monocytes Relative 10 4 - 12 %    Eosinophils Relative 4 0 - 6 %    Basophils Relative 1 0 - 1 %    Neutrophils Absolute 2 75 1 85 - 7 62 Thousands/µL    Immature Grans Absolute 0 01 0 00 - 0 20 Thousand/uL    Lymphocytes Absolute 1 20 0 60 - 4 47 Thousands/µL    Monocytes Absolute 0 48 0 17 - 1 22 Thousand/µL    Eosinophils Absolute 0 17 0 00 - 0 61 Thousand/µL    Basophils Absolute 0 04 0 00 - 0 10 Thousands/µL   Comprehensive metabolic panel    Collection Time: 08/31/22  8:13 AM   Result Value Ref Range    Sodium 143 135 - 147 mmol/L    Potassium 4 1 3 5 - 5 3 mmol/L    Chloride 112 (H) 96 - 108 mmol/L    CO2 25 21 - 32 mmol/L    ANION GAP 6 4 - 13 mmol/L    BUN 17 5 - 25 mg/dL    Creatinine 1 05 0 60 - 1 30 mg/dL    Glucose, Fasting 94 65 - 99 mg/dL    Calcium 8 9 8 3 - 10 1 mg/dL    AST 20 5 - 45 U/L    ALT 24 12 - 78 U/L    Alkaline Phosphatase 58 46 - 116 U/L    Total Protein 6 9 6 4 - 8 4 g/dL    Albumin 3 6 3 5 - 5 0 g/dL    Total Bilirubin 0 67 0 20 - 1 00 mg/dL    eGFR 65 ml/min/1 73sq m   Lipid panel    Collection Time: 08/31/22  8:13 AM   Result Value Ref Range    Cholesterol 175 See Comment mg/dL    Triglycerides 54 See Comment mg/dL    HDL, Direct 49 >=40 mg/dL    LDL Calculated 115 (H) 0 - 100 mg/dL    Non-HDL-Chol (CHOL-HDL) 126 mg/dl   TSH, 3rd generation with Free T4 reflex    Collection Time: 08/31/22  8:13 AM   Result Value Ref Range    TSH 3RD GENERATON 1 300 0 450 - 4 500 uIU/mL   Vitamin D 1,25 dihydroxy    Collection Time: 08/31/22  8:13 AM   Result Value Ref Range    Vit D, 1,25-Dihydroxy 45 0 24 8 - 81 5 pg/mL       Assessment/Plan:    Benign essential hypertension  Very go well controlled on atenolol 50 mg once daily which is used a both to treat his hypertension as well as migraine headache prophylaxis  Continue same dosage    Prostate cancer ([facility] [address] )  History of prostatectomy  Patient does follow-up with urologist once yearly  Vitamin D deficiency  Remains on vitamin-D supplementation  Check vitamin-D level with next set of labs    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  Cialis has been effective for the patient however he would like to try sildenafil 100 mg  Side effects reviewed  He has been on generic Viagra in the past    Bradycardia  Mild sinus bradycardia  Patient is essentially asymptomatic with this    He remains on atenolol 50 mg once daily which can contribute  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  Problem List Items Addressed This Visit        Cardiovascular and Mediastinum    Benign essential hypertension     Very go well controlled on atenolol 50 mg once daily which is used a both to treat his hypertension as well as migraine headache prophylaxis  Continue same dosage         Relevant Orders    CBC and differential    Comprehensive metabolic panel    Lipid panel    TSH, 3rd generation with Free T4 reflex       Genitourinary    Prostate cancer ([facility] [address] )     History of prostatectomy  Patient does follow-up with urologist once yearly  Other    Bradycardia     Mild sinus bradycardia  Patient is essentially asymptomatic with this  He remains on atenolol 50 mg once daily which can contribute  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

## 2022-09-09 PROBLEM — R19.7 FREQUENT DIARRHEA: Status: RESOLVED | Noted: 2022-03-22 | Resolved: 2022-09-09

## 2022-09-09 PROBLEM — H81.11 BENIGN PAROXYSMAL POSITIONAL VERTIGO OF RIGHT EAR: Status: RESOLVED | Noted: 2020-02-04 | Resolved: 2022-09-09

## 2022-09-09 NOTE — ASSESSMENT & PLAN NOTE
Cialis has been effective for the patient however he would like to try sildenafil 100 mg  Side effects reviewed    He has been on generic Viagra in the past

## 2022-09-09 NOTE — ASSESSMENT & PLAN NOTE
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

## 2022-09-09 NOTE — ASSESSMENT & PLAN NOTE
Very go well controlled on atenolol 50 mg once daily which is used a both to treat his hypertension as well as migraine headache prophylaxis    Continue same dosage

## 2022-09-14 ENCOUNTER — OFFICE VISIT (OUTPATIENT)
Dept: UROLOGY | Facility: CLINIC | Age: 82
End: 2022-09-14
Payer: COMMERCIAL

## 2022-09-14 VITALS
BODY MASS INDEX: 25.33 KG/M2 | OXYGEN SATURATION: 98 % | WEIGHT: 171 LBS | DIASTOLIC BLOOD PRESSURE: 76 MMHG | HEIGHT: 69 IN | HEART RATE: 60 BPM | SYSTOLIC BLOOD PRESSURE: 124 MMHG

## 2022-09-14 DIAGNOSIS — C61 PROSTATE CANCER (HCC): Primary | ICD-10-CM

## 2022-09-14 PROCEDURE — 1160F RVW MEDS BY RX/DR IN RCRD: CPT | Performed by: PHYSICIAN ASSISTANT

## 2022-09-14 PROCEDURE — 99213 OFFICE O/P EST LOW 20 MIN: CPT | Performed by: PHYSICIAN ASSISTANT

## 2022-09-14 PROCEDURE — 3074F SYST BP LT 130 MM HG: CPT | Performed by: PHYSICIAN ASSISTANT

## 2022-09-14 PROCEDURE — 3078F DIAST BP <80 MM HG: CPT | Performed by: PHYSICIAN ASSISTANT

## 2022-11-01 NOTE — PROGRESS NOTES
PT Evaluation     Today's date: 11/3/2022  Patient name: Priyanka Sommer  : 1940  MRN: [id number]  Referring provider: Mariza Nunez DO  Dx:   Encounter Diagnosis     ICD-10-CM    1  Acute pain of both knees  M25 561     M25 562        Start Time: 717  Stop Time:   Total time in clinic (min): 60 minutes    Assessment  Assessment details: Priyanka Sommer is a a pleasant 80 y o  male who presents today for evaluation via direct access with pain, decreased strength, decreased ROM, joint effusion and balance dysfunction  Vikas Harveyer complains of aching pain in both knee ranging from 0/10 to 10/10  Pain is exacerbated by activity or standing and made better by rest     The patient demonstrates minimally decreased strength during resisted muscle testing  Pt ROM is minimally decreased with no pain  The patient has difficulty with ambulation, transfers, leisure, athletics and work  Patient will benefit from skilled physical therapy, including therapeutic exercise, stretching, balance training, manual therapy and modalities prn to improve their level of function, to increase overall quality of life, and to address his impairments  Impairments: abnormal coordination, abnormal gait, abnormal muscle firing, abnormal or restricted ROM, abnormal movement, activity intolerance, impaired balance, impaired physical strength, lacks appropriate home exercise program, pain with function, safety issue, weight-bearing intolerance, poor posture  and poor body mechanics  Understanding of Dx/Px/POC: excellent  Goals  ST  Independent with HEP in 2 weeks  2  Pt will have verbal report of improvement in symptoms by >/=25% in 2 weeks  3  Decrease pain to 7/10 at it's worst   4  Increase strength by 1/2 grade in all deficient planes  To be achieved by D/C   LT  Pt will improve FOTO score by >/= goal points in 6 weeks    2  Pt will improve FOTO score to >/= goal score by visit # 12   3  Pt will be able to walk a mile with little to no difficulty  4  Pt will be able to stand for an hour with little to no difficulty  5  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  Plan  Patient would benefit from: skilled PT  Planned modality interventions: cryotherapy, hydrotherapy, TENS and unattended electrical stimulation  Planned therapy interventions: activity modification, ADL retraining, balance, balance/weight bearing training, behavior modification, body mechanics training, functional ROM exercises, gait training, home exercise program, IADL retraining, joint mobilization, manual therapy, massage, neuromuscular re-education, patient education, strengthening, stretching, therapeutic activities, therapeutic exercise and transfer training  Frequency: 1x week (2-3x week)  Duration in weeks: 12  Plan of Care beginning date: 11/3/2022  Plan of Care expiration date: 1/3/2023  Treatment plan discussed with: patient        Subjective Evaluation    History of Present Illness  Mechanism of injury: Yuko Romeo presents today with complaints of bilateral knee pain that began on  after walking [place] beach [place]  Mechanism of injury was atraumatic, notes no falls or pops    Pt has no recent imaging for this problem  The patient also reports diminished strength, decreased ROM, decreased endurance and difficulty with function  Relevant PMH includes HTN, ZAINAB, chronic knee pain    Pain  Current pain ratin  At best pain ratin  At worst pain rating: 10  Quality: dull ache  Relieving factors: change in position and support  Aggravating factors: standing and stair climbing  Progression: improved      Diagnostic Tests  No diagnostic tests performed  Patient Goals  Patient goals for therapy: decreased edema, decreased pain, improved balance, return to sport/leisure activities, independence with ADLs/IADLs, increased strength and increased motion          Objective Observations   Left Knee   Positive for effusion  Palpation   Left   No palpable tenderness to the distal biceps femoris  Right   No palpable tenderness to the distal biceps femoris  Neurological Testing     Sensation     Knee   Left Knee   Intact: light touch    Right Knee   Intact: light touch     Active Range of Motion   Left Hip   Normal active range of motion    Right Hip   Normal active range of motion  Left Knee   Normal active range of motion    Right Knee   Normal active range of motion    Strength/Myotome Testing     Left Hip   Planes of Motion   Flexion: 4  Abduction: 4  Adduction: 4    Right Hip   Planes of Motion   Flexion: 4-  Abduction: 4  Adduction: 4    Left Knee   Flexion: 4  Extension: 4    Right Knee   Flexion: 4  Extension: 4    Tests     Left Hip   Positive Ely's  90/90 SLR: Positive  SLR: Positive  Right Hip   Positive Ely's  90/90 SLR: Positive  SLR: Positive  Left Knee   Negative anterior drawer, Apley's compression, posterior drawer, Thessaly's test at 5 degrees, valgus stress test at 0 degrees, valgus stress test at 30 degrees, varus stress test at 0 degrees and varus stress test at 30 degrees  Right Knee   Negative anterior drawer, Apley's compression, posterior drawer, Thessaly's test at 5 degrees, valgus stress test at 0 degrees, valgus stress test at 30 degrees, varus stress test at 0 degrees and varus stress test at 30 degrees  Precautions: HTN, ZAINAB    Access Code: [email or web address]  URL: [email or web address]  Date: 11/03/2022  Prepared by: Harry Shipley     Manuals 11/3                                      Neuro Re-Ed             SL stance                          Rockerboard             Blue foam bal              StS walk on foam             Heel toe walk on blue foam             SL Clock              Ther Ex             Rec   Bike 10'            Quad sets             Glute sets                          Supine Clam shells Hip add supine             SLR 4 way HEP            SAQ             LAQ HEP            Bridge HEP            HS stretch 60"            Calf stretch 60"            Quad stretch 60"            Heel raises             Mini squat             Matrix both             Backwards walking             Ther Activity             STS/TG             FSU             LSU             Gait Training                                       Modalities

## 2022-11-03 ENCOUNTER — OFFICE VISIT (OUTPATIENT)
Dept: PHYSICAL THERAPY | Facility: CLINIC | Age: 82
End: 2022-11-03

## 2022-11-03 DIAGNOSIS — M25.562 ACUTE PAIN OF BOTH KNEES: Primary | ICD-10-CM

## 2022-11-03 DIAGNOSIS — M25.561 ACUTE PAIN OF BOTH KNEES: Primary | ICD-10-CM

## 2022-11-07 ENCOUNTER — APPOINTMENT (OUTPATIENT)
Dept: PHYSICAL THERAPY | Facility: CLINIC | Age: 82
End: 2022-11-07

## 2022-11-08 ENCOUNTER — OFFICE VISIT (OUTPATIENT)
Dept: PHYSICAL THERAPY | Facility: CLINIC | Age: 82
End: 2022-11-08

## 2022-11-08 DIAGNOSIS — M25.561 ACUTE PAIN OF BOTH KNEES: Primary | ICD-10-CM

## 2022-11-08 DIAGNOSIS — M25.562 ACUTE PAIN OF BOTH KNEES: Primary | ICD-10-CM

## 2022-11-08 NOTE — PROGRESS NOTES
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 and noted his main source of pain is while doing stairs more in the evening verse morning  Objective: See treatment diary below      Assessment:  Continued with treatment session progressed within the POC written below  Pt noted having the most challenge with lateral side step ups L> R  Tolerated treatment fairly well  Patient exhibited good technique with therapeutic exercises and would benefit from continued PT    DOMS - revied and educagted on 24 to 48 hours of muscle soreness  Plan: Continue per plan of care  Precautions: HTN, ZAINAB    Access Code: [email or web address] - updated on     Manuals 11/3 11/8                                     Neuro Re-Ed             SL stance                          Rockerboard             Blue foam bal              StS walk on foam             Heel toe walk on blue foam             SL Clock                                                     Ther Ex             Rec   Bike 10' 10 min                         Supine Clam shells             Hip add supine             SLR 4 way HEP SLR flexion  2x 10            LAQ HEP            Bridge HEP            HS stretch 60"            Calf stretch 60"            Quad stretch 60"            Mini squat             Matrix knee flexion              Matrix extension   25# 2x 10                         Backwards walking                                                    Ther Activity             STS/TG  TG 2 x 10            FSU  2x 10 6"            LSU  2x 10 6"                                                               Gait Training Modalities

## 2022-11-10 ENCOUNTER — APPOINTMENT (OUTPATIENT)
Dept: PHYSICAL THERAPY | Facility: CLINIC | Age: 82
End: 2022-11-10

## 2022-11-14 ENCOUNTER — APPOINTMENT (OUTPATIENT)
Dept: PHYSICAL THERAPY | Facility: CLINIC | Age: 82
End: 2022-11-14

## 2022-11-14 NOTE — PROGRESS NOTES
Daily Note    Today's date: 11/15/22  Patient name: Arnulfo Valderrama  : 1940  MRN: [id number]  Referring provider: Kate Bear DO  Dx:   Encounter Diagnosis     ICD-10-CM    1  Acute pain of both knees  M25 561     M25 562        Start Time: 945  Stop Time: 1030  Total time in clinic (min): 45 minutes      Subjective: Devonte Bolaños reports that he had some pain doing stairs on Friday and Saturday, and had some worsening of symptoms due to the weather  Objective: See treatment diary below  Assessment: Devonte Bolaños tolerated treatment well with consistent cuing throughout  TE's were performed with increased resistance and increased reps  New TE's were demonstrated with proper technique, and tolerated well  Following treatment, the patient demonstrated fatigue post treatment, exhibited good technique with therapeutic exercises and would benefit from continued PT  Plan: Continue per plan of care  Progress treatment as tolerated  Precautions: HTN, ZAINAB    Access Code: [email or web address] - updated on     Manuals 11/3 11/8 11/15                                    Neuro Re-Ed             SL stance                          Rockerboard   2'/2'          Blue foam bal              StS walk on foam             Heel toe walk on blue foam             SL Clock                                                     Ther Ex             Rec   Bike 10' 10 min  10'                       Supine Clam shells             Hip add supine             SLR 4 way HEP SLR flexion  2x 10  Standing YTB ext/ ab 2x10          Heel/toe raise   3x20          LAQ HEP            Bridge HEP            HS stretch 60"  90"          Calf stretch 60"  90"          Quad stretch 60"            Mini squat             Matrix knee flexion    35# 3x10          Matrix extension   25# 2x 10  25# 3x10                       Backwards walking                                                    Ther Activity             STS/TG  TG 2 x 10  L22 3x10          FSU  2x 10 6"            LSU  2x 10 6"                                                               Gait Training                                       Modalities                                            Jose Carlos Giles, PT  11/15/2022,10:22 AM

## 2022-11-15 ENCOUNTER — OFFICE VISIT (OUTPATIENT)
Dept: PHYSICAL THERAPY | Facility: CLINIC | Age: 82
End: 2022-11-15

## 2022-11-15 DIAGNOSIS — M25.562 ACUTE PAIN OF BOTH KNEES: Primary | ICD-10-CM

## 2022-11-15 DIAGNOSIS — M25.561 ACUTE PAIN OF BOTH KNEES: Primary | ICD-10-CM

## 2022-11-16 ENCOUNTER — TELEPHONE (OUTPATIENT)
Dept: FAMILY MEDICINE CLINIC | Facility: CLINIC | Age: 82
End: 2022-11-16

## 2022-11-16 NOTE — TELEPHONE ENCOUNTER
Patient called and stated that yesterday he was feeling a little woosey  So this morning he woke up with a HA, which he says is unusual for him  He took his bp and it was 165/82  He says its been running around that # the last few times he's taken it    Please advise

## 2022-11-16 NOTE — TELEPHONE ENCOUNTER
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

## 2022-11-16 NOTE — TELEPHONE ENCOUNTER
I called and spoke with patient and he advised he still feels a bit off today with the headache  When he bends over he feels dizzy  No Sob or chest pain  Patient did think he was maybe dehydrated yesterday so he increased his water intake  Still has the headache today

## 2022-11-16 NOTE — TELEPHONE ENCOUNTER
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

## 2022-11-17 ENCOUNTER — APPOINTMENT (OUTPATIENT)
Dept: PHYSICAL THERAPY | Facility: CLINIC | Age: 82
End: 2022-11-17

## 2022-11-22 ENCOUNTER — OFFICE VISIT (OUTPATIENT)
Dept: PHYSICAL THERAPY | Facility: CLINIC | Age: 82
End: 2022-11-22

## 2022-11-22 DIAGNOSIS — M25.562 ACUTE PAIN OF BOTH KNEES: Primary | ICD-10-CM

## 2022-11-22 DIAGNOSIS — M25.561 ACUTE PAIN OF BOTH KNEES: Primary | ICD-10-CM

## 2022-11-22 NOTE — PROGRESS NOTES
Daily Note     Today's date: 2022  Patient name: Julianne Lundborg  : 1940  MRN: [id number]  Referring provider: Yuni Rodriguez DO  Dx: No diagnosis found  Start Time:           Subjective: Pt noted that he did have some pain in his L knee still  No braces worn upon arrival this treatment session  Objective: See treatment diary below      Assessment: Continued with treatment session, Pt noted having some minimal L knee discomfort with the step ups  Progressed to single LE squats on TG  Increase challenge noted  Tolerated treatment well  Patient exhibited good technique with therapeutic exercises and would benefit from continued PT  Plan: Continue per plan of care  Precautions: HTN, ZAINAB    Access Code: [email or web address] - updated on     Manuals 11/3 11/8 11/15 11/22                                   Neuro Re-Ed             SL stance             Rockerboard   2'/2'          SL Clock     2x 10                                                 Ther Ex             Rec   Bike 10' 10 min  10' 10 min                       Supine Clam shells             Hip add supine             SLR 4 way HEP SLR flexion  2x 10  Standing YTB ext/ ab 2x10 Standing 3 way YTB 2x 10          Heel/toe raise   3x20          LAQ HEP            Bridge HEP            HS stretch 60"  90"          Calf stretch 60"  90"          Quad stretch 60"            Mini squat             Matrix knee flexion    35# 3x10 35# 3x 10          Matrix extension   25# 2x 10  25# 3x10 25# 3x 10                       Backwards walking                                                    Ther Activity             STS/TG  TG 2 x 10  L22 3x10 L22 3x 10          FSU  2x 10 6"   6" 2x 10 e a          LSU  2x 10 6"           Lateral side to side walk     NV with TB                                                 Gait Training                                       Modalities

## 2022-11-25 ENCOUNTER — APPOINTMENT (OUTPATIENT)
Dept: PHYSICAL THERAPY | Facility: CLINIC | Age: 82
End: 2022-11-25

## 2022-11-29 ENCOUNTER — OFFICE VISIT (OUTPATIENT)
Dept: PHYSICAL THERAPY | Facility: CLINIC | Age: 82
End: 2022-11-29

## 2022-11-29 DIAGNOSIS — M25.561 ACUTE PAIN OF BOTH KNEES: Primary | ICD-10-CM

## 2022-11-29 DIAGNOSIS — M25.562 ACUTE PAIN OF BOTH KNEES: Primary | ICD-10-CM

## 2022-11-29 NOTE — PROGRESS NOTES
Daily Note    Today's date: 22  Patient name: Annabella Boxer  : 1940  MRN: [id number]  Referring provider: Rach Miller DO  Dx:   Encounter Diagnosis     ICD-10-CM    1  Acute pain of both knees  M25 561     M25 562           Start Time: 1115  Stop Time: 1200  Total time in clinic (min): 45 minutes      Subjective: Ermelinda Turpin reports that his R knee is feeling a bit better, but the left knee is bothering him lately and it gave out this morning while he was trying to sit down  Objective: See treatment diary below  Assessment: Ermelinda Turpin tolerated treatment well with moderate cuing  TE's were performed with increased resistance and increased reps  New TE's were demonstrated with proper technique, and tolerated well  Following treatment, the patient demonstrated fatigue post treatment, exhibited good technique with therapeutic exercises and would benefit from continued PT  Plan: Continue per plan of care  Progress treatment as tolerated  Precautions: HTN, ZAINAB    Access Code: [email or web address] - updated on     Manuals 11/3 11/8 11/15 11/22 11/29                                  Neuro Re-Ed             SL stance             Rockerboard   2'/2'          SL Clock     2x 10                                                 Ther Ex             Rec   Bike 10' 10 min  10' 10 min  10'                     Supine Clam shells             Hip add supine             SLR 4 way HEP SLR flexion  2x 10  Standing YTB ext/ ab 2x10 Standing 3 way YTB 2x 10  Standing RTB 2x10        Heel/toe raise   3x20          LAQ HEP            Bridge HEP            HS stretch 60"  90"          Calf stretch 60"  90"          Quad stretch 60"            Mini squat     3x10        Matrix knee flexion    35# 3x10 35# 3x 10  40# 3x12        Matrix extension   25# 2x 10  25# 3x10 25# 3x 10  25# 3x12                     Backwards walking                                                    Ther Activity             STS/TG  TG 2 x 10  L22 3x10 L22 3x 10  L22 3x12        FSU  2x 10 6"   6" 2x 10 e a          LSU  2x 10 6"           Lateral side to side walk     NV with TB  8x10'        Monster walk      8x10'                                  Gait Training                                       Danni Patel, PT  11/29/2022,11:31 AM

## 2022-12-02 ENCOUNTER — APPOINTMENT (OUTPATIENT)
Dept: PHYSICAL THERAPY | Facility: CLINIC | Age: 82
End: 2022-12-02

## 2022-12-06 ENCOUNTER — EVALUATION (OUTPATIENT)
Dept: PHYSICAL THERAPY | Facility: CLINIC | Age: 82
End: 2022-12-06

## 2022-12-06 DIAGNOSIS — M25.561 ACUTE PAIN OF BOTH KNEES: Primary | ICD-10-CM

## 2022-12-06 DIAGNOSIS — M25.562 ACUTE PAIN OF BOTH KNEES: Primary | ICD-10-CM

## 2022-12-06 NOTE — PROGRESS NOTES
PT Evaluation     Today's date: 2022  Patient name: Diego Sebastian  : 1940  MRN: [id number]  Referring provider: Roxana Leonard DO  Dx:   Encounter Diagnosis     ICD-10-CM    1  Acute pain of both knees  M25 561     M25 562           Start Time: 5  Stop Time: 1730  Total time in clinic (min): 45 minutes    Assessment  Assessment details: Diego Sebastian is a a pleasant 80 y o  male who presents today for evaluation via direct access with pain, decreased strength, decreased ROM, joint effusion and balance dysfunction  Jimy Higgins complains of aching pain in both knee ranging from 0/10 to 9/10  Pain is exacerbated by activity or standing and made better by rest     The patient demonstrates minimally decreased strength during resisted muscle testing  Pt ROM is minimally decreased with no pain  The patient has difficulty with stairs, athletics, and work  Patient will benefit from continued skilled physical therapy, including therapeutic exercise, stretching, balance training, manual therapy and modalities prn to improve their level of function, to increase overall quality of life, and to address his impairments  Jimy Higgins has met some of his goals at this time  Pt was instructed on his updated plan of care and wishes to continue therapy  Impairments: abnormal coordination, abnormal gait, abnormal muscle firing, abnormal or restricted ROM, abnormal movement, activity intolerance, impaired balance, impaired physical strength, lacks appropriate home exercise program, pain with function, safety issue, weight-bearing intolerance, poor posture  and poor body mechanics  Understanding of Dx/Px/POC: excellent  Goals  ST  Independent with HEP in 2 weeks  - GOAL MET  2  Pt will have verbal report of improvement in symptoms by >/=25% in 2 weeks  - GOAL MET  3  Decrease pain to 7/10 at it's worst  - GOAL NOT MET  4  Increase strength by 1/2 grade in all deficient planes   - GOAL MET    To be achieved by D/C LT  Pt will improve FOTO score by >/= goal points in 6 weeks  - GOAL PARTIALLY MET   2  Pt will improve FOTO score to >/= goal score by visit # 12  - GOAL PARTIALLY MET  3  Pt will be able to walk a mile with little to no difficulty  - GOAL MET  4  Pt will be able to stand for an hour with little to no difficulty  - GOAL MET  5  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  - GOAL MET  6  Pt will be able to perform stairs without little difficulty  Plan  Patient would benefit from: skilled PT  Planned modality interventions: cryotherapy, hydrotherapy, TENS and unattended electrical stimulation  Planned therapy interventions: activity modification, ADL retraining, balance, balance/weight bearing training, behavior modification, body mechanics training, functional ROM exercises, gait training, home exercise program, IADL retraining, joint mobilization, manual therapy, massage, neuromuscular re-education, patient education, strengthening, stretching, therapeutic activities, therapeutic exercise and transfer training  Frequency: 1x week  Duration in weeks: 12  Plan of Care beginning date: 2022  Plan of Care expiration date: 1/3/2023  Treatment plan discussed with: patient        Subjective Evaluation    History of Present Illness  Mechanism of injury: Lico Tellez reports some continued difficulty with doing stairs at home  He has no problem walking long distances or going down stairs  Pt notes that his R knee was more of a problem when he began therapy, but now his L knee is worse  He notes that the tape applied today feels good and lessened pain while doing stairs  Pt was advised to contact his MD for a referral to an orthopedic and imaging if his pain continues or worsens over the next month of therapy    Pain  Current pain ratin  At best pain ratin  At worst pain ratin  Quality: dull ache  Relieving factors: change in position and support  Aggravating factors: standing and stair climbing  Progression: improved      Diagnostic Tests  No diagnostic tests performed  Patient Goals  Patient goals for therapy: decreased edema, decreased pain, improved balance, return to sport/leisure activities, independence with ADLs/IADLs, increased strength and increased motion          Objective     Palpation   Left   No palpable tenderness to the distal biceps femoris  Right   No palpable tenderness to the distal biceps femoris  Neurological Testing     Sensation     Knee   Left Knee   Intact: light touch    Right Knee   Intact: light touch     Active Range of Motion   Left Hip   Normal active range of motion    Right Hip   Normal active range of motion  Left Knee   Normal active range of motion    Right Knee   Normal active range of motion    Strength/Myotome Testing     Left Hip   Planes of Motion   Flexion: 4  Abduction: 4  Adduction: 4    Right Hip   Planes of Motion   Flexion: 4  Abduction: 4  Adduction: 4    Left Knee   Flexion: 4  Extension: 4    Right Knee   Flexion: 4  Extension: 4           Precautions: HTN, ZAINAB    Access Code: [email or web address]  URL: [email or web address]  Date: 11/03/2022  Prepared by: Arlet Dallas       Manuals 11/3 11/8 11/15 11/22 11/29 12/6       KT Tape      TS - C strip, ext  > flx C - strip SC method      Re-eval      TS       Neuro Re-Ed             SL stance             Rockerboard   2'/2'          SL Clock     2x 10                                                 Ther Ex             Rec   Bike 10' 10 min  10' 10 min  10' 10'                    Supine Clam shells             Hip add supine             SLR 4 way HEP SLR flexion  2x 10  Standing YTB ext/ ab 2x10 Standing 3 way YTB 2x 10  Standing RTB 2x10        Heel/toe raise   3x20          LAQ HEP            Bridge HEP            HS stretch 60"  90"          Calf stretch 60"  90"          Quad stretch 60"            Mini squat     3x10        Matrix knee flexion    35# 3x10 35# 3x 10  40# 3x12 40# 3x15       Matrix extension   25# 2x 10  25# 3x10 25# 3x 10  25# 3x12 15# 3x10 SL                    Backwards walking      NV                                              Ther Activity             STS/TG  TG 2 x 10  L22 3x10 L22 3x 10  L22 3x12        FSU  2x 10 6"   6" 2x 10 e a   6" 3x8       LSU  2x 10 6"           Lateral side to side walk     NV with TB  8x10' NV       Monster walk      8x10' NV                                 Gait Training                                       Modalities

## 2022-12-13 ENCOUNTER — OFFICE VISIT (OUTPATIENT)
Dept: PHYSICAL THERAPY | Facility: CLINIC | Age: 82
End: 2022-12-13

## 2022-12-13 DIAGNOSIS — M25.562 ACUTE PAIN OF BOTH KNEES: Primary | ICD-10-CM

## 2022-12-13 DIAGNOSIS — M25.561 ACUTE PAIN OF BOTH KNEES: Primary | ICD-10-CM

## 2022-12-13 NOTE — PROGRESS NOTES
Daily Note     Today's date: 2022  Patient name: Alaina Ramirez  : 1940  MRN: [id number]  Referring provider: Mark Jimenez DO  Dx:   Encounter Diagnosis     ICD-10-CM    1  Acute pain of both knees  M25 561     M25 562           Start Time: 1043  Stop Time: 1131  Total time in clinic (min): 48 minutes    Subjective: Pain states that he has pain in bilateral knees, L>R since LV  Patient states during session that he notes an improvement in pain levels on (L) knee with the bike warm up and extension and flexion exercises  Objective: See treatment diary below      Assessment: Patient tolerated treatment well  During forward step ups pain exhibited on (L) anterior, knee, but improved from LV  Patient would benefit from continued PT to improve bilateral knee strength  Plan: Continue per plan of care  Precautions: HTN, ZAINAB    Access Code: [email or web address]  URL: [email or web address]  Date: 2022  Prepared by: Jannet Nageotte       Manuals 11/3 11/8 11/15 11/22 11/29 12/6 12/      KT Tape      TS - C strip, ext  > flx TS      Re-eval      TS       Neuro Re-Ed             SL stance             Rockerboard   2'/2'          SL Clock     2x 10                                                 Ther Ex             Rec   Bike 10' 10 min  10' 10 min  10' 10' 10'min LVL 4                   Supine Clam shells             Hip add supine             SLR 4 way HEP SLR flexion  2x 10  Standing YTB ext/ ab 2x10 Standing 3 way YTB 2x 10  Standing RTB 2x10  NV, NT      Heel/toe raise   3x20          LAQ HEP            Bridge HEP            HS stretch 60"  90"          Calf stretch 60"  90"          Quad stretch 60"            Mini squat     3x10        Matrix knee flexion    35# 3x10 35# 3x 10  40# 3x12 40# 3x15 40# 3x15      Matrix extension   25# 2x 10  25# 3x10 25# 3x 10  25# 3x12 15# 3x10 SL 15# 3x10 SL                   Backwards walking      NV 15#, x10                                             Ther Activity             STS/TG  TG 2 x 10  L22 3x10 L22 3x 10  L22 3x12  NV, NT      FSU  2x 10 6"   6" 2x 10 e a   6" 3x8 6" 3x8      LSU  2x 10 6"           Lateral side to side walk     NV with TB  8x10' NV RTB 8x10'      Monster walk      8x10' NV 8x10'                                Gait Training                                       Modalities

## 2022-12-15 ENCOUNTER — APPOINTMENT (OUTPATIENT)
Dept: RADIOLOGY | Facility: CLINIC | Age: 82
End: 2022-12-15

## 2022-12-15 ENCOUNTER — OFFICE VISIT (OUTPATIENT)
Dept: FAMILY MEDICINE CLINIC | Facility: CLINIC | Age: 82
End: 2022-12-15

## 2022-12-15 VITALS
OXYGEN SATURATION: 98 % | HEIGHT: 69 IN | WEIGHT: 176 LBS | RESPIRATION RATE: 16 BRPM | BODY MASS INDEX: 26.07 KG/M2 | SYSTOLIC BLOOD PRESSURE: 124 MMHG | HEART RATE: 64 BPM | DIASTOLIC BLOOD PRESSURE: 78 MMHG

## 2022-12-15 DIAGNOSIS — M25.561 ARTHRALGIA OF BOTH KNEES: Primary | ICD-10-CM

## 2022-12-15 DIAGNOSIS — M25.361 INSTABILITY OF RIGHT KNEE JOINT: ICD-10-CM

## 2022-12-15 DIAGNOSIS — M25.562 ARTHRALGIA OF BOTH KNEES: ICD-10-CM

## 2022-12-15 DIAGNOSIS — M25.561 ARTHRALGIA OF BOTH KNEES: ICD-10-CM

## 2022-12-15 DIAGNOSIS — M25.562 ARTHRALGIA OF BOTH KNEES: Primary | ICD-10-CM

## 2022-12-15 DIAGNOSIS — M25.361 RIGHT KNEE BUCKLING: ICD-10-CM

## 2022-12-15 NOTE — ASSESSMENT & PLAN NOTE
- Sharp pain and buckling of his right knee with full weightbearing and 30 to 40 degrees of flexion    -Patient has not gotten any improvement with physical therapy    -Patient will likely need MRI as I suspect that he does have some meniscal damage of the right lateral meniscus    Needs to have x-ray first

## 2022-12-15 NOTE — ASSESSMENT & PLAN NOTE
Patient has been going through physical therapy without any significant improvement    -Order generated for x-ray of bilateral knee    He is having more trouble with his right knee and will likely need MRI

## 2022-12-15 NOTE — PROGRESS NOTES
Subjective:      Patient ID: Cara Dennis is a 80 y o  male  Worsening bilateral knee pain in an 80year-old  Has been going to physical therapy with minimal improvement  Pain is worse with movement and standing  Patient states that his right knee feels as though it will give way when he is going upstairs with full body weight on his right knee and 30 to 40 degrees of flexion  Patient states that he was going up a flight of stairs and his knee buckled underneath him with a sharp deep pain  Pain in his left knee is more of a dull ache  No swelling  No inciting injuries or trauma though the patient states that he was going up a sand dune in September when the pain in his right knee became more severe  Patient is able to walk on flat ground without any difficulty      Past Medical History:   Diagnosis Date   • Actinic keratosis     Last assessed - 10/15/14   • Cortical age-related cataract of both eyes 3/22/2022   • Hypertension    • Hypokalemia     Last assessed - 8/13/14   • Sinus bradycardia     Last assessed - 8/13/14       Family History   Problem Relation Age of Onset   • Prostate cancer Father    • No Known Problems Mother    • Liver cancer Brother         Adenocarcinoma    • No Known Problems Sister    • No Known Problems Sister    • No Known Problems Son    • No Known Problems Son    • Leukemia Daughter         Remission-Bone Marrow transplant   • No Known Problems Daughter        Past Surgical History:   Procedure Laterality Date   • HERNIA REPAIR     • SIGMOIDOSCOPY      (Fiberoptic, Therapeutic) 7/28/2014, 8/25/14        reports that he has never smoked  He has never used smokeless tobacco  He reports that he does not drink alcohol and does not use drugs        Current Outpatient Medications:   •  atenolol (TENORMIN) 50 mg tablet, Take 1 tablet (50 mg total) by mouth daily, Disp: 90 tablet, Rfl: 3  •  azelastine (ASTELIN) 0 1 % nasal spray, SPRAY 1 SPRAY INTO EACH NOSTRIL 2 (TWO) TIMES A DAY USE IN EACH NOSTRIL AS DIRECTED, Disp: 1 Bottle, Rfl: 1  •  betamethasone valerate (VALISONE) 0 1 % cream, Apply topically 2 (two) times a day (Patient taking differently: Apply topically as needed), Disp: 45 g, Rfl: 0  •  Cholecalciferol (VITAMIN D3) 1000 UNIT/SPRAY LIQD, Take 1 tablet by mouth daily, Disp: , Rfl:   •  Coenzyme Q10 100 MG CHEW, Chew 200 mg, Disp: , Rfl:   •  Diclofenac Sodium (VOLTAREN) 1 %, Apply 2 g topically 4 (four) times a day (Patient taking differently: Apply 2 g topically as needed), Disp: 100 g, Rfl: 3  •  Omega-3-Acid Eth Est, Dietary, 1 g CAPS, Take by mouth, Disp: , Rfl:   •  Potassium 99 MG TABS, Take 1 tablet by mouth 2 (two) times a day, Disp: , Rfl:   •  sildenafil (VIAGRA) 100 mg tablet, Take 1 tablet (100 mg total) by mouth daily as needed for erectile dysfunction, Disp: 30 tablet, Rfl: 1  •  Difluprednate 0 05 % EMUL, INSTILL 1 DROP INTO LEFT EYE FOUR TIMES A DAY AS DIRECTED FOR USE AFTER SURGERY (Patient not taking: Reported on 9/14/2022), Disp: , Rfl:   •  ofloxacin (OCUFLOX) 0 3 % ophthalmic solution, INSTILL 1 DROP INTO LEFT EYE FOUR TIMES A DAY START THREE DAYS PRIOR TO SURGERY IN OPERATIVE EYE (Patient not taking: Reported on 9/7/2022), Disp: , Rfl:     The following portions of the patient's history were reviewed and updated as appropriate: allergies, current medications, past family history, past medical history, past social history, past surgical history and problem list     Review of Systems   Musculoskeletal: Positive for arthralgias ( Bilateral knee pain right greater than left)  Negative for gait problem and joint swelling  Neurological: Negative for numbness  All other systems reviewed and are negative  Objective:    /78   Pulse 64   Resp 16   Ht 5' 9" (1 753 m)   Wt 79 8 kg (176 lb)   SpO2 98%   BMI 25 99 kg/m²      Physical Exam  Vitals and nursing note reviewed  Constitutional:       Appearance: Normal appearance     Musculoskeletal: Right knee: Crepitus present  No swelling, deformity, effusion, erythema or bony tenderness  Normal range of motion  Tenderness present over the lateral joint line  No patellar tendon tenderness  No LCL laxity, MCL laxity, ACL laxity or PCL laxity  Abnormal meniscus  Normal alignment and normal patellar mobility  Normal pulse  Instability Tests: Anterior drawer test negative  Posterior drawer test negative  Anterior Lachman test negative  Lateral Tan test positive  Medial Tan test negative  Left knee: Crepitus present  No swelling, deformity or effusion  No tenderness  No LCL laxity, MCL laxity, ACL laxity or PCL laxity  Normal alignment, normal meniscus and normal patellar mobility  Normal pulse  Instability Tests: Anterior drawer test negative  Posterior drawer test negative  Anterior Lachman test negative  Medial Tan test negative and lateral Tan test negative  Neurological:      Mental Status: He is alert  No results found for this or any previous visit (from the past 1008 hour(s))  Assessment/Plan:    Instability of right knee joint  - Sharp pain and buckling of his right knee with full weightbearing and 30 to 40 degrees of flexion    -Patient has not gotten any improvement with physical therapy    -Patient will likely need MRI as I suspect that he does have some meniscal damage of the right lateral meniscus  Needs to have x-ray first    Right knee buckling  Make certain that he has some support such as hand railings when he is going upstairs    Arthralgia of both knees  Patient has been going through physical therapy without any significant improvement    -Order generated for x-ray of bilateral knee    He is having more trouble with his right knee and will likely need MRI          Problem List Items Addressed This Visit        Other    Arthralgia of both knees - Primary     Patient has been going through physical therapy without any significant improvement    -Order generated for x-ray of bilateral knee  He is having more trouble with his right knee and will likely need MRI         Relevant Orders    XR knee 3 vw right non injury    XR knee 3 vw left non injury    Instability of right knee joint     - Sharp pain and buckling of his right knee with full weightbearing and 30 to 40 degrees of flexion    -Patient has not gotten any improvement with physical therapy    -Patient will likely need MRI as I suspect that he does have some meniscal damage of the right lateral meniscus    Needs to have x-ray first         Right knee buckling     Make certain that he has some support such as hand railings when he is going upstairs

## 2022-12-16 ENCOUNTER — APPOINTMENT (OUTPATIENT)
Dept: PHYSICAL THERAPY | Facility: CLINIC | Age: 82
End: 2022-12-16

## 2022-12-19 NOTE — PROGRESS NOTES
Daily Note     Today's date: 2022  Patient name: Khurram Medrano  : 1940  MRN: [id number]  Referring provider: Litzy Fritz DO  Dx:   Encounter Diagnosis     ICD-10-CM    1  Acute pain of both knees  M25 561     M25 562                      Subjective: ***      Objective: See treatment diary below      Assessment: Tolerated treatment {Tolerated treatment :}  Patient {assessment:}      Plan: {PLAN:2162889255}     Precautions: HTN, ZAINAB    Access Code: [email or web address]  URL: [email or web address]  Date: 2022  Prepared by: Kaela Bruno       Manuals 11/3 11/8 11/15 11/22 11/29 12/6 12/      KT Tape      TS - C strip, ext  > flx TS      Re-eval      TS       Neuro Re-Ed             SL stance             Rockerboard   2'/2'          SL Clock     2x 10                                                 Ther Ex             Rec   Bike 10' 10 min  10' 10 min  10' 10' 10'min LVL 4                   Supine Clam shells             Hip add supine             SLR 4 way HEP SLR flexion  2x 10  Standing YTB ext/ ab 2x10 Standing 3 way YTB 2x 10  Standing RTB 2x10  NV, NT      Heel/toe raise   3x20          LAQ HEP            Bridge HEP            HS stretch 60"  90"          Calf stretch 60"  90"          Quad stretch 60"            Mini squat     3x10        Matrix knee flexion    35# 3x10 35# 3x 10  40# 3x12 40# 3x15 40# 3x15      Matrix extension   25# 2x 10  25# 3x10 25# 3x 10  25# 3x12 15# 3x10 SL 15# 3x10 SL                   Backwards walking      NV 15#, x10                                             Ther Activity             STS/TG  TG 2 x 10  L22 3x10 L22 3x 10  L22 3x12  NV, NT      FSU  2x 10 6"   6" 2x 10 e a   6" 3x8 6" 3x8      LSU  2x 10 6"           Lateral side to side walk     NV with TB  8x10' NV RTB 8x10'      Monster walk      8x10' NV 8x10'                                Gait Training                                       Modalities

## 2022-12-20 ENCOUNTER — OFFICE VISIT (OUTPATIENT)
Dept: PHYSICAL THERAPY | Facility: CLINIC | Age: 82
End: 2022-12-20

## 2022-12-20 DIAGNOSIS — M25.561 ACUTE PAIN OF BOTH KNEES: Primary | ICD-10-CM

## 2022-12-20 DIAGNOSIS — M25.562 ACUTE PAIN OF BOTH KNEES: Primary | ICD-10-CM

## 2022-12-20 NOTE — PROGRESS NOTES
This encounter was created in error - please disregard  Patient left without being seen  Wanted to wait on x-rays results before continuing treatment

## 2022-12-22 ENCOUNTER — TELEPHONE (OUTPATIENT)
Dept: FAMILY MEDICINE CLINIC | Facility: CLINIC | Age: 82
End: 2022-12-22

## 2022-12-22 DIAGNOSIS — M25.361 RIGHT KNEE BUCKLING: ICD-10-CM

## 2022-12-22 DIAGNOSIS — M25.361 INSTABILITY OF RIGHT KNEE JOINT: Primary | ICD-10-CM

## 2022-12-22 NOTE — TELEPHONE ENCOUNTER
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

## 2022-12-23 ENCOUNTER — APPOINTMENT (OUTPATIENT)
Dept: PHYSICAL THERAPY | Facility: CLINIC | Age: 82
End: 2022-12-23

## 2022-12-27 ENCOUNTER — APPOINTMENT (OUTPATIENT)
Dept: PHYSICAL THERAPY | Facility: CLINIC | Age: 82
End: 2022-12-27

## 2022-12-27 DIAGNOSIS — M25.561 ACUTE PAIN OF BOTH KNEES: Primary | ICD-10-CM

## 2022-12-27 DIAGNOSIS — M25.562 ACUTE PAIN OF BOTH KNEES: Primary | ICD-10-CM

## 2022-12-27 NOTE — PROGRESS NOTES
Daily Note     Today's date: 2022  Patient name: Jose M Khan  : 1940  MRN: [id number]  Referring provider: Arcenio Ochoa DO  Dx:   Encounter Diagnosis     ICD-10-CM    1  Acute pain of both knees  M25 561     M25 562                      Subjective: ***      Objective: See treatment diary below      Assessment: Tolerated treatment {Tolerated treatment :}  Patient {assessment:}      Plan: {PLAN:[id number]}     Precautions: HTN, ZAINAB    Access Code: [email or web address]  URL: [email or web address]  Date: 2022  Prepared by: Alyssa Anderson       Manuals 11/3 11/8 11/15 11/22 11/29 12/6 12/      KT Tape      TS - C strip, ext  > flx TS      Re-eval      TS       Neuro Re-Ed             SL stance             Rockerboard   2'/2'          SL Clock     2x 10                                                 Ther Ex             Rec   Bike 10' 10 min  10' 10 min  10' 10' 10'min LVL 4                   Supine Clam shells             Hip add supine             SLR 4 way HEP SLR flexion  2x 10  Standing YTB ext/ ab 2x10 Standing 3 way YTB 2x 10  Standing RTB 2x10  NV, NT      Heel/toe raise   3x20          LAQ HEP            Bridge HEP            HS stretch 60"  90"          Calf stretch 60"  90"          Quad stretch 60"            Mini squat     3x10        Matrix knee flexion    35# 3x10 35# 3x 10  40# 3x12 40# 3x15 40# 3x15      Matrix extension   25# 2x 10  25# 3x10 25# 3x 10  25# 3x12 15# 3x10 SL 15# 3x10 SL                   Backwards walking      NV 15#, x10                                             Ther Activity             STS/TG  TG 2 x 10  L22 3x10 L22 3x 10  L22 3x12  NV, NT      FSU  2x 10 6"   6" 2x 10 e a   6" 3x8 6" 3x8      LSU  2x 10 6"           Lateral side to side walk     NV with TB  8x10' NV RTB 8x10'      Monster walk      8x10' NV 8x10'                                Gait Training                                       Modalities

## 2022-12-30 ENCOUNTER — APPOINTMENT (OUTPATIENT)
Dept: PHYSICAL THERAPY | Facility: CLINIC | Age: 82
End: 2022-12-30

## 2023-01-24 ENCOUNTER — HOSPITAL ENCOUNTER (OUTPATIENT)
Dept: MRI IMAGING | Facility: CLINIC | Age: 83
Discharge: HOME | End: 2023-01-24

## 2023-01-24 DIAGNOSIS — M25.361 RIGHT KNEE BUCKLING: ICD-10-CM

## 2023-01-24 DIAGNOSIS — M25.361 INSTABILITY OF RIGHT KNEE JOINT: ICD-10-CM

## 2023-01-26 ENCOUNTER — TELEPHONE (OUTPATIENT)
Dept: FAMILY MEDICINE CLINIC | Facility: CLINIC | Age: 83
End: 2023-01-26

## 2023-01-26 DIAGNOSIS — M23.8X1 CHONDRAL DEFECT OF CONDYLE OF RIGHT FEMUR: Primary | ICD-10-CM

## 2023-01-26 NOTE — TELEPHONE ENCOUNTER
I spoke with Owen Reid and advised him of the results  He would like to stay within the [address] and he wanted me to let you know that once he gets Ortho's recommendations he wants to follow back up with you before making a decision on anything  I advised him that Ortho should reach out to him within 24-48 hours and he will let me know if he doesn't hear from them

## 2023-01-26 NOTE — TELEPHONE ENCOUNTER
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 [facility] or [facility]?

## 2023-02-28 ENCOUNTER — APPOINTMENT (OUTPATIENT)
Dept: LAB | Facility: CLINIC | Age: 83
End: 2023-02-28

## 2023-02-28 DIAGNOSIS — I10 BENIGN ESSENTIAL HYPERTENSION: ICD-10-CM

## 2023-02-28 DIAGNOSIS — R00.2 PALPITATIONS: ICD-10-CM

## 2023-02-28 DIAGNOSIS — C61 PROSTATE CANCER (HCC): ICD-10-CM

## 2023-02-28 DIAGNOSIS — E55.9 VITAMIN D DEFICIENCY: ICD-10-CM

## 2023-02-28 LAB
ALBUMIN SERPL BCP-MCNC: 3.9 G/DL (ref 3.5–5)
ALP SERPL-CCNC: 61 U/L (ref 46–116)
ALT SERPL W P-5'-P-CCNC: 27 U/L (ref 12–78)
ANION GAP SERPL CALCULATED.3IONS-SCNC: 3 MMOL/L (ref 4–13)
AST SERPL W P-5'-P-CCNC: 22 U/L (ref 5–45)
BASOPHILS # BLD AUTO: 0.05 THOUSANDS/ÂΜL (ref 0–0.1)
BASOPHILS NFR BLD AUTO: 1 % (ref 0–1)
BILIRUB SERPL-MCNC: 0.52 MG/DL (ref 0.2–1)
BUN SERPL-MCNC: 20 MG/DL (ref 5–25)
CALCIUM SERPL-MCNC: 9 MG/DL (ref 8.3–10.1)
CHLORIDE SERPL-SCNC: 111 MMOL/L (ref 96–108)
CHOLEST SERPL-MCNC: 193 MG/DL
CO2 SERPL-SCNC: 27 MMOL/L (ref 21–32)
CREAT SERPL-MCNC: 1 MG/DL (ref 0.6–1.3)
EOSINOPHIL # BLD AUTO: 0.22 THOUSAND/ÂΜL (ref 0–0.61)
EOSINOPHIL NFR BLD AUTO: 4 % (ref 0–6)
ERYTHROCYTE [DISTWIDTH] IN BLOOD BY AUTOMATED COUNT: 13.2 % (ref 11.6–15.1)
GFR SERPL CREATININE-BSD FRML MDRD: 69 ML/MIN/1.73SQ M
GLUCOSE P FAST SERPL-MCNC: 100 MG/DL (ref 65–99)
HCT VFR BLD AUTO: 41.4 % (ref 36.5–49.3)
HDLC SERPL-MCNC: 54 MG/DL
HGB BLD-MCNC: 13.9 G/DL (ref 12–17)
IMM GRANULOCYTES # BLD AUTO: 0.02 THOUSAND/UL (ref 0–0.2)
IMM GRANULOCYTES NFR BLD AUTO: 0 % (ref 0–2)
LDLC SERPL CALC-MCNC: 129 MG/DL (ref 0–100)
LYMPHOCYTES # BLD AUTO: 1.38 THOUSANDS/ÂΜL (ref 0.6–4.47)
LYMPHOCYTES NFR BLD AUTO: 28 % (ref 14–44)
MCH RBC QN AUTO: 31 PG (ref 26.8–34.3)
MCHC RBC AUTO-ENTMCNC: 33.6 G/DL (ref 31.4–37.4)
MCV RBC AUTO: 92 FL (ref 82–98)
MONOCYTES # BLD AUTO: 0.52 THOUSAND/ÂΜL (ref 0.17–1.22)
MONOCYTES NFR BLD AUTO: 10 % (ref 4–12)
NEUTROPHILS # BLD AUTO: 2.8 THOUSANDS/ÂΜL (ref 1.85–7.62)
NEUTS SEG NFR BLD AUTO: 57 % (ref 43–75)
NONHDLC SERPL-MCNC: 139 MG/DL
NRBC BLD AUTO-RTO: 0 /100 WBCS
PLATELET # BLD AUTO: 229 THOUSANDS/UL (ref 149–390)
PMV BLD AUTO: 10.1 FL (ref 8.9–12.7)
POTASSIUM SERPL-SCNC: 4.1 MMOL/L (ref 3.5–5.3)
PROT SERPL-MCNC: 7 G/DL (ref 6.4–8.4)
PSA SERPL-MCNC: 0.3 NG/ML (ref 0–4)
RBC # BLD AUTO: 4.49 MILLION/UL (ref 3.88–5.62)
SODIUM SERPL-SCNC: 141 MMOL/L (ref 135–147)
TRIGL SERPL-MCNC: 52 MG/DL
TSH SERPL DL<=0.05 MIU/L-ACNC: 1.85 UIU/ML (ref 0.45–4.5)
WBC # BLD AUTO: 4.99 THOUSAND/UL (ref 4.31–10.16)

## 2023-02-28 RX ORDER — ATENOLOL 50 MG/1
50 TABLET ORAL DAILY
Qty: 90 TABLET | Refills: 1 | Status: SHIPPED | OUTPATIENT
Start: 2023-02-28

## 2023-03-02 LAB — 1,25(OH)2D3 SERPL-MCNC: 44.1 PG/ML (ref 24.8–81.5)

## 2023-03-03 ENCOUNTER — OFFICE VISIT (OUTPATIENT)
Dept: OBGYN CLINIC | Facility: MEDICAL CENTER | Age: 83
End: 2023-03-03

## 2023-03-03 VITALS
HEIGHT: 69 IN | WEIGHT: 176 LBS | BODY MASS INDEX: 26.07 KG/M2 | HEART RATE: 62 BPM | SYSTOLIC BLOOD PRESSURE: 177 MMHG | DIASTOLIC BLOOD PRESSURE: 110 MMHG

## 2023-03-03 DIAGNOSIS — M23.8X1 CHONDRAL DEFECT OF CONDYLE OF RIGHT FEMUR: ICD-10-CM

## 2023-03-03 DIAGNOSIS — M23.91 INTERNAL DERANGEMENT OF RIGHT KNEE: ICD-10-CM

## 2023-03-03 DIAGNOSIS — M25.562 PAIN IN BOTH KNEES, UNSPECIFIED CHRONICITY: Primary | ICD-10-CM

## 2023-03-03 DIAGNOSIS — M22.2X1 PATELLOFEMORAL DISORDER OF RIGHT KNEE: ICD-10-CM

## 2023-03-03 DIAGNOSIS — M25.561 PAIN IN BOTH KNEES, UNSPECIFIED CHRONICITY: Primary | ICD-10-CM

## 2023-03-03 RX ORDER — BUPIVACAINE HYDROCHLORIDE 2.5 MG/ML
2 INJECTION, SOLUTION INFILTRATION; PERINEURAL
Status: COMPLETED | OUTPATIENT
Start: 2023-03-03 | End: 2023-03-03

## 2023-03-03 RX ORDER — BETAMETHASONE SODIUM PHOSPHATE AND BETAMETHASONE ACETATE 3; 3 MG/ML; MG/ML
12 INJECTION, SUSPENSION INTRA-ARTICULAR; INTRALESIONAL; INTRAMUSCULAR; SOFT TISSUE
Status: COMPLETED | OUTPATIENT
Start: 2023-03-03 | End: 2023-03-03

## 2023-03-03 RX ORDER — LIDOCAINE HYDROCHLORIDE 10 MG/ML
2 INJECTION, SOLUTION INFILTRATION; PERINEURAL
Status: COMPLETED | OUTPATIENT
Start: 2023-03-03 | End: 2023-03-03

## 2023-03-03 RX ADMIN — BETAMETHASONE SODIUM PHOSPHATE AND BETAMETHASONE ACETATE 12 MG: 3; 3 INJECTION, SUSPENSION INTRA-ARTICULAR; INTRALESIONAL; INTRAMUSCULAR; SOFT TISSUE at 15:39

## 2023-03-03 RX ADMIN — BUPIVACAINE HYDROCHLORIDE 2 ML: 2.5 INJECTION, SOLUTION INFILTRATION; PERINEURAL at 15:39

## 2023-03-03 RX ADMIN — LIDOCAINE HYDROCHLORIDE 2 ML: 10 INJECTION, SOLUTION INFILTRATION; PERINEURAL at 15:39

## 2023-03-03 NOTE — PROGRESS NOTES
Chief complaint; bilateral knee pain  History; 77-year-old male accompanied by his wife who is much younger in appearance than his years suggest   He relates walking in excess of 12 to 15 miles per week  He volunteers at [employer],  He wishes to report bilateral knee discomfort,  The right knee seems to be more severe occurs with instability and ambulatory dysfunction  He has had, to the knees,no operations, and no injections to his knees  Review of his medication list finds no specific medication use for his knees  Evidence of Voltaren ointment or cream that is used for his thumbs  He has done extensive physical therapy both formal as ordered and within the home  He has x-rays that were taken December 2022 as well as an MRI of the right knee only which is recent  This affects him the most when he attempts to walk up and down stairs with the knees in a flexed position  Concerned about the right knee stability and becoming a fall risk      Past Medical History:   Diagnosis Date   • Actinic keratosis     Last assessed - 10/15/14   • Cortical age-related cataract of both eyes 3/22/2022   • Hypertension    • Hypokalemia     Last assessed - 8/13/14   • Sinus bradycardia     Last assessed - 8/13/14       Past Surgical History:   Procedure Laterality Date   • HERNIA REPAIR     • SIGMOIDOSCOPY      (Fiberoptic, Therapeutic) 7/28/2014, 8/25/14       Family History   Problem Relation Age of Onset   • Prostate cancer Father    • No Known Problems Mother    • Liver cancer Brother         Adenocarcinoma    • No Known Problems Sister    • No Known Problems Sister    • No Known Problems Son    • No Known Problems Son    • Leukemia Daughter         Remission-Bone Marrow transplant   • No Known Problems Daughter        Social History     Tobacco Use   • Smoking status: Never   • Smokeless tobacco: Never   • Tobacco comments:     Former smoker per [employer]    Vaping Use   • Vaping Use: Never used   Substance Use Topics   • Alcohol use: No     Comment: Seldomly per [employer]    • Drug use: No     Exam;    His trousers were raised to above his knees  No effusion of either knee  Right medial knee shows varicosities in the subcutaneous tissue  He has terminal extension of both knees  He has significant reproducible pain medial compartment of the right and the left knee  The right knee has a positive Tan's test, exterior medial compartment  The right knee has a positive Apley's grind test  A negative Lachman's and negative drawer's test both knees  He has patellofemoral grating both knees right greater than left  I am unable to reproduce palpable or reproducible pain over the lateral femoral condyle of the right knee  From extension the knees can flex greater than 95 degrees bilateral    X-rays; right knee, appears to evidence modest medial and lateral compartment narrowing, teller hypertrophy and changes seen in the patellofemoral joint on the lateral and merchant views    Left knee, appears to evidence modest medial and lateral compartment narrowing and mild patellofemoral changes  MRI right knee, evidence of degeneration of the medial meniscus right knee with a horizontal cleave, as well as an OCD has marrow edema of the femoral condyle        Large joint arthrocentesis: R knee  Universal Protocol:  Consent given by: patient    Supporting Documentation  Indications: pain   Procedure Details  Location: knee - R knee  Needle size: 22 G  Ultrasound guidance: no  Medications administered: 2 mL bupivacaine 0 25 %; 2 mL lidocaine 1 %; 12 mg betamethasone acetate-betamethasone sodium phosphate 6 (3-3) mg/mL      Large joint arthrocentesis: L knee  Universal Protocol:  Consent given by: patient    Supporting Documentation  Indications: pain   Procedure Details  Location: knee - L knee  Needle size: 22 G  Medications administered: 2 mL bupivacaine 0 25 %; 2 mL lidocaine 1 %; 12 mg betamethasone acetate-betamethasone sodium phosphate 6 (3-3) mg/mL          Impression;    Care of bilateral knee discomfort, moderately patellofemoral   Right knee internal derangement  Right knee history of sporadic instability    Plan; He was offered and received well-placed injections to both knees  We will observe his to these injections  He is offered a follow-up appointment in 3 months for clinical review no x-rays necessary  May continue his physical therapeutic exercises at home    His entire experience was supervised by and plan formulated by the attending surgeon it was my privilege to assist him in the delivery of this man's care      X-rays

## 2023-03-13 ENCOUNTER — RA CDI HCC (OUTPATIENT)
Dept: OTHER | Facility: HOSPITAL | Age: 83
End: 2023-03-13

## 2023-03-13 NOTE — PROGRESS NOTES
[facility]  coding opportunities       Chart reviewed, no opportunity found:   [address]        Patients Insurance     Medicare Insurance: [employer]

## 2023-03-15 ENCOUNTER — OFFICE VISIT (OUTPATIENT)
Dept: FAMILY MEDICINE CLINIC | Facility: CLINIC | Age: 83
End: 2023-03-15

## 2023-03-15 ENCOUNTER — TELEPHONE (OUTPATIENT)
Dept: OBGYN CLINIC | Facility: MEDICAL CENTER | Age: 83
End: 2023-03-15

## 2023-03-15 VITALS
WEIGHT: 175 LBS | DIASTOLIC BLOOD PRESSURE: 78 MMHG | BODY MASS INDEX: 25.92 KG/M2 | SYSTOLIC BLOOD PRESSURE: 142 MMHG | HEART RATE: 70 BPM | HEIGHT: 69 IN | RESPIRATION RATE: 16 BRPM | OXYGEN SATURATION: 99 %

## 2023-03-15 DIAGNOSIS — N52.1 ERECTILE DYSFUNCTION DUE TO DISEASES CLASSIFIED ELSEWHERE: ICD-10-CM

## 2023-03-15 DIAGNOSIS — I10 BENIGN ESSENTIAL HYPERTENSION: ICD-10-CM

## 2023-03-15 DIAGNOSIS — M25.561 ARTHRALGIA OF BOTH KNEES: ICD-10-CM

## 2023-03-15 DIAGNOSIS — M25.562 ARTHRALGIA OF BOTH KNEES: ICD-10-CM

## 2023-03-15 DIAGNOSIS — E55.9 VITAMIN D DEFICIENCY: ICD-10-CM

## 2023-03-15 DIAGNOSIS — M25.361 INSTABILITY OF RIGHT KNEE JOINT: Primary | ICD-10-CM

## 2023-03-15 DIAGNOSIS — Z00.00 MEDICARE ANNUAL WELLNESS VISIT, SUBSEQUENT: ICD-10-CM

## 2023-03-15 DIAGNOSIS — M25.361 RIGHT KNEE BUCKLING: ICD-10-CM

## 2023-03-15 DIAGNOSIS — C61 PROSTATE CANCER (HCC): ICD-10-CM

## 2023-03-15 DIAGNOSIS — N52.31 ERECTILE DYSFUNCTION AFTER RADICAL PROSTATECTOMY: ICD-10-CM

## 2023-03-15 RX ORDER — SILDENAFIL 100 MG/1
100 TABLET, FILM COATED ORAL DAILY PRN
Qty: 30 TABLET | Refills: 3 | Status: SHIPPED | OUTPATIENT
Start: 2023-03-15 | End: 2023-03-16

## 2023-03-15 NOTE — PROGRESS NOTES
Assessment and Plan:     Problem List Items Addressed This Visit        Cardiovascular and Mediastinum    Benign essential hypertension     Remains very well controlled on atenolol 50 mg once daily  Continue same  Relevant Orders    CBC and differential    Comprehensive metabolic panel    Lipid panel    TSH, 3rd generation with Free T4 reflex       Genitourinary    Prostate cancer ([facility] [address] )     History of prostatectomy  Follow-up with urology yearly  Diagnostic PSA is actually decreasing  Always a good sign            Other    Arthralgia of both knees     Referral back to orthopedic surgery with failure of intra-articular corticosteroid injections         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     Prescription provided for refill of generic Viagra which has been intermittently effective for the patient         Relevant Medications    sildenafil (VIAGRA) 100 mg tablet    Instability of right knee joint - Primary    Medicare annual wellness visit, subsequent     Subsequent annual wellness visit completed    -Patient is current on screenings and immunizations         Right knee buckling     Message sent to orthopedic surgeon  Sounds like he will probably be going the way of a knee replacement as he only got relief from intra-articular corticosteroid for approximately 10 days         Vitamin D deficiency     Remains on vitamin D supplementation  Vitamin D level to be checked in 6 months         Relevant Orders    Vitamin D 1,25 dihydroxy   Other Visit Diagnoses     Erectile dysfunction due to diseases classified elsewhere        Relevant Medications    sildenafil (VIAGRA) 100 mg tablet        BMI Counseling: There is no height or weight on file to calculate BMI  The BMI is above normal  Exercise recommendations include moderate physical activity 150 minutes/week  Rationale for BMI follow-up plan is due to patient being overweight or obese         Preventive health issues were discussed with patient, and age appropriate screening tests were ordered as noted in patient's After Visit Summary  Personalized health advice and appropriate referrals for health education or preventive services given if needed, as noted in patient's After Visit Summary       History of Present Illness:     Patient presents for Medicare Annual Wellness visit    Patient Care Team:  Frankie Nicholson DO as PCP - General  Frankie Nicholson DO as PCP - [address] [place] [address] (RTE)  Frankie Nicholson DO as PCP - PCP-[facility] (RTE)  MD Alberta Tobar MD (Dermatology)  Ronald Rm DO (Gastroenterology)  Bubba Etienne PA-C as Physician Assistant (Physician Assistant)  Silvia Hernadez MD (Orthopedic Surgery)     Problem List:     Patient Active Problem List   Diagnosis   • ZAINAB (obstructive sleep apnea)   • Benign essential hypertension   • Vitamin D deficiency   • Medicare annual wellness visit, subsequent   • 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   • Bradycardia   • Arthritis of lumbar spine   • Low back pain   • Prostate cancer ([facility] [address] )   • Squamous cell carcinoma of skin   • Toe pain, right   • Palpitations   • Environmental allergies   • Dermatitis of right foot   • Arthralgia of both knees   • Raynaud's phenomenon without gangrene   • Instability of right knee joint   • Right knee buckling   • 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      Past Medical and Surgical History:     Past Medical History:   Diagnosis Date   • Actinic keratosis     Last assessed - 10/15/14   • Cortical age-related cataract of both eyes 3/22/2022   • Hypertension    • Hypokalemia     Last assessed - 8/13/14   • Sinus bradycardia     Last assessed - 8/13/14     Past Surgical History:   Procedure Laterality Date   • HERNIA REPAIR     • SIGMOIDOSCOPY      (Fiberoptic, Therapeutic) 7/28/2014, 8/25/14      Family History:     Family History   Problem Relation Age of Onset   • Prostate cancer Father    • No Known Problems Mother    • Liver cancer Brother         Adenocarcinoma    • No Known Problems Sister    • No Known Problems Sister    • No Known Problems Son    • No Known Problems Son    • Leukemia Daughter         Remission-Bone Marrow transplant   • No Known Problems Daughter       Social History:     Social History     Socioeconomic History   • Marital status:      Spouse name: None   • Number of children: None   • Years of education: None   • Highest education level: None   Occupational History   • Occupation: Retired   Tobacco Use   • Smoking status: Never   • Smokeless tobacco: Never   • Tobacco comments:     Former smoker per [employer]    Vaping Use   • Vaping Use: Never used   Substance and Sexual Activity   • Alcohol use: No     Comment: Seldomly per [employer]    • Drug use: No   • Sexual activity: None   Other Topics Concern   • None   Social History Narrative    Caffeine use - 2 cups daily     and  noted per [employer]     Social Determinants of Health     Financial Resource Strain: Low Risk    • Difficulty of Paying Living Expenses: Not very hard   Food Insecurity: Not on file   Transportation Needs: No Transportation Needs   • Lack of Transportation (Medical): No   • Lack of Transportation (Non-Medical):  No   Physical Activity: Not on file   Stress: Not on file   Social Connections: Not on file   Intimate Partner Violence: Not on file   Housing Stability: Not on file      Medications and Allergies:     Current Outpatient Medications   Medication Sig Dispense Refill   • atenolol (TENORMIN) 50 mg tablet Take 1 tablet (50 mg total) by mouth daily 90 tablet 1   • azelastine (ASTELIN) 0 1 % nasal spray SPRAY 1 SPRAY INTO EACH NOSTRIL 2 (TWO) TIMES A DAY USE IN EACH NOSTRIL AS DIRECTED 1 Bottle 1   • betamethasone valerate (VALISONE) 0 1 % cream Apply topically 2 (two) times a day (Patient taking differently: Apply topically as needed) 45 g 0   • Cholecalciferol (VITAMIN D3) 1000 UNIT/SPRAY LIQD Take 1 tablet by mouth daily     • Coenzyme Q10 100 MG CHEW Chew 200 mg     • Diclofenac Sodium (VOLTAREN) 1 % Apply 2 g topically 4 (four) times a day (Patient taking differently: Apply 2 g topically as needed) 100 g 3   • Difluprednate 0 05 % EMUL      • ofloxacin (OCUFLOX) 0 3 % ophthalmic solution      • Omega-3-Acid Eth Est, Dietary, 1 g CAPS Take by mouth     • Potassium 99 MG TABS Take 1 tablet by mouth 2 (two) times a day     • sildenafil (VIAGRA) 100 mg tablet Take 1 tablet (100 mg total) by mouth daily as needed for erectile dysfunction 30 tablet 3     No current facility-administered medications for this visit  Allergies   Allergen Reactions   • Isoflavones (Soy)       Immunizations:     Immunization History   Administered Date(s) Administered   • COVID-19 MODERNA VACC 0 5 ML IM 01/06/2021, 02/03/2021, 10/29/2021, 05/10/2022   • COVID-19 Moderna Vac BIVALENT 12 Yr+ IM (BOOSTER ONLY) 0 5 ML 10/13/2022   • INFLUENZA 10/20/2015, 09/13/2016, 10/03/2016, 10/17/2017, 09/22/2018, 11/09/2018, 10/09/2019, 10/15/2021, 10/04/2022   • Influenza Split High Dose Preservative Free IM 10/20/2015   • Influenza, seasonal, injectable 10/03/2016   • Pneumococcal Conjugate 13-Valent 12/18/2015   • Pneumococcal Polysaccharide PPV23 02/12/2014, 10/03/2016, 03/27/2018   • Tdap 01/01/2006, 10/17/2017   • Zoster 08/02/2016   • Zoster Vaccine Recombinant 07/03/2018, 11/09/2018   • influenza, trivalent, adjuvanted 10/09/2019      Health Maintenance:         Topic Date Due   • Colorectal Cancer Screening  09/15/2023 (Originally 2/18/1985)     There are no preventive care reminders to display for this patient  Medicare Health Risk Assessment:     /78   Pulse 70   Resp 16   Ht 5' 9" (1 753 m)   Wt 79 4 kg (175 lb)   SpO2 99%   BMI 25 84 kg/m²      Carrillo Ross is here for his Subsequent Wellness visit  Last Medicare Wellness visit information reviewed, patient interviewed, no change since last AWV       Health Risk Assessment:   Patient rates overall health as very good  Patient feels that their physical health rating is same  Patient is very satisfied with their life  Eyesight was rated as same  Hearing was rated as same  Patient feels that their emotional and mental health rating is same  Patients states they are never, rarely angry  Patient states they are never, rarely unusually tired/fatigued  Pain experienced in the last 7 days has been none  Patient states that he has experienced no weight loss or gain in last 6 months  Depression Screening:   PHQ-2 Score: 0      Fall Risk Screening: In the past year, patient has experienced: no history of falling in past year      Home Safety:  Patient has trouble with stairs inside or outside of their home  Patient has working smoke alarms and has no working carbon monoxide detector  Home safety hazards include: none  Nutrition:   Current diet is Regular and Limited junk food  Medications:   Patient is currently taking over-the-counter supplements  OTC medications include: see medication list  Patient is able to manage medications  Activities of Daily Living (ADLs)/Instrumental Activities of Daily Living (IADLs):   Walk and transfer into and out of bed and chair?: Yes  Dress and groom yourself?: Yes    Bathe or shower yourself?: Yes    Feed yourself? Yes  Do your laundry/housekeeping?: Yes  Manage your money, pay your bills and track your expenses?: Yes  Make your own meals?: Yes    Do your own shopping?: Yes    Previous Hospitalizations:   Any hospitalizations or ED visits within the last 12 months?: No      Advance Care Planning:   Living will: Yes    Durable POA for healthcare:  Yes    Advanced directive: Yes    Advanced directive counseling given: No    Five wishes given: No    Patient declined ACP directive: No    End of Life Decisions reviewed with patient: Yes    Provider agrees with end of life decisions: Yes      PREVENTIVE SCREENINGS      Cardiovascular Screening:    General: Screening Current      Diabetes Screening:     General: Screening Current      Colorectal Cancer Screening:     General: Screening Current      Prostate Cancer Screening:    General: History Prostate Cancer and Screening Not Indicated      Osteoporosis Screening:    General: Screening Not Indicated      Abdominal Aortic Aneurysm (AAA) Screening:        General: Screening Not Indicated      Lung Cancer Screening:     General: Screening Not Indicated      Hepatitis C Screening:    General: Screening Not Indicated    Screening, Brief Intervention, and Referral to Treatment (SBIRT)    Screening  Typical number of drinks in a day: 0  Typical number of drinks in a week: 0  Interpretation: Low risk drinking behavior  AUDIT-C Screenin) How often did you have a drink containing alcohol in the past year? never  2) How many drinks did you have on a typical day when you were drinking in the past year? 0  3) How often did you have 6 or more drinks on one occasion in the past year? never    AUDIT-C Score: 0  Interpretation: Score 0-3 (male): Negative screen for alcohol misuse    Single Item Drug Screening:  How often have you used an illegal drug (including marijuana) or a prescription medication for non-medical reasons in the past year? never    Single Item Drug Screen Score: 0  Interpretation: Negative screen for possible drug use disorder      Jesus Hackett DO    80-year-old male presents for subsequent annual wellness visit and follow-up of chronic conditions including hypertension, history of migraine headaches  Overall the patient has been feeling well  Patient is still working as a volunteer at [employer]  He does follow-up with urology  He was evaluated by orthopedic surgery in regards to bilateral knee pain with buckling of his right knee  He was seen on March 3 and did have intra-articular corticosteroid injections performed in both knees    He states that relief only lasted for 1 day before he started having pain once again in his left knee and his right knee seem to last about 10 days before he started developing pain and most recently he has been experiencing buckling and giving way of his right knee though he has not fallen  Knee seems to buckle specially when he is on the stairs and he make certain to hold hand railings  Labs reviewed which showed diagnostic PSA decreasing from 0 4-0 3, normal CBC, CMP with the exception of impaired fasting glucose of 100, total cholesterol 193, triglycerides 52, HDL 54, , vitamin D level 44 1 within normal TSH at 1 85      Review of Systems   Constitutional: Negative  HENT: Negative  Eyes: Negative  Respiratory: Negative  Cardiovascular: Negative  Gastrointestinal: Negative  Endocrine: Negative  Genitourinary: Negative  Musculoskeletal: Positive for arthralgias (Bilateral knees)  Skin: Negative  Allergic/Immunologic: Negative  Neurological: Negative  Hematological: Negative  Psychiatric/Behavioral: Negative  All other systems reviewed and are negative  Physical Exam  Vitals and nursing note reviewed  Constitutional:       Appearance: Normal appearance  He is well-developed  HENT:      Head: Normocephalic and atraumatic  Right Ear: Tympanic membrane, ear canal and external ear normal       Left Ear: Tympanic membrane, ear canal and external ear normal       Nose: Nose normal    Eyes:      General: No scleral icterus  Right eye: No discharge  Left eye: No discharge  Extraocular Movements: Extraocular movements intact  Conjunctiva/sclera: Conjunctivae normal       Pupils: Pupils are equal, round, and reactive to light  Cardiovascular:      Rate and Rhythm: Normal rate and regular rhythm  Pulses: Normal pulses  Heart sounds: Normal heart sounds  Pulmonary:      Effort: Pulmonary effort is normal       Breath sounds: Normal breath sounds     Abdominal:      General: Bowel sounds are normal       Palpations: Abdomen is soft  Genitourinary:     Penis: Normal        Prostate: Normal       Rectum: Normal    Musculoskeletal:         General: No swelling or deformity  Normal range of motion  Cervical back: Normal range of motion and neck supple  Skin:     General: Skin is warm and dry  Neurological:      General: No focal deficit present  Mental Status: He is alert and oriented to person, place, and time  Mental status is at baseline  Psychiatric:         Behavior: Behavior normal          Thought Content:  Thought content normal          Judgment: Judgment normal

## 2023-03-15 NOTE — PATIENT INSTRUCTIONS
Medicare Preventive Visit Patient Instructions  Thank you for completing your Welcome to Medicare Visit or Medicare Annual Wellness Visit today  Your next wellness visit will be due in one year (3/15/2024)  The screening/preventive services that you may require over the next 5-10 years are detailed below  Some tests may not apply to you based off risk factors and/or age  Screening tests ordered at today's visit but not completed yet may show as past due  Also, please note that scanned in results may not display below  Preventive Screenings:  Service Recommendations Previous Testing/Comments   Colorectal Cancer Screening  · Colonoscopy    · Fecal Occult Blood Test (FOBT)/Fecal Immunochemical Test (FIT)  · Fecal DNA/Cologuard Test  · Flexible Sigmoidoscopy Age: 39-70 years old   Colonoscopy: every 10 years (May be performed more frequently if at higher risk)  OR  FOBT/FIT: every 1 year  OR  Cologuard: every 3 years  OR  Sigmoidoscopy: every 5 years  Screening may be recommended earlier than age 39 if at higher risk for colorectal cancer  Also, an individualized decision between you and your healthcare provider will decide whether screening between the ages of 74-80 would be appropriate   Colonoscopy: 03/03/2020  FOBT/FIT: Not on file  Cologuard: Not on file  Sigmoidoscopy: Not on file    Screening Current     Prostate Cancer Screening Individualized decision between patient and health care provider in men between ages of 53-78   Medicare will cover every 12 months beginning on the day after your 50th birthday PSA: 0 3 ng/mL     History Prostate Cancer  Screening Not Indicated     Hepatitis C Screening Once for adults born between 1945 and 1965  More frequently in patients at high risk for Hepatitis C Hep C Antibody: Not on file        Diabetes Screening 1-2 times per year if you're at risk for diabetes or have pre-diabetes Fasting glucose: 100 mg/dL (2/28/2023)  A1C: No results in last 5 years (No results in last 5 years)  Screening Current   Cholesterol Screening Once every 5 years if you don't have a lipid disorder  May order more often based on risk factors  Lipid panel: 02/28/2023  Screening Current      Other Preventive Screenings Covered by Medicare:  1  Abdominal Aortic Aneurysm (AAA) Screening: covered once if your at risk  You're considered to be at risk if you have a family history of AAA or a male between the age of 73-68 who smoking at least 100 cigarettes in your lifetime  2  Lung Cancer Screening: covers low dose CT scan once per year if you meet all of the following conditions: (1) Age 50-69; (2) No signs or symptoms of lung cancer; (3) Current smoker or have quit smoking within the last 15 years; (4) You have a tobacco smoking history of at least 20 pack years (packs per day x number of years you smoked); (5) You get a written order from a healthcare provider  3  Glaucoma Screening: covered annually if you're considered high risk: (1) You have diabetes OR (2) Family history of glaucoma OR (3)  aged 48 and older OR (3)  American aged 72 and older  3  Osteoporosis Screening: covered every 2 years if you meet one of the following conditions: (1) Have a vertebral abnormality; (2) On glucocorticoid therapy for more than 3 months; (3) Have primary hyperparathyroidism; (4) On osteoporosis medications and need to assess response to drug therapy  5  HIV Screening: covered annually if you're between the age of 12-76  Also covered annually if you are younger than 13 and older than 72 with risk factors for HIV infection  For pregnant patients, it is covered up to 3 times per pregnancy      Immunizations:  Immunization Recommendations   Influenza Vaccine Annual influenza vaccination during flu season is recommended for all persons aged >= 6 months who do not have contraindications   Pneumococcal Vaccine   * Pneumococcal conjugate vaccine = PCV13 (Prevnar 13), PCV15 (Vaxneuvance), PCV20 (Prevnar 20)  * Pneumococcal polysaccharide vaccine = PPSV23 (Pneumovax) Adults 2364 years old: 1-3 doses may be recommended based on certain risk factors  Adults 72 years old: 1-2 doses may be recommended based off what pneumonia vaccine you previously received   Hepatitis B Vaccine 3 dose series if at intermediate or high risk (ex: diabetes, end stage renal disease, liver disease)   Tetanus (Td) Vaccine - COST NOT COVERED BY MEDICARE PART B Following completion of primary series, a booster dose should be given every 10 years to maintain immunity against tetanus  Td may also be given as tetanus wound prophylaxis  Tdap Vaccine - COST NOT COVERED BY MEDICARE PART B Recommended at least once for all adults  For pregnant patients, recommended with each pregnancy  Shingles Vaccine (Shingrix) - COST NOT COVERED BY MEDICARE PART B  2 shot series recommended in those aged 48 and above     Health Maintenance Due:      Topic Date Due   • Colorectal Cancer Screening  03/03/2023     Immunizations Due:  There are no preventive care reminders to display for this patient  Advance Directives   What are advance directives? Advance directives are legal documents that state your wishes and plans for medical care  These plans are made ahead of time in case you lose your ability to make decisions for yourself  Advance directives can apply to any medical decision, such as the treatments you want, and if you want to donate organs  What are the types of advance directives? There are many types of advance directives, and each state has rules about how to use them  You may choose a combination of any of the following:  · Living will: This is a written record of the treatment you want  You can also choose which treatments you do not want, which to limit, and which to stop at a certain time  This includes surgery, medicine, IV fluid, and tube feedings  · Durable power of  for healthcare [facility]):   This is a written record that states who you want to make healthcare choices for you when you are unable to make them for yourself  This person, called a proxy, is usually a family member or a friend  You may choose more than 1 proxy  · Do not resuscitate (DNR) order:  A DNR order is used in case your heart stops beating or you stop breathing  It is a request not to have certain forms of treatment, such as CPR  A DNR order may be included in other types of advance directives  · Medical directive: This covers the care that you want if you are in a coma, near death, or unable to make decisions for yourself  You can list the treatments you want for each condition  Treatment may include pain medicine, surgery, blood transfusions, dialysis, IV or tube feedings, and a ventilator (breathing machine)  · Values history: This document has questions about your views, beliefs, and how you feel and think about life  This information can help others choose the care that you would choose  Why are advance directives important? An advance directive helps you control your care  Although spoken wishes may be used, it is better to have your wishes written down  Spoken wishes can be misunderstood, or not followed  Treatments may be given even if you do not want them  An advance directive may make it easier for your family to make difficult choices about your care  Weight Management   Why it is important to manage your weight:  Being overweight increases your risk of health conditions such as heart disease, high blood pressure, type 2 diabetes, and certain types of cancer  It can also increase your risk for osteoarthritis, sleep apnea, and other respiratory problems  Aim for a slow, steady weight loss  Even a small amount of weight loss can lower your risk of health problems  How to lose weight safely:  A safe and healthy way to lose weight is to eat fewer calories and get regular exercise   You can lose up about 1 pound a week by decreasing the number of calories you eat by 500 calories each day  Healthy meal plan for weight management:  A healthy meal plan includes a variety of foods, contains fewer calories, and helps you stay healthy  A healthy meal plan includes the following:  · Eat whole-grain foods more often  A healthy meal plan should contain fiber  Fiber is the part of grains, fruits, and vegetables that is not broken down by your body  Whole-grain foods are healthy and provide extra fiber in your diet  Some examples of whole-grain foods are whole-wheat breads and pastas, oatmeal, brown rice, and bulgur  · Eat a variety of vegetables every day  Include dark, leafy greens such as spinach, kale, joanna greens, and mustard greens  Eat yellow and orange vegetables such as carrots, sweet potatoes, and winter squash  · Eat a variety of fruits every day  Choose fresh or canned fruit (canned in its own juice or light syrup) instead of juice  Fruit juice has very little or no fiber  · Eat low-fat dairy foods  Drink fat-free (skim) milk or 1% milk  Eat fat-free yogurt and low-fat cottage cheese  Try low-fat cheeses such as mozzarella and other reduced-fat cheeses  · Choose meat and other protein foods that are low in fat  Choose beans or other legumes such as split peas or lentils  Choose fish, skinless poultry (chicken or turkey), or lean cuts of red meat (beef or pork)  Before you cook meat or poultry, cut off any visible fat  · Use less fat and oil  Try baking foods instead of frying them  Add less fat, such as margarine, sour cream, regular salad dressing and mayonnaise to foods  Eat fewer high-fat foods  Some examples of high-fat foods include french fries, doughnuts, ice cream, and cakes  · Eat fewer sweets  Limit foods and drinks that are high in sugar  This includes candy, cookies, regular soda, and sweetened drinks  Exercise:  Exercise at least 30 minutes per day on most days of the week   Some examples of exercise include walking, biking, dancing, and swimming  You can also fit in more physical activity by taking the stairs instead of the elevator or parking farther away from stores  Ask your healthcare provider about the best exercise plan for you  © Copyright [employer] 2018 Information is for End User's use only and may not be sold, redistributed or otherwise used for commercial purposes   All illustrations and images included in CareNotes® are the copyrighted property of [employer]  or [address] [place] [address]

## 2023-03-15 NOTE — ASSESSMENT & PLAN NOTE
Message sent to orthopedic surgeon    Sounds like he will probably be going the way of a knee replacement as he only got relief from intra-articular corticosteroid for approximately 10 days

## 2023-03-15 NOTE — ASSESSMENT & PLAN NOTE
History of prostatectomy  Follow-up with urology yearly  Diagnostic PSA is actually decreasing    Always a good sign

## 2023-03-15 NOTE — ASSESSMENT & PLAN NOTE
Prescription provided for refill of generic Viagra which has been intermittently effective for the patient

## 2023-03-24 ENCOUNTER — OFFICE VISIT (OUTPATIENT)
Dept: OBGYN CLINIC | Facility: MEDICAL CENTER | Age: 83
End: 2023-03-24

## 2023-03-24 VITALS
HEIGHT: 69 IN | HEART RATE: 61 BPM | WEIGHT: 175 LBS | SYSTOLIC BLOOD PRESSURE: 141 MMHG | BODY MASS INDEX: 25.92 KG/M2 | DIASTOLIC BLOOD PRESSURE: 65 MMHG

## 2023-03-24 DIAGNOSIS — M25.561 CHRONIC PAIN OF BOTH KNEES: Primary | ICD-10-CM

## 2023-03-24 DIAGNOSIS — M17.12 PRIMARY OSTEOARTHRITIS OF LEFT KNEE: ICD-10-CM

## 2023-03-24 DIAGNOSIS — M17.11 PRIMARY OSTEOARTHRITIS OF RIGHT KNEE: ICD-10-CM

## 2023-03-24 DIAGNOSIS — M25.562 CHRONIC PAIN OF BOTH KNEES: Primary | ICD-10-CM

## 2023-03-24 DIAGNOSIS — G89.29 CHRONIC PAIN OF BOTH KNEES: Primary | ICD-10-CM

## 2023-03-24 NOTE — PROGRESS NOTES
Assessment:  1  Chronic pain of both knees        2  Primary osteoarthritis of right knee        3  Primary osteoarthritis of left knee            Plan:  Bilateral knee osteoarthritis  Bilateral knee visco-supplement was ordered as patient has on-going knee pain and stiffness in which interferes with their daily activity and sleep along with crepitus with range of motion on exam and significant osteoarthritic changes on radiograph  The patient rates their pain at 10/10 at times  The patient has tried home exercise program learned from past physical therapy, steroid injections, activity modification, oral medications with limited benefit  Bracing has not provided relief  The patient has been counseled on weight loss  To do next visit:  Return in about 3 weeks (around 4/14/2023) for visco-supplementation  The above stated was discussed in layman's terms and the patient expressed understanding  All questions were answered to the patient's satisfaction  Scribe Attestation    I,:  Gamaliel De La Rosa am acting as a scribe while in the presence of the attending physician :       I,:  Rashard Uribe MD personally performed the services described in this documentation    as scribed in my presence :             Subjective:   Leo Garcia is a 80 y o  male who presents for follow up of bilateral knees  He is s/p bilateral knee steroid injections with short-lived benefit and side-effect of hiccups for 24 hours  Today he complains of right generalized and left anterior central knee pain  The right knee can feel weak on the stairs  Prolonged weight bearing and repetitive bending aggravates while rest alleviates  He denies medications for pain  He has tried physical therapy with limited benefit  He denies surgery of either knee          Review of systems negative unless otherwise specified in HPI    Past Medical History:   Diagnosis Date   • Actinic keratosis     Last assessed - 10/15/14   • Cortical age-related cataract of both eyes 3/22/2022   • Hypertension    • Hypokalemia     Last assessed - 8/13/14   • Sinus bradycardia     Last assessed - 8/13/14       Past Surgical History:   Procedure Laterality Date   • HERNIA REPAIR     • SIGMOIDOSCOPY      (Fiberoptic, Therapeutic) 7/28/2014, 8/25/14       Family History   Problem Relation Age of Onset   • Prostate cancer Father    • No Known Problems Mother    • Liver cancer Brother         Adenocarcinoma    • No Known Problems Sister    • No Known Problems Sister    • No Known Problems Son    • No Known Problems Son    • Leukemia Daughter         Remission-Bone Marrow transplant   • No Known Problems Daughter        Social History     Occupational History   • Occupation: Retired   Tobacco Use   • Smoking status: Never   • Smokeless tobacco: Never   • Tobacco comments:     Former smoker per [employer]    Vaping Use   • Vaping Use: Never used   Substance and Sexual Activity   • Alcohol use: No     Comment: Seldomly per [employer]    • Drug use: No   • Sexual activity: Not on file         Current Outpatient Medications:   •  atenolol (TENORMIN) 50 mg tablet, Take 1 tablet (50 mg total) by mouth daily, Disp: 90 tablet, Rfl: 1  •  azelastine (ASTELIN) 0 1 % nasal spray, SPRAY 1 SPRAY INTO EACH NOSTRIL 2 (TWO) TIMES A DAY USE IN EACH NOSTRIL AS DIRECTED, Disp: 1 Bottle, Rfl: 1  •  betamethasone valerate (VALISONE) 0 1 % cream, Apply topically 2 (two) times a day (Patient taking differently: Apply topically as needed), Disp: 45 g, Rfl: 0  •  Cholecalciferol (VITAMIN D3) 1000 UNIT/SPRAY LIQD, Take 1 tablet by mouth daily, Disp: , Rfl:   •  Coenzyme Q10 100 MG CHEW, Chew 200 mg, Disp: , Rfl:   •  Diclofenac Sodium (VOLTAREN) 1 %, Apply 2 g topically 4 (four) times a day (Patient taking differently: Apply 2 g topically as needed), Disp: 100 g, Rfl: 3  •  Difluprednate 0 05 % EMUL, , Disp: , Rfl:   •  ofloxacin (OCUFLOX) 0 3 % ophthalmic solution, , Disp: , Rfl:   • Omega-3-Acid Eth Est, Dietary, 1 g CAPS, Take by mouth, Disp: , Rfl:   •  Potassium 99 MG TABS, Take 1 tablet by mouth 2 (two) times a day, Disp: , Rfl:   •  sildenafil (VIAGRA) 100 mg tablet, TAKE 1 TABLET BY MOUTH EVERY DAY AS NEEDED FOR ERECTILE DYSFUNCTION, Disp: 30 tablet, Rfl: 3    Allergies   Allergen Reactions   • Isoflavones (Soy)             Vitals:    03/24/23 1304   BP: 141/65   Pulse: 61       Objective:  Physical exam  · General: Awake, Alert, Oriented  · Eyes: Pupils equal, round and reactive to light  · Heart: regular rate and rhythm  · Lungs: No audible wheezing  · Abdomen: soft                    Ortho Exam  Bilateral knee:  Varus alignment   No erythema or ecchymosis  No effusion or swelling  Normal strength  Good ROM with crepitus   Calf compartments soft and supple  Sensation intact  Toes are warm sensate and mobile      Diagnostics, reviewed and taken today if performed as documented:    None performed    Procedures, if performed today:    Procedures    None performed      Portions of the record may have been created with voice recognition software  Occasional wrong word or "sound a like" substitutions may have occurred due to the inherent limitations of voice recognition software  Read the chart carefully and recognize, using context, where substitutions have occurred

## 2023-05-26 ENCOUNTER — PROCEDURE VISIT (OUTPATIENT)
Dept: OBGYN CLINIC | Facility: MEDICAL CENTER | Age: 83
End: 2023-05-26

## 2023-05-26 VITALS
WEIGHT: 175 LBS | DIASTOLIC BLOOD PRESSURE: 73 MMHG | SYSTOLIC BLOOD PRESSURE: 152 MMHG | BODY MASS INDEX: 25.92 KG/M2 | HEART RATE: 54 BPM | HEIGHT: 69 IN

## 2023-05-26 DIAGNOSIS — M25.561 CHRONIC PAIN OF BOTH KNEES: ICD-10-CM

## 2023-05-26 DIAGNOSIS — M17.0 PRIMARY OSTEOARTHRITIS OF BOTH KNEES: Primary | ICD-10-CM

## 2023-05-26 DIAGNOSIS — G89.29 CHRONIC PAIN OF BOTH KNEES: ICD-10-CM

## 2023-05-26 DIAGNOSIS — M25.562 CHRONIC PAIN OF BOTH KNEES: ICD-10-CM

## 2023-05-26 NOTE — PATIENT INSTRUCTIONS
1  Primary osteoarthritis of both knees  Large joint arthrocentesis: bilateral knee      2  Chronic pain of both knees  Large joint arthrocentesis: bilateral knee          Return in about 3 months (around 8/26/2023) for re-check, or sooner if symptoms worsen or fail to improve

## 2023-05-26 NOTE — PROGRESS NOTES
Assessment:   Diagnosis ICD-10-CM Associated Orders   1  Primary osteoarthritis of both knees  M17 0 Large joint arthrocentesis: bilateral knee      2  Chronic pain of both knees  M25 561 Large joint arthrocentesis: bilateral knee    M25 562     G89 29           Plan:  • Bilateral knees known osteoarthritis and chronic pain  • Both of his knees were injected with the Durolane Visco product today  • He tolerated both procedures well  • Ice and postinjection protocol advised  • Weightbearing activities as tolerated  Counseled on general wellness  • Quality of life decision to pursue elective total knee arthroplasty  To do next visit:  Return in about 3 months (around 8/26/2023) for re-check, or sooner if symptoms worsen or fail to improve  The above stated was discussed in layman's terms and the patient expressed understanding  All questions were answered to the patient's satisfaction  Scribe Attestation    I,:  Grace Zuniga am acting as a scribe while in the presence of the attending physician :       I,:  Lyssa Quintana MD personally performed the services described in this documentation    as scribed in my presence :             Subjective:   Xenia Miller is a 80 y o  male who presents today with a female counterpart for repeat evaluation of his bilateral knees, known osteoarthritis  He continues to have pain that increases with weightbearing activities  He is stiffness getting up with her prolonged sedentary positions  He has difficulty ambulating stairs in an alternating fashion especially at the end of the day  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 on top of his gardening activities  He previously had cortisone injections to both knees with great relief however it was short lasted     Pain scale 8/10      Review of systems negative unless otherwise specified in HPI  Review of Systems    Past Medical History:   Diagnosis Date • Actinic keratosis     Last assessed - 10/15/14   • Cortical age-related cataract of both eyes 3/22/2022   • Hypertension    • Hypokalemia     Last assessed - 8/13/14   • Sinus bradycardia     Last assessed - 8/13/14       Past Surgical History:   Procedure Laterality Date   • HERNIA REPAIR     • SIGMOIDOSCOPY      (Fiberoptic, Therapeutic) 7/28/2014, 8/25/14       Family History   Problem Relation Age of Onset   • Prostate cancer Father    • No Known Problems Mother    • Liver cancer Brother         Adenocarcinoma    • No Known Problems Sister    • No Known Problems Sister    • No Known Problems Son    • No Known Problems Son    • Leukemia Daughter         Remission-Bone Marrow transplant   • No Known Problems Daughter        Social History     Occupational History   • Occupation: Retired   Tobacco Use   • Smoking status: Never   • Smokeless tobacco: Never   • Tobacco comments:     Former smoker per [employer]    Vaping Use   • Vaping Use: Never used   Substance and Sexual Activity   • Alcohol use: No     Comment: Seldomly per [employer]    • Drug use: No   • Sexual activity: Not on file         Current Outpatient Medications:   •  atenolol (TENORMIN) 50 mg tablet, Take 1 tablet (50 mg total) by mouth daily, Disp: 90 tablet, Rfl: 1  •  azelastine (ASTELIN) 0 1 % nasal spray, SPRAY 1 SPRAY INTO EACH NOSTRIL 2 (TWO) TIMES A DAY USE IN EACH NOSTRIL AS DIRECTED, Disp: 1 Bottle, Rfl: 1  •  betamethasone valerate (VALISONE) 0 1 % cream, Apply topically 2 (two) times a day (Patient taking differently: Apply topically as needed), Disp: 45 g, Rfl: 0  •  Cholecalciferol (VITAMIN D3) 1000 UNIT/SPRAY LIQD, Take 1 tablet by mouth daily, Disp: , Rfl:   •  Coenzyme Q10 100 MG CHEW, Chew 200 mg, Disp: , Rfl:   •  Diclofenac Sodium (VOLTAREN) 1 %, Apply 2 g topically 4 (four) times a day (Patient taking differently: Apply 2 g topically as needed), Disp: 100 g, Rfl: 3  •  Difluprednate 0 05 % EMUL, , Disp: , Rfl:   •  ofloxacin "(OCUFLOX) 0 3 % ophthalmic solution, , Disp: , Rfl:   •  Omega-3-Acid Eth Est, Dietary, 1 g CAPS, Take by mouth, Disp: , Rfl:   •  Potassium 99 MG TABS, Take 1 tablet by mouth 2 (two) times a day, Disp: , Rfl:   •  sildenafil (VIAGRA) 100 mg tablet, TAKE 1 TABLET BY MOUTH EVERY DAY AS NEEDED FOR ERECTILE DYSFUNCTION, Disp: 30 tablet, Rfl: 3    Allergies   Allergen Reactions   • Isoflavones (Soy)             Vitals:    05/26/23 1415   BP: 152/73   Pulse: (!) 54       Objective:                    Right Knee Exam     Muscle Strength   The patient has normal right knee strength  Tenderness   The patient is experiencing tenderness in the medial joint line  Range of Motion   Extension: 5   Flexion: 110 (With crepitation and stiffness)     Other   Erythema: absent  Sensation: normal  Swelling: mild  Effusion: no effusion present      Left Knee Exam     Muscle Strength   The patient has normal left knee strength  Tenderness   The patient is experiencing tenderness in the medial joint line  Range of Motion   Extension: 0   Flexion: 110 (With crepitation and stiffness)     Other   Erythema: absent  Sensation: normal  Swelling: none  Effusion: no effusion present    Comments: Both knees are in varus alignment with bony enlargement medially  Diagnostics, reviewed and taken today if performed as documented:    None performed            Procedures, if performed today:    Large joint arthrocentesis: bilateral knee  Universal Protocol:  Consent: Verbal consent obtained  Risks and benefits: risks, benefits and alternatives were discussed  Consent given by: patient  Time out: Immediately prior to procedure a \"time out\" was called to verify the correct patient, procedure, equipment, support staff and site/side marked as required    Timeout called at: 5/26/2023 2:31 PM   Patient understanding: patient states understanding of the procedure being performed  Site marked: the operative site was marked  Patient " "identity confirmed: verbally with patient    Supporting Documentation  Indications: pain and diagnostic evaluation   Procedure Details  Location: knee - bilateral knee  Preparation: Patient was prepped and draped in the usual sterile fashion  Needle size: 22 G  Ultrasound guidance: no  Approach: supralateral     Medications (Right): 3 mL sodium hyaluronate 60 MG/3MLMedications (Left): 3 mL sodium hyaluronate 60 MG/3ML   Patient tolerance: patient tolerated the procedure well with no immediate complications  Dressing:  Sterile dressing applied            Portions of the record may have been created with voice recognition software  Occasional wrong word or \"sound a like\" substitutions may have occurred due to the inherent limitations of voice recognition software  Read the chart carefully and recognize, using context, where substitutions have occurred    "

## 2023-05-30 ENCOUNTER — TELEPHONE (OUTPATIENT)
Dept: OBGYN CLINIC | Facility: HOSPITAL | Age: 83
End: 2023-05-30

## 2023-05-30 NOTE — TELEPHONE ENCOUNTER
Caller: Eliel Garrison    Doctor: Eliz Dow    Reason for call: Patient calling to report a possible reaction following his knee injections  Patient reports some swelling & pain in his stomach  Also reports increased urination  States the discomfort lasted through Saturday and into Sunday      Call back#: [phone]

## 2023-08-25 ENCOUNTER — OFFICE VISIT (OUTPATIENT)
Dept: OBGYN CLINIC | Facility: MEDICAL CENTER | Age: 83
End: 2023-08-25
Payer: COMMERCIAL

## 2023-08-25 VITALS
DIASTOLIC BLOOD PRESSURE: 84 MMHG | HEIGHT: 69 IN | HEART RATE: 65 BPM | SYSTOLIC BLOOD PRESSURE: 125 MMHG | BODY MASS INDEX: 25.92 KG/M2 | WEIGHT: 175 LBS

## 2023-08-25 DIAGNOSIS — M17.11 PRIMARY OSTEOARTHRITIS OF RIGHT KNEE: Primary | ICD-10-CM

## 2023-08-25 DIAGNOSIS — M17.12 PRIMARY OSTEOARTHRITIS OF LEFT KNEE: ICD-10-CM

## 2023-08-25 DIAGNOSIS — Z48.89 ENCOUNTER FOR OTHER SPECIFIED SURGICAL AFTERCARE: ICD-10-CM

## 2023-08-25 DIAGNOSIS — Z47.89 ENCOUNTER FOR OTHER ORTHOPEDIC AFTERCARE: ICD-10-CM

## 2023-08-25 PROCEDURE — 99214 OFFICE O/P EST MOD 30 MIN: CPT | Performed by: ORTHOPAEDIC SURGERY

## 2023-08-25 RX ORDER — SODIUM CHLORIDE, SODIUM LACTATE, POTASSIUM CHLORIDE, CALCIUM CHLORIDE 600; 310; 30; 20 MG/100ML; MG/100ML; MG/100ML; MG/100ML
125 INJECTION, SOLUTION INTRAVENOUS CONTINUOUS
OUTPATIENT
Start: 2023-08-25

## 2023-08-25 RX ORDER — CEFAZOLIN SODIUM 2 G/50ML
2000 SOLUTION INTRAVENOUS ONCE
OUTPATIENT
Start: 2023-08-25 | End: 2023-08-25

## 2023-08-25 RX ORDER — FERROUS SULFATE TAB EC 324 MG (65 MG FE EQUIVALENT) 324 (65 FE) MG
324 TABLET DELAYED RESPONSE ORAL
Qty: 60 TABLET | Refills: 0 | Status: SHIPPED | OUTPATIENT
Start: 2023-08-25

## 2023-08-25 RX ORDER — ASCORBIC ACID 500 MG
500 TABLET ORAL DAILY
Qty: 30 TABLET | Refills: 0 | Status: SHIPPED | OUTPATIENT
Start: 2023-08-25

## 2023-08-25 RX ORDER — CHLORHEXIDINE GLUCONATE 0.12 MG/ML
15 RINSE ORAL ONCE
OUTPATIENT
Start: 2023-08-25 | End: 2023-08-25

## 2023-08-25 RX ORDER — GABAPENTIN 300 MG/1
300 CAPSULE ORAL ONCE
OUTPATIENT
Start: 2023-08-25 | End: 2023-08-25

## 2023-08-25 RX ORDER — ACETAMINOPHEN 325 MG/1
975 TABLET ORAL ONCE
OUTPATIENT
Start: 2023-08-25 | End: 2023-08-25

## 2023-08-25 RX ORDER — FOLIC ACID 1 MG/1
1 TABLET ORAL DAILY
Qty: 30 TABLET | Refills: 0 | Status: SHIPPED | OUTPATIENT
Start: 2023-08-25

## 2023-08-25 RX ORDER — ENOXAPARIN SODIUM 100 MG/ML
40 INJECTION SUBCUTANEOUS DAILY
Qty: 11.2 ML | Refills: 0 | Status: SHIPPED | OUTPATIENT
Start: 2023-08-25 | End: 2023-09-22

## 2023-08-25 RX ORDER — TRANEXAMIC ACID 10 MG/ML
1000 INJECTION, SOLUTION INTRAVENOUS ONCE
OUTPATIENT
Start: 2023-08-25 | End: 2023-08-25

## 2023-08-25 NOTE — PROGRESS NOTES
Assessment:  1. Primary osteoarthritis of right knee        2. Primary osteoarthritis of left knee        3. Encounter for other orthopedic aftercare        4. Encounter for other specified surgical aftercare            Plan:  Bilateral knee osteoarthritis. The patient will be scheduled for right total knee arthroplasty. We feel the patient will find significant relief per current symptoms, findings on imaging and exam.  Risks, benefits, precautions and expectations were discussed. Risks include blood loss, potential infection and blood clots. Preoperative vitamins were prescribed. The patient should follow up after surgery. To do next visit:  Return for post-op with x-rays. The above stated was discussed in layman's terms and the patient expressed understanding. All questions were answered to the patient's satisfaction. Scribe Attestation    I,:  Arcenio Nascimento am acting as a scribe while in the presence of the attending physician.:       I,:  Sally Vyas MD personally performed the services described in this documentation    as scribed in my presence.:             Subjective:   Miguel Espinoza is a 80 y.o. male who presents for follow up of bilateral knees. He is s/p bilateral knee visco-supplement injections with limited benefit, 5/26/2023. Today he complains of left anterior inferior knee pain and right generalized knee pain and weakness. Stairs can be challenging and aggravate while rest alleviates.         Review of systems negative unless otherwise specified in HPI    Past Medical History:   Diagnosis Date   • Actinic keratosis     Last assessed - 10/15/14   • Cortical age-related cataract of both eyes 3/22/2022   • Hypertension    • Hypokalemia     Last assessed - 8/13/14   • Sinus bradycardia     Last assessed - 8/13/14       Past Surgical History:   Procedure Laterality Date   • HERNIA REPAIR     • SIGMOIDOSCOPY      (Fiberoptic, Therapeutic) 7/28/2014, 8/25/14       Family History   Problem Relation Age of Onset   • Prostate cancer Father    • No Known Problems Mother    • Liver cancer Brother         Adenocarcinoma    • No Known Problems Sister    • No Known Problems Sister    • No Known Problems Son    • No Known Problems Son    • Leukemia Daughter         Remission-Bone Marrow transplant   • No Known Problems Daughter        Social History     Occupational History   • Occupation: Retired   Tobacco Use   • Smoking status: Never   • Smokeless tobacco: Never   • Tobacco comments:     Former smoker per [employer]    Vaping Use   • Vaping Use: Never used   Substance and Sexual Activity   • Alcohol use: No     Comment: Seldomly per [employer]    • Drug use: No   • Sexual activity: Not on file         Current Outpatient Medications:   •  atenolol (TENORMIN) 50 mg tablet, Take 1 tablet (50 mg total) by mouth daily, Disp: 90 tablet, Rfl: 1  •  azelastine (ASTELIN) 0.1 % nasal spray, SPRAY 1 SPRAY INTO EACH NOSTRIL 2 (TWO) TIMES A DAY USE IN EACH NOSTRIL AS DIRECTED, Disp: 1 Bottle, Rfl: 1  •  betamethasone valerate (VALISONE) 0.1 % cream, Apply topically 2 (two) times a day (Patient taking differently: Apply topically as needed), Disp: 45 g, Rfl: 0  •  Cholecalciferol (VITAMIN D3) 1000 UNIT/SPRAY LIQD, Take 1 tablet by mouth daily, Disp: , Rfl:   •  Coenzyme Q10 100 MG CHEW, Chew 200 mg, Disp: , Rfl:   •  Diclofenac Sodium (VOLTAREN) 1 %, Apply 2 g topically 4 (four) times a day (Patient taking differently: Apply 2 g topically as needed), Disp: 100 g, Rfl: 3  •  Difluprednate 0.05 % EMUL, , Disp: , Rfl:   •  ofloxacin (OCUFLOX) 0.3 % ophthalmic solution, , Disp: , Rfl:   •  Omega-3-Acid Eth Est, Dietary, 1 g CAPS, Take by mouth, Disp: , Rfl:   •  Potassium 99 MG TABS, Take 1 tablet by mouth 2 (two) times a day, Disp: , Rfl:   •  sildenafil (VIAGRA) 100 mg tablet, TAKE 1 TABLET BY MOUTH EVERY DAY AS NEEDED FOR ERECTILE DYSFUNCTION, Disp: 30 tablet, Rfl: 3    Allergies   Allergen Reactions • Isoflavones (Soy)             Vitals:    08/25/23 1419   BP: 125/84   Pulse: 65       Objective:  Physical exam  · General: Awake, Alert, Oriented  · Eyes: Pupils equal, round and reactive to light  · Heart: regular rate and rhythm  · Lungs: No audible wheezing  · Abdomen: soft                    Ortho Exam  Bilateral knees:  No erythema or ecchymosis  No effusion or swelling  Normal strength  Good ROM with crepitus   Calf compartments soft and supple  Sensation intact  Toes are warm sensate and mobile      Diagnostics, reviewed and taken today if performed as documented:    None performed    Procedures, if performed today:    Procedures    None performed      Portions of the record may have been created with voice recognition software. Occasional wrong word or "sound a like" substitutions may have occurred due to the inherent limitations of voice recognition software. Read the chart carefully and recognize, using context, where substitutions have occurred.

## 2023-08-28 DIAGNOSIS — Z01.818 PRE-OP TESTING: Primary | ICD-10-CM

## 2023-09-03 DIAGNOSIS — I10 BENIGN ESSENTIAL HYPERTENSION: ICD-10-CM

## 2023-09-05 DIAGNOSIS — M17.11 PRIMARY OSTEOARTHRITIS OF RIGHT KNEE: ICD-10-CM

## 2023-09-05 DIAGNOSIS — I10 BENIGN ESSENTIAL HYPERTENSION: ICD-10-CM

## 2023-09-05 RX ORDER — ATENOLOL 50 MG/1
50 TABLET ORAL DAILY
Qty: 90 TABLET | Refills: 1 | OUTPATIENT
Start: 2023-09-05

## 2023-09-05 RX ORDER — ATENOLOL 50 MG/1
50 TABLET ORAL DAILY
Qty: 90 TABLET | Refills: 1 | Status: SHIPPED | OUTPATIENT
Start: 2023-09-05

## 2023-09-11 ENCOUNTER — APPOINTMENT (OUTPATIENT)
Dept: LAB | Facility: CLINIC | Age: 83
End: 2023-09-11
Payer: COMMERCIAL

## 2023-09-11 DIAGNOSIS — I10 BENIGN ESSENTIAL HYPERTENSION: ICD-10-CM

## 2023-09-11 DIAGNOSIS — M17.11 PRIMARY OSTEOARTHRITIS OF RIGHT KNEE: ICD-10-CM

## 2023-09-11 DIAGNOSIS — E55.9 VITAMIN D DEFICIENCY: ICD-10-CM

## 2023-09-11 LAB
ALBUMIN SERPL BCP-MCNC: 3.9 G/DL (ref 3.5–5)
ALP SERPL-CCNC: 56 U/L (ref 34–104)
ALT SERPL W P-5'-P-CCNC: 43 U/L (ref 7–52)
ANION GAP SERPL CALCULATED.3IONS-SCNC: 10 MMOL/L
AST SERPL W P-5'-P-CCNC: 30 U/L (ref 13–39)
BASOPHILS # BLD AUTO: 0.04 THOUSANDS/ÂΜL (ref 0–0.1)
BASOPHILS NFR BLD AUTO: 1 % (ref 0–1)
BILIRUB SERPL-MCNC: 0.6 MG/DL (ref 0.2–1)
BUN SERPL-MCNC: 20 MG/DL (ref 5–25)
CALCIUM SERPL-MCNC: 8.9 MG/DL (ref 8.4–10.2)
CHLORIDE SERPL-SCNC: 107 MMOL/L (ref 96–108)
CHOLEST SERPL-MCNC: 160 MG/DL
CO2 SERPL-SCNC: 26 MMOL/L (ref 21–32)
CREAT SERPL-MCNC: 1 MG/DL (ref 0.6–1.3)
EOSINOPHIL # BLD AUTO: 0.17 THOUSAND/ÂΜL (ref 0–0.61)
EOSINOPHIL NFR BLD AUTO: 5 % (ref 0–6)
ERYTHROCYTE [DISTWIDTH] IN BLOOD BY AUTOMATED COUNT: 13 % (ref 11.6–15.1)
GFR SERPL CREATININE-BSD FRML MDRD: 69 ML/MIN/1.73SQ M
GLUCOSE P FAST SERPL-MCNC: 88 MG/DL (ref 65–99)
HCT VFR BLD AUTO: 40.8 % (ref 36.5–49.3)
HDLC SERPL-MCNC: 39 MG/DL
HGB BLD-MCNC: 13.7 G/DL (ref 12–17)
IMM GRANULOCYTES # BLD AUTO: 0.06 THOUSAND/UL (ref 0–0.2)
IMM GRANULOCYTES NFR BLD AUTO: 2 % (ref 0–2)
LDLC SERPL CALC-MCNC: 102 MG/DL (ref 0–100)
LYMPHOCYTES # BLD AUTO: 1.08 THOUSANDS/ÂΜL (ref 0.6–4.47)
LYMPHOCYTES NFR BLD AUTO: 31 % (ref 14–44)
MCH RBC QN AUTO: 30.9 PG (ref 26.8–34.3)
MCHC RBC AUTO-ENTMCNC: 33.6 G/DL (ref 31.4–37.4)
MCV RBC AUTO: 92 FL (ref 82–98)
MONOCYTES # BLD AUTO: 0.52 THOUSAND/ÂΜL (ref 0.17–1.22)
MONOCYTES NFR BLD AUTO: 15 % (ref 4–12)
NEUTROPHILS # BLD AUTO: 1.63 THOUSANDS/ÂΜL (ref 1.85–7.62)
NEUTS SEG NFR BLD AUTO: 46 % (ref 43–75)
NONHDLC SERPL-MCNC: 121 MG/DL
NRBC BLD AUTO-RTO: 0 /100 WBCS
PLATELET # BLD AUTO: 247 THOUSANDS/UL (ref 149–390)
PMV BLD AUTO: 9.3 FL (ref 8.9–12.7)
POTASSIUM SERPL-SCNC: 3.7 MMOL/L (ref 3.5–5.3)
PROT SERPL-MCNC: 6.6 G/DL (ref 6.4–8.4)
RBC # BLD AUTO: 4.43 MILLION/UL (ref 3.88–5.62)
SODIUM SERPL-SCNC: 143 MMOL/L (ref 135–147)
TRIGL SERPL-MCNC: 94 MG/DL
TSH SERPL DL<=0.05 MIU/L-ACNC: 1.45 UIU/ML (ref 0.45–4.5)
WBC # BLD AUTO: 3.5 THOUSAND/UL (ref 4.31–10.16)

## 2023-09-11 PROCEDURE — 36415 COLL VENOUS BLD VENIPUNCTURE: CPT

## 2023-09-11 PROCEDURE — 85025 COMPLETE CBC W/AUTO DIFF WBC: CPT

## 2023-09-11 PROCEDURE — 80061 LIPID PANEL: CPT

## 2023-09-11 PROCEDURE — 80053 COMPREHEN METABOLIC PANEL: CPT

## 2023-09-11 PROCEDURE — 84443 ASSAY THYROID STIM HORMONE: CPT

## 2023-09-11 PROCEDURE — 82652 VIT D 1 25-DIHYDROXY: CPT

## 2023-09-11 NOTE — PROGRESS NOTES
9/13/2023      Chief Complaint   Patient presents with   • Follow-up     Prostate cancer  ED f/u         Assessment and Plan    80 y.o. male     1. Prostate cancer status post XRT (2010)   - PSA (2/28/23) 0.3, stable  - Follow up in 1 year with PSA prior     2. Erectile dysfunction  - Using Viagra 100 mg at this time  - Call with any questions or concerns in the meantime  - All questions answered; patient understands and agrees with plan       History of Present Illness  Bonnie Marshall is a 80 y.o. male patient with history of prostate cancer status post XRT (2010) here for follow up. Patient's PSA continues to demonstrate stability and is currently 0.3.     Patient continues to report that his urination continues to be stable.  He does notice slowing of his 1st urination in the morning as well as some issues with possible incontinence if trying to postpone urination. [facility] feels that he has a good stream, empty after urination, and denies nocturia.  He denies any dysuria, gross hematuria, suprapubic pressure, flank pain, bone pain, or weight loss.     He continues to use Viagra 100 mg as needed for sexual dysfunction. Alfreda Christie does not suffer from any side effects from this medication. Review of Systems   Constitutional: Negative for activity change, appetite change, chills and fever. HENT: Negative for congestion and trouble swallowing. Respiratory: Negative for cough and shortness of breath. Cardiovascular: Negative for chest pain, palpitations and leg swelling. Gastrointestinal: Negative for abdominal pain, constipation, diarrhea, nausea and vomiting. Genitourinary: Negative for difficulty urinating, dysuria, flank pain, frequency, hematuria and urgency. Musculoskeletal: Negative for back pain and gait problem. Skin: Negative for wound. Allergic/Immunologic: Negative for immunocompromised state. Neurological: Negative for dizziness and syncope. Hematological: Does not bruise/bleed easily. Psychiatric/Behavioral: Negative for confusion. All other systems reviewed and are negative. Vitals  Vitals:    09/13/23 1048   BP: 136/80   Pulse: 60   SpO2: 98%   Weight: 78.3 kg (172 lb 9.6 oz)   Height: 5' 9" (1.753 m)       Physical Exam  Constitutional:       General: He is not in acute distress. Appearance: Normal appearance. He is not ill-appearing, toxic-appearing or diaphoretic. HENT:      Head: Normocephalic. Nose: No congestion. Eyes:      General: No scleral icterus. Right eye: No discharge. Left eye: No discharge. Conjunctiva/sclera: Conjunctivae normal.      Pupils: Pupils are equal, round, and reactive to light. Pulmonary:      Effort: Pulmonary effort is normal.   Musculoskeletal:      Cervical back: Normal range of motion. Skin:     General: Skin is warm and dry. Coloration: Skin is not jaundiced or pale. Findings: No bruising, erythema, lesion or rash. Neurological:      General: No focal deficit present. Mental Status: He is alert and oriented to person, place, and time. Mental status is at baseline. Gait: Gait normal.   Psychiatric:         Mood and Affect: Mood normal.         Behavior: Behavior normal.         Thought Content: Thought content normal.         Judgment: Judgment normal.           Past History  Past Medical History:   Diagnosis Date   • Actinic keratosis     Last assessed - 10/15/14   • Cortical age-related cataract of both eyes 3/22/2022   • Hypertension    • Hypokalemia     Last assessed - 8/13/14   • Sinus bradycardia     Last assessed - 8/13/14     Social History     Socioeconomic History   • Marital status:       Spouse name: None   • Number of children: None   • Years of education: None   • Highest education level: None   Occupational History   • Occupation: Retired   Tobacco Use   • Smoking status: Never   • Smokeless tobacco: Never   • Tobacco comments:     Former smoker per [employer]    Vaping Use • Vaping Use: Never used   Substance and Sexual Activity   • Alcohol use: No     Comment: Seldomly per [employer]    • Drug use: No   • Sexual activity: Not Currently   Other Topics Concern   • None   Social History Narrative    Caffeine use - 2 cups daily     and  noted per [facility]     Social Determinants of Health     Financial Resource Strain: Low Risk  (3/15/2023)    Overall Financial Resource Strain (CARDIA)    • Difficulty of Paying Living Expenses: Not very hard   Food Insecurity: Not on file   Transportation Needs: No Transportation Needs (3/15/2023)    PRAPARE - Transportation    • Lack of Transportation (Medical): No    • Lack of Transportation (Non-Medical):  No   Physical Activity: Not on file   Stress: Not on file   Social Connections: Not on file   Intimate Partner Violence: Not on file   Housing Stability: Not on file     Social History     Tobacco Use   Smoking Status Never   Smokeless Tobacco Never   Tobacco Comments    Former smoker per [employer]      Family History   Problem Relation Age of Onset   • Prostate cancer Father    • No Known Problems Mother    • Liver cancer Brother         Adenocarcinoma    • No Known Problems Sister    • No Known Problems Sister    • No Known Problems Son    • No Known Problems Son    • Leukemia Daughter         Remission-Bone Marrow transplant   • No Known Problems Daughter        The following portions of the patient's history were reviewed and updated as appropriate: allergies, current medications, past medical history, past social history, past surgical history and problem list.    Results  No results found for this or any previous visit (from the past 1 hour(s)).]  Lab Results   Component Value Date    PSA 0.3 02/28/2023    PSA 0.4 08/31/2022    PSA 0.5 08/11/2021    PSA 0.4 01/12/2021     Lab Results   Component Value Date    CALCIUM 8.9 09/11/2023    K 3.7 09/11/2023    CO2 26 09/11/2023     09/11/2023    BUN 20 09/11/2023 CREATININE 1.00 09/11/2023     Lab Results   Component Value Date    WBC 3.50 (L) 09/11/2023    HGB 13.7 09/11/2023    HCT 40.8 09/11/2023    MCV 92 09/11/2023     09/11/2023       Cira Walden PA-C

## 2023-09-12 ENCOUNTER — RA CDI HCC (OUTPATIENT)
Dept: OTHER | Facility: HOSPITAL | Age: 83
End: 2023-09-12

## 2023-09-12 NOTE — PROGRESS NOTES
[address] coding opportunities       Chart reviewed, no opportunity found: [address] Review     Patients Insurance     Medicare Insurance: [employer]

## 2023-09-13 ENCOUNTER — OFFICE VISIT (OUTPATIENT)
Dept: UROLOGY | Facility: CLINIC | Age: 83
End: 2023-09-13
Payer: COMMERCIAL

## 2023-09-13 VITALS
HEIGHT: 69 IN | BODY MASS INDEX: 25.56 KG/M2 | WEIGHT: 172.6 LBS | DIASTOLIC BLOOD PRESSURE: 80 MMHG | SYSTOLIC BLOOD PRESSURE: 136 MMHG | OXYGEN SATURATION: 98 % | HEART RATE: 60 BPM

## 2023-09-13 DIAGNOSIS — C61 PROSTATE CANCER (HCC): Primary | ICD-10-CM

## 2023-09-13 PROCEDURE — 99213 OFFICE O/P EST LOW 20 MIN: CPT | Performed by: PHYSICIAN ASSISTANT

## 2023-09-14 ENCOUNTER — RA CDI HCC (OUTPATIENT)
Dept: OTHER | Facility: HOSPITAL | Age: 83
End: 2023-09-14

## 2023-09-14 NOTE — PROGRESS NOTES
[address] [facility] coding opportunities       Chart reviewed, no opportunity found: CHART REVIEWED, [facility]     Patients Insurance     Medicare Insurance: [employer]

## 2023-09-20 ENCOUNTER — OFFICE VISIT (OUTPATIENT)
Dept: FAMILY MEDICINE CLINIC | Facility: CLINIC | Age: 83
End: 2023-09-20
Payer: COMMERCIAL

## 2023-09-20 VITALS
SYSTOLIC BLOOD PRESSURE: 128 MMHG | BODY MASS INDEX: 25.77 KG/M2 | HEIGHT: 69 IN | WEIGHT: 174 LBS | DIASTOLIC BLOOD PRESSURE: 78 MMHG | OXYGEN SATURATION: 98 % | RESPIRATION RATE: 16 BRPM | HEART RATE: 58 BPM

## 2023-09-20 DIAGNOSIS — Z91.09 ENVIRONMENTAL ALLERGIES: ICD-10-CM

## 2023-09-20 DIAGNOSIS — M23.8X1 CHONDRAL DEFECT OF CONDYLE OF RIGHT FEMUR: ICD-10-CM

## 2023-09-20 DIAGNOSIS — E55.9 VITAMIN D DEFICIENCY: ICD-10-CM

## 2023-09-20 DIAGNOSIS — G47.33 OSA (OBSTRUCTIVE SLEEP APNEA): ICD-10-CM

## 2023-09-20 DIAGNOSIS — I10 BENIGN ESSENTIAL HYPERTENSION: Primary | ICD-10-CM

## 2023-09-20 DIAGNOSIS — R00.2 PALPITATIONS: ICD-10-CM

## 2023-09-20 DIAGNOSIS — R60.9 DEPENDENT EDEMA: ICD-10-CM

## 2023-09-20 PROCEDURE — 99214 OFFICE O/P EST MOD 30 MIN: CPT | Performed by: FAMILY MEDICINE

## 2023-09-21 NOTE — ASSESSMENT & PLAN NOTE
Trace minimal dependent edema bilateral ankles. We will send patient for echocardiogram to evaluate cardiac function.

## 2023-09-21 NOTE — PROGRESS NOTES
Subjective:      Patient ID: Lauren Gage is a 80 y.o. male. 80-year-old male presents with his wife for follow-up of chronic conditions. Overall has been feeling well. End of the day he develops lower extremity edema. He did have COVID-19 confirmed diagnosis on September 4, 2023 but is definitely feeling much better. Only had sore throat followed by nasal congestion. Labs reviewed. Patient will be having right total knee replacement at the end of November and will be seeing me for preop exam      Past Medical History:   Diagnosis Date   • Actinic keratosis     Last assessed - 10/15/14   • Cortical age-related cataract of both eyes 3/22/2022   • Hypertension    • Hypokalemia     Last assessed - 8/13/14   • Sinus bradycardia     Last assessed - 8/13/14       Family History   Problem Relation Age of Onset   • Prostate cancer Father    • No Known Problems Mother    • Liver cancer Brother         Adenocarcinoma    • No Known Problems Sister    • No Known Problems Sister    • No Known Problems Son    • No Known Problems Son    • Leukemia Daughter         Remission-Bone Marrow transplant   • No Known Problems Daughter        Past Surgical History:   Procedure Laterality Date   • HERNIA REPAIR     • SIGMOIDOSCOPY      (Fiberoptic, Therapeutic) 7/28/2014, 8/25/14        reports that he has never smoked. He has never used smokeless tobacco. He reports that he does not drink alcohol and does not use drugs.       Current Outpatient Medications:   •  ascorbic acid (VITAMIN C) 500 MG tablet, Take 1 tablet (500 mg total) by mouth daily Start 30 days prior to surgery, Disp: 30 tablet, Rfl: 0  •  atenolol (TENORMIN) 50 mg tablet, Take 1 tablet (50 mg total) by mouth daily, Disp: 90 tablet, Rfl: 1  •  azelastine (ASTELIN) 0.1 % nasal spray, SPRAY 1 SPRAY INTO EACH NOSTRIL 2 (TWO) TIMES A DAY USE IN EACH NOSTRIL AS DIRECTED, Disp: 1 Bottle, Rfl: 1  •  betamethasone valerate (VALISONE) 0.1 % cream, Apply topically 2 (two) times a day (Patient taking differently: Apply topically as needed), Disp: 45 g, Rfl: 0  •  Cholecalciferol (VITAMIN D3) 1000 UNIT/SPRAY LIQD, Take 1 tablet by mouth daily, Disp: , Rfl:   •  Coenzyme Q10 100 MG CHEW, Chew 200 mg, Disp: , Rfl:   •  Diclofenac Sodium (VOLTAREN) 1 %, Apply 2 g topically 4 (four) times a day (Patient taking differently: Apply 2 g topically as needed), Disp: 100 g, Rfl: 3  •  Difluprednate 0.05 % EMUL, , Disp: , Rfl:   •  enoxaparin (LOVENOX) 40 mg/0.4 mL, Inject 0.4 mL (40 mg total) under the skin daily for 28 days Start injections after surgery, Disp: 11.2 mL, Rfl: 0  •  ferrous sulfate 324 (65 Fe) mg, Take 1 tablet (324 mg total) by mouth 2 (two) times a day before meals Start 30 days prior to surgery, Disp: 60 tablet, Rfl: 0  •  folic acid (FOLVITE) 1 mg tablet, Take 1 tablet (1 mg total) by mouth daily Start 30 days prior to surgery, Disp: 30 tablet, Rfl: 0  •  ofloxacin (OCUFLOX) 0.3 % ophthalmic solution, , Disp: , Rfl:   •  Omega-3-Acid Eth Est, Dietary, 1 g CAPS, Take by mouth, Disp: , Rfl:   •  Potassium 99 MG TABS, Take 1 tablet by mouth 2 (two) times a day, Disp: , Rfl:   •  Potassium Gluconate 2.5 MEQ TABS, Take 1 tablet by mouth, Disp: , Rfl:   •  sildenafil (VIAGRA) 100 mg tablet, TAKE 1 TABLET BY MOUTH EVERY DAY AS NEEDED FOR ERECTILE DYSFUNCTION, Disp: 30 tablet, Rfl: 3    The following portions of the patient's history were reviewed and updated as appropriate: allergies, current medications, past family history, past medical history, past social history, past surgical history and problem list.    Review of Systems   Constitutional: Negative. HENT: Negative. Eyes: Negative. Respiratory: Negative. Cardiovascular: Positive for leg swelling ( Dependent edema only at the end of the day). Gastrointestinal: Negative. Endocrine: Negative. Genitourinary: Negative. Musculoskeletal: Negative. Skin: Negative. Allergic/Immunologic: Negative. Neurological: Negative. Hematological: Negative. Psychiatric/Behavioral: Negative. All other systems reviewed and are negative. Objective:    /78   Pulse 58   Resp 16   Ht 5' 9" (1.753 m)   Wt 78.9 kg (174 lb)   SpO2 98%   BMI 25.70 kg/m²      Physical Exam  Vitals and nursing note reviewed. Constitutional:       General: He is not in acute distress. Appearance: Normal appearance. He is well-developed and normal weight. He is not ill-appearing. HENT:      Head: Normocephalic and atraumatic. Right Ear: Tympanic membrane, ear canal and external ear normal.      Left Ear: Tympanic membrane, ear canal and external ear normal.      Nose: Nose normal.      Mouth/Throat:      Mouth: Mucous membranes are moist.   Eyes:      Extraocular Movements: Extraocular movements intact. Conjunctiva/sclera: Conjunctivae normal.      Pupils: Pupils are equal, round, and reactive to light. Cardiovascular:      Rate and Rhythm: Regular rhythm. Bradycardia present. Pulses: Normal pulses. Heart sounds: Normal heart sounds. No murmur heard. Pulmonary:      Effort: Pulmonary effort is normal.      Breath sounds: Normal breath sounds. Abdominal:      General: Abdomen is flat. Bowel sounds are normal.      Palpations: Abdomen is soft. Musculoskeletal:         General: Normal range of motion. Cervical back: Normal range of motion and neck supple. Right lower leg: Edema ( Trace nonpitting at ankle) present. Left lower leg: Edema ( Trace nonpitting ankle) present. Skin:     General: Skin is warm and dry. Neurological:      General: No focal deficit present. Mental Status: He is alert and oriented to person, place, and time. Psychiatric:         Mood and Affect: Mood normal.         Behavior: Behavior normal.         Thought Content:  Thought content normal.         Judgment: Judgment normal.           Recent Results (from the past 1008 hour(s))   CBC and differential    Collection Time: 09/11/23  7:19 AM   Result Value Ref Range    WBC 3.50 (L) 4.31 - 10.16 Thousand/uL    RBC 4.43 3.88 - 5.62 Million/uL    Hemoglobin 13.7 12.0 - 17.0 g/dL    Hematocrit 40.8 36.5 - 49.3 %    MCV 92 82 - 98 fL    MCH 30.9 26.8 - 34.3 pg    MCHC 33.6 31.4 - 37.4 g/dL    RDW 13.0 11.6 - 15.1 %    MPV 9.3 8.9 - 12.7 fL    Platelets 797 195 - 030 Thousands/uL    nRBC 0 /100 WBCs    Neutrophils Relative 46 43 - 75 %    Immat GRANS % 2 0 - 2 %    Lymphocytes Relative 31 14 - 44 %    Monocytes Relative 15 (H) 4 - 12 %    Eosinophils Relative 5 0 - 6 %    Basophils Relative 1 0 - 1 %    Neutrophils Absolute 1.63 (L) 1.85 - 7.62 Thousands/µL    Immature Grans Absolute 0.06 0.00 - 0.20 Thousand/uL    Lymphocytes Absolute 1.08 0.60 - 4.47 Thousands/µL    Monocytes Absolute 0.52 0.17 - 1.22 Thousand/µL    Eosinophils Absolute 0.17 0.00 - 0.61 Thousand/µL    Basophils Absolute 0.04 0.00 - 0.10 Thousands/µL   Comprehensive metabolic panel    Collection Time: 09/11/23  7:19 AM   Result Value Ref Range    Sodium 143 135 - 147 mmol/L    Potassium 3.7 3.5 - 5.3 mmol/L    Chloride 107 96 - 108 mmol/L    CO2 26 21 - 32 mmol/L    ANION GAP 10 mmol/L    BUN 20 5 - 25 mg/dL    Creatinine 1.00 0.60 - 1.30 mg/dL    Glucose, Fasting 88 65 - 99 mg/dL    Calcium 8.9 8.4 - 10.2 mg/dL    AST 30 13 - 39 U/L    ALT 43 7 - 52 U/L    Alkaline Phosphatase 56 34 - 104 U/L    Total Protein 6.6 6.4 - 8.4 g/dL    Albumin 3.9 3.5 - 5.0 g/dL    Total Bilirubin 0.60 0.20 - 1.00 mg/dL    eGFR 69 ml/min/1.73sq m   Lipid panel    Collection Time: 09/11/23  7:19 AM   Result Value Ref Range    Cholesterol 160 See Comment mg/dL    Triglycerides 94 See Comment mg/dL    HDL, Direct 39 (L) >=40 mg/dL    LDL Calculated 102 (H) 0 - 100 mg/dL    Non-HDL-Chol (CHOL-HDL) 121 mg/dl   TSH, 3rd generation with Free T4 reflex    Collection Time: 09/11/23  7:19 AM   Result Value Ref Range    TSH 3RD GENERATON 1.450 0.450 - 4.500 uIU/mL Assessment/Plan:    Benign essential hypertension  Very well controlled on atenolol 50 mg once daily. 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 He does follow-up with urology on a yearly basis. Palpitations  Negative stress testing in the past.  Patient remains on atenolol 50 mg once daily. Had only 1 episode of breakthrough    Dependent edema  Trace minimal dependent edema bilateral ankles. We will send patient for echocardiogram to evaluate cardiac function. Problem List Items Addressed This Visit        Respiratory    ZAINAB (obstructive sleep apnea)       Cardiovascular and Mediastinum    Benign essential hypertension - Primary     Very well controlled on atenolol 50 mg once daily. 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 We will send patient for echocardiogram to evaluate cardiac function. Relevant Orders    Echo complete w/ contrast if indicated    Environmental allergies    Palpitations     Negative stress testing in the past.  Patient remains on atenolol 50 mg once daily.   Had only 1 episode of breakthrough         Relevant Orders    Echo complete w/ contrast if indicated    Vitamin D deficiency

## 2023-09-21 NOTE — ASSESSMENT & PLAN NOTE
Negative stress testing in the past.  Patient remains on atenolol 50 mg once daily.   Had only 1 episode of breakthrough

## 2023-09-27 ENCOUNTER — APPOINTMENT (OUTPATIENT)
Dept: LAB | Facility: CLINIC | Age: 83
End: 2023-09-27
Payer: COMMERCIAL

## 2023-09-27 DIAGNOSIS — C61 PROSTATE CANCER (HCC): ICD-10-CM

## 2023-09-27 DIAGNOSIS — E55.9 AVITAMINOSIS D: ICD-10-CM

## 2023-09-27 PROCEDURE — 36415 COLL VENOUS BLD VENIPUNCTURE: CPT

## 2023-09-27 PROCEDURE — 82652 VIT D 1 25-DIHYDROXY: CPT

## 2023-09-28 LAB — 1,25(OH)2D3 SERPL-MCNC: 81.2 PG/ML (ref 24.8–81.5)

## 2023-10-17 ENCOUNTER — TELEPHONE (OUTPATIENT)
Dept: FAMILY MEDICINE CLINIC | Facility: CLINIC | Age: 83
End: 2023-10-17

## 2023-10-17 NOTE — TELEPHONE ENCOUNTER
----- Message from Ian Fernandez DO sent at 10/16/2023  1:56 PM EDT -----  Please call patient and notify that results of echocardiogram are essentially normal.  Left ventricular ejection fraction of 55 to 60% shows normal cardiac function. Trace regurgitation noted at the mitral and tricuspid valves. This is really of no significance.   Trivial lower extremity edema is not cardiac in nature

## 2023-10-23 ENCOUNTER — TELEPHONE (OUTPATIENT)
Dept: ANESTHESIOLOGY | Facility: CLINIC | Age: 83
End: 2023-10-23

## 2023-10-25 ENCOUNTER — TELEPHONE (OUTPATIENT)
Dept: OBGYN CLINIC | Facility: HOSPITAL | Age: 83
End: 2023-10-25

## 2023-10-25 NOTE — TELEPHONE ENCOUNTER
Preoperative Elective Admission Assessment- spoke to patient     Living Situation:    Who does pt live with: significant other, [place]. What kind of home: multi-level  How do they enter the home: front  How many levels in home: 2 story home   # of steps to enter home: 7 to enter   # of steps to second floor: 13 to 2nd floor   Are there handrails: Yes  Are there landings: Yes  Sleeping arrangement: first/entry floor  Where is Bathroom: First floor bathroom, walk in shower      First Floor Setup:   Is there a bathroom: Yes  Where would pt sleep: couch     DME: [facility] gonz[facility]     We discussed clearing pathways in the home and making sure there is accessibly to use the walker, for example, removing throw rugs. Patient's Current Level of Function: Ambulates: Independently and ADLs: Independent    Post-op Caregiver: spouse  Currently receive any HHC/aides/community supports: No     Post-op Transport: spouse  To/from hospital: spouse  To/from PT 2-3x/week: spouse  Uses community transport now: No     Outpatient Physical Therapy Site:  Site: [place]   pre and post-op appts scheduled? Yes     Medication Management: self  Preferred Pharmacy for Post-op Medications: [id number]- [place] - [address] [[id number]]   Blood Management Vitamin Regimen:  Pt has at home, ready to start 30 days before. We discussed them at this time. Post-op anticoagulant: prescribed preoperatively - patient has at home ready for after surgery use only     DC Plan: Pt plans to be discharged home    Barriers to DC identified preoperatively: none identified    BMI: 25.70      Patient Education:  Pt educated on post-op pain, early mobilization (POD0), LOS goals, OP PT goals, and preoperative bathing. Patient educated that our goal is to appropriately discharge patient based off their post-op function while striving to maintain maximal independence.  The goal is to discharge patient to home and for them to attend outpatient physical therapy.     Assigned to care team? Yes

## 2023-11-03 ENCOUNTER — TELEPHONE (OUTPATIENT)
Dept: OBGYN CLINIC | Facility: MEDICAL CENTER | Age: 83
End: 2023-11-03

## 2023-11-03 ENCOUNTER — TELEPHONE (OUTPATIENT)
Age: 83
End: 2023-11-03

## 2023-11-03 LAB — HBA1C MFR BLD HPLC: 5.7 %

## 2023-11-03 NOTE — TELEPHONE ENCOUNTER
Caller: [facility]    Doctor: Dandre Castro    Reason for call: Patient is at lab to get pre-op work done and they are unable to see the necessary orders in portal.    They would like to know if we can fax the order for EKG and any other pre-op lab work such that the patient can be taken care of.     Fax#: [phone]    Call back#: [phone]

## 2023-11-06 ENCOUNTER — RA CDI HCC (OUTPATIENT)
Dept: OTHER | Facility: HOSPITAL | Age: 83
End: 2023-11-06

## 2023-11-06 NOTE — PROGRESS NOTES
[address] coding opportunities       Chart reviewed, no opportunity found: [address] Review     Patients Insurance     Medicare Insurance: [employer]

## 2023-11-14 ENCOUNTER — CONSULT (OUTPATIENT)
Dept: FAMILY MEDICINE CLINIC | Facility: CLINIC | Age: 83
End: 2023-11-14
Payer: COMMERCIAL

## 2023-11-14 VITALS
HEART RATE: 56 BPM | TEMPERATURE: 97.3 F | SYSTOLIC BLOOD PRESSURE: 122 MMHG | HEIGHT: 69 IN | WEIGHT: 173 LBS | RESPIRATION RATE: 16 BRPM | DIASTOLIC BLOOD PRESSURE: 62 MMHG | OXYGEN SATURATION: 99 % | BODY MASS INDEX: 25.62 KG/M2

## 2023-11-14 DIAGNOSIS — I10 BENIGN ESSENTIAL HYPERTENSION: ICD-10-CM

## 2023-11-14 DIAGNOSIS — M23.8X1 CHONDRAL DEFECT OF CONDYLE OF RIGHT FEMUR: Primary | ICD-10-CM

## 2023-11-14 DIAGNOSIS — R00.1 BRADYCARDIA: ICD-10-CM

## 2023-11-14 PROCEDURE — 99214 OFFICE O/P EST MOD 30 MIN: CPT | Performed by: FAMILY MEDICINE

## 2023-11-14 RX ORDER — FLUOROURACIL 50 MG/G
CREAM TOPICAL
COMMUNITY
Start: 2023-11-06

## 2023-11-14 NOTE — ASSESSMENT & PLAN NOTE
Asymptomatic sinus bradycardia. Patient remains physically active. He can perform strenuous work without any issue. He did have EKG and echocardiogram performed. Echocardiogram showed left ventricular ejection fraction of 60%.   EKG shows sinus bradycardia which he has had for years

## 2023-11-14 NOTE — ASSESSMENT & PLAN NOTE
Patient is medically cleared for right knee total arthroplasty under general anesthesia    Message sent to orthopedic surgeon notifying him that clearance for surgery is in the chart

## 2023-11-14 NOTE — PROGRESS NOTES
PRE-OPERATIVE EVALUATION  [facility]    NAME: Shin Childs  AGE: 80 y.o. SEX: male  : 1940     DATE: 2023     Pre-Operative Evaluation      Chief Complaint: Pre-operative Evaluation     Surgery: Right knee total arthroplasty  Anticipated Date of Surgery: 2023  Referring Provider: Aleksandra Peck MD     History of Present Illness:     Shin Childs is a 80 y.o. male who presents to the office today for a preoperative consultation at the request of surgeon, Dr. Rasta Cabrera, who plans on performing right knee total arthroplasty on 2023. Planned anesthesia is general. Patient has a bleeding risk of: no recent abnormal bleeding. Patient does not have objections to receiving blood products if needed. Current anti-platelet/anti-coagulation medications that the patient is prescribed includes:  None . Assessment of Chronic Conditions:   - Hypertension: Optimized and well controlled on atenolol  - Asymptomatic bradycardia, physically fit and on beta-blocker for both hypertension and migraine prevention     Assessment of Cardiac Risk:  Denies unstable or severe angina or MI in the last 6 weeks or history of stent placement in the last year   Denies decompensated heart failure (e.g. New onset heart failure, NYHA functional class IV heart failure, or worsening existing heart failure)  Denies significant arrhythmias such as high grade AV block, symptomatic ventricular arrhythmia, newly recognized ventricular tachycardia, supraventricular tachycardia with resting heart rate >100, or symptomatic bradycardia  Denies severe heart valve disease including aortic stenosis or symptomatic mitral stenosis     Exercise Capacity:  Able to walk 4 blocks without symptoms?: Yes  Able to walk 2 flights without symptoms?: Yes    Prior Anesthesia Reactions: No     Personal history of venous thromboembolic disease? No    History of steroid use for >2 weeks within last year? No    STOP-BANG Sleep Apnea Screening Questionnaire:         Review of Systems:     Review of Systems   Constitutional: Negative. HENT: Negative. Eyes: Negative. Respiratory: Negative. Cardiovascular:  Positive for leg swelling ( Dependent edema only at the end of the day). Gastrointestinal: Negative. Endocrine: Negative. Genitourinary: Negative. Musculoskeletal: Negative. Skin: Negative. Allergic/Immunologic: Negative. Neurological: Negative. Hematological: Negative. Psychiatric/Behavioral: Negative. All other systems reviewed and are negative. 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   • Bradycardia   • Arthritis of lumbar spine   • Low back pain   • Prostate cancer ([address])   • Squamous cell carcinoma of skin   • Toe pain, right   • Palpitations   • Environmental allergies   • Dermatitis of right foot   • Arthralgia of both knees   • Raynaud's phenomenon without gangrene   • Instability of right knee joint   • Right knee buckling   • 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   • Dependent edema       Allergies:      Allergies   Allergen Reactions   • Isoflavones (Soy)        Current Medications:       Current Outpatient Medications:   •  ascorbic acid (VITAMIN C) 500 MG tablet, Take 1 tablet (500 mg total) by mouth daily Start 30 days prior to surgery, Disp: 30 tablet, Rfl: 0  •  atenolol (TENORMIN) 50 mg tablet, Take 1 tablet (50 mg total) by mouth daily, Disp: 90 tablet, Rfl: 1  •  azelastine (ASTELIN) 0.1 % nasal spray, SPRAY 1 SPRAY INTO EACH NOSTRIL 2 (TWO) TIMES A DAY USE IN EACH NOSTRIL AS DIRECTED, Disp: 1 Bottle, Rfl: 1  •  betamethasone valerate (VALISONE) 0.1 % cream, Apply topically 2 (two) times a day (Patient taking differently: Apply topically as needed), Disp: 45 g, Rfl: 0  •  Cholecalciferol (VITAMIN D3) 1000 UNIT/SPRAY LIQD, Take 1 tablet by mouth daily, Disp: , Rfl:   •  Coenzyme Q10 100 MG CHEW, Chew 200 mg, Disp: , Rfl:   •  Diclofenac Sodium (VOLTAREN) 1 %, Apply 2 g topically 4 (four) times a day (Patient taking differently: Apply 2 g topically as needed), Disp: 100 g, Rfl: 3  •  Difluprednate 0.05 % EMUL, , Disp: , Rfl:   •  ferrous sulfate 324 (65 Fe) mg, Take 1 tablet (324 mg total) by mouth 2 (two) times a day before meals Start 30 days prior to surgery, Disp: 60 tablet, Rfl: 0  •  fluorouracil (EFUDEX) 5 % cream, APPLY TO HANDS TWICE A DAY X 3 WEEKS THEN D/C, Disp: , Rfl:   •  folic acid (FOLVITE) 1 mg tablet, Take 1 tablet (1 mg total) by mouth daily Start 30 days prior to surgery, Disp: 30 tablet, Rfl: 0  •  ofloxacin (OCUFLOX) 0.3 % ophthalmic solution, , Disp: , Rfl:   •  Omega-3-Acid Eth Est, Dietary, 1 g CAPS, Take by mouth, Disp: , Rfl:   •  Potassium 99 MG TABS, Take 1 tablet by mouth 2 (two) times a day, Disp: , Rfl:   •  Potassium Gluconate 2.5 MEQ TABS, Take 1 tablet by mouth, Disp: , Rfl:   •  sildenafil (VIAGRA) 100 mg tablet, TAKE 1 TABLET BY MOUTH EVERY DAY AS NEEDED FOR ERECTILE DYSFUNCTION, Disp: 30 tablet, Rfl: 3  •  enoxaparin (LOVENOX) 40 mg/0.4 mL, Inject 0.4 mL (40 mg total) under the skin daily for 28 days Start injections after surgery, Disp: 11.2 mL, Rfl: 0    Past Medical History:       Past Medical History:   Diagnosis Date   • Actinic keratosis     Last assessed - 10/15/14   • Cortical age-related cataract of both eyes 3/22/2022   • Hypertension    • Hypokalemia     Last assessed - 8/13/14   • Sinus bradycardia     Last assessed - 8/13/14        Past Surgical History:   Procedure Laterality Date   • HERNIA REPAIR     • SIGMOIDOSCOPY      (Fiberoptic, Therapeutic) 7/28/2014, 8/25/14        Family History   Problem Relation Age of Onset   • Prostate cancer Father    • No Known Problems Mother    • Liver cancer Brother         Adenocarcinoma    • No Known Problems Sister    • No Known Problems Sister    • No Known Problems Son    • No Known Problems Son    • Leukemia Daughter         Remission-Bone Marrow transplant   • No Known Problems Daughter         Social History     Socioeconomic History   • Marital status:      Spouse name: Not on file   • Number of children: Not on file   • Years of education: Not on file   • Highest education level: Not on file   Occupational History   • Occupation: Retired   Tobacco Use   • Smoking status: Never   • Smokeless tobacco: Never   • Tobacco comments:     Former smoker per [employer]    Vaping Use   • Vaping Use: Never used   Substance and Sexual Activity   • Alcohol use: No     Comment: Seldomly per [employer]    • Drug use: No   • Sexual activity: Not Currently   Other Topics Concern   • Not on file   Social History Narrative    Caffeine use - 2 cups daily     and  noted per [employer]     Social Determinants of Health     Financial Resource Strain: Low Risk  (3/15/2023)    Overall Financial Resource Strain (CARDIA)    • Difficulty of Paying Living Expenses: Not very hard   Food Insecurity: Not on file   Transportation Needs: No Transportation Needs (3/15/2023)    PRAPARE - Transportation    • Lack of Transportation (Medical): No    • Lack of Transportation (Non-Medical): No   Physical Activity: Not on file   Stress: Not on file   Social Connections: Not on file   Intimate Partner Violence: Not on file   Housing Stability: Not on file        Physical Exam:     Physical Exam  Vitals and nursing note reviewed. Constitutional:       General: He is not in acute distress. Appearance: Normal appearance. He is well-developed and normal weight. He is not ill-appearing. HENT:      Head: Normocephalic and atraumatic.       Right Ear: Tympanic membrane, ear canal and external ear normal.      Left Ear: Tympanic membrane, ear canal and external ear normal. Nose: Nose normal.      Mouth/Throat:      Mouth: Mucous membranes are moist.      Comments: Upper full dentures and lower partial dentures  Eyes:      Extraocular Movements: Extraocular movements intact. Conjunctiva/sclera: Conjunctivae normal.      Pupils: Pupils are equal, round, and reactive to light. Cardiovascular:      Rate and Rhythm: Regular rhythm. Bradycardia present. Pulses: Normal pulses. Heart sounds: Normal heart sounds. No murmur heard. Pulmonary:      Effort: Pulmonary effort is normal.      Breath sounds: Normal breath sounds. Abdominal:      General: Abdomen is flat. Bowel sounds are normal.      Palpations: Abdomen is soft. Musculoskeletal:         General: Normal range of motion. Cervical back: Normal range of motion and neck supple. Right lower leg: No edema. Left lower leg: No edema. Skin:     General: Skin is warm and dry. Neurological:      General: No focal deficit present. Mental Status: He is alert and oriented to person, place, and time. Psychiatric:         Mood and Affect: Mood normal.         Behavior: Behavior normal.         Thought Content: Thought content normal.         Judgment: Judgment normal.          Data:     Pre-operative work-up    Laboratory Results: I have personally reviewed the pertinent laboratory results/reports     Recent Results (from the past 1008 hour(s))   Hemoglobin A1C    Collection Time: 11/03/23 12:00 AM   Result Value Ref Range    Hemoglobin A1C 5.7    HEMOGLOBIN A1C    Collection Time: 11/03/23 12:32 PM   Result Value Ref Range    Hemoglobin A1C 5.7 (H) <5.7 %    eAG, EST AVG Glucose 117 mg/dL        EKG: I have personally reviewed pertinent reports. Sinus bradycardia which is unchanged compared to EKG from 2019. Chest x-ray: I have personally reviewed pertinent reports.   Chest x-ray in the system from September 2023    Previous cardiopulmonary studies within the past year:  Echocardiogram: October 2023 showing left ventricular ejection fraction of 60%        Assessment & Recommendations:     1. 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 Bradycardia        3. Benign essential hypertension          Problem List Items Addressed This Visit        Cardiovascular and Mediastinum    Benign essential hypertension     Very well controlled on atenolol 50 mg once daily. 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 Patient remains physically active. He can perform strenuous work without any issue. He did have EKG and echocardiogram performed. Echocardiogram showed left ventricular ejection fraction of 60%. EKG shows sinus bradycardia which he has had for years           Pre-Op Evaluation Assessment  80 y.o. male with planned surgery: Right total knee arthroplasty. Known risk factors for perioperative complications: None. Cardiac Risk Estimation: per the Revised Cardiac Risk Index (Circ. 100:1043, 1999), the patient's risk factors for cardiac complications include  none , putting him in: RCI RISK CLASS II (1 risk factor, risk of major cardiac compl. appr. 1.3%). Current medications which may produce withdrawal symptoms if withheld perioperatively: None. Pre-Op Evaluation Plan  1. Further preoperative workup as follows:   - None; no further preoperative work-up is required    2. Medication Management/Recommendations:   - Patient should continue antihypertensive medications up through and including the day of surgery. - Patient has been instructed to avoid aspirin containing medications or non-steroidal anti-inflammatory drugs for the week preceding surgery.     3. Prophylaxis for cardiac events with perioperative beta-blockers: Patient is already on atenolol. 4. Patient requires further consultation with: None    Clearance  Patient is CLEARED for surgery without any additional cardiac testing.      Charles Hickey DO  [address] [facility]  [address] [address] [address]  Phone#  [phone]  Fax#  [phone]

## 2023-11-15 ENCOUNTER — CONSULT (OUTPATIENT)
Dept: ANESTHESIOLOGY | Facility: CLINIC | Age: 83
End: 2023-11-15
Payer: COMMERCIAL

## 2023-11-15 VITALS
SYSTOLIC BLOOD PRESSURE: 140 MMHG | WEIGHT: 174 LBS | BODY MASS INDEX: 25.77 KG/M2 | TEMPERATURE: 96.6 F | HEART RATE: 55 BPM | DIASTOLIC BLOOD PRESSURE: 70 MMHG | HEIGHT: 69 IN | OXYGEN SATURATION: 98 %

## 2023-11-15 DIAGNOSIS — I10 BENIGN ESSENTIAL HYPERTENSION: ICD-10-CM

## 2023-11-15 DIAGNOSIS — Z01.89 ENCOUNTER FOR GERIATRIC ASSESSMENT: Primary | ICD-10-CM

## 2023-11-15 DIAGNOSIS — M17.11 PRIMARY OSTEOARTHRITIS OF RIGHT KNEE: ICD-10-CM

## 2023-11-15 DIAGNOSIS — G47.33 OSA (OBSTRUCTIVE SLEEP APNEA): ICD-10-CM

## 2023-11-15 PROCEDURE — 99213 OFFICE O/P EST LOW 20 MIN: CPT | Performed by: NURSE PRACTITIONER

## 2023-11-15 NOTE — PROGRESS NOTES
[address] [facility])  CONSULT: GERIATRIC SURGERY       Assessment/Plan:  80year old male referred to [facility] for pre-surgey geriatric screening  Consult concerns: advanced age   He is scheduled on 11.30.23  Case: [id number] Date/Time: 11/30/23 0800   Procedure: ARTHROPLASTY KNEE TOTAL W ROBOT (Right: Knee)   Anesthesia type: Spinal w/ Femoral Nerve Block   Diagnosis: Primary osteoarthritis of right knee [M17.11]   Pre-op diagnosis: Primary osteoarthritis of right knee [M17.11]   Location: BE OR ROOM 18 / [address]   Surgeons: Khari Tejada MD     No problem-specific Assessment & Plan notes found for this encounter. Last anesthesia   Reports no concerns        Problem List Items Addressed This Visit          Respiratory    ZAINAB (obstructive sleep apnea)  DX 8-9 years ago   Not faithful with CPAP use- b/c falls asleep before putting it on   Wakes at 3 am - naps during the day   Encouraged to wear nightly   Stable   Received         Cardiovascular and Mediastinum    Benign essential hypertension  140/70  No concerns   Received    METS 9   DENIES CP/DENIES SOB          Other    Encounter for geriatric assessment - Primary  If admitted to hospital after surgery geriatric inpatient. Consult should not hold up discharge         Other Visit Diagnoses       Primary osteoarthritis of right knee      Seen for surgical optimization   Seen for geriatric surgery   Received    METS 9   Denies CP/SOB   Admits to being is well health   ECHO:    Left Ventricle: Left ventricle is normal in size. Wall thickness is   normal. Systolic function is normal with an ejection fraction of 55-60%. Wall motion is within normal limits. There is normal diastolic function. Mitral Valve: There is trace regurgitation. Tricuspid Valve: There is trace regurgitation. Left Ventricle   Left ventricle is normal in size.  Wall thickness is normal. Systolic function is normal with an ejection fraction of 55-60%. Wall motion is within normal limits. There is normal diastolic function. Right Ventricle   Right ventricle cavity is normal. Systolic function is normal.     Left Atrium   Left atrium cavity size is normal.     Right Atrium   Right atrium cavity is normal.     IVC/SVC   The inferior vena cava demonstrates a diameter of <=21 mm and collapses >50%; therefore, the right atrial pressure is estimated at 0-5 mmHg. Mitral Valve   Mitral valve structure is normal. There is trace regurgitation. Tricuspid Valve   Tricuspid valve structure is normal. There is trace regurgitation. Aortic Valve   The aortic valve is trileaflet. There is no regurgitation or stenosis. Pulmonic Valve   Pulmonic valve structure is normal. There is trace regurgitation. Ascending Aorta   The aorta appears normal in size. Pericardium   There is no pericardial effusion                     Subjective:      Patient ID: Raman Hanley is a 80 y.o. male who was referred to [facility] for presurgery geriatric screening secondary to advanced age, having surgery, and receiving anesthetics. Patient has been managing ongoing right knee pain and is electing to have a right knee replacement. I met patient in [facility]. He arrived with his significant other. Both were pleasant and a pleasure to care for. There were no cognitive concerns identified today. I did recommend inpatient geriatric consult after surgery due to advanced age. This consult should not hold up discharge. Patient lives at home with his significant other. He is independent with all ADLs. Admits to being in well health today. Denies chest pain and denies shortness of breath. METS is 9.25. Still drives. He has received medical clearance. SSI risk low  REILLY risk low  SOC anemia needs no needs  Nutrition no needs    As always we started our best protocol    As always we discussed having your BEST surgery, and BEST recovery.   Surgery goals reviewed today. Breathing exercises   Patient was encouraged to begin lung exercises today. This could be accomplished through deep breathing and cough exercises. Patient was taught how to use an incentive spirometer. Return demonstration provided. Eating/nutrition   Encouraged patient to increase oral protein intake prior to surgery. This can be accomplished by consuming chicken, fish, tuna fish, cottage cheese, cheese, eggs, [place] yogurt, and protein shakes as needed. I encouraged use of protein shakes such ENLIVE. I also recommended making your own protein shakes with protein powder. Sleep/Stress management  Patient was encouraged to rest their body prior to surgery. Encouraged attempting to get 8 hours of sleep at night. Avoid stress. Avoid sick contacts. Encouraged to find a relaxing hobby such as reading, meditation, listening to music. Training exercises  Patient was encouraged to remain active as possible. Today bilateral lower extremity generic exercises were taught for muscle strengthening and balance. All exercises to be done sitting down. The following portions of the patient's history were reviewed and updated as appropriate: allergies, current medications, past family history, past medical history, past social history, past surgical history, and problem list.    Review of Systems   Constitutional:  Negative for chills and fatigue. HENT:  Negative for sore throat. Respiratory:  Negative for cough, choking and shortness of breath. Cardiovascular:  Negative for chest pain, palpitations and leg swelling. Gastrointestinal:  Negative for diarrhea, nausea, rectal pain and vomiting. Genitourinary:  Negative for difficulty urinating. Skin:  Negative for color change, pallor, rash and wound. Neurological:  Negative for dizziness, facial asymmetry and headaches. Psychiatric/Behavioral:  Negative for agitation and behavioral problems. Objective: There were no vitals taken for this visit. Physical Exam  Cardiovascular:      Rate and Rhythm: Normal rate. Neurological:      General: No focal deficit present. Mental Status: He is alert and oriented to person, place, and time. Mental status is at baseline. Psychiatric:         Mood and Affect: Mood normal.         Behavior: Behavior normal.         Thought Content:  Thought content normal.         Judgment: Judgment normal.

## 2023-11-15 NOTE — PROGRESS NOTES
See Geriatric Assessment below. .. Cognitive Assessment:   CAM:   TUG <15 sec: yes  Falls (last 6 months): no  Hand  score:28.67  -Attempt 1: 30  -Attempt 2: 28  -Attempt 3: 28  Douglas Total Score: 23  PHQ- 9 Depression Scale:0  Nutrition Assessment Score:13  METS:9.25  Health goals:  -What are your overall health goals? Wants to live 10 more years    -What brings you strength? family    -What activities are important to you?  Woodworking    BEST protocol reviewed, incentive spirometer demo completed, instructed to do 10 breaths 4x a day starting now until surgery

## 2023-11-17 NOTE — PRE-PROCEDURE INSTRUCTIONS
Pre-Surgery Instructions:   Medication Instructions    ascorbic acid (VITAMIN C) 500 MG tablet Hold day of surgery. atenolol (TENORMIN) 50 mg tablet Take day of surgery. betamethasone valerate (VALISONE) 0.1 % cream Hold day of surgery. Cholecalciferol (VITAMIN D3) 1000 UNIT/SPRAY LIQD Stop taking 7 days prior to surgery. Coenzyme Q10 100 MG CHEW Stop taking 7 days prior to surgery. Diclofenac Sodium (VOLTAREN) 1 % Stop taking 1 day prior to surgery. ferrous sulfate 324 (65 Fe) mg Hold day of surgery. folic acid (FOLVITE) 1 mg tablet Hold day of surgery. ofloxacin (OCUFLOX) 0.3 % ophthalmic solution Uses PRN- OK to take day of surgery    Omega-3-Acid Eth Est, Dietary, 1 g CAPS Stop taking 7 days prior to surgery. Potassium 99 MG TABS Stop taking 7 days prior to surgery. sildenafil (VIAGRA) 100 mg tablet Stop taking 1 day prior to surgery. Medication instructions for day surgery reviewed. Please use only a sip of water to take your instructed medications. Avoid aspirin and all over the counter vitamins, supplements and NSAIDS for one week prior to surgery per anesthesia guidelines. Tylenol is ok to take as needed. You will receive a call one business day prior to surgery with an arrival time and hospital directions. If your surgery is scheduled on a Monday, the hospital will be calling you on the Friday prior to your surgery. If you have not heard from anyone by 8pm, please call the hospital supervisor through the hospital  at [phone]. Do not eat or drink anything after midnight the night before your surgery, including candy, mints, lifesavers, or chewing gum. Do not drink alcohol 24hrs before your surgery. Try not to smoke at least 24hrs before your surgery. Inst to bring CPAP w/ him DOS. Reviewed with patient, in detail, instructions from "My Surgical Experience". Verified with patient that he received [facility] patient education from surgeon's office. Advised to call ortho office/surgery coordinator with any questions and/or concerns. Confirmed that patient has hibiclens soap and CHG wipes. Incentive spirometer received-reviewed process at time of call. Patient verbalized understanding of current visitor restrictions due to Covid and will clarify with nurse DOS. No alcohol 24 hours prior to surgery. Patient aware Lovenox or other Blood Thinner prescribed is for POST OP ONLY. Instructed to call surgeon's office in meantime with any new illness. Patient verbalized an understanding of all instructions reviewed and offers no concerns at this time. Follow the pre surgery showering instructions as listed in the [facility] Surgical Experience Booklet” or otherwise provided by your surgeon's office. Do not use a blade to shave the surgical area 1 week before surgery. It is okay to use a clean electric clippers up to 24 hours before surgery. Do not apply any lotions, creams, including makeup, cologne, deodorant, or perfumes after showering on the day of your surgery. Do not use dry shampoo, hair spray, hair gel, or any type of hair products. No contact lenses, eye make-up, or artificial eyelashes. Remove nail polish, including gel polish, and any artificial, gel, or acrylic nails if possible. Remove all jewelry including rings and body piercing jewelry. Wear causal clothing that is easy to take on and off. Consider your type of surgery. Keep any valuables, jewelry, piercings at home. Please bring any specially ordered equipment (sling, braces) if indicated - walker for use after admission. Arrange for a responsible person to drive you to and from the hospital on the day of your surgery. Visitor Guidelines discussed. Call the surgeon's office with any new illnesses, exposures, or additional questions prior to surgery. Please reference your [facility] Surgical Experience Booklet” for additional information to prepare for your upcoming surgery.

## 2023-11-28 ENCOUNTER — LAB REQUISITION (OUTPATIENT)
Dept: LAB | Facility: HOSPITAL | Age: 83
End: 2023-11-28
Payer: COMMERCIAL

## 2023-11-28 ENCOUNTER — EVALUATION (OUTPATIENT)
Dept: PHYSICAL THERAPY | Facility: CLINIC | Age: 83
End: 2023-11-28
Payer: COMMERCIAL

## 2023-11-28 ENCOUNTER — APPOINTMENT (OUTPATIENT)
Dept: LAB | Facility: CLINIC | Age: 83
End: 2023-11-28
Payer: COMMERCIAL

## 2023-11-28 DIAGNOSIS — Z01.818 PRE-OP TESTING: ICD-10-CM

## 2023-11-28 DIAGNOSIS — M17.11 PRIMARY OSTEOARTHRITIS OF RIGHT KNEE: Primary | ICD-10-CM

## 2023-11-28 DIAGNOSIS — Z01.818 ENCOUNTER FOR OTHER PREPROCEDURAL EXAMINATION: ICD-10-CM

## 2023-11-28 PROCEDURE — 86850 RBC ANTIBODY SCREEN: CPT | Performed by: ORTHOPAEDIC SURGERY

## 2023-11-28 PROCEDURE — 97110 THERAPEUTIC EXERCISES: CPT

## 2023-11-28 PROCEDURE — 86901 BLOOD TYPING SEROLOGIC RH(D): CPT | Performed by: ORTHOPAEDIC SURGERY

## 2023-11-28 PROCEDURE — 86900 BLOOD TYPING SEROLOGIC ABO: CPT | Performed by: ORTHOPAEDIC SURGERY

## 2023-11-28 PROCEDURE — 36415 COLL VENOUS BLD VENIPUNCTURE: CPT

## 2023-11-28 PROCEDURE — 97161 PT EVAL LOW COMPLEX 20 MIN: CPT

## 2023-11-28 NOTE — PROGRESS NOTES
PT Evaluation     Today's date: 2023  Patient name: Elier Zarate  : 1940  MRN: [id number]  Referring provider: Marian Lu  Dx:   Encounter Diagnosis     ICD-10-CM    1. Primary osteoarthritis of right knee  M17.11 Ambulatory referral to Physical Therapy                     Assessment  Assessment details: Elier Zarate is a 80 y.o. male presenting to physical therapy for a pre-operative initial evaluation as he will be undergoing a R TKA on 23. The patient demonstrates [facility] right knee ROM, however decreased knee strength and increased pain limiting his ability to tolerate prolonged standing, walking, and stairs. 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 This patient would benefit from OP PT services to address their impairments and functional limitations to maximize functional mobility. The patient and his significant other were educated extensively on post operative expectations for surgery, pain and swelling management, ambulation with AD, and was provided a HEP. They demonstrated/verbalized understanding all education and denied questions at the end of the session. Impairments: abnormal gait, abnormal or restricted ROM, activity intolerance, impaired physical strength, lacks appropriate home exercise program, pain with function and weight-bearing intolerance    Symptom irritability: moderateUnderstanding of Dx/Px/POC: excellent   Prognosis: good    Goals  STG to be achieved in 4 weeks: The patient will report a decrease in right knee "at worst" subjective pain rating score to at least 6/10 to allow for improved tolerance for weightbearing activities. The patient will increase right knee flexion AROM to at least 70 degrees to allow for improved functional mobility.    The patient will increase right knee extension MMT score to at least 3+/5 to allow for improved functional mobility. LTG to be achieved by DC: The patient will be independent in comprehensive HEP. The patient will report no pain with usual activities. The patient will improve right knee flexion and extension AROM to [facility] to allow for improved functional mobility. The patient will increase right knee flexion and extension MMT score to [facility] to allow for improved functional mobility. The patient will be able to ambulate 2-3 miles without pain, difficulty or AD, to allow for return to hospital volunteering. The patient will be able to ambulate one full flight of stairs without pain, difficulty, or AD to allow for navigation of his home. Plan  Patient would benefit from: skilled physical therapy  Planned modality interventions: thermotherapy: hydrocollator packs, TENS and cryotherapy  Planned therapy interventions: manual therapy, joint mobilization, balance/weight bearing training, neuromuscular re-education, patient education, flexibility, strengthening, stretching, therapeutic activities, therapeutic exercise, gait training and home exercise program  Frequency: 2x week  Duration in weeks: 12  Plan of Care beginning date: 11/28/2023  Plan of Care expiration date: 2/20/2024  Treatment plan discussed with: patient        Subjective Evaluation    History of Present Illness  Mechanism of injury: Curly Toro presents to therapy for a pre-operative initial evaluation as he will be undergoing a R TKA on 11/30/23. He reports that he has been experiencing knee pain since a vacation a little over a year ago where his knee gave out on soft sand. 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 He says that he did receive PT last year for the knees, however without much improvement. He did see orthopedics where he received injections which also failed to give him relief in pain. He lives at home with his significant other, Mari Andres, who will be able to assist him as needed after surgery. [place]'s daughter will also be present for a few weeks depending on how well Ya Espinal is doing after surgery and how much assistance [place] needs. He lives in a [address] [place] [address] Avenue home, 13 steps with bilateral hand rails from the ground level to upper level. After surgery, he plans to stay on the bottom floor of his home which has a couch for him to sleep on as well as a full bathroom. He reports that he does have a RW as well as a walking stick at home. He has been doing exercises provided to him from his surgeon regularly.     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  Pain  Current pain ratin  At best pain ratin  At worst pain ratin  Location: R knee  Quality: sharp  Relieving factors: change in position and rest  Aggravating factors: standing, walking and stair climbing  Progression: worsening    Social Support  Steps to enter house: yes  Stairs in house: yes   Lives in: multiple-level home  Lives with: significant other    Employment status: not working  Treatments  Previous treatment: injection treatment, medication and physical therapy  Current treatment: physical therapy        Objective     Active Range of Motion   Left Knee   Flexion: 130 degrees   Extension: 0 degrees     Right Knee   Flexion: 130 degrees   Extension: 0 degrees     Strength/Myotome Testing     Left Hip   Planes of Motion   Flexion: 4    Right Hip   Planes of Motion   Flexion: 4    Left Knee   Flexion: 4  Extension: 4    Right Knee   Flexion: 4  Extension: 4    Ambulation   Weight-Bearing Status   Assistive device used: none    Observational Gait   Gait: antalgic              Precautions: R TKA 23, HTN, Hx prostate CA  Access Code: [email or web address]    POC expires Unit limit Auth  expiration date PT/OT + Visit Limit?   2/20/24 N/A Pending BOMN  $20                 Visit/Unit Tracking  AUTH Status:  Date 11/28              Pending Used 1               Remaining                   Manuals 11/28            R knee PROM with OP             Patellar mobility                                        Neuro Re-Ed             Quad sets towel under heel HEP            Quad set with LAQ HEP            Quad set with SLR                                                                 Ther Ex             Rec bike rocking for knee ROM             Heel slides with strap HEP            Standing hamstring stretch at step  HEP sitting            Standing calf stretch with rockerboard HEP sitting                                      Pt education PT POC, HEP, post op status                          Ther Activity             TG squats             Fwd step ups             Lat step downs             Gait Training                                       Modalities

## 2023-11-29 LAB
ABO GROUP BLD: NORMAL
BLD GP AB SCN SERPL QL: NEGATIVE
RH BLD: POSITIVE
SPECIMEN EXPIRATION DATE: NORMAL

## 2023-11-30 ENCOUNTER — ANESTHESIA (OUTPATIENT)
Dept: PERIOP | Facility: HOSPITAL | Age: 83
End: 2023-11-30
Payer: COMMERCIAL

## 2023-11-30 ENCOUNTER — HOSPITAL ENCOUNTER (OUTPATIENT)
Facility: HOSPITAL | Age: 83
Setting detail: OUTPATIENT SURGERY
Discharge: HOME | End: 2023-12-01
Attending: ORTHOPAEDIC SURGERY | Admitting: ORTHOPAEDIC SURGERY
Payer: COMMERCIAL

## 2023-11-30 ENCOUNTER — ANESTHESIA EVENT (OUTPATIENT)
Dept: PERIOP | Facility: HOSPITAL | Age: 83
End: 2023-11-30
Payer: COMMERCIAL

## 2023-11-30 DIAGNOSIS — R00.1 BRADYCARDIA: ICD-10-CM

## 2023-11-30 DIAGNOSIS — Z96.651 S/P TKR (TOTAL KNEE REPLACEMENT), RIGHT: ICD-10-CM

## 2023-11-30 DIAGNOSIS — L30.9 DERMATITIS OF RIGHT FOOT: ICD-10-CM

## 2023-11-30 DIAGNOSIS — I73.00 RAYNAUD'S PHENOMENON WITHOUT GANGRENE: ICD-10-CM

## 2023-11-30 DIAGNOSIS — Z01.818 PRE-OP TESTING: ICD-10-CM

## 2023-11-30 DIAGNOSIS — G47.33 OSA (OBSTRUCTIVE SLEEP APNEA): ICD-10-CM

## 2023-11-30 DIAGNOSIS — I10 BENIGN ESSENTIAL HYPERTENSION: ICD-10-CM

## 2023-11-30 DIAGNOSIS — M25.361 INSTABILITY OF RIGHT KNEE JOINT: Primary | ICD-10-CM

## 2023-11-30 LAB
ABO GROUP BLD: NORMAL
BLD GP AB SCN SERPL QL: NEGATIVE
GLUCOSE SERPL-MCNC: 105 MG/DL (ref 65–140)
RH BLD: POSITIVE
SPECIMEN EXPIRATION DATE: NORMAL

## 2023-11-30 PROCEDURE — C9290 INJ, BUPIVACAINE LIPOSOME: HCPCS | Performed by: ANESTHESIOLOGY

## 2023-11-30 PROCEDURE — C1776 JOINT DEVICE (IMPLANTABLE): HCPCS | Performed by: ORTHOPAEDIC SURGERY

## 2023-11-30 PROCEDURE — 82948 REAGENT STRIP/BLOOD GLUCOSE: CPT

## 2023-11-30 PROCEDURE — 99222 1ST HOSP IP/OBS MODERATE 55: CPT | Performed by: INTERNAL MEDICINE

## 2023-11-30 PROCEDURE — 86850 RBC ANTIBODY SCREEN: CPT | Performed by: ORTHOPAEDIC SURGERY

## 2023-11-30 PROCEDURE — 97163 PT EVAL HIGH COMPLEX 45 MIN: CPT

## 2023-11-30 PROCEDURE — 86901 BLOOD TYPING SEROLOGIC RH(D): CPT | Performed by: ORTHOPAEDIC SURGERY

## 2023-11-30 PROCEDURE — 86900 BLOOD TYPING SEROLOGIC ABO: CPT | Performed by: ORTHOPAEDIC SURGERY

## 2023-11-30 PROCEDURE — S2900 ROBOTIC SURGICAL SYSTEM: HCPCS | Performed by: ORTHOPAEDIC SURGERY

## 2023-11-30 PROCEDURE — NC001 PR NO CHARGE: Performed by: ORTHOPAEDIC SURGERY

## 2023-11-30 PROCEDURE — C1713 ANCHOR/SCREW BN/BN,TIS/BN: HCPCS | Performed by: ORTHOPAEDIC SURGERY

## 2023-11-30 PROCEDURE — 27447 TOTAL KNEE ARTHROPLASTY: CPT | Performed by: ORTHOPAEDIC SURGERY

## 2023-11-30 DEVICE — SMARTSET HV HIGH VISCOSITY BONE CEMENT 40G
Type: IMPLANTABLE DEVICE | Site: KNEE | Status: FUNCTIONAL
Brand: SMARTSET

## 2023-11-30 DEVICE — ATTUNE KNEE SYSTEM FEMORAL POSTERIOR STABILIZED SIZE 8 RIGHT CEMENTED
Type: IMPLANTABLE DEVICE | Site: KNEE | Status: FUNCTIONAL
Brand: ATTUNE

## 2023-11-30 DEVICE — ATTUNE KNEE SYSTEM TIBIAL BASE FIXED BEARING SIZE 7 CEMENTED
Type: IMPLANTABLE DEVICE | Site: KNEE | Status: FUNCTIONAL
Brand: ATTUNE

## 2023-11-30 DEVICE — ATTUNE KNEE SYSTEM TIBIAL INSERT FIXED BEARING POSTERIOR STABILIZED 8 5MM AOX
Type: IMPLANTABLE DEVICE | Site: KNEE | Status: FUNCTIONAL
Brand: ATTUNE

## 2023-11-30 DEVICE — ATTUNE PATELLA MEDIALIZED DOME 38MM CEMENTED AOX
Type: IMPLANTABLE DEVICE | Site: KNEE | Status: FUNCTIONAL
Brand: ATTUNE

## 2023-11-30 RX ORDER — CEFAZOLIN SODIUM 2 G/50ML
2000 SOLUTION INTRAVENOUS ONCE
Status: DISCONTINUED | OUTPATIENT
Start: 2023-11-30 | End: 2023-11-30 | Stop reason: HOSPADM

## 2023-11-30 RX ORDER — CHLORHEXIDINE GLUCONATE ORAL RINSE 1.2 MG/ML
15 SOLUTION DENTAL ONCE
Status: COMPLETED | OUTPATIENT
Start: 2023-11-30 | End: 2023-11-30

## 2023-11-30 RX ORDER — CEFAZOLIN SODIUM 1 G/50ML
1000 SOLUTION INTRAVENOUS EVERY 8 HOURS
Status: COMPLETED | OUTPATIENT
Start: 2023-11-30 | End: 2023-12-01

## 2023-11-30 RX ORDER — OXYCODONE HYDROCHLORIDE 10 MG/1
10 TABLET ORAL EVERY 4 HOURS PRN
Status: DISCONTINUED | OUTPATIENT
Start: 2023-11-30 | End: 2023-12-01

## 2023-11-30 RX ORDER — ENOXAPARIN SODIUM 100 MG/ML
40 INJECTION SUBCUTANEOUS
Status: DISCONTINUED | OUTPATIENT
Start: 2023-11-30 | End: 2023-12-01 | Stop reason: HOSPADM

## 2023-11-30 RX ORDER — ONDANSETRON 2 MG/ML
4 INJECTION INTRAMUSCULAR; INTRAVENOUS ONCE AS NEEDED
Status: DISCONTINUED | OUTPATIENT
Start: 2023-11-30 | End: 2023-11-30 | Stop reason: HOSPADM

## 2023-11-30 RX ORDER — HYDROMORPHONE HCL/PF 1 MG/ML
0.5 SYRINGE (ML) INJECTION EVERY 2 HOUR PRN
Status: DISCONTINUED | OUTPATIENT
Start: 2023-11-30 | End: 2023-12-01

## 2023-11-30 RX ORDER — BUPIVACAINE HYDROCHLORIDE 5 MG/ML
INJECTION, SOLUTION EPIDURAL; INTRACAUDAL
Status: COMPLETED | OUTPATIENT
Start: 2023-11-30 | End: 2023-11-30

## 2023-11-30 RX ORDER — ACETAMINOPHEN 325 MG/1
650 TABLET ORAL EVERY 4 HOURS PRN
Status: DISCONTINUED | OUTPATIENT
Start: 2023-11-30 | End: 2023-11-30

## 2023-11-30 RX ORDER — ONDANSETRON 2 MG/ML
4 INJECTION INTRAMUSCULAR; INTRAVENOUS EVERY 6 HOURS PRN
Status: DISCONTINUED | OUTPATIENT
Start: 2023-11-30 | End: 2023-12-01 | Stop reason: HOSPADM

## 2023-11-30 RX ORDER — ONDANSETRON 2 MG/ML
INJECTION INTRAMUSCULAR; INTRAVENOUS AS NEEDED
Status: DISCONTINUED | OUTPATIENT
Start: 2023-11-30 | End: 2023-11-30

## 2023-11-30 RX ORDER — ATENOLOL 50 MG/1
50 TABLET ORAL DAILY
Status: DISCONTINUED | OUTPATIENT
Start: 2023-12-01 | End: 2023-12-01 | Stop reason: HOSPADM

## 2023-11-30 RX ORDER — ACETAMINOPHEN 325 MG/1
975 TABLET ORAL ONCE
Status: COMPLETED | OUTPATIENT
Start: 2023-11-30 | End: 2023-11-30

## 2023-11-30 RX ORDER — OXYCODONE HYDROCHLORIDE 5 MG/1
TABLET ORAL
Qty: 30 TABLET | Refills: 0 | Status: SHIPPED | OUTPATIENT
Start: 2023-11-30

## 2023-11-30 RX ORDER — TRANEXAMIC ACID 10 MG/ML
1000 INJECTION, SOLUTION INTRAVENOUS ONCE
Status: COMPLETED | OUTPATIENT
Start: 2023-11-30 | End: 2023-11-30

## 2023-11-30 RX ORDER — ACETAMINOPHEN 325 MG/1
975 TABLET ORAL EVERY 8 HOURS SCHEDULED
Status: DISCONTINUED | OUTPATIENT
Start: 2023-11-30 | End: 2023-12-01 | Stop reason: HOSPADM

## 2023-11-30 RX ORDER — HYDRALAZINE HYDROCHLORIDE 25 MG/1
25 TABLET, FILM COATED ORAL EVERY 8 HOURS PRN
Status: DISCONTINUED | OUTPATIENT
Start: 2023-11-30 | End: 2023-12-01 | Stop reason: HOSPADM

## 2023-11-30 RX ORDER — GABAPENTIN 300 MG/1
300 CAPSULE ORAL ONCE
Status: COMPLETED | OUTPATIENT
Start: 2023-11-30 | End: 2023-11-30

## 2023-11-30 RX ORDER — MAGNESIUM HYDROXIDE 1200 MG/15ML
LIQUID ORAL AS NEEDED
Status: DISCONTINUED | OUTPATIENT
Start: 2023-11-30 | End: 2023-11-30 | Stop reason: HOSPADM

## 2023-11-30 RX ORDER — SIMETHICONE 80 MG
80 TABLET,CHEWABLE ORAL 4 TIMES DAILY PRN
Status: DISCONTINUED | OUTPATIENT
Start: 2023-11-30 | End: 2023-12-01 | Stop reason: HOSPADM

## 2023-11-30 RX ORDER — DOCUSATE SODIUM 100 MG/1
100 CAPSULE, LIQUID FILLED ORAL 2 TIMES DAILY
Status: DISCONTINUED | OUTPATIENT
Start: 2023-11-30 | End: 2023-12-01 | Stop reason: HOSPADM

## 2023-11-30 RX ORDER — FENTANYL CITRATE/PF 50 MCG/ML
50 SYRINGE (ML) INJECTION
Status: DISCONTINUED | OUTPATIENT
Start: 2023-11-30 | End: 2023-11-30 | Stop reason: HOSPADM

## 2023-11-30 RX ORDER — SODIUM CHLORIDE, SODIUM LACTATE, POTASSIUM CHLORIDE, CALCIUM CHLORIDE 600; 310; 30; 20 MG/100ML; MG/100ML; MG/100ML; MG/100ML
125 INJECTION, SOLUTION INTRAVENOUS CONTINUOUS
Status: DISCONTINUED | OUTPATIENT
Start: 2023-11-30 | End: 2023-12-01 | Stop reason: HOSPADM

## 2023-11-30 RX ORDER — BUPIVACAINE HYDROCHLORIDE 7.5 MG/ML
INJECTION, SOLUTION INTRASPINAL AS NEEDED
Status: DISCONTINUED | OUTPATIENT
Start: 2023-11-30 | End: 2023-11-30

## 2023-11-30 RX ORDER — OXYCODONE HYDROCHLORIDE 5 MG/1
5 TABLET ORAL EVERY 4 HOURS PRN
Status: DISCONTINUED | OUTPATIENT
Start: 2023-11-30 | End: 2023-12-01

## 2023-11-30 RX ORDER — CEFAZOLIN SODIUM 1 G/3ML
INJECTION, POWDER, FOR SOLUTION INTRAMUSCULAR; INTRAVENOUS AS NEEDED
Status: DISCONTINUED | OUTPATIENT
Start: 2023-11-30 | End: 2023-11-30

## 2023-11-30 RX ORDER — PROPOFOL 10 MG/ML
INJECTION, EMULSION INTRAVENOUS CONTINUOUS PRN
Status: DISCONTINUED | OUTPATIENT
Start: 2023-11-30 | End: 2023-11-30

## 2023-11-30 RX ADMIN — TRANEXAMIC ACID 1000 MG: 10 INJECTION, SOLUTION INTRAVENOUS at 07:39

## 2023-11-30 RX ADMIN — FENTANYL CITRATE 50 MCG: 50 INJECTION INTRAMUSCULAR; INTRAVENOUS at 09:41

## 2023-11-30 RX ADMIN — PROPOFOL 100 MCG/KG/MIN: 10 INJECTION, EMULSION INTRAVENOUS at 07:36

## 2023-11-30 RX ADMIN — CEFAZOLIN 2000 MG: 1 INJECTION, POWDER, FOR SOLUTION INTRAMUSCULAR; INTRAVENOUS at 07:41

## 2023-11-30 RX ADMIN — CEFAZOLIN SODIUM 1000 MG: 1 SOLUTION INTRAVENOUS at 16:47

## 2023-11-30 RX ADMIN — ENOXAPARIN SODIUM 40 MG: 40 INJECTION SUBCUTANEOUS at 22:32

## 2023-11-30 RX ADMIN — BUPIVACAINE HYDROCHLORIDE IN DEXTROSE 1.6 ML: 7.5 INJECTION, SOLUTION SUBARACHNOID at 07:34

## 2023-11-30 RX ADMIN — OXYCODONE HYDROCHLORIDE 10 MG: 10 TABLET ORAL at 12:11

## 2023-11-30 RX ADMIN — ACETAMINOPHEN 975 MG: 325 TABLET, FILM COATED ORAL at 06:22

## 2023-11-30 RX ADMIN — PHENYLEPHRINE HYDROCHLORIDE 30 MCG/MIN: 10 INJECTION INTRAVENOUS at 07:46

## 2023-11-30 RX ADMIN — SODIUM CHLORIDE, SODIUM LACTATE, POTASSIUM CHLORIDE, AND CALCIUM CHLORIDE: .6; .31; .03; .02 INJECTION, SOLUTION INTRAVENOUS at 08:27

## 2023-11-30 RX ADMIN — GABAPENTIN 300 MG: 300 CAPSULE ORAL at 06:25

## 2023-11-30 RX ADMIN — BUPIVACAINE 20 ML: 13.3 INJECTION, SUSPENSION, LIPOSOMAL INFILTRATION at 07:20

## 2023-11-30 RX ADMIN — Medication 30 MG: at 12:11

## 2023-11-30 RX ADMIN — ACETAMINOPHEN 975 MG: 325 TABLET, FILM COATED ORAL at 13:29

## 2023-11-30 RX ADMIN — BUPIVACAINE HYDROCHLORIDE 10 ML: 5 INJECTION, SOLUTION EPIDURAL; INTRACAUDAL; PERINEURAL at 07:20

## 2023-11-30 RX ADMIN — ONDANSETRON 4 MG: 2 INJECTION INTRAMUSCULAR; INTRAVENOUS at 08:31

## 2023-11-30 RX ADMIN — OXYCODONE HYDROCHLORIDE 10 MG: 10 TABLET ORAL at 20:24

## 2023-11-30 RX ADMIN — ACETAMINOPHEN 975 MG: 325 TABLET, FILM COATED ORAL at 22:32

## 2023-11-30 RX ADMIN — CHLORHEXIDINE GLUCONATE 15 ML: 1.2 SOLUTION ORAL at 06:00

## 2023-11-30 RX ADMIN — SODIUM CHLORIDE, SODIUM LACTATE, POTASSIUM CHLORIDE, AND CALCIUM CHLORIDE: .6; .31; .03; .02 INJECTION, SOLUTION INTRAVENOUS at 07:00

## 2023-11-30 NOTE — DISCHARGE INSTR - AVS FIRST PAGE
Discharge Instructions - Orthopedics  Earlanthony Roads 80 y.o. male MRN: [id number]  Unit/Bed#: APU 02    Weight Bearing Status:                                           Weight Bearing as tolerated to the right lower extremity. DVT prophylaxis:  Complete course of Lovenox as directed    Pain:  Continue analgesics as directed    Showering Instructions:   Do not shower until followup     Dressing Instructions:   Keep dressing clean, dry and intact until follow up appointment. Driving Instructions:  No driving until cleared by Orthopaedic Surgery. PT/OT:  Continue PT/OT on outpatient basis as directed    Appt Instructions: If you do not have your appointment, please call the clinic at [phone]  Otherwise followup as scheduled    Contact the office sooner if you experience any increased numbness/tingling in the extremities.       Miscellaneous:  None

## 2023-11-30 NOTE — ANESTHESIA PROCEDURE NOTES
Anesthesia Notable Event    Date/Time: 11/30/2023 2:05 PM    Performed by: Mary Frey MD  Authorized by: Mary Frey MD

## 2023-11-30 NOTE — ANESTHESIA PREPROCEDURE EVALUATION
Procedure:  ARTHROPLASTY KNEE TOTAL W ROBOT (Right: Knee)    Relevant Problems   CARDIO   (+) Benign essential hypertension      /RENAL   (+) Prostate cancer (HCC)      MUSCULOSKELETAL   (+) Arthritis of lumbar spine   (+) Low back pain      PULMONARY   (+) ZAINAB (obstructive sleep apnea)        Physical Exam    Airway    Mallampati score: II  TM Distance: >3 FB  Neck ROM: full     Dental   No notable dental hx     Cardiovascular  Rhythm: regular, Rate: normal    Pulmonary   Breath sounds clear to auscultation    Other Findings        Anesthesia Plan  ASA Score- 2     Anesthesia Type- spinal with ASA Monitors. Additional Monitors:     Airway Plan:            Plan Factors-Exercise tolerance (METS): >4 METS. Chart reviewed. EKG reviewed. Existing labs reviewed. Patient summary reviewed. Patient is not a current smoker. Obstructive sleep apnea risk education given perioperatively. Induction-     Postoperative Plan- Plan for postoperative opioid use. Informed Consent- Anesthetic plan and risks discussed with patient. I personally reviewed this patient with the CRNA. Discussed and agreed on the Anesthesia Plan with the CRNA. Raegan Del Castillo

## 2023-11-30 NOTE — PLAN OF CARE
Problem: PAIN - ADULT  Goal: Verbalizes/displays adequate comfort level or baseline comfort level  Description: Interventions:  - Encourage patient to monitor pain and request assistance  - Assess pain using appropriate pain scale  - Administer analgesics based on type and severity of pain and evaluate response  - Implement non-pharmacological measures as appropriate and evaluate response  - Consider cultural and social influences on pain and pain management  - Notify physician/advanced practitioner if interventions unsuccessful or patient reports new pain  Outcome: Progressing     Problem: INFECTION - ADULT  Goal: Absence or prevention of progression during hospitalization  Description: INTERVENTIONS:  - Assess and monitor for signs and symptoms of infection  - Monitor lab/diagnostic results  - Monitor all insertion sites, i.e. indwelling lines, tubes, and drains  - Monitor endotracheal if appropriate and nasal secretions for changes in amount and color  - [place] appropriate cooling/warming therapies per order  - Administer medications as ordered  - Instruct and encourage patient and family to use good hand hygiene technique  - Identify and instruct in appropriate isolation precautions for identified infection/condition  Outcome: Progressing  Goal: Absence of fever/infection during neutropenic period  Description: INTERVENTIONS:  - Monitor WBC    Outcome: Progressing     Problem: SAFETY ADULT  Goal: Patient will remain free of falls  Description: INTERVENTIONS:  - Educate patient/family on patient safety including physical limitations  - Instruct patient to call for assistance with activity   - Consult OT/PT to assist with strengthening/mobility   - Keep Call bell within reach  - Keep bed low and locked with side rails adjusted as appropriate  - Keep care items and personal belongings within reach  - Initiate and maintain comfort rounds  - Make Fall Risk Sign visible to staff  - Offer Toileting every 2 Hours, in advance of need  - Initiate/Maintain alarm  - Obtain necessary fall risk management equipment:   - Apply yellow socks and bracelet for high fall risk patients  - Consider moving patient to room near nurses station  Outcome: Progressing  Goal: Maintain or return to baseline ADL function  Description: INTERVENTIONS:  -  Assess patient's ability to carry out ADLs; assess patient's baseline for ADL function and identify physical deficits which impact ability to perform ADLs (bathing, care of mouth/teeth, toileting, grooming, dressing, etc.)  - Assess/evaluate cause of self-care deficits   - Assess range of motion  - Assess patient's mobility; develop plan if impaired  - Assess patient's need for assistive devices and provide as appropriate  - Encourage maximum independence but intervene and supervise when necessary  - Involve family in performance of ADLs  - Assess for home care needs following discharge   - Consider OT consult to assist with ADL evaluation and planning for discharge  - Provide patient education as appropriate  Outcome: Progressing  Goal: Maintains/Returns to pre admission functional level  Description: INTERVENTIONS:  - Perform AM-PAC 6 Click Basic Mobility/ Daily Activity assessment daily.  - Set and communicate daily mobility goal to care team and patient/family/caregiver. - Collaborate with rehabilitation services on mobility goals if consulted  - Perform Range of Motion 3 times a day. - Reposition patient every 2 hours.   - Dangle patient 3 times a day  - Stand patient 3 times a day  - Ambulate patient 3 times a day  - Out of bed to chair 3 times a day   - Out of bed for meals 3 times a day  - Out of bed for toileting  - Record patient progress and toleration of activity level   Outcome: Progressing     Problem: DISCHARGE PLANNING  Goal: Discharge to home or other facility with appropriate resources  Description: INTERVENTIONS:  - Identify barriers to discharge w/patient and caregiver  - Arrange for needed discharge resources and transportation as appropriate  - Identify discharge learning needs (meds, wound care, etc.)  - Arrange for interpretive services to assist at discharge as needed  - Refer to Case Management Department for coordinating discharge planning if the patient needs post-hospital services based on physician/advanced practitioner order or complex needs related to functional status, cognitive ability, or social support system  Outcome: Progressing     Problem: Knowledge Deficit  Goal: Patient/family/caregiver demonstrates understanding of disease process, treatment plan, medications, and discharge instructions  Description: Complete learning assessment and assess knowledge base.   Interventions:  - Provide teaching at level of understanding  - Provide teaching via preferred learning methods  Outcome: Progressing

## 2023-11-30 NOTE — ANESTHESIA PROCEDURE NOTES
Spinal Block    Patient location during procedure: OR  Start time: 11/30/2023 7:34 AM  Reason for block: procedure for pain and at surgeon's request  Staffing  Performed by: Yolanda Badillo CRNA  Authorized by: Mary Frey MD    Preanesthetic Checklist  Completed: patient identified, IV checked, site marked, risks and benefits discussed, surgical consent, monitors and equipment checked, pre-op evaluation and timeout performed  Spinal Block  Patient position: sitting  Prep: ChloraPrep  Patient monitoring: cardiac monitor and frequent blood pressure checks  Approach: midline  Location: L3-4  Injection technique: single-shot  Needle  Needle type: pencil-tip   Needle gauge: 25 G  Needle length: 10 cm  Assessment  Sensory level: T4  Injection Assessment:  negative aspiration for heme, no paresthesia on injection and positive aspiration for clear CSF.   Post-procedure:  adhesive bandage applied, pressure dressing applied, secured with tape, site cleaned and sterile dressing applied

## 2023-11-30 NOTE — H&P (VIEW-ONLY)
Office Visit    8/25/2023  [address] [facility] [facility] [place]       Sally Vyas MD  Orthopedic Surgery Primary osteoarthritis of right knee +3 more  Dx Left Knee - Follow-up  Right Knee - Follow-up; Referred by Referral Self  Reason for Visit     Progress Notes  Sally Vyas MD (Physician)  Orthopedic Surgery  Expand All Collapse All  Assessment:  1. Primary osteoarthritis of right knee          2. Primary osteoarthritis of left knee          3. Encounter for other orthopedic aftercare          4. Encounter for other specified surgical aftercare                Plan:  Bilateral knee osteoarthritis. The patient will be scheduled for right total knee arthroplasty. We feel the patient will find significant relief per current symptoms, findings on imaging and exam.  Risks, benefits, precautions and expectations were discussed. Risks include blood loss, potential infection and blood clots. Preoperative vitamins were prescribed. The patient should follow up after surgery. To do next visit:  Return for post-op with x-rays. The above stated was discussed in layman's terms and the patient expressed understanding. All questions were answered to the patient's satisfaction. Scribe Attestation    I,:  Arcenio Nascimento am acting as a scribe while in the presence of the attending physician.:       I,:  Sally Vyas MD personally performed the services described in this documentation    as scribed in my presence.:                 Subjective:   Miguel Espinoza is a 80 y.o. male who presents for follow up of bilateral knees. He is s/p bilateral knee visco-supplement injections with limited benefit, 5/26/2023. Today he complains of left anterior inferior knee pain and right generalized knee pain and weakness. Stairs can be challenging and aggravate while rest alleviates.           Review of systems negative unless otherwise specified in HPI     Medical History Past Medical History:   Diagnosis Date    Actinic keratosis       Last assessed - 10/15/14    Cortical age-related cataract of both eyes 3/22/2022    Hypertension      Hypokalemia       Last assessed - 8/13/14    Sinus bradycardia       Last assessed - 8/13/14            Surgical History         Past Surgical History:   Procedure Laterality Date    HERNIA REPAIR        SIGMOIDOSCOPY         (Fiberoptic, Therapeutic) 7/28/2014, 8/25/14            Family History         Family History   Problem Relation Age of Onset    Prostate cancer Father      No Known Problems Mother      Liver cancer Brother           Adenocarcinoma     No Known Problems Sister      No Known Problems Sister      No Known Problems Son      No Known Problems Son      Leukemia Daughter           Remission-Bone Marrow transplant    No Known Problems Daughter              Social History            Occupational History    Occupation: Retired   Tobacco Use    Smoking status: Never    Smokeless tobacco: Never    Tobacco comments:       Former smoker per [employer]    Vaping Use    Vaping Use: Never used   Substance and Sexual Activity    Alcohol use: No       Comment: Seldomly per [employer]     Drug use: No    Sexual activity: Not on file            Current Outpatient Medications:     atenolol (TENORMIN) 50 mg tablet, Take 1 tablet (50 mg total) by mouth daily, Disp: 90 tablet, Rfl: 1    azelastine (ASTELIN) 0.1 % nasal spray, SPRAY 1 SPRAY INTO EACH NOSTRIL 2 (TWO) TIMES A DAY USE IN EACH NOSTRIL AS DIRECTED, Disp: 1 Bottle, Rfl: 1    betamethasone valerate (VALISONE) 0.1 % cream, Apply topically 2 (two) times a day (Patient taking differently: Apply topically as needed), Disp: 45 g, Rfl: 0    Cholecalciferol (VITAMIN D3) 1000 UNIT/SPRAY LIQD, Take 1 tablet by mouth daily, Disp: , Rfl:     Coenzyme Q10 100 MG CHEW, Chew 200 mg, Disp: , Rfl:     Diclofenac Sodium (VOLTAREN) 1 %, Apply 2 g topically 4 (four) times a day (Patient taking differently: Apply 2 g topically as needed), Disp: 100 g, Rfl: 3    Difluprednate 0.05 % EMUL, , Disp: , Rfl:     ofloxacin (OCUFLOX) 0.3 % ophthalmic solution, , Disp: , Rfl:     Omega-3-Acid Eth Est, Dietary, 1 g CAPS, Take by mouth, Disp: , Rfl:     Potassium 99 MG TABS, Take 1 tablet by mouth 2 (two) times a day, Disp: , Rfl:     sildenafil (VIAGRA) 100 mg tablet, TAKE 1 TABLET BY MOUTH EVERY DAY AS NEEDED FOR ERECTILE DYSFUNCTION, Disp: 30 tablet, Rfl: 3          Allergies   Allergen Reactions    Isoflavones (Soy)                     Vitals:     08/25/23 1419   BP: 125/84   Pulse: 65         Objective:  Physical exam  General: Awake, Alert, Oriented  Eyes: Pupils equal, round and reactive to light  Heart: regular rate and rhythm  Lungs: No audible wheezing  Abdomen: soft                     Ortho Exam  Bilateral knees:  No erythema or ecchymosis  No effusion or swelling  Normal strength  Good ROM with crepitus   Calf compartments soft and supple  Sensation intact  Toes are warm sensate and mobile        Diagnostics, reviewed and taken today if performed as documented:     None performed     Procedures, if performed today:     Procedures     None performed       Portions of the record may have been created with voice recognition software. Occasional wrong word or "sound a like" substitutions may have occurred due to the inherent limitations of voice recognition software. Read the chart carefully and recognize, using context, where substitutions have occurred. Instructions      Return for post-op with x-rays. Patient declined After Visit Summary     After Visit Summary (Automatic SnapShot taken 8/25/2023)  Additional Documentation    Vitals: /84 (BP Location: Right arm, Patient Position: Sitting, Cuff Size: Standard)     Pulse 65     Ht 5' 9" (1.753 m)     Wt 79.4 kg (175 lb)     BMI 25.84 kg/m²     BSA 1.95 m²   SmartForms:  SUSAN PRE-CHARTING      . ..(2 more)   Encounter Info: Marco Antonio Leo History,     Allergies,     Detailed Report     Orders Placed    Anemia Panel w/Reflex  APTT  CBC and differential  Comprehensive metabolic panel  Hemoglobin A1C W/EAG Estimation  Protime-INR  Type and screen  Ambulatory referral to surgical optimization Closed  Ambulatory referral to Physical Therapy Pending Review  Case request operating room: ARTHROPLASTY KNEE TOTAL W ROBOT Once  Medication Changes      Ascorbic Acid 500 mg Oral Daily, Start 30 days prior to surgery    Enoxaparin Sodium 40 mg Subcutaneous Daily, Start injections after surgery    Ferrous Sulfate 324 mg Oral 2 times daily before meals, Start 30 days prior to surgery    Folic Acid 1 mg Oral Daily, Start 30 days prior to surgery  Medication List  Visit Diagnoses      Primary osteoarthritis of right knee    Primary osteoarthritis of left knee    Encounter for other orthopedic aftercare    Encounter for other specified surgical aftercare  Problem List

## 2023-11-30 NOTE — PLAN OF CARE
Problem: PHYSICAL THERAPY ADULT  Goal: Performs mobility at highest level of function for planned discharge setting. See evaluation for individualized goals. Description: Treatment/Interventions: Functional transfer training, LE strengthening/ROM, Elevations, Therapeutic exercise, Patient/family training, Equipment eval/education, Bed mobility, Gait training, OT, Spoke to nursing  Equipment Recommended: Jordy Maureen (has RW)       See flowsheet documentation for full assessment, interventions and recommendations. Note: Prognosis: Good  Problem List: Decreased strength, Decreased range of motion, Decreased endurance, Impaired balance, Decreased mobility, Pain  Assessment: PT completed evaluation of 80 y.o. male admitted to [address] [place] [address] on 11/30/2023 for the following planned procedure: R TKA. Post-op patient is WBAT R LE. Patient's current status instabilities include ongoing pain, continuous O2/HR monitoring, surgical drain, falls risk, use of new AD, and a regression in function from baseline. PMH is significant for knee OA, low back pain, and prostate CA. Prior to this admission patient resided with his spouse in a bilevel home (patient normally negotiates stairs within the home to bedroom however upon d/c he plans to reside on 1st level of home only where there are 0 BRITTANY, he will sleep on couch, there is a full  bathroom with a walk in shower). Commode, RW, SPC. At his baseline he is I with mobility (no use of AD), ADLS, and iADLS. 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 Current impairments include decreased strength and ROM of R LE, decreased balance and gait deviations. During PT evaluation, patient currently is requiring min-AX1 for bed mobility, tranfers, and ambulation. With use of RW he walked 2 feet (bed to chair) + 5 feet forwards presenting with slow, step to gait pattern.  Anticipate with ongoing mobility this admission patient will achieve PT goals and d/c home with family and OPPT (scheduled for 1st session on Monday 12/4). Patient will continue to benefit from continued skilled PT this admission to achieve maximal function and safety. Rehab Resource Intensity Level, PT: III (Minimum Resource Intensity)    See flowsheet documentation for full assessment.

## 2023-11-30 NOTE — PHYSICAL THERAPY NOTE
Physical Therapy Evaluation     Patient's Name: Polly Ramírez    Admitting Diagnosis  Primary osteoarthritis of right knee [M17.11]    Problem List  Patient Active Problem List   Diagnosis    ZAINAB (obstructive sleep apnea)    Benign essential hypertension    Vitamin D deficiency    Medicare annual wellness visit, subsequent    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    Bradycardia    Arthritis of lumbar spine    Low back pain    Prostate cancer (HCC)    Squamous cell carcinoma of skin    Toe pain, right    Palpitations    Environmental allergies    Dermatitis of right foot    Arthralgia of both knees    Raynaud's phenomenon without gangrene    Instability of right knee joint    Right knee buckling    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    Dependent edema    Encounter for geriatric assessment       Past Medical History  Past Medical History:   Diagnosis Date    Actinic keratosis     Last assessed - 10/15/14    Cortical age-related cataract of both eyes 03/22/2022    CPAP (continuous positive airway pressure) dependence     Hypertension     Hypokalemia     Last assessed - 8/13/14    Personal history of COVID-19     09/2023 - mild symptoms    Prostate cancer ([address])     tx w radiation only    Sinus bradycardia     Last assessed - 8/13/14    Sleep apnea        Past Surgical History  Past Surgical History:   Procedure Laterality Date    CATARACT EXTRACTION Bilateral     COLONOSCOPY      x 4    HERNIA REPAIR      SIGMOIDOSCOPY      (Fiberoptic, Therapeutic) 7/28/2014, 8/25/14 11/30/23 1425   PT Last Visit   PT Visit Date 11/30/23   Note Type   Note type Evaluation   Pain Assessment   Pain Assessment Tool 0-10   Pain Score 1   Pain Location/Orientation Orientation: Right;Location: Knee   Hospital Pain Intervention(s) Ambulation/increased activity;Repositioned;Cold applied   Restrictions/Precautions   Weight Bearing Precautions Per Order Yes   RLE Weight Bearing Per Order WBAT   Braces or Orthoses (none)   Other Precautions Pain; Fall Risk;Multiple lines   Home Living   Type of [address] [place] [address] Center  Two level  (split level; 0 BRITTANY)   Bathroom Shower/Tub Walk-in shower   Bathroom Toilet Standard   Bathroom Equipment Commode   Bathroom Accessibility Accessible   Home Equipment Walker;Cane   Additional Comments Prior to this admission patient resided with his spouse in a bilevel home (patient normally negotiates stairs within the home to bedroom however upon d/c he plans to reside on 1st level of home only where there are 0 BRITTANY, he will sleep on couch, there is a full  bathroom with a walk in shower). Darcie, RW, SPC. At his baseline he is I with mobility (no use of AD), ADLS, and iADLS. 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 Prior Function   Level of [place] Independent with ADLs; Independent with functional mobility; Independent with IADLS   Lives With Spouse   Receives Help From Family   IADLs Independent with driving; Independent with meal prep; Independent with medication management   Falls in the last 6 months 0   Vocational Volunteer work   General   Additional Pertinent History 80 y.o. male admitted to [address] [place] [address] on 11/30/2023 for the following planned procedure: R TKA. Post-op patient is WBAT R LE. Family/Caregiver Present No   Cognition   Overall Cognitive Status WFL   Arousal/Participation Alert   Orientation Level Oriented X4   Memory Within functional limits   Following Commands Follows all commands and directions without difficulty   Subjective   Subjective "I can go home"   RUE Assessment   RUE Assessment WFL   LUE Assessment   LUE Assessment WFL   RLE Assessment   RLE Assessment X  (3+/5 grossly post-op)   LLE Assessment   LLE Assessment WFL   Bed Mobility   Supine to Sit 4  Minimal assistance   Additional items Assist x 1;HOB elevated; Increased time required;LE management   Sit to Supine Unable to assess   Additional Comments post evaluation patient OOB in chair with b/l LE elevated, call bell in reach   Transfers   Sit to Stand 4  Minimal assistance   Additional items Assist x 1; Increased time required;Verbal cues   Stand to Sit 4  Minimal assistance   Additional items Assist x 1; Increased time required;Verbal cues   Additional Comments transfers with RW   Ambulation/Elevation   Gait pattern Excessively slow; Step to;Short stride;Decreased foot clearance; Antalgic;R Knee Julio   Gait Assistance 4  Minimal assist   Additional items Assist x 1   Assistive Device Rolling walker   Distance 2 feet + 5 feet   Balance   Static Sitting Good   Static Standing Fair -   Ambulatory Poor +   Endurance Deficit   Endurance Deficit Yes   Endurance Deficit Description pain   Activity Tolerance   Activity Tolerance Patient tolerated treatment well   Medical Staff Made Aware restorative technician Von assisted   Nurse Made Aware jem to see per RN Dennie Marseille and f/u post   Assessment   Prognosis Good   Problem List Decreased strength;Decreased range of motion;Decreased endurance; Impaired balance;Decreased mobility;Pain   Assessment PT completed evaluation of 80 y.o. male admitted to [facility] on 11/30/2023 for the following planned procedure: R TKA. Post-op patient is WBAT R LE. Patient's current status instabilities include ongoing pain, continuous O2/HR monitoring, surgical drain, falls risk, use of new AD, and a regression in function from baseline. PMH is significant for knee OA, low back pain, and prostate CA. Prior to this admission patient resided with his spouse in a bilevel home (patient normally negotiates stairs within the home to bedroom however upon d/c he plans to reside on 1st level of home only where there are 0 BRITTANY, he will sleep on couch, there is a full  bathroom with a walk in shower). Commode, RW, SPC. At his baseline he is I with mobility (no use of AD), ADLS, and iADLS. 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 Current impairments include decreased strength and ROM of R LE, decreased balance and gait deviations. During PT evaluation, patient currently is requiring min-AX1 for bed mobility, tranfers, and ambulation. With use of RW he walked 2 feet (bed to chair) + 5 feet forwards presenting with slow, step to gait pattern. Anticipate with ongoing mobility this admission patient will achieve PT goals and d/c home with family and OPPT (scheduled for 1st session on Monday 12/4). Patient will continue to benefit from continued skilled PT this admission to achieve maximal function and safety. Goals   Patient Goals to go home   LTG Expiration Date 12/14/23   Long Term Goal #1 1) Perform bed mobility mod-I to participate in frequent repositioning and improve skin integrity; 2) Perform functional transfers mod-I to promote I with toileting and OOB mobility; 3) Ambulate 150 feet mod-I with RW to participate in household and community level mobility; 4) Improve R LE strength by 1/2 grade in order to improve efficiency of tranfers; 5) Navigate 7 steps S level in order to safely navigate multiple floors at home (if patient would like to trial stairs; does have 1st floor set up)   PT Treatment Day 0   Plan   Treatment/Interventions Functional transfer training;LE strengthening/ROM; Elevations; Therapeutic exercise;Patient/family training;Equipment eval/education; Bed mobility;Gait training;OT;Spoke to nursing   PT Frequency Twice a day   Discharge Recommendation   Rehab Resource Intensity Level, PT III (Minimum Resource Intensity)   Equipment Recommended Walker  (has RW)   AM-PAC Basic Mobility Inpatient   Turning in Flat Bed Without Bedrails 3   Lying on Back to Sitting on Edge of Flat Bed Without Bedrails 3   Moving Bed to Chair 3   Standing Up From Chair Using Arms 3   Walk in Room 3   Climb 3-5 Stairs With Railing 3   Basic Mobility Inpatient Raw Score 18   Basic Mobility Standardized Score 41.05   Highest Level Of Mobility JH-HLM Goal 6: Walk 10 steps or more   JH-HLM Achieved 6: Walk 10 steps or more     The patient's AM-PAC Basic Mobility Inpatient Standardized Score is less than 42.9, suggesting this patient may benefit from discharge to post-acute rehabilitation services. Please also refer to the recommendation of the Physical Therapist for safe discharge planning.         Mario Kennedy, PT, DPT

## 2023-11-30 NOTE — INTERVAL H&P NOTE
H&P reviewed. After examining the patient I find no changes in the patients condition since the H&P had been written. Vitals:    11/30/23 0602   BP: 138/80   Pulse: 63   Resp: 18   Temp: 97.8 °F (36.6 °C)   SpO2: 99%   Head: Present  CVS: RRR  Lungs: Clear bilateral  Treatment: Present  Assessment: Adult male with osteoarthritis of the right knee that remains symptomatic despite appropriate nonsurgical treatments  Plan: Right total knee arthroplasty.   Patient is familiar with the risks and benefits

## 2023-11-30 NOTE — CONSULTS
Internal Medicine  Consultation Note    Patient: Kayli Auguste  Age/sex: 80 y.o. male  Medical Record #: [id number]    Assessment/Plan    Status Post right Total KNEE ARTHROPLASTY  Continue post op pain control measures as prescribed. Follow bowel regimen to help decrease narcotic induced constipation. Follow post operative hemoglobin with serial CBC and treat accordingly. Monitor WBC and fever curve post op while encouraging use of incentive spirometer. DVT prophylaxis in place and reviewed. Hypertension  Hydralazine 25 mg q 8 prn SBP greater then 160  Continue atenolol. Vitals:    11/30/23 1100   BP: 112/65   Pulse: (!) 52   Resp: 13   Temp:    SpO2: 97%       Bradycardia  Present outpt as well. Pt is asymptomatic. Will monitor post-op    PRE-OP HGB LEVEL: 13.4    Subjective/ HPI: Kayli Auguste was seen and examined. Hx of KNEE pain failed out patient conservative measures. Elected to undergo total KNEE arthroplasty We are asked to see patient for post op management of underlying medical co-morbidities as outlined above. Pt did well intra and post operatively with good hemodynamics. Pt currently comfortable and without any reported post op nausea. ROS:   A 10 point ROS was performed; negative except as noted above.      Social History:    Substance Use History:   Social History     Substance and Sexual Activity   Alcohol Use No    Comment: Seldomly per [employer] - not in years     Social History     Tobacco Use   Smoking Status Never   Smokeless Tobacco Never   Tobacco Comments    Former smoker per [employer]      Social History     Substance and Sexual Activity   Drug Use No       Family History:    Family History   Problem Relation Age of Onset    Prostate cancer Father     No Known Problems Mother     Liver cancer Brother         Adenocarcinoma     No Known Problems Sister     No Known Problems Sister     No Known Problems Son     No Known Problems Son     Leukemia Daughter Remission-Bone Marrow transplant    No Known Problems Daughter          Review of Scheduled Meds:  Current Facility-Administered Medications   Medication Dose Route Frequency Provider Last Rate    acetaminophen  650 mg Oral Q4H PRN Corlis Number, PA-C      atenolol  50 mg Oral Daily Corlis Number, PA-C      cefazolin  1,000 mg Intravenous Q8H Corlis Number, PA-C      co-enzyme Q-10  30 mg Oral Daily Corlis Number, PA-C      docusate sodium  100 mg Oral BID Corlis Number, PA-C      enoxaparin  40 mg Subcutaneous Q24H [address] Corlis Number, PA-C      HYDROmorphone  0.5 mg Intravenous Q2H PRN Corlis Number, PA-C      lactated ringers  1,000 mL Intravenous Once PRN Corlis Number, PA-C      And    lactated ringers  1,000 mL Intravenous Once PRN Corlis Number, PA-C      lactated ringers  125 mL/hr Intravenous Continuous Corlis Number, PA-C 125 mL/hr (11/30/23 0854)    ondansetron  4 mg Intravenous Q6H PRN Corlis Number, PA-C      oxyCODONE  10 mg Oral Q4H PRN Corlis Number, PA-C      oxyCODONE  5 mg Oral Q4H PRN Corlis Number, PA-C      simethicone  80 mg Oral 4x Daily PRN Corlis Number, PA-C      sodium chloride  1,000 mL Intravenous Once PRN Corlis Number, PA-C      And    sodium chloride  1,000 mL Intravenous Once PRN Corlis Number, PA-C         Labs: Invalid input(s): "LABGLOM", "CMP"               Results from last 7 days   Lab Units 11/30/23  0856   POC GLUCOSE mg/dl 105       No results found for: "Claudia Rued", "Meryle Primer", "WOUNDCULT", "Parley Alvino"    Input and Output Summary (last 24 hours):        Intake/Output Summary (Last 24 hours) at 11/30/2023 1136  Last data filed at 11/30/2023 1000  Gross per 24 hour   Intake 1200 ml   Output 295 ml   Net 905 ml       Imaging:     No orders to display       *Labs /Radiology studiesLabs reviewed  *Medications reviewed and reconciled as needed  *Please refer to order section for additional ordered labs studies  *Case discussed with primary attending during morning huddle case rounds    Vitals:   Temp (24hrs), Av.6 °F (36.4 °C), Min:97.3 °F (36.3 °C), Max:97.8 °F (36.6 °C)    Temp:  [97.3 °F (36.3 °C)-97.8 °F (36.6 °C)] 97.8 °F (36.6 °C)  HR:  [50-63] 52  Resp:  [12-18] 13  BP: (111-142)/(57-80) 112/65  SpO2:  [2 %-100 %] 97 %  Body mass index is 25.7 kg/m². Physical Exam:   General Appearance: no distress, conversive  HEENT: PERRLA, conjuctiva normal; oropharynx clear; mucous membranes moist;   Neck:  Supple, no lymphadenopathy or thyromegaly  Lungs: CTA, normal respiratory effort, no retractions, expiratory effort normal  CV: regular rate and rhythm , PMI normal   ABD: soft non tender, no masses , no hepatic or splenomegaly  EXT: DP pulses intact, no lymphadenopathy, no edema ;  right KNEE dressing in place  Skin: normal turgor, normal texture, no rash  Psych: affect normal, mood normal  Neuro: AAOx3          Invasive Devices       Peripheral Intravenous Line  Duration             Peripheral IV 23 Left;Dorsal (posterior) Hand <1 day              Drain  Duration             Closed/Suction Drain Anterior;Right Knee Accordion 10 Fr. <1 day    Urethral Catheter Latex 16 Fr. <1 day                       Code Status: Level 1 - Full Code  Current Length of Stay: 0 day(s)    Total floor / unit time spent today 1 hour  Coordination of patient's care was performed in conjunction with primary service. Time invested included review of patient's labs, vitals, and management of their comorbidities with continued monitoring, examination of patient as well as answering patient questions, documenting her findings and creating progress note in electronic medical record,  ordering appropriate diagnostic testing. Medical decision making for the day was made by supervising physician unless otherwise noted in their attestation statement. ** Please Note: Fluency Direct voice to text software may have been used in the creation of this document.  Audio transcription errors may occur**

## 2023-11-30 NOTE — OP NOTE
OPERATIVE REPORT  PATIENT NAME: Magi Barker  : 1940  MRN: [id number]  Pt Location:  [facility] OR ROOM 18    Surgery Date: 2023    Surgeon(s) and Role:     * Diane Guzman MD - Primary     * Lakia Caceres PA-C - Assisting     * Kirk Phillips MD - Assisting     * Sandrita Perez PA-C - Assisting     Preop Diagnosis:  Primary osteoarthritis of right knee [M17.11]    Post-Op Diagnosis Codes:     * Primary osteoarthritis of right knee [M17.11]    Procedure(s):  Right - ARTHROPLASTY KNEE TOTAL W ROBOT    Specimens:  * No specimens in log *    Estimated Blood Loss:   Minimal    Drains:  Closed/Suction Drain Anterior;Right Knee Accordion 10 Fr. (Active)   Number of days: 0       Urethral Catheter Latex 16 Fr. (Active)   Number of days: 0       Anesthesia Type:   Spinal w/ Femoral Nerve Block     Operative Indications:  Primary osteoarthritis of right knee [M17.11]    Operative Findings:  Depuy attune   Femur-8   Poly-5   Tibia-7   [id number]-[id number]    Complications:   None    Knee Technique: Suture (direct) Repair  Knee Approach: Medial Parapatellar    Procedure and Technique: Following the induction medical level spinal anesthesia, Del Cid catheter was then sterilely introduced to this patient's bladder. Antibiotics were ministered. The right thigh was then fitted with a thigh-high tourniquet. The right lower extremity was sterilely prepped and draped. The right lower extremity is exsanguinated gravity, tourniquet inflated to 3 mmHg. A midline knee incision was carried the knee in flexion. Full-thickness flaps were raised when asked as the extensor mechanism. A medial arthrotomy was created open up the knee joint. 2 half pins were placed in proximal tibia, 2 half pins were placed on distal femur. In doing so, modules were created. The arrays were attached the modules. Checkpoints made the proximal tibia distal femur. The knee was then registered.   The surgery was planned out on the computer, the plan was finalized. The robot was docked. The first maneuver of the distal femoral cut probably anterior posterior cuts. The chamfer cuts complete the process. Proximal tibia cut was made next. Care was taken preserve the taken medial collateral, lateral collateral, and posterior structures during these maneuvers. The box cut was then made for the posterior stabilized unit, remnant medial lateral discectomies were performed. Trials were inserted, the knee was taken the range of motion, found to be A full extension, good flexion, stable throughout. The patella was then resurfaced while utilizing manufactures equipment, was found to be a size 38 mm button. The trial components removed and the knee was prepared for insertion of cemented components. The cemented tibia, cemented femur, trial poly-, cemented patella placed. Excess cement was removed, the knee was brought to extension. The cement was at a cure. The trial poly was taken out, the knee was packed off. The tourniquet was deflated, hemostasis was secured. The insert polyethylene was then snapped into position. The knee was taken through final range of motion, found to be A full extension, good flexion, stable throughout, next patella tracking. Satisfied with the extent of surgery, the wounds were flushed with saline and closed. A Betadine soak initiated. A drain was placed deep brought via cephalad stab incision. The arthrotomy was closed with number Vicryl suture. The subcu tissue closed with 2-0 Vicryl suture. The skin was closed with staples. Sterile dressings were applied. He was then transferred to recovery room in stable condition plans to include physical therapy weightbearing tolerance, he will require DVT prophylaxis with Lovenox   I was present for the entire procedure.     Patient Disposition:  PACU             SIGNATURE: Sally Vyas MD  DATE: November 30, 2023  TIME: 8:40 AM

## 2023-11-30 NOTE — ANESTHESIA POSTPROCEDURE EVALUATION
Post-Op Assessment Note    CV Status:  Stable    Pain management: adequate       Mental Status:  Sleepy   Hydration Status:  Euvolemic   PONV Controlled:  Controlled   Airway Patency:  Patent     Post Op Vitals Reviewed: Yes    No anethesia notable event occurred.     Staff: JAMES               /59 (11/30/23 0856)    Temp (!) 97.3 °F (36.3 °C) (11/30/23 0856)    Pulse 55 (11/30/23 0856)   Resp 12 (11/30/23 0856)    SpO2 100

## 2023-11-30 NOTE — CONSULTS
Office Visit    8/25/2023  [facility] [facility] [place]       Prince Ann MD  Orthopedic Surgery Primary osteoarthritis of right knee +3 more  Dx Left Knee - Follow-up  Right Knee - Follow-up; Referred by Referral Self  Reason for Visit     Progress Notes  Prince Ann MD (Physician)  Orthopedic Surgery  Expand All Collapse All  Assessment:  1. Primary osteoarthritis of right knee          2. Primary osteoarthritis of left knee          3. Encounter for other orthopedic aftercare          4. Encounter for other specified surgical aftercare                Plan:  Bilateral knee osteoarthritis. The patient will be scheduled for right total knee arthroplasty. We feel the patient will find significant relief per current symptoms, findings on imaging and exam.  Risks, benefits, precautions and expectations were discussed. Risks include blood loss, potential infection and blood clots. Preoperative vitamins were prescribed. The patient should follow up after surgery. To do next visit:  Return for post-op with x-rays. The above stated was discussed in layman's terms and the patient expressed understanding. All questions were answered to the patient's satisfaction. Scribe Attestation    I,:  Ema Jang am acting as a scribe while in the presence of the attending physician.:       I,:  Prince Ann MD personally performed the services described in this documentation    as scribed in my presence.:                 Subjective:   Danis Shine is a 80 y.o. male who presents for follow up of bilateral knees. He is s/p bilateral knee visco-supplement injections with limited benefit, 5/26/2023. Today he complains of left anterior inferior knee pain and right generalized knee pain and weakness. Stairs can be challenging and aggravate while rest alleviates.           Review of systems negative unless otherwise specified in HPI     Medical History Past Medical History:   Diagnosis Date    Actinic keratosis       Last assessed - 10/15/14    Cortical age-related cataract of both eyes 3/22/2022    Hypertension      Hypokalemia       Last assessed - 8/13/14    Sinus bradycardia       Last assessed - 8/13/14            Surgical History         Past Surgical History:   Procedure Laterality Date    HERNIA REPAIR        SIGMOIDOSCOPY         (Fiberoptic, Therapeutic) 7/28/2014, 8/25/14            Family History         Family History   Problem Relation Age of Onset    Prostate cancer Father      No Known Problems Mother      Liver cancer Brother           Adenocarcinoma     No Known Problems Sister      No Known Problems Sister      No Known Problems Son      No Known Problems Son      Leukemia Daughter           Remission-Bone Marrow transplant    No Known Problems Daughter              Social History            Occupational History    Occupation: Retired   Tobacco Use    Smoking status: Never    Smokeless tobacco: Never    Tobacco comments:       Former smoker per [employer]    Vaping Use    Vaping Use: Never used   Substance and Sexual Activity    Alcohol use: No       Comment: Seldomly per [employer]     Drug use: No    Sexual activity: Not on file            Current Outpatient Medications:     atenolol (TENORMIN) 50 mg tablet, Take 1 tablet (50 mg total) by mouth daily, Disp: 90 tablet, Rfl: 1    azelastine (ASTELIN) 0.1 % nasal spray, SPRAY 1 SPRAY INTO EACH NOSTRIL 2 (TWO) TIMES A DAY USE IN EACH NOSTRIL AS DIRECTED, Disp: 1 Bottle, Rfl: 1    betamethasone valerate (VALISONE) 0.1 % cream, Apply topically 2 (two) times a day (Patient taking differently: Apply topically as needed), Disp: 45 g, Rfl: 0    Cholecalciferol (VITAMIN D3) 1000 UNIT/SPRAY LIQD, Take 1 tablet by mouth daily, Disp: , Rfl:     Coenzyme Q10 100 MG CHEW, Chew 200 mg, Disp: , Rfl:     Diclofenac Sodium (VOLTAREN) 1 %, Apply 2 g topically 4 (four) times a day (Patient taking differently: Apply 2 g topically as needed), Disp: 100 g, Rfl: 3    Difluprednate 0.05 % EMUL, , Disp: , Rfl:     ofloxacin (OCUFLOX) 0.3 % ophthalmic solution, , Disp: , Rfl:     Omega-3-Acid Eth Est, Dietary, 1 g CAPS, Take by mouth, Disp: , Rfl:     Potassium 99 MG TABS, Take 1 tablet by mouth 2 (two) times a day, Disp: , Rfl:     sildenafil (VIAGRA) 100 mg tablet, TAKE 1 TABLET BY MOUTH EVERY DAY AS NEEDED FOR ERECTILE DYSFUNCTION, Disp: 30 tablet, Rfl: 3          Allergies   Allergen Reactions    Isoflavones (Soy)                     Vitals:     08/25/23 1419   BP: 125/84   Pulse: 65         Objective:  Physical exam  General: Awake, Alert, Oriented  Eyes: Pupils equal, round and reactive to light  Heart: regular rate and rhythm  Lungs: No audible wheezing  Abdomen: soft                     Ortho Exam  Bilateral knees:  No erythema or ecchymosis  No effusion or swelling  Normal strength  Good ROM with crepitus   Calf compartments soft and supple  Sensation intact  Toes are warm sensate and mobile        Diagnostics, reviewed and taken today if performed as documented:     None performed     Procedures, if performed today:     Procedures     None performed       Portions of the record may have been created with voice recognition software. Occasional wrong word or "sound a like" substitutions may have occurred due to the inherent limitations of voice recognition software. Read the chart carefully and recognize, using context, where substitutions have occurred. Instructions      Return for post-op with x-rays. Patient declined After Visit Summary     After Visit Summary (Automatic SnapShot taken 8/25/2023)  Additional Documentation    Vitals: /84 (BP Location: Right arm, Patient Position: Sitting, Cuff Size: Standard)     Pulse 65     Ht 5' 9" (1.753 m)     Wt 79.4 kg (175 lb)     BMI 25.84 kg/m²     BSA 1.95 m²   SmartForms:  SUSAN PRE-CHARTING      . ..(2 more)   Encounter Info: Marco Antonio Leo History,     Allergies,     Detailed Report     Orders Placed    Anemia Panel w/Reflex  APTT  CBC and differential  Comprehensive metabolic panel  Hemoglobin A1C W/EAG Estimation  Protime-INR  Type and screen  Ambulatory referral to surgical optimization Closed  Ambulatory referral to Physical Therapy Pending Review  Case request operating room: ARTHROPLASTY KNEE TOTAL W ROBOT Once  Medication Changes      Ascorbic Acid 500 mg Oral Daily, Start 30 days prior to surgery    Enoxaparin Sodium 40 mg Subcutaneous Daily, Start injections after surgery    Ferrous Sulfate 324 mg Oral 2 times daily before meals, Start 30 days prior to surgery    Folic Acid 1 mg Oral Daily, Start 30 days prior to surgery  Medication List  Visit Diagnoses      Primary osteoarthritis of right knee    Primary osteoarthritis of left knee    Encounter for other orthopedic aftercare    Encounter for other specified surgical aftercare  Problem List

## 2023-11-30 NOTE — ANESTHESIA PROCEDURE NOTES
Peripheral Block    Patient location during procedure: holding area  Start time: 11/30/2023 7:20 AM  Reason for block: procedure for pain, at surgeon's request and post-op pain management  Staffing  Performed by: Twyla Nayak MD  Authorized by: Twyla Nayak MD    Preanesthetic Checklist  Completed: patient identified, IV checked, site marked, risks and benefits discussed, surgical consent, monitors and equipment checked, pre-op evaluation and timeout performed  Peripheral Block  Patient position: supine  Prep: ChloraPrep  Patient monitoring: frequent blood pressure checks, continuous pulse oximetry and heart rate  Block type: Adductor Canal  Laterality: right  Injection technique: single-shot  Procedures: ultrasound guided, Ultrasound guidance required for the procedure to increase accuracy and safety of medication placement and decrease risk of complications.   Ultrasound permanent image savedbupivacaine (PF) (MARCAINE) 0.5 % injection 20 mL - Perineural   10 mL - 11/30/2023 7:20:00 AM  bupivacaine liposomal (EXPAREL) 1.3 % injection 20 mL - Perineural   20 mL - 11/30/2023 7:20:00 AM  Needle  Needle type: Stimuplex   Needle length: 6 in  Needle localization: anatomical landmarks and ultrasound guidance  Assessment  Injection assessment: incremental injection, frequent aspiration, injected with ease, negative aspiration, negative for heart rate change, no paresthesia on injection, no symptoms of intraneural/intravenous injection and needle tip visualized at all times  Paresthesia pain: none  Post-procedure:  site cleaned  patient tolerated the procedure well with no immediate complications

## 2023-11-30 NOTE — PROGRESS NOTES
Progress Note - Orthopedics   Francisco J  80 y.o. male MRN: [id number]  Unit/Bed#: PACU 02      Subjective:    83 y.o.male POD 0 R TKA. No acute events, no new complaints. Pain well controlled. Labs:  0   Lab Value Date/Time    HCT 40.8 09/11/2023 0719    HCT 41.4 02/28/2023 0737    HCT 40.7 08/31/2022 0813    HGB 13.7 09/11/2023 0719    HGB 13.9 02/28/2023 0737    HGB 13.7 08/31/2022 0813    WBC 3.50 (L) 09/11/2023 0719    WBC 4.99 02/28/2023 0737    WBC 4.65 08/31/2022 0813       Meds:    Current Facility-Administered Medications:     bupivacaine liposomal (EXPAREL) 1.3 % injection 10 mL, 10 mL, Infiltration, Once, Ruy Coyne MD    ceFAZolin (ANCEF) IVPB (premix in dextrose) 2,000 mg 50 mL, 2,000 mg, Intravenous, Once, Gino Soriano MD    fentaNYL (SUBLIMAZE) injection 50 mcg, 50 mcg, Intravenous, Q5 Min PRN, [employer], CRNA, 50 mcg at 11/30/23 0941    lactated ringers infusion, 125 mL/hr, Intravenous, Continuous, Gino Soriano MD, Last Rate: 125 mL/hr at 11/30/23 0854, 125 mL/hr at 11/30/23 0854    ondansetron (ZOFRAN) injection 4 mg, 4 mg, Intravenous, Once PRN, Yolanda Cummins CRNA    Blood Culture:   No results found for: "Nic Negrete"    Wound Culture:   No results found for: "WOUNDCULT"    Ins and Outs:  I/O last 24 hours: In: 1200 [I.V.:1200]  Out: 295 [Urine:250; Drains:45]          Physical:  Vitals:    11/30/23 1100   BP: 112/65   Pulse: (!) 52   Resp: 13   Temp:    SpO2: 97%     Musculoskeletal: right Lower Extremity  Dressing c/d/i  TTP patrice incisionally  Sensation intact to saphenous, sural, tibial, superficial peroneal nerve, and deep peroneal  Motor intact to +FHL/EHL, +ankle dorsi/plantar flexion  2+ DP pulse  Digits warm and well perfused  Capillary refill < 2 seconds    Assessment:    83 y.o.male POD 0 R TKA.      Plan:  WBAT RLE  Will monitor for ABLA and administer IVF/prbc as indicated for Greater than 2 gram drop or Hgb < 7   PT/OT  Pain control  DVT ppx - lovenox  Dispo: pending pain control, AM labs, PT    Víctor Sims MD

## 2023-12-01 VITALS
TEMPERATURE: 98 F | RESPIRATION RATE: 18 BRPM | DIASTOLIC BLOOD PRESSURE: 67 MMHG | OXYGEN SATURATION: 98 % | HEART RATE: 69 BPM | WEIGHT: 169 LBS | SYSTOLIC BLOOD PRESSURE: 131 MMHG | BODY MASS INDEX: 25.61 KG/M2 | HEIGHT: 68 IN

## 2023-12-01 LAB
ANION GAP SERPL CALCULATED.3IONS-SCNC: 8 MMOL/L
BUN SERPL-MCNC: 19 MG/DL (ref 5–25)
CALCIUM SERPL-MCNC: 8.2 MG/DL (ref 8.4–10.2)
CHLORIDE SERPL-SCNC: 109 MMOL/L (ref 96–108)
CO2 SERPL-SCNC: 25 MMOL/L (ref 21–32)
CREAT SERPL-MCNC: 0.88 MG/DL (ref 0.6–1.3)
ERYTHROCYTE [DISTWIDTH] IN BLOOD BY AUTOMATED COUNT: 14 % (ref 11.6–15.1)
GFR SERPL CREATININE-BSD FRML MDRD: 79 ML/MIN/1.73SQ M
GLUCOSE SERPL-MCNC: 135 MG/DL (ref 65–140)
HCT VFR BLD AUTO: 35.1 % (ref 36.5–49.3)
HGB BLD-MCNC: 11.6 G/DL (ref 12–17)
MCH RBC QN AUTO: 30.6 PG (ref 26.8–34.3)
MCHC RBC AUTO-ENTMCNC: 33 G/DL (ref 31.4–37.4)
MCV RBC AUTO: 93 FL (ref 82–98)
PLATELET # BLD AUTO: 194 THOUSANDS/UL (ref 149–390)
PMV BLD AUTO: 9.9 FL (ref 8.9–12.7)
POTASSIUM SERPL-SCNC: 4 MMOL/L (ref 3.5–5.3)
RBC # BLD AUTO: 3.79 MILLION/UL (ref 3.88–5.62)
SODIUM SERPL-SCNC: 142 MMOL/L (ref 135–147)
WBC # BLD AUTO: 8.2 THOUSAND/UL (ref 4.31–10.16)

## 2023-12-01 PROCEDURE — 80048 BASIC METABOLIC PNL TOTAL CA: CPT | Performed by: PHYSICIAN ASSISTANT

## 2023-12-01 PROCEDURE — NC001 PR NO CHARGE: Performed by: ORTHOPAEDIC SURGERY

## 2023-12-01 PROCEDURE — NC001 PR NO CHARGE: Performed by: NURSE PRACTITIONER

## 2023-12-01 PROCEDURE — 99232 SBSQ HOSP IP/OBS MODERATE 35: CPT | Performed by: INTERNAL MEDICINE

## 2023-12-01 PROCEDURE — 97116 GAIT TRAINING THERAPY: CPT

## 2023-12-01 PROCEDURE — 97166 OT EVAL MOD COMPLEX 45 MIN: CPT

## 2023-12-01 PROCEDURE — 97110 THERAPEUTIC EXERCISES: CPT

## 2023-12-01 PROCEDURE — 99232 SBSQ HOSP IP/OBS MODERATE 35: CPT | Performed by: NURSE PRACTITIONER

## 2023-12-01 PROCEDURE — 99024 POSTOP FOLLOW-UP VISIT: CPT | Performed by: ORTHOPAEDIC SURGERY

## 2023-12-01 PROCEDURE — 85027 COMPLETE CBC AUTOMATED: CPT | Performed by: PHYSICIAN ASSISTANT

## 2023-12-01 RX ORDER — SENNOSIDES 8.6 MG
1 TABLET ORAL
Status: DISCONTINUED | OUTPATIENT
Start: 2023-12-01 | End: 2023-12-01 | Stop reason: HOSPADM

## 2023-12-01 RX ORDER — OXYCODONE HYDROCHLORIDE 5 MG/1
5 TABLET ORAL EVERY 4 HOURS PRN
Status: DISCONTINUED | OUTPATIENT
Start: 2023-12-01 | End: 2023-12-01 | Stop reason: HOSPADM

## 2023-12-01 RX ADMIN — OXYCODONE HYDROCHLORIDE 5 MG: 10 TABLET ORAL at 12:23

## 2023-12-01 RX ADMIN — IRON SUCROSE 200 MG: 20 INJECTION, SOLUTION INTRAVENOUS at 09:55

## 2023-12-01 RX ADMIN — ACETAMINOPHEN 975 MG: 325 TABLET, FILM COATED ORAL at 05:16

## 2023-12-01 RX ADMIN — DOCUSATE SODIUM 100 MG: 100 CAPSULE, LIQUID FILLED ORAL at 08:21

## 2023-12-01 RX ADMIN — ATENOLOL 50 MG: 50 TABLET ORAL at 08:21

## 2023-12-01 RX ADMIN — CEFAZOLIN SODIUM 1000 MG: 1 SOLUTION INTRAVENOUS at 00:31

## 2023-12-01 RX ADMIN — Medication 30 MG: at 08:21

## 2023-12-01 RX ADMIN — OXYCODONE HYDROCHLORIDE 10 MG: 10 TABLET ORAL at 05:15

## 2023-12-01 NOTE — OCCUPATIONAL THERAPY NOTE
Occupational Therapy Evaluation     Patient Name: Padma Mon  WBHGX'H Date: 12/1/2023  Problem List  Active Problems:    Instability of right knee joint    Past Medical History  Past Medical History:   Diagnosis Date    Actinic keratosis     Last assessed - 10/15/14    Cortical age-related cataract of both eyes 03/22/2022    CPAP (continuous positive airway pressure) dependence     Hypertension     Hypokalemia     Last assessed - 8/13/14    Personal history of COVID-19     09/2023 - mild symptoms    Prostate cancer ([address])     tx w radiation only    Sinus bradycardia     Last assessed - 8/13/14    Sleep apnea      Past Surgical History  Past Surgical History:   Procedure Laterality Date    CATARACT EXTRACTION Bilateral     COLONOSCOPY      x 4    HERNIA REPAIR      VT ARTHRP KNE CONDYLE&PLATU MEDIAL&LAT COMPARTMENTS Right 11/30/2023    Procedure: ARTHROPLASTY KNEE TOTAL W ROBOT;  Surgeon: Katelyn Griffith MD;  Location: [facility];  Service: Orthopedics    SIGMOIDOSCOPY      (Fiberoptic, Therapeutic) 7/28/2014, 8/25/14 12/01/23 0830   OT Last Visit   OT Visit Date 12/01/23   Note Type   Note type Evaluation   Pain Assessment   Pain Assessment Tool 0-10   Pain Score 6   Pain Location/Orientation Orientation: Right;Location: Knee  (with mobility - less pain at rest)   Hospital Pain Intervention(s) Repositioned; Ambulation/increased activity; Emotional support;Relaxation technique   Restrictions/Precautions   Weight Bearing Precautions Per Order Yes   RUE Weight Bearing Per Order WBAT   LUE Weight Bearing Per Order WBAT   RLE Weight Bearing Per Order WBAT   LLE Weight Bearing Per Order WBAT   Other Precautions WBS; Fall Risk;Pain   Home Living   Type of Home House   Home Layout Multi-level   Bathroom Shower/Tub Walk-in shower   Bathroom Toilet Standard   Bathroom Equipment Commode   Home Equipment Walker;Cane   Additional Comments split level home - no BIRTTANY - reports he plans to stay on FF post d/c Prior Function   Level of [place] Independent with ADLs; Independent with functional mobility; Independent with IADLS   Lives With Spouse   Receives Help From Family   IADLs Independent with driving; Independent with meal prep; Independent with medication management   Falls in the last 6 months 0   Vocational Volunteer work   Lifestyle   Autonomy I adls and mobility- i iadls - shares homemaking with spouse   Reciprocal Relationships supportive family - reports wife is home and able to provide assist prn   Service to Others retired - does volunteer work at [employer]. Intrinsic Gratification supportive family - wife is able to assist prn   Subjective   Subjective offers no c/o   ADL   Eating Assistance 7  Independent   Grooming Assistance 5  Supervision/Setup   UB Bathing Assistance 5  Supervision/Setup   LB Bathing Assistance 5  Supervision/Setup   UB Dressing Assistance 5  Supervision/Setup   LB Dressing Assistance 5  Supervision/Setup   Toileting Assistance  5  Supervision/Setup   Bed Mobility   Additional Comments oob in chair   Transfers   Sit to Stand 5  Supervision   Stand to Sit 5  Supervision   Functional Mobility   Functional Mobility 5  Supervision   Additional items Rolling walker   Balance   Static Sitting Good   Dynamic Sitting Fair +   Static Standing Fair +   Dynamic Standing Fair   Ambulatory Fair   Activity Tolerance   Activity Tolerance Patient limited by fatigue;Patient limited by pain   Medical Staff Made Aware Tam Hall DPT and Ran SPT   RUE Assessment   RUE Assessment WFL   LUE Assessment   LUE Assessment WFL   Cognition   Overall Cognitive Status WFL   Assessment   Limitation Decreased ADL status; Decreased endurance;Decreased high-level ADLs; Decreased self-care trans   Prognosis Good   Assessment Pt is a 80 y.o. male who was admitted to [facility] on 11/30/2023 with OA R knee s/p R TKA .  Patient  has a past medical history of Actinic keratosis, Cortical age-related cataract of both eyes, CPAP (continuous positive airway pressure) dependence, Hypertension, Hypokalemia, Personal history of COVID-19, Prostate cancer ([address]), Sinus bradycardia, and Sleep apnea. At baseline pt was completing adls and mobility independently - I adls - shares homemaking with spouse. Pt lives with spouse in split level home with no BRITTANY- reports he plans to stay on FF post d/c. Currently pt requires sba for overall ADLS and sba for functional mobility/transfers. Pt currently presents with impairments in the following categories -difficulty performing ADLS, difficulty performing IADLS , and environment activity tolerance, endurance, and standing balance/tolerance.  These impairments, as well as pt's fatigue, pain, orthopedic restricitions , WBS , risk for falls, and home environment  limit pt's ability to safely engage in all baseline areas of occupation, includingfunctional mobility/transfers, community mobility, laundry , driving, house maintenance, meal prep, cleaning, work/volunteer work , social participation , and leisure activities  however has supportive family who are able to provide assist prn and all necessary DME for home - no acute OT needs indicated at this time - anticipate d/c home with family support and outpt therapy when medically cleared - d/c from caseload   Goals   Patient Goals go home   Plan   OT Frequency Eval only   Discharge Recommendation   Rehab Resource Intensity Level, OT III (Minimum Resource Intensity)   AM-PAC Daily Activity Inpatient   Lower Body Dressing 3   Bathing 3   Toileting 3   Upper Body Dressing 4   Grooming 4   Eating 4   Daily Activity Raw Score 21   Daily Activity Standardized Score (Calc for Raw Score >=11) 44.27   AM-PAC Applied Cognition Inpatient   Following a Speech/Presentation 4   Understanding Ordinary Conversation 4   Taking Medications 4   Remembering Where Things Are Placed or Put Away 4   Remembering List of 4-5 Errands 4   Taking Care of Complicated Tasks 4 Applied Cognition Raw Score 24   Applied Cognition Standardized Score 62.21   End of Consult   Education Provided Yes   Patient Position at End of Consult Bedside chair; All needs within reach   Nurse Communication Nurse aware of consult       The patient's raw score on the AM-PAC Daily Activity Inpatient Short Form is 21. A raw score of greater than or equal to 19 suggests the patient may benefit from discharge to home. Please refer to the recommendation of the Occupational Therapist for safe discharge planning.         [facility]

## 2023-12-01 NOTE — PROGRESS NOTES
Internal Medicine Progress Note  Patient: Lorenzo Gillette  Age/sex: 80 y.o. male  Medical Record #: [id number]      ASSESSMENT/PLAN: (Interval History)  Lorenzo Gillette is seen and examined and management for following issues:    Status Post right Total KNEE ARTHROPLASTY  Pain controlled  Continue encourage incentive spirometry; monitor fever curve  DVT prophylaxis in place and reviewed  Results from last 7 days   Lab Units 12/01/23  0518   WBC Thousand/uL 8.20   HEMOGLOBIN g/dL 11.6*   HEMATOCRIT % 35.1*   PLATELETS Thousands/uL 194       Operative acute blood loss anemia  Decrease in hgb levels from preop labs results; suspect due to post operation extravasation losses along with potential dilutional effects of fluids  Initiate surgery optimization venofer protocol/transfusion  Transfuse PRBC if hgb less then 8.0 (per ortho protocol)  or symptomatic  Monitor follow up CBC post intervention to trend effect    Hypertension  Hydralazine 25 mg q 8 prn SBP greater then 160  Continue atenolol. Bradycardia  Present outpt as well. Pt is asymptomatic. Will monitor post-op      The above assessment and plan was reviewed and updated as determined by my evaluation of the patient on 12/1/2023.     Labs:   Results from last 7 days   Lab Units 12/01/23  0518   WBC Thousand/uL 8.20   HEMOGLOBIN g/dL 11.6*   HEMATOCRIT % 35.1*   PLATELETS Thousands/uL 194     Results from last 7 days   Lab Units 12/01/23  0518   SODIUM mmol/L 142   POTASSIUM mmol/L 4.0   CHLORIDE mmol/L 109*   CO2 mmol/L 25   BUN mg/dL 19   CREATININE mg/dL 0.88   CALCIUM mg/dL 8.2*             Results from last 7 days   Lab Units 11/30/23  0856   POC GLUCOSE mg/dl 105       Review of Scheduled Meds:  Current Facility-Administered Medications   Medication Dose Route Frequency Provider Last Rate    acetaminophen  975 mg Oral Q8H [address] Cate Segura PA-C      atenolol  50 mg Oral Daily Hunter Fedler PA-C      co-enzyme Q-10  30 mg Oral Daily Hunter Felder PA-C      docusate sodium  100 mg Oral BID Corlis Number, PA-C      enoxaparin  40 mg Subcutaneous Q24H [address] Corlis Number, PA-C      hydrALAZINE  25 mg Oral Q8H PRN Cate Lupe[place], PA-C      HYDROmorphone  0.5 mg Intravenous Q2H PRN Corlis Number, PA-C      lactated ringers  1,000 mL Intravenous Once PRN Corlis Number, PA-C      And    lactated ringers  1,000 mL Intravenous Once PRN Corlis Number, PA-C      lactated ringers  125 mL/hr Intravenous Continuous Corlis Number, PA-C Stopped (11/30/23 1700)    ondansetron  4 mg Intravenous Q6H PRN Corlis Number, PA-C      oxyCODONE  10 mg Oral Q4H PRN Corlis Number, PA-C      oxyCODONE  5 mg Oral Q4H PRN Corlis Number, PA-C      simethicone  80 mg Oral 4x Daily PRN Corlis Number, PA-C      sodium chloride  1,000 mL Intravenous Once PRN Corlis Number, PA-C      And    sodium chloride  1,000 mL Intravenous Once PRN Corlis Number, PA-C         Subjective/ HPI: Patient seen and examined. Patients overnight issues or events were reviewed with nursing or staff during rounds or morning huddle session. New or overnight issues include the following:     Pt without any overnight events or reported nursing issues      ROS:   A 10 point ROS was performed; negative except as noted above.        Imaging:     No orders to display       *Labs /Radiology studies Reviewed  *Medications  reviewed and reconciled as needed  *Please refer to order section for additional ordered labs studies  *Case discussed with primary attending during morning huddle case rounds    Physical Examination:  Vitals:   Vitals:    11/30/23 2019 11/30/23 2240 12/01/23 0332 12/01/23 0745   BP: 112/69 119/69 120/67 131/67   Pulse: 72 69 64 69   Resp: 16 18     Temp: 98.2 °F (36.8 °C)  (!) 97.4 °F (36.3 °C) 98 °F (36.7 °C)   TempSrc:       SpO2: 98% 99% 99% 98%   Weight:       Height:           General Appearance: no distress, conversive  HEENT: PERRLA, conjuctiva normal; oropharynx clear; mucous membranes moist;   Neck: Supple, no lymphadenopathy or thyromegaly  Lungs: CTA, normal respiratory effort, no retractions, expiratory effort normal  CV: regular rate and rhythm , PMI normal   ABD: soft non tender, no masses , no hepatic or splenomegaly  EXT: DP pulses intact, no lymphadenopathy, no edema; Rt knee surgical dressing in place  Skin: normal turgor, normal texture, no rash  Psych: affect normal, mood normal  Neuro: AAOx3    : rosario removed ; due to void    The above physical exam was reviewed and updated as determined by my evaluation of the patient on 12/1/2023. Invasive Devices       Peripheral Intravenous Line  Duration             Peripheral IV 11/30/23 Left;Dorsal (posterior) Hand 1 day              Drain  Duration             Closed/Suction Drain Anterior;Right Knee Accordion 10 Fr. 1 day                       VTE Pharmacologic Prophylaxis: Enoxaparin  Code Status: Level 1 - Full Code  Current Length of Stay: 0 day(s)      Total floor / unit time spent today 30 minutes  Coordination of patient's care was performed in conjunction with primary service. Time invested included review of patient's labs, vitals, and management of their comorbidities with continued monitoring, examination of patient as well as answering patient questions, documenting her findings and creating progress note in electronic medical record,  ordering appropriate diagnostic testing. Medical decision making for the day was made by supervising physician unless otherwise noted in their attestation statement. ** Please Note:  voice to text software may have been used in the creation of this document.  Although proof errors in transcription or interpretation are a potential of such software**

## 2023-12-01 NOTE — PHYSICAL THERAPY NOTE
Physical Therapy Treatment    Patient Name: Rodrigo BEE Date: 12/1/2023     Problem List  Principal Problem:    Instability of right knee joint       Past Medical History  Past Medical History:   Diagnosis Date    Actinic keratosis     Last assessed - 10/15/14    Cortical age-related cataract of both eyes 03/22/2022    CPAP (continuous positive airway pressure) dependence     Hypertension     Hypokalemia     Last assessed - 8/13/14    Personal history of COVID-19     09/2023 - mild symptoms    Prostate cancer ([address])     tx w radiation only    Sinus bradycardia     Last assessed - 8/13/14    Sleep apnea         Past Surgical History  Past Surgical History:   Procedure Laterality Date    CATARACT EXTRACTION Bilateral     COLONOSCOPY      x 4    HERNIA REPAIR      MS ARTHRP KNE CONDYLE&PLATU MEDIAL&LAT COMPARTMENTS Right 11/30/2023    Procedure: ARTHROPLASTY KNEE TOTAL W ROBOT;  Surgeon: Madina Blair MD;  Location: [facility];  Service: Orthopedics    SIGMOIDOSCOPY      (Fiberoptic, Therapeutic) 7/28/2014, 8/25/14 12/01/23 0905   PT Last Visit   PT Visit Date 12/01/23   Note Type   Note Type Treatment   Pain Assessment   Pain Assessment Tool 0-10   Pain Score 6   Pain Location/Orientation Orientation: Right;Location: Knee   Pain Onset/Description Onset: Gradual;Frequency: Constant/Continuous   Hospital Pain Intervention(s) Ambulation/increased activity;Repositioned   Restrictions/Precautions   Weight Bearing Precautions Per Order Yes   RLE Weight Bearing Per Order WBAT   Other Precautions Pain; Fall Risk  (Drain R knee)   General   Chart Reviewed Yes   Family/Caregiver Present No   Cognition   Overall Cognitive Status WFL   Arousal/Participation Alert; Cooperative   Attention Within functional limits   Memory Within functional limits   Following Commands Follows all commands and directions without difficulty   Comments Pt pleasant and cooperative t/o session   Subjective   Subjective "I [place] the beach on [place] and felt it go"   Bed Mobility   Supine to Sit Unable to assess   Sit to Supine Unable to assess   Additional Comments Pt seated OOB in chair at start of session. Transfers   Sit to Stand 5  Supervision   Additional items Verbal cues; Increased time required   Stand to Sit 5  Supervision   Additional items Verbal cues; Increased time required   Additional Comments + RW   Ambulation/Elevation   Gait pattern Excessively slow;R Knee Julio; Improper Weight shift;Decreased foot clearance   Gait Assistance 5  Supervision   Additional items Verbal cues   Assistive Device Rolling walker   Distance 2x50' with short standing rest break   Stair Management Assistance Not tested   Balance   Static Sitting Good   Dynamic Sitting Good   Static Standing Fair -   Dynamic Standing Fair -   Ambulatory Fair -   Endurance Deficit   Endurance Deficit Yes   Endurance Deficit Description pain   Activity Tolerance   Activity Tolerance Patient tolerated treatment well   Medical Staff Made Aware OT [facility]   Nurse Made Aware RN cleared pt prior to PT intervention   Exercises   Quad Sets Sitting;AROM; Right;15 reps  (Seated in recliner)   Heelslides Sitting;15 reps;Right;AROM  (with pillowcase under R foot to reduce friction)   Knee AROM Long Arc Quad Sitting;20 reps;AROM; Right   Balance training  Pt stood at supervision level for ~2 minutes throughout session   Assessment   Prognosis Good   Problem List Decreased strength;Decreased range of motion;Decreased endurance; Impaired balance;Decreased mobility;Pain   Assessment Pt seen this date for PT intervention. Therapy session focused on functional transfers, overground ambulation, and therapeutic exercises. Functional gains in this session include increased ambulation distances and decreased levels of assistance t/o all functional mobility, demonstrating improvements in endurance and activity tolerance . Therapeutic exercises were instructed to be performed as part of HEP before beginning OPPT. Pt reported having a FFSU upon d/c, and is appropriate to safely enter his home at this point with family supervision. However, PT will continue to follow pt while admitted due to functional limitations associated with POD#1 RTKA status. At conclusion of PT session pt returned back in chair and all needs in reach. The patient's AM-PAC Basic Mobility Inpatient Short Form Raw Score is 18. A Raw score of greater than or equal to 16 suggests the patient may benefit from discharge to home. Please also refer to the recommendation of the Physical Therapist for safe discharge planning (OPPT). Goals   Patient Goals to go home   LTG Expiration Date 12/14/23   PT Treatment Day 1   Plan   Treatment/Interventions Functional transfer training;LE strengthening/ROM; Elevations; Therapeutic exercise; Endurance training;Equipment eval/education;Patient/family training;Bed mobility;Gait training   PT Frequency Twice a day   Discharge Recommendation   Rehab Resource Intensity Level, PT III (Minimum Resource Intensity)   Equipment Recommended Walker  (was RW at home)   Additional Comments OPPT   AM-PAC Basic Mobility Inpatient   Turning in Flat Bed Without Bedrails 3   Lying on Back to Sitting on Edge of Flat Bed Without Bedrails 3   Moving Bed to Chair 3   Standing Up From Chair Using Arms 3   Walk in Room 3   Climb 3-5 Stairs With Railing 3   Basic Mobility Inpatient Raw Score 18   Basic Mobility Standardized Score 41.05   Highest Level Of Mobility   JH-HLM Goal 6: Walk 10 steps or more   JH-HLM Achieved 7: Walk 25 feet or more   Education   Education Provided Home exercise program;Mobility training;Assistive device   Patient Demonstrates acceptance/verbal understanding   End of Consult   Patient Position at End of Consult Bedside chair; All needs within reach     TOO [place]

## 2023-12-01 NOTE — PROGRESS NOTES
Progress Note - Orthopedics   Chip Paige 80 y.o. male MRN: [id number]  Unit/Bed#: -01      Subjective:    80 y.o.male POD 1 R TKA. No acute events, no new complaints. Pain well controlled.     Labs:  0   Lab Value Date/Time    HCT 40.8 09/11/2023 0719    HCT 41.4 02/28/2023 0737    HCT 40.7 08/31/2022 0813    HGB 13.7 09/11/2023 0719    HGB 13.9 02/28/2023 0737    HGB 13.7 08/31/2022 0813    WBC 3.50 (L) 09/11/2023 0719    WBC 4.99 02/28/2023 0737    WBC 4.65 08/31/2022 0813       Meds:    Current Facility-Administered Medications:     acetaminophen (TYLENOL) tablet 975 mg, 975 mg, Oral, Q8H [address], Cate Segura PA-C, 975 mg at 11/30/23 1329    [START ON 12/1/2023] atenolol (TENORMIN) tablet 50 mg, 50 mg, Oral, Daily, Anay Brown PA-C    ceFAZolin (ANCEF) IVPB (premix in dextrose) 1,000 mg 50 mL, 1,000 mg, Intravenous, Q8H, Anay Brown PA-C, Last Rate: 100 mL/hr at 11/30/23 1647, 1,000 mg at 11/30/23 1647    co-enzyme Q-10 capsule 30 mg, 30 mg, Oral, Daily, Anay Brown PA-C, 30 mg at 11/30/23 1211    docusate sodium (COLACE) capsule 100 mg, 100 mg, Oral, BID, Anay Brown PA-C    enoxaparin (LOVENOX) subcutaneous injection 40 mg, 40 mg, Subcutaneous, Q24H [address], Anay Brown PA-C    hydrALAZINE (APRESOLINE) tablet 25 mg, 25 mg, Oral, Q8H PRN, Cate Segura PA-C    HYDROmorphone (DILAUDID) injection 0.5 mg, 0.5 mg, Intravenous, Q2H PRN, Anay Brown PA-C    lactated ringers bolus 1,000 mL, 1,000 mL, Intravenous, Once PRN **AND** lactated ringers bolus 1,000 mL, 1,000 mL, Intravenous, Once PRN, Anay Brown PA-C    lactated ringers infusion, 125 mL/hr, Intravenous, Continuous, Anay Brown PA-C, Stopped at 11/30/23 1700    ondansetron (ZOFRAN) injection 4 mg, 4 mg, Intravenous, Q6H PRN, Anay Brown PA-C    oxyCODONE (ROXICODONE) immediate release tablet 10 mg, 10 mg, Oral, Q4H PRN, Anay Brown PA-C, 10 mg at 11/30/23 2024    oxyCODONE (ROXICODONE) IR tablet 5 mg, 5 mg, Oral, Q4H PRN, Ling Teresa PA-C    [facility]) chewable tablet 80 mg, 80 mg, Oral, 4x Daily PRN, Ling Teresa PA-C    sodium chloride 0.9 % bolus 1,000 mL, 1,000 mL, Intravenous, Once PRN **AND** sodium chloride 0.9 % bolus 1,000 mL, 1,000 mL, Intravenous, Once PRN, Ling Teresa PA-C    Blood Culture:   No results found for: "BLOODCX"    Wound Culture:   No results found for: "WOUNDCULT"    Ins and Outs:  I/O last 24 hours: In: 2200 [I.V.:2200]  Out: 995 [Urine:850; Drains:145]          Physical:  Vitals:    11/30/23 2019   BP: 112/69   Pulse: 72   Resp: 16   Temp: 98.2 °F (36.8 °C)   SpO2: 98%     Musculoskeletal: right Lower Extremity  Dressing c/d/I  TTP patrice incisionally  SILT s/s/t/sp/dp  Motor intact EHL/FHL, ankle df/pf  2+ DP pulse    Assessment:    83 y.o.male POD 1 R TKA.      Plan:  WBAT RLE  Will monitor for ABLA and administer IVF/prbc as indicated for Greater than 2 gram drop or Hgb < 7   PT/OT  Pain control  DVT ppx - lovenox  Dispo: pending pain control, AM labs, PT    Allison Cash MD

## 2023-12-01 NOTE — PLAN OF CARE
Problem: PAIN - ADULT  Goal: Verbalizes/displays adequate comfort level or baseline comfort level  Description: Interventions:  - Encourage patient to monitor pain and request assistance  - Assess pain using appropriate pain scale  - Administer analgesics based on type and severity of pain and evaluate response  - Implement non-pharmacological measures as appropriate and evaluate response  - Consider cultural and social influences on pain and pain management  - Notify physician/advanced practitioner if interventions unsuccessful or patient reports new pain  Outcome: Progressing     Problem: INFECTION - ADULT  Goal: Absence or prevention of progression during hospitalization  Description: INTERVENTIONS:  - Assess and monitor for signs and symptoms of infection  - Monitor lab/diagnostic results  - Monitor all insertion sites, i.e. indwelling lines, tubes, and drains  - Monitor endotracheal if appropriate and nasal secretions for changes in amount and color  - [place] appropriate cooling/warming therapies per order  - Administer medications as ordered  - Instruct and encourage patient and family to use good hand hygiene technique  - Identify and instruct in appropriate isolation precautions for identified infection/condition  Outcome: Progressing  Goal: Absence of fever/infection during neutropenic period  Description: INTERVENTIONS:  - Monitor WBC    Outcome: Progressing     Problem: SAFETY ADULT  Goal: Patient will remain free of falls  Description: INTERVENTIONS:  - Educate patient/family on patient safety including physical limitations  - Instruct patient to call for assistance with activity   - Consult OT/PT to assist with strengthening/mobility   - Keep Call bell within reach  - Keep bed low and locked with side rails adjusted as appropriate  - Keep care items and personal belongings within reach  - Initiate and maintain comfort rounds  - Make Fall Risk Sign visible to staff  - Offer Toileting every  Hours, in advance of need  - Initiate/Maintain alarm  - Obtain necessary fall risk management equipment:   - Apply yellow socks and bracelet for high fall risk patients  - Consider moving patient to room near nurses station  Outcome: Progressing  Goal: Maintain or return to baseline ADL function  Description: INTERVENTIONS:  -  Assess patient's ability to carry out ADLs; assess patient's baseline for ADL function and identify physical deficits which impact ability to perform ADLs (bathing, care of mouth/teeth, toileting, grooming, dressing, etc.)  - Assess/evaluate cause of self-care deficits   - Assess range of motion  - Assess patient's mobility; develop plan if impaired  - Assess patient's need for assistive devices and provide as appropriate  - Encourage maximum independence but intervene and supervise when necessary  - Involve family in performance of ADLs  - Assess for home care needs following discharge   - Consider OT consult to assist with ADL evaluation and planning for discharge  - Provide patient education as appropriate  Outcome: Progressing  Goal: Maintains/Returns to pre admission functional level  Description: INTERVENTIONS:  - Perform AM-PAC 6 Click Basic Mobility/ Daily Activity assessment daily.  - Set and communicate daily mobility goal to care team and patient/family/caregiver. - Collaborate with rehabilitation services on mobility goals if consulted  - Perform Range of Motion 3 times a day. - Reposition patient every 2 hours.   - Dangle patient 3 times a day  - Stand patient 3 times a day  - Ambulate patient 3 times a day  - Out of bed to chair 3 times a day   - Out of bed for meals 3 times a day  - Out of bed for toileting  - Record patient progress and toleration of activity level   Outcome: Progressing     Problem: DISCHARGE PLANNING  Goal: Discharge to home or other facility with appropriate resources  Description: INTERVENTIONS:  - Identify barriers to discharge w/patient and caregiver  - Arrange for needed discharge resources and transportation as appropriate  - Identify discharge learning needs (meds, wound care, etc.)  - Arrange for interpretive services to assist at discharge as needed  - Refer to Case Management Department for coordinating discharge planning if the patient needs post-hospital services based on physician/advanced practitioner order or complex needs related to functional status, cognitive ability, or social support system  Outcome: Progressing     Problem: Knowledge Deficit  Goal: Patient/family/caregiver demonstrates understanding of disease process, treatment plan, medications, and discharge instructions  Description: Complete learning assessment and assess knowledge base.   Interventions:  - Provide teaching at level of understanding  - Provide teaching via preferred learning methods  Outcome: Progressing

## 2023-12-01 NOTE — PLAN OF CARE
Problem: PHYSICAL THERAPY ADULT  Goal: Performs mobility at highest level of function for planned discharge setting. See evaluation for individualized goals. Description: Treatment/Interventions: Functional transfer training, LE strengthening/ROM, Elevations, Therapeutic exercise, Patient/family training, Equipment eval/education, Bed mobility, Gait training, OT, Spoke to nursing  Equipment Recommended: Rudolph Herman (has RW)       See flowsheet documentation for full assessment, interventions and recommendations. Outcome: Progressing  Note: Prognosis: Good  Problem List: Decreased strength, Decreased range of motion, Decreased endurance, Impaired balance, Decreased mobility, Pain  Assessment: Pt seen this date for PT intervention. Therapy session focused on functional transfers, overground ambulation, and therapeutic exercises. Functional gains in this session include increased ambulation distances and decreased levels of assistance t/o all functional mobility, demonstrating improvements in endurance and activity tolerance . Therapeutic exercises were instructed to be performed as part of HEP before beginning OPPT. Pt reported having a FFSU upon d/c, and is appropriate to safely enter his home at this point with family supervision. However, PT will continue to follow pt while admitted due to functional limitations associated with POD#1 RTKA status. At conclusion of PT session pt returned back in chair and all needs in reach. The patient's AM-PAC Basic Mobility Inpatient Short Form Raw Score is 18. A Raw score of greater than or equal to 16 suggests the patient may benefit from discharge to home. Please also refer to the recommendation of the Physical Therapist for safe discharge planning (OPPT). Rehab Resource Intensity Level, PT: III (Minimum Resource Intensity)    See flowsheet documentation for full assessment.

## 2023-12-01 NOTE — PLAN OF CARE
Problem: PAIN - ADULT  Goal: Verbalizes/displays adequate comfort level or baseline comfort level  Description: Interventions:  - Encourage patient to monitor pain and request assistance  - Assess pain using appropriate pain scale  - Administer analgesics based on type and severity of pain and evaluate response  - Implement non-pharmacological measures as appropriate and evaluate response  - Consider cultural and social influences on pain and pain management  - Notify physician/advanced practitioner if interventions unsuccessful or patient reports new pain  Outcome: Progressing     Problem: INFECTION - ADULT  Goal: Absence or prevention of progression during hospitalization  Description: INTERVENTIONS:  - Assess and monitor for signs and symptoms of infection  - Monitor lab/diagnostic results  - Monitor all insertion sites, i.e. indwelling lines, tubes, and drains  - Monitor endotracheal if appropriate and nasal secretions for changes in amount and color  - [place] appropriate cooling/warming therapies per order  - Administer medications as ordered  - Instruct and encourage patient and family to use good hand hygiene technique  - Identify and instruct in appropriate isolation precautions for identified infection/condition  Outcome: Progressing  Goal: Absence of fever/infection during neutropenic period  Description: INTERVENTIONS:  - Monitor WBC    Outcome: Progressing     Problem: SAFETY ADULT  Goal: Patient will remain free of falls  Description: INTERVENTIONS:  - Educate patient/family on patient safety including physical limitations  - Instruct patient to call for assistance with activity   - Consult OT/PT to assist with strengthening/mobility   - Keep Call bell within reach  - Keep bed low and locked with side rails adjusted as appropriate  - Keep care items and personal belongings within reach  - Initiate and maintain comfort rounds  - Make Fall Risk Sign visible to staff  - Offer Toileting every 2-4 Hours, in advance of need  - Initiate/Maintain call bell alarm  - Obtain necessary fall risk management equipment: nonskid footwear   - Apply yellow socks and bracelet for high fall risk patients  - Consider moving patient to room near nurses station  Outcome: Progressing  Goal: Maintain or return to baseline ADL function  Description: INTERVENTIONS:  -  Assess patient's ability to carry out ADLs; assess patient's baseline for ADL function and identify physical deficits which impact ability to perform ADLs (bathing, care of mouth/teeth, toileting, grooming, dressing, etc.)  - Assess/evaluate cause of self-care deficits   - Assess range of motion  - Assess patient's mobility; develop plan if impaired  - Assess patient's need for assistive devices and provide as appropriate  - Encourage maximum independence but intervene and supervise when necessary  - Involve family in performance of ADLs  - Assess for home care needs following discharge   - Consider OT consult to assist with ADL evaluation and planning for discharge  - Provide patient education as appropriate  Outcome: Progressing  Goal: Maintains/Returns to pre admission functional level  Description: INTERVENTIONS:  - Perform AM-PAC 6 Click Basic Mobility/ Daily Activity assessment daily.  - Set and communicate daily mobility goal to care team and patient/family/caregiver. - Collaborate with rehabilitation services on mobility goals if consulted  - Perform Range of Motion 3 times a day. - Reposition patient every 3 hours.   - Dangle patient 3 times a day  - Stand patient 3 times a day  - Ambulate patient 3 times a day  - Out of bed to chair 3 times a day   - Out of bed for meals 3 times a day  - Out of bed for toileting  - Record patient progress and toleration of activity level   Outcome: Progressing     Problem: DISCHARGE PLANNING  Goal: Discharge to home or other facility with appropriate resources  Description: INTERVENTIONS:  - Identify barriers to discharge w/patient and caregiver  - Arrange for needed discharge resources and transportation as appropriate  - Identify discharge learning needs (meds, wound care, etc.)  - Arrange for interpretive services to assist at discharge as needed  - Refer to Case Management Department for coordinating discharge planning if the patient needs post-hospital services based on physician/advanced practitioner order or complex needs related to functional status, cognitive ability, or social support system  Outcome: Progressing     Problem: Knowledge Deficit  Goal: Patient/family/caregiver demonstrates understanding of disease process, treatment plan, medications, and discharge instructions  Description: Complete learning assessment and assess knowledge base.   Interventions:  - Provide teaching at level of understanding  - Provide teaching via preferred learning methods  Outcome: Progressing

## 2023-12-01 NOTE — DISCHARGE SUMMARY
ORTHOPEDICS DISCHARGE SUMMARY  Reji Stroud 80 y.o. male MRN: [id number]  Unit/Bed#:     Attending Physician: Minh Vega    Admitting diagnosis: Primary osteoarthritis of right knee [M17.11]    Discharge diagnosis: Primary osteoarthritis of right knee [M17.11]    Date of admission: 11/30/2023    Date of discharge: 12/01/23         Procedure: Right - ARTHROPLASTY KNEE TOTAL W ROBOT    HPI:  This is a 80y.o. year old male that presented to the office with signs and symptoms of right knee osteoarthritis. They tried and failed conservative treatment measures and wished to proceed with surgical intervention. The risks, benefits, and complications of the procedure were discussed with the patient and informed consent was obtained. Hospital Course: The patient was admitted to the hospital on 11/30/2023 and underwent an uncomplicated right total knee arthroplasty. They were transferred to the floor after a brief stay in the post-anesthesia care unit. Their pain was well managed with IV and oral pain medications. They began therapy on post operative day #1. Lovenox was also started for DVT prophylaxis 12 hours post operatively. Hemovac drain was removed on POD1. On discharge date pt was cleared by PT and the medicine team and determined to be safe for discharge. Daily discussion was had with the patient, nursing staff, orthopaedic team, and family members if present. All questions were answered to the patients satisifaction. 0   Lab Value Date/Time    HGB 11.6 (L) 12/01/2023 0518    HGB 13.7 09/11/2023 0719    HGB 13.9 02/28/2023 0737    HGB 13.7 08/31/2022 0813    HGB 14.1 02/23/2022 0838    HGB 14.5 08/11/2021 0747    HGB 14.3 01/12/2021 0822    HGB 13.7 07/10/2020 0827    HGB 13.7 01/09/2020 0712    HGB 13.9 07/03/2019 0836    HGB 13.3 01/04/2019 0736    HGB 12.6 07/10/2018 0757       Greater than 2 gram drop which qualifies for diagnosis of acute blood loss anemia.   Vital signs remained stable and pt was resuscitated with IVF as needed   Body mass index is 25.7 kg/m². Discharge Instructions: The patient was discharged weight bearing as tolerated to the right lower extremity. Lovenox will be continued for 28 days. Continue PT/OT. Take pain medications as instructed. Discharge Medications: For the complete list of discharge medications, please refer to the patient's medication reconciliation.

## 2023-12-01 NOTE — PROGRESS NOTES
Peripheral Nerve Block Follow-up Note - Acute Pain Service    Kaur Dolan 80 y.o. male MRN: [id number]  Unit/Bed#: -01 Encounter: [id number]      Assessment:   Principal Problem:    Instability of right knee joint    Kaur Dolan is a 80y.o. year old male who is postop day 1 from a right total knee arthroplasty. Plan:   - Right adductor block with Exparel was done preoperatively for postoperative pain control which is predominantly worn off. Patient has most significant pain in his right anterior knee, no right lower extremity weakness or paresthesias. Multimodal analgesia with:  Tylenol 975 mg every 8 hours scheduled  Decrease oxycodone to 2.5 mg every 4 hours as needed for moderate pain  Decrease oxycodone to 5 mg every 4 hours as needed for severe pain  Discontinue IV Dilaudid  Add naloxone as needed for opioid reversal agent/respiratory depression  The patient is advised to apply ice or cold packs intermittently as needed to relieve pain apply ice for no more than 20 minutes at a time    Bowel management  Colace 100 mg twice a day  Add Senokot daily to prevent opioid-induced constipation    Postoperative pain is present, controlled on current medication regimen. Okay for discharge from pain management standpoint. Would recommend to continue the above analgesic regimen as time of discharge. Treatment plan has been reviewed with patient, bedside nursing staff and primary care service. APS will sign off at this time. Thank you for the consult. All opioids and other analgesics to be written at discretion of primary team. Please contact Acute Pain Service - [facility] via [employer] from [phone] with additional questions or concerns. See [employer] or [employer] for additional contacts and after hours information. Pain History  Current pain location(s): Right knee  Pain Scale:   1-10  24 hour history: Patient sitting in chair, no acute distress.   Has been able to ambulate in hallway with physical therapy. He is reporting most bothersome pain in his right anterior knee/thigh. Tolerating current analgesic regimen, as needed oxycodone provides mild to moderate improvement in pain. Denied headache, dizziness, chest pain, shortness of breath, nausea or vomiting. Denied right lower extremity paresthesias or weakness. Opioid requirement previous 24 hours: Oxycodone 30mg    Meds/Allergies   all current active meds have been reviewed, current meds:   Current Facility-Administered Medications   Medication Dose Route Frequency    acetaminophen (TYLENOL) tablet 975 mg  975 mg Oral Q8H [address]    atenolol (TENORMIN) tablet 50 mg  50 mg Oral Daily    co-enzyme Q-10 capsule 30 mg  30 mg Oral Daily    docusate sodium (COLACE) capsule 100 mg  100 mg Oral BID    enoxaparin (LOVENOX) subcutaneous injection 40 mg  40 mg Subcutaneous Q24H RAUL    hydrALAZINE (APRESOLINE) tablet 25 mg  25 mg Oral Q8H PRN    HYDROmorphone (DILAUDID) injection 0.5 mg  0.5 mg Intravenous Q2H PRN    iron sucrose (VENOFER) 200 mg in sodium chloride 0.9 % 100 mL IVPB  200 mg Intravenous Daily    lactated ringers infusion  125 mL/hr Intravenous Continuous    ondansetron (ZOFRAN) injection 4 mg  4 mg Intravenous Q6H PRN    oxyCODONE (ROXICODONE) immediate release tablet 10 mg  10 mg Oral Q4H PRN    oxyCODONE (ROXICODONE) IR tablet 5 mg  5 mg Oral Q4H PRN    simethicone (MYLICON) chewable tablet 80 mg  80 mg Oral 4x Daily PRN   , and PTA meds:   Prior to Admission Medications   Prescriptions Last Dose Informant Patient Reported? Taking?    Cholecalciferol (VITAMIN D3) 1000 UNIT/SPRAY LIQD Past Week Self Yes Yes   Sig: Take 1 tablet by mouth daily   Coenzyme Q10 100 MG CHEW Past Week Self Yes Yes   Sig: Chew 200 mg   Diclofenac Sodium (VOLTAREN) 1 % Past Month Self No Yes   Sig: Apply 2 g topically 4 (four) times a day   Patient taking differently: Apply 2 g topically as needed   Omega-3-Acid Eth Est, Dietary, 1 g CAPS Past Week Self Yes Yes   Sig: Take by mouth   Potassium 99 MG TABS Past Week Self Yes Yes   Sig: Take 1 tablet by mouth 2 (two) times a day   ascorbic acid (VITAMIN C) 500 MG tablet Past Week Self No Yes   Sig: Take 1 tablet (500 mg total) by mouth daily Start 30 days prior to surgery   atenolol (TENORMIN) 50 mg tablet 11/30/2023 at 0300 Self No Yes   Sig: Take 1 tablet (50 mg total) by mouth daily   azelastine (ASTELIN) 0.1 % nasal spray Not Taking Self No No   Sig: SPRAY 1 SPRAY INTO EACH NOSTRIL 2 (TWO) TIMES A DAY USE IN EACH NOSTRIL AS DIRECTED   Patient not taking: Reported on 11/30/2023   betamethasone valerate (VALISONE) 0.1 % cream  Self No Yes   Sig: Apply topically 2 (two) times a day   Patient taking differently: Apply topically as needed   enoxaparin (LOVENOX) 40 mg/0.4 mL  Self No No   Sig: Inject 0.4 mL (40 mg total) under the skin daily for 28 days Start injections after surgery   ferrous sulfate 324 (65 Fe) mg 11/29/2023 Self No Yes   Sig: Take 1 tablet (324 mg total) by mouth 2 (two) times a day before meals Start 30 days prior to surgery   fluorouracil (EFUDEX) 5 % cream More than a month  Yes No   Sig: APPLY TO HANDS TWICE A DAY X 3 WEEKS THEN D/C   folic acid (FOLVITE) 1 mg tablet 11/29/2023 Self No Yes   Sig: Take 1 tablet (1 mg total) by mouth daily Start 30 days prior to surgery   ofloxacin (OCUFLOX) 0.3 % ophthalmic solution Past Month Self Yes Yes   sildenafil (VIAGRA) 100 mg tablet Past Week Self No Yes   Sig: TAKE 1 TABLET BY MOUTH EVERY DAY AS NEEDED FOR ERECTILE DYSFUNCTION      Facility-Administered Medications: None       No Known Allergies    Objective     Temp:  [97.2 °F (36.2 °C)-99 °F (37.2 °C)] 98 °F (36.7 °C)  HR:  [56-72] 69  Resp:  [16-18] 18  BP: (109-137)/(55-74) 131/67    Physical Exam  Vitals reviewed. Constitutional:       General: He is awake. He is not in acute distress. Appearance: He is not ill-appearing, toxic-appearing or diaphoretic. HENT:      Head: Normocephalic and atraumatic.       Nose: Nose normal. No congestion or rhinorrhea. Mouth/Throat:      Mouth: Mucous membranes are moist.   Eyes:      Extraocular Movements: Extraocular movements intact. Cardiovascular:      Rate and Rhythm: Normal rate. Pulmonary:      Effort: Pulmonary effort is normal. No tachypnea, bradypnea or respiratory distress. Musculoskeletal:      Comments: Right knee ace wrap dressing intact   Skin:     General: Skin is warm and dry. Coloration: Skin is pale. Neurological:      Mental Status: He is alert and oriented to person, place, and time. Mental status is at baseline. Motor: No tremor. Psychiatric:         Attention and Perception: Attention normal.         Mood and Affect: Mood normal. Mood is not anxious. Speech: Speech normal.         Behavior: Behavior normal. Behavior is cooperative. Lab Results:   Results from last 7 days   Lab Units 12/01/23  0518   WBC Thousand/uL 8.20   HEMOGLOBIN g/dL 11.6*   HEMATOCRIT % 35.1*   PLATELETS Thousands/uL 194      Results from last 7 days   Lab Units 12/01/23  0518   POTASSIUM mmol/L 4.0   CHLORIDE mmol/L 109*   CO2 mmol/L 25   BUN mg/dL 19   CREATININE mg/dL 0.88   CALCIUM mg/dL 8.2*       Counseling / Coordination of Care  Total floor / unit time spent today 20 minutes. Greater than 50% of total time was spent with the patient and / or family counseling and / or coordination of care. A description of the counseling / coordination of care: Chart review included vital signs, laboratory values, past medical history, progress notes, current and home medications. Discussed postoperative pain management which was inclusive of opioid and nonopioid medications, the use of peripheral nerve block and duration of effectiveness of local anesthetics. Also reviewed nonpharmacological treatments such as ice. Treatment plan and discharge recommendations were reviewed with patient, bedside nursing staff and primary care service.     Please note that the APS provides consultative services regarding pain management only. With the exception of ketamine, peripheral nerve catheters, and epidural infusions (and except when indicated), final decisions regarding starting or changing doses of analgesic medications are at the discretion of the consulting service. Off hours consultation and/or medication management is generally not available.     DAKOTAH Hurley  Acute Pain Service

## 2023-12-04 ENCOUNTER — OFFICE VISIT (OUTPATIENT)
Dept: PHYSICAL THERAPY | Facility: CLINIC | Age: 83
End: 2023-12-04
Payer: COMMERCIAL

## 2023-12-04 ENCOUNTER — TELEPHONE (OUTPATIENT)
Dept: OBGYN CLINIC | Facility: HOSPITAL | Age: 83
End: 2023-12-04

## 2023-12-04 DIAGNOSIS — M17.11 PRIMARY OSTEOARTHRITIS OF RIGHT KNEE: Primary | ICD-10-CM

## 2023-12-04 DIAGNOSIS — Z96.651 S/P TOTAL KNEE ARTHROPLASTY, RIGHT: ICD-10-CM

## 2023-12-04 PROCEDURE — 97164 PT RE-EVAL EST PLAN CARE: CPT

## 2023-12-04 PROCEDURE — 97110 THERAPEUTIC EXERCISES: CPT

## 2023-12-04 NOTE — PROGRESS NOTES
PT Re-Evaluation     Today's date: 2023  Patient name: Shin Childs  : 1940  MRN: [id number]  Referring provider: Lissette Byrnes DO  Dx:   Encounter Diagnosis     ICD-10-CM    1. Primary osteoarthritis of right knee  M17.11 PT plan of care cert/re-cert      2. S/P total knee arthroplasty, right  Z96.651           Start Time: 0900  Stop Time: 8067  Total time in clinic (min): 55 minutes    Assessment  Assessment details: Shin Childs is a 80 y.o. male presenting to physical therapy s/p R TKA on 23. The patient demonstrates decreased right knee passive and active ROM, decreased knee strength and quadriceps muscle activation, and ambulatory dysfunction as he is utilizing a RW for all functional mobility. He demonstrates and antalgic gait pattern with decreased stance time on the right knee and loss of TKE during stance phase. He is a fall risk as evident in his scores on the TUG and 5 STS functional assessments. 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 This patient would benefit from OP PT services to address their impairments and functional limitations to maximize functional mobility. The patient and his significant other were educated extensively on post operative expectations for surgery, pain and swelling management with a recommendation to utilize compression stockings. His pre-operative HEP was reviewed with him and he demonstrated/verbalized understanding it's completion. Impairments: abnormal gait, abnormal or restricted ROM, activity intolerance, impaired balance, impaired physical strength, lacks appropriate home exercise program, pain with function and weight-bearing intolerance    Symptom irritability: moderateUnderstanding of Dx/Px/POC: excellent   Prognosis: good    Goals  STG to be achieved in 4 weeks:    The patient will report a decrease in right knee "at worst" subjective pain rating score to at least 6/10 to allow for improved tolerance for weightbearing activities. The patient will increase right knee flexion AROM to at least 70 degrees to allow for improved functional mobility. The patient will increase right knee extension MMT score to at least 3+/5 to allow for improved functional mobility. LTG to be achieved by DC: The patient will be independent in comprehensive HEP. The patient will report no pain with usual activities. The patient will improve right knee flexion and extension AROM to [facility] to allow for improved functional mobility. The patient will increase right knee flexion and extension MMT score to [facility] to allow for improved functional mobility. The patient will be able to ambulate 2-3 miles without pain, difficulty or AD, to allow for return to hospital volunteering. The patient will be able to ambulate one full flight of stairs without pain, difficulty, or AD to allow for navigation of his home. Plan  Patient would benefit from: skilled physical therapy  Planned modality interventions: thermotherapy: hydrocollator packs, TENS and cryotherapy  Planned therapy interventions: manual therapy, joint mobilization, balance/weight bearing training, neuromuscular re-education, patient education, flexibility, strengthening, stretching, therapeutic activities, therapeutic exercise, gait training and home exercise program  Frequency: 2x week  Duration in weeks: 12  Plan of Care beginning date: 11/28/2023  Plan of Care expiration date: 2/20/2024  Treatment plan discussed with: patient        Subjective Evaluation    History of Present Illness  Mechanism of injury: Charito Rm presents to therapy s/p R TKA on 11/30/23. He reports that he was discharged the day after surgery, 12/1/23. He says that he has stopped using oxycodone on Saturday and has been taking tylenol extra strength and icing as needed for pain.  He has been walking well with his RW and denies any instabilities when walking with it. His first post operative follow up with the surgeon is 23. From pre-operative IE:   He lives at home with his significant other, Sawyer Encarnacion, who will be able to assist him as needed after surgery. [place]'s daughter will also be present for a few weeks depending on how well Morenita Moser is doing after surgery and how much assistance [place] needs. He lives in a [address] home, 13 steps with bilateral hand rails from the ground level to upper level. After surgery, he plans to stay on the bottom floor of his home which has a couch for him to sleep on as well as a full bathroom. He reports that he does have a RW as well as a walking stick at home. He has been doing exercises provided to him from his surgeon regularly. 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  Pain  Current pain ratin  At best pain ratin  At worst pain ratin  Location: R knee  Quality: sharp, dull ache, throbbing and tight  Relieving factors: change in position and rest  Aggravating factors: standing, walking and stair climbing    Social Support  Steps to enter house: yes  Stairs in house: yes   Lives in: multiple-level home  Lives with: significant other    Employment status: not working  Treatments  Previous treatment: injection treatment, medication and physical therapy  Current treatment: physical therapy        Objective     Observations     Additional Observation Details  TKA incision covered with ACE bandage. Per patient was advised to keep this in place until post operative visit with surgeon on 23. Moderate swelling in R ankle and calf. No pitting edema. No compression stocking donned.      Active Range of Motion   Left Knee   Flexion: 130 degrees   Extension: 0 degrees     Right Knee   Flexion: 88 degrees with pain  Extension: -20 degrees with pain    Passive Range of Motion     Right Knee   Flexion: 90 degrees with pain  Extension: -18 degrees with pain    Strength/Myotome Testing     Left Hip   Planes of Motion   Flexion: 3    Right Hip   Planes of Motion   Flexion: 4    Left Knee   Flexion: 4+  Extension: 4+  Quadriceps contraction: good    Right Knee   Flexion: 2+  Extension: 2+  Quadriceps contraction: fair    Ambulation   Weight-Bearing Status   Weight-Bearing Status (Right): weight-bearing as tolerated    Assistive device used: two-wheeled walker    Ambulation: Level Surfaces   Ambulation with assistive device: independent    Observational Gait   Gait: antalgic   Decreased right stance time and right swing time. Functional Assessment        Comments  Post Operative IE 12/4/23:  TUG-21.25 seconds, with use of RW, antalgic gait pattern with loss of TKE in stance phase with decreased stance time on the R LE    5 STS-16.72 seconds, with use of B/L UEs for push off and eccentric control to chair, fair knee flexion when sitting in chair             Precautions: R TKA 11/30/23, HTN, Hx prostate CA  Access Code: [email or web address]  POC expires Unit limit Auth  expiration date PT/OT + Visit Limit?    2/20/24 N/A 2/28/24 BOMN  $20                 Visit/Unit Tracking  AUTH Status:  Date 11/28 12/4             Approved 12 Used 1 2              Remaining  11 10                Manuals 11/28 12/4           R knee PROM with OP             Patellar mobility                                        Neuro Re-Ed             Quad sets towel under heel HEP reviewed           Quad set with LAQ HEP 5"x10           Quad set with SLR                                                                 Ther Ex             Rec bike rocking for knee ROM             Heel slides with strap HEP 5"x10           Standing hamstring stretch at step  HEP sitting 3x30" sitting           Standing calf stretch with rockerboard HEP sitting 3x30" sitting                                     Pt education PT POC, HEP, post op status  HEP review, ice, compression stockings                        Ther Activity             TG squats             Fwd step ups             Lat step downs             Gait Training                                       Modalities

## 2023-12-05 NOTE — TELEPHONE ENCOUNTER
Patient contacted for a postoperative follow up assessment. Patient reports doing "very good but feels tired from PT yesterday." Patient states current pain level of a  1/10  when sitting and 4/10 when walking with RW. Patient denies increase in swelling and dressing is clean, dry and intact. Patient is icing the site regularly. Confirmed upcoming appointments with patient:  PT: 12/4, attended. Next session 12/7  Post op: 12/8    Patient states compression stockings are too tight, reviewed to cut a slit in the top portion of the elastic to release some of the tension. We reviewed patients AVS medication list. Patient is taking Oxycodone 5mg PRN, Lovenox 40mg Daily. Patient has had a BM. Discussed using Colace 100mg BID or Miralax 17g Daily if needed, patient also states he increases fiber if needed. Patient states he is additionally taking Tylenol 1000mg every 6 hours. Discussed Tylenol max dosing 3000mg/day. Instructed patient to take Tylenol 1000mg every 8 hours instead of every 6hours. Patient is agreeable and verbalizes understanding. Patient denies nausea, vomiting, abdominal pain, chest pain, shortness of breath, fever, dizziness and calf pain. Patient does not have any other questions or concerns at this time. Pt was encouraged to call with any questions, concerns or issues.

## 2023-12-06 NOTE — TELEPHONE ENCOUNTER
Spoke to patient. He called and left me a VM stating he had concerns. He reports swelling has increased and bruising to the operative ankle. He states swelling has made it harder today to do exercises. We discussed postoperative swelling, and icing areas of pain and swelling. We discussed swelling may increase and decrease depending on activity levels. Pt encouraged to rest and Ice today. pt encouraged to call me with questions, concerns or issues.

## 2023-12-07 ENCOUNTER — OFFICE VISIT (OUTPATIENT)
Dept: PHYSICAL THERAPY | Facility: CLINIC | Age: 83
End: 2023-12-07
Payer: COMMERCIAL

## 2023-12-07 DIAGNOSIS — M17.11 PRIMARY OSTEOARTHRITIS OF RIGHT KNEE: Primary | ICD-10-CM

## 2023-12-07 DIAGNOSIS — Z96.651 STATUS POST TOTAL RIGHT KNEE REPLACEMENT: ICD-10-CM

## 2023-12-07 PROCEDURE — 97112 NEUROMUSCULAR REEDUCATION: CPT

## 2023-12-07 PROCEDURE — 97140 MANUAL THERAPY 1/> REGIONS: CPT

## 2023-12-07 NOTE — PROGRESS NOTES
Daily Note     Today's date: 2023  Patient name: Wilfrid Palomares  : 1940  MRN: [id number]  Referring provider: Hua Tang DO  Dx:   Encounter Diagnosis     ICD-10-CM    1. Primary osteoarthritis of right knee  M17.11       2. Status post total right knee replacement  Z96.651                      Subjective: Patient reports concerns regarding the pain he is havingin the knee. He called his surgeons office and was advised on continuing to ice the knee. He was unable to put on compression sock due to it being too tight to get on. Objective: See treatment diary below  Moderate generalized swelling throughout right lower leg and into feet/toes. Mild ecchymosis also in calf and inferior/medial ankle. Assessment: Tolerated treatment well. Extensively educated patient on post operative expectations of swelling and advised on elevation, icing, and provided Tubigrip stocking to aid in compression. He required tactile cues for quadriceps muscle activation during heel prop quad set exercise. Patient demonstrated fatigue post treatment, exhibited good technique with therapeutic exercises, and would benefit from continued PT      Plan: Continue per plan of care. Precautions: R TKA 23, HTN, Hx prostate CA  Access Code: [email or web address]  POC expires Unit limit Auth  expiration date PT/OT + Visit Limit?    24 N/A 24 BOMN  $20                 Visit/Unit Tracking  AUTH Status:  Date             Approved 12 Used 1 2 3             Remaining  11 10 9               Manuals           R knee PROM with OP   BE          Patellar mobility              Tubigrip size E   BE                       Neuro Re-Ed             Quad sets towel under heel HEP reviewed 5"x10  5"x5          Quad set with LAQ HEP 5"x10 5" 2x10 with AAROM from L LE          Quad set with SLR                                                                 Ther Ex             Rec bike rocking for knee ROM NV          Heel slides with strap HEP 5"x10 5"2x10          Standing hamstring stretch at step  HEP sitting 3x30" sitting 3x30" step          Standing calf stretch with rockerboard HEP sitting 3x30" sitting 3x30" step                                    Pt education PT POC, HEP, post op status  HEP review, ice, compression stockings                        Ther Activity             TG squats             Fwd step ups             Lat step downs             Gait Training                                       Modalities

## 2023-12-08 ENCOUNTER — OFFICE VISIT (OUTPATIENT)
Dept: OBGYN CLINIC | Facility: MEDICAL CENTER | Age: 83
End: 2023-12-08

## 2023-12-08 ENCOUNTER — APPOINTMENT (OUTPATIENT)
Dept: RADIOLOGY | Facility: MEDICAL CENTER | Age: 83
End: 2023-12-08
Payer: COMMERCIAL

## 2023-12-08 VITALS
WEIGHT: 169 LBS | HEIGHT: 68 IN | BODY MASS INDEX: 25.61 KG/M2 | SYSTOLIC BLOOD PRESSURE: 145 MMHG | HEART RATE: 61 BPM | DIASTOLIC BLOOD PRESSURE: 75 MMHG

## 2023-12-08 DIAGNOSIS — Z96.651 S/P TOTAL KNEE ARTHROPLASTY, RIGHT: ICD-10-CM

## 2023-12-08 DIAGNOSIS — Z96.651 S/P TOTAL KNEE ARTHROPLASTY, RIGHT: Primary | ICD-10-CM

## 2023-12-08 PROCEDURE — 99024 POSTOP FOLLOW-UP VISIT: CPT | Performed by: ORTHOPAEDIC SURGERY

## 2023-12-08 PROCEDURE — 73560 X-RAY EXAM OF KNEE 1 OR 2: CPT

## 2023-12-08 NOTE — PROGRESS NOTES
Assessment:  1. S/P total knee arthroplasty, right  XR knee 1 or 2 vw right          Plan:  One week s/p right TKA with robot, 11/30/2023  The patient is doing well and should continue daily Lovenox, pain medications as needed and current physical therapy regimen. The patient can shower letting soapy water over incision, yet should not to submerge the incision, and then pat dry. The patient should follow up in one week      To do next visit:  Return in about 1 week (around 12/15/2023). The above stated was discussed in layman's terms and the patient expressed understanding. All questions were answered to the patient's satisfaction. Scribe Attestation      I,:  Aleck Epley am acting as a scribe while in the presence of the attending physician.:       I,:  Rafaela Feliciano MD personally performed the services described in this documentation    as scribed in my presence.:               Subjective:   Marquise Kulkarni is a 80 y.o. male who presents one week s/p right TKA with robot, 11/30/2023. The patient is doing well. Today he complains of generalized right knee pain. He does participate in physical therapy. He does use Lovenox daily. He does use oxycodone and Tylenol for pain control. He denies fever, chills or shortness of breath.         Review of systems negative unless otherwise specified in HPI    Past Medical History:   Diagnosis Date    Actinic keratosis     Last assessed - 10/15/14    Cortical age-related cataract of both eyes 03/22/2022    CPAP (continuous positive airway pressure) dependence     Hypertension     Hypokalemia     Last assessed - 8/13/14    Personal history of COVID-19     09/2023 - mild symptoms    Prostate cancer ([address])     tx w radiation only    Sinus bradycardia     Last assessed - 8/13/14    Sleep apnea        Past Surgical History:   Procedure Laterality Date    CATARACT EXTRACTION Bilateral     COLONOSCOPY      x 4    HERNIA REPAIR      [place] ARTHRP KNE CONDYLE&PLATU MEDIAL&LAT COMPARTMENTS Right 11/30/2023    Procedure: ARTHROPLASTY KNEE TOTAL W ROBOT;  Surgeon: Flory Ford MD;  Location: [facility];  Service: Orthopedics    SIGMOIDOSCOPY      (Fiberoptic, Therapeutic) 7/28/2014, 8/25/14       Family History   Problem Relation Age of Onset    Prostate cancer Father     No Known Problems Mother     Liver cancer Brother         Adenocarcinoma     No Known Problems Sister     No Known Problems Sister     No Known Problems Son     No Known Problems Son     Leukemia Daughter         Remission-Bone Marrow transplant    No Known Problems Daughter        Social History     Occupational History    Occupation: Retired   Tobacco Use    Smoking status: Never    Smokeless tobacco: Never    Tobacco comments:     Former smoker per [employer]    Vaping Use    Vaping Use: Never used   Substance and Sexual Activity    Alcohol use: No     Comment: Seldomly per [employer] - not in years    Drug use: No    Sexual activity: Not Currently         Current Outpatient Medications:     atenolol (TENORMIN) 50 mg tablet, Take 1 tablet (50 mg total) by mouth daily, Disp: 90 tablet, Rfl: 1    betamethasone valerate (VALISONE) 0.1 % cream, Apply topically 2 (two) times a day (Patient taking differently: Apply topically as needed), Disp: 45 g, Rfl: 0    Cholecalciferol (VITAMIN D3) 1000 UNIT/SPRAY LIQD, Take 1 tablet by mouth daily, Disp: , Rfl:     Coenzyme Q10 100 MG CHEW, Chew 200 mg, Disp: , Rfl:     Diclofenac Sodium (VOLTAREN) 1 %, Apply 2 g topically 4 (four) times a day (Patient taking differently: Apply 2 g topically as needed), Disp: 100 g, Rfl: 3    enoxaparin (LOVENOX) 40 mg/0.4 mL, Inject 0.4 mL (40 mg total) under the skin daily for 28 days Start injections after surgery, Disp: 11.2 mL, Rfl: 0    ofloxacin (OCUFLOX) 0.3 % ophthalmic solution, , Disp: , Rfl:     Omega-3-Acid Eth Est, Dietary, 1 g CAPS, Take by mouth, Disp: , Rfl:     oxyCODONE (ROXICODONE) 5 immediate release tablet, 1 pill po Q4 Hrs prn, Disp: 30 tablet, Rfl: 0    Potassium 99 MG TABS, Take 1 tablet by mouth 2 (two) times a day, Disp: , Rfl:     sildenafil (VIAGRA) 100 mg tablet, TAKE 1 TABLET BY MOUTH EVERY DAY AS NEEDED FOR ERECTILE DYSFUNCTION, Disp: 30 tablet, Rfl: 3    azelastine (ASTELIN) 0.1 % nasal spray, SPRAY 1 SPRAY INTO EACH NOSTRIL 2 (TWO) TIMES A DAY USE IN EACH NOSTRIL AS DIRECTED (Patient not taking: Reported on 11/30/2023), Disp: 1 Bottle, Rfl: 1    fluorouracil (EFUDEX) 5 % cream, APPLY TO HANDS TWICE A DAY X 3 WEEKS THEN D/C (Patient not taking: Reported on 12/8/2023), Disp: , Rfl:     No Known Allergies         Vitals:    12/08/23 1306   BP: 145/75   Pulse: 61       Objective:  Physical exam  General: Awake, Alert, Oriented  Eyes: Pupils equal, round and reactive to light  Heart: regular rate and rhythm  Lungs: No audible wheezing  Abdomen: soft                    Ortho Exam  Right knee:  Patient uses walker for ambulation   Incision clean dry and intact  Staples well approximated  No erythema   Ecchymotic staining throughout anterior knee and shin  Appropriate swelling of lower limb  Appropriate warmth of knee  Extensor mechanism intact  Extension 45  Flexion 95  Calf compartments soft and supple  Sensation intact  Toes are warm sensate and mobile      Diagnostics, reviewed and taken today if performed as documented: The attending physician has personally reviewed the pertinent films in PACS and interpretation is as follows:  Right knee x-ray:  Well aligned prosthesis with no acute changes. Procedures, if performed today:    Procedures    None performed      Portions of the record may have been created with voice recognition software. Occasional wrong word or "sound a like" substitutions may have occurred due to the inherent limitations of voice recognition software. Read the chart carefully and recognize, using context, where substitutions have occurred.

## 2023-12-12 ENCOUNTER — OFFICE VISIT (OUTPATIENT)
Dept: PHYSICAL THERAPY | Facility: CLINIC | Age: 83
End: 2023-12-12
Payer: COMMERCIAL

## 2023-12-12 DIAGNOSIS — Z96.651 STATUS POST TOTAL RIGHT KNEE REPLACEMENT: ICD-10-CM

## 2023-12-12 DIAGNOSIS — M17.11 PRIMARY OSTEOARTHRITIS OF RIGHT KNEE: Primary | ICD-10-CM

## 2023-12-12 PROCEDURE — 97110 THERAPEUTIC EXERCISES: CPT

## 2023-12-12 PROCEDURE — 97112 NEUROMUSCULAR REEDUCATION: CPT

## 2023-12-12 PROCEDURE — 97140 MANUAL THERAPY 1/> REGIONS: CPT

## 2023-12-12 NOTE — PROGRESS NOTES
Daily Note     Today's date: 2023  Patient name: Rodrigo Gates  : 1940  MRN: [id number]  Referring provider: Bradley Rizo DO  Dx:   Encounter Diagnosis     ICD-10-CM    1. Primary osteoarthritis of right knee  M17.11       2. Status post total right knee replacement  Z96.651                      Subjective: Patient reports that he lost his balance trying to use a urinal this morning and stumbled backwards onto the couch. He expresses frustrations in his limited ability to bear weight through the right leg as well as his balance and strength deficits. Objective: See treatment diary below      Assessment: Tolerated treatment well. Initiated TG squats and step ups to facilitate improved strength of the right lower extremity functionally. Advised patient again that the deficits he has for strength, balance, and mobility are all normal for the fact that his surgery was on 23. He was advised that all of these factors will continue to improve the further he gets out from surgery and with continued performance of his HEP. Patient demonstrated fatigue post treatment, exhibited good technique with therapeutic exercises, and would benefit from continued PT      Plan: Progress treatment as tolerated. Precautions: R TKA 23, HTN, Hx prostate CA  Access Code: [email or web address]  POC expires Unit limit Auth  expiration date PT/OT + Visit Limit?    24 N/A 24 BOMN  $20                 Visit/Unit Tracking  AUTH Status:  Date            Approved 12 Used 1 2 3 4            Remaining  11 10 9 8              Manuals          R knee PROM with OP   BE BE         Patellar mobility     BE         Tubigrip size E   BE BE                      Neuro Re-Ed             Quad sets towel under heel HEP reviewed 5"x10  5"x5 5"2x10          Quad set with LAQ HEP 5"x10 5" 2x10 with AAROM from L LE 5" 2x10 with AAROM from L LE         Quad set with SLR Ther Ex             Rec bike rocking for knee ROM   NV Rocking 10'         Heel slides with strap HEP 5"x10 5"2x10          Standing hamstring stretch at step  HEP sitting 3x30" sitting 3x30" step 3x30" step         Standing calf stretch with rockerboard HEP sitting 3x30" sitting 3x30" rockerboard 3x30" rockerboard                                   Pt education PT POC, HEP, post op status  HEP review, ice, compression stockings                        Ther Activity             TG squats    L19 2x10          Fwd step ups    4" x10   VC         Lat step downs             Gait Training                                       Modalities

## 2023-12-14 ENCOUNTER — OFFICE VISIT (OUTPATIENT)
Dept: PHYSICAL THERAPY | Facility: CLINIC | Age: 83
End: 2023-12-14
Payer: COMMERCIAL

## 2023-12-14 DIAGNOSIS — Z96.651 S/P TOTAL KNEE ARTHROPLASTY, RIGHT: ICD-10-CM

## 2023-12-14 DIAGNOSIS — Z96.651 STATUS POST TOTAL RIGHT KNEE REPLACEMENT: ICD-10-CM

## 2023-12-14 DIAGNOSIS — M17.11 PRIMARY OSTEOARTHRITIS OF RIGHT KNEE: Primary | ICD-10-CM

## 2023-12-14 PROCEDURE — 97110 THERAPEUTIC EXERCISES: CPT | Performed by: PHYSICAL THERAPIST

## 2023-12-14 PROCEDURE — 97112 NEUROMUSCULAR REEDUCATION: CPT | Performed by: PHYSICAL THERAPIST

## 2023-12-14 PROCEDURE — 97140 MANUAL THERAPY 1/> REGIONS: CPT | Performed by: PHYSICAL THERAPIST

## 2023-12-15 ENCOUNTER — OFFICE VISIT (OUTPATIENT)
Dept: OBGYN CLINIC | Facility: MEDICAL CENTER | Age: 83
End: 2023-12-15

## 2023-12-15 VITALS
SYSTOLIC BLOOD PRESSURE: 137 MMHG | DIASTOLIC BLOOD PRESSURE: 82 MMHG | HEIGHT: 68 IN | HEART RATE: 67 BPM | BODY MASS INDEX: 25.67 KG/M2 | WEIGHT: 169.4 LBS

## 2023-12-15 DIAGNOSIS — Z96.651 AFTERCARE FOLLOWING RIGHT KNEE JOINT REPLACEMENT SURGERY: Primary | ICD-10-CM

## 2023-12-15 DIAGNOSIS — Z47.1 AFTERCARE FOLLOWING RIGHT KNEE JOINT REPLACEMENT SURGERY: Primary | ICD-10-CM

## 2023-12-15 PROCEDURE — 99024 POSTOP FOLLOW-UP VISIT: CPT | Performed by: ORTHOPAEDIC SURGERY

## 2023-12-15 NOTE — PROGRESS NOTES
Assessment:   Diagnosis ICD-10-CM Associated Orders   1. Aftercare following right knee joint replacement surgery  Z47.1     Z96.651           Plan:  Second postoperative visit 2 weeks status post right total knee arthroplasty. His incision is healed adequately and staples removed successfully. He may get his incision wet, do not submerge, pat dry. Ice and elevate for swelling reduction as indicated. Complete Lovenox for DVT prophylaxis. Continue physical therapy home exercise program to maximize recovery. Weightbearing and activity as tolerated. To do next visit:  Return in about 4 weeks (around 1/12/2024) for re-check. The above stated was discussed in layman's terms and the patient expressed understanding. All questions were answered to the patient's satisfaction. Scribe Attestation      I,:  Xiomara Robison am acting as a scribe while in the presence of the attending physician.:       I,:  Bettie Wilder MD personally performed the services described in this documentation    as scribed in my presence.:               Subjective:   Hemalatha Becerra is a 80 y.o. male who presents today for repeat evaluation 2 weeks status post right total knee arthroplasty. He has been attending physical therapy and progressing well. He states his therapist overworked him yesterday and has soreness today. He denies any calf or thigh pain. Denies any fevers or chills. He presents using a rolling walker for ambulatory assistance. He has been administering his Lovenox for DVT prophylaxis. Overall he states that he is doing rather well.       Review of systems negative unless otherwise specified in HPI  Review of Systems    Past Medical History:   Diagnosis Date    Actinic keratosis     Last assessed - 10/15/14    Cortical age-related cataract of both eyes 03/22/2022    CPAP (continuous positive airway pressure) dependence     Hypertension     Hypokalemia     Last assessed - 8/13/14    Personal history of COVID-19     09/2023 - mild symptoms    Prostate cancer ([address])     tx w radiation only    Sinus bradycardia     Last assessed - 8/13/14    Sleep apnea        Past Surgical History:   Procedure Laterality Date    CATARACT EXTRACTION Bilateral     COLONOSCOPY      x 4    HERNIA REPAIR      [place] ARTHRP KNE CONDYLE&PLATU MEDIAL&LAT COMPARTMENTS Right 11/30/2023    Procedure: ARTHROPLASTY KNEE TOTAL W ROBOT;  Surgeon: Kaylyn Castleman, MD;  Location: [facility];  Service: Orthopedics    SIGMOIDOSCOPY      (Fiberoptic, Therapeutic) 7/28/2014, 8/25/14       Family History   Problem Relation Age of Onset    Prostate cancer Father     No Known Problems Mother     Liver cancer Brother         Adenocarcinoma     No Known Problems Sister     No Known Problems Sister     No Known Problems Son     No Known Problems Son     Leukemia Daughter         Remission-Bone Marrow transplant    No Known Problems Daughter        Social History     Occupational History    Occupation: Retired   Tobacco Use    Smoking status: Never    Smokeless tobacco: Never    Tobacco comments:     Former smoker per [employer]    Vaping Use    Vaping status: Never Used   Substance and Sexual Activity    Alcohol use: No     Comment: Seldomly per [employer] - not in years    Drug use: No    Sexual activity: Not Currently         Current Outpatient Medications:     atenolol (TENORMIN) 50 mg tablet, Take 1 tablet (50 mg total) by mouth daily, Disp: 90 tablet, Rfl: 1    betamethasone valerate (VALISONE) 0.1 % cream, Apply topically 2 (two) times a day (Patient taking differently: Apply topically as needed), Disp: 45 g, Rfl: 0    Cholecalciferol (VITAMIN D3) 1000 UNIT/SPRAY LIQD, Take 1 tablet by mouth daily, Disp: , Rfl:     Coenzyme Q10 100 MG CHEW, Chew 200 mg, Disp: , Rfl:     Diclofenac Sodium (VOLTAREN) 1 %, Apply 2 g topically 4 (four) times a day (Patient taking differently: Apply 2 g topically as needed), Disp: 100 g, Rfl: 3    ofloxacin (OCUFLOX) 0.3 % ophthalmic solution, , Disp: , Rfl:     Omega-3-Acid Eth Est, Dietary, 1 g CAPS, Take by mouth, Disp: , Rfl:     oxyCODONE (ROXICODONE) 5 immediate release tablet, 1 pill po Q4 Hrs prn, Disp: 30 tablet, Rfl: 0    Potassium 99 MG TABS, Take 1 tablet by mouth 2 (two) times a day, Disp: , Rfl:     sildenafil (VIAGRA) 100 mg tablet, TAKE 1 TABLET BY MOUTH EVERY DAY AS NEEDED FOR ERECTILE DYSFUNCTION, Disp: 30 tablet, Rfl: 3    azelastine (ASTELIN) 0.1 % nasal spray, SPRAY 1 SPRAY INTO EACH NOSTRIL 2 (TWO) TIMES A DAY USE IN EACH NOSTRIL AS DIRECTED (Patient not taking: Reported on 11/30/2023), Disp: 1 Bottle, Rfl: 1    enoxaparin (LOVENOX) 40 mg/0.4 mL, Inject 0.4 mL (40 mg total) under the skin daily for 28 days Start injections after surgery, Disp: 11.2 mL, Rfl: 0    fluorouracil (EFUDEX) 5 % cream, APPLY TO HANDS TWICE A DAY X 3 WEEKS THEN D/C (Patient not taking: Reported on 12/8/2023), Disp: , Rfl:     No Known Allergies         Vitals:    12/15/23 1248   BP: 137/82   Pulse: 67       Body mass index is 25.76 kg/m². Wt Readings from Last 3 Encounters:   12/15/23 76.8 kg (169 lb 6.4 oz)   12/08/23 76.7 kg (169 lb)   11/30/23 76.7 kg (169 lb)       Objective:                    Right Knee Exam     Range of Motion   Right knee extension: Within 10-15 degrees of full extension. Right knee flexion: Beyond 100 degrees of flexion possibly 110 degrees. Other   Swelling: mild  Effusion: effusion present    Comments:    Intact extensor mechanism. Healed anterior incision with staples in place was removed successfully. No drainage. Appropriate mental warmth. No signs of infection. Surrounding erythematous discoloration consistent with staple irritation. Diagnostics, reviewed and taken today if performed as documented:    None performed          Procedures, if performed today:    Procedures    None performed      Portions of the record may have been created with voice recognition software.   Occasional wrong word or "sound a like" substitutions may have occurred due to the inherent limitations of voice recognition software. Read the chart carefully and recognize, using context, where substitutions have occurred.

## 2023-12-19 ENCOUNTER — OFFICE VISIT (OUTPATIENT)
Dept: PHYSICAL THERAPY | Facility: CLINIC | Age: 83
End: 2023-12-19
Payer: COMMERCIAL

## 2023-12-19 DIAGNOSIS — Z96.651 S/P TOTAL KNEE ARTHROPLASTY, RIGHT: ICD-10-CM

## 2023-12-19 DIAGNOSIS — M17.11 PRIMARY OSTEOARTHRITIS OF RIGHT KNEE: Primary | ICD-10-CM

## 2023-12-19 DIAGNOSIS — Z96.651 STATUS POST TOTAL RIGHT KNEE REPLACEMENT: ICD-10-CM

## 2023-12-19 PROCEDURE — 97112 NEUROMUSCULAR REEDUCATION: CPT

## 2023-12-19 PROCEDURE — 97140 MANUAL THERAPY 1/> REGIONS: CPT

## 2023-12-19 PROCEDURE — 97530 THERAPEUTIC ACTIVITIES: CPT

## 2023-12-19 PROCEDURE — 97110 THERAPEUTIC EXERCISES: CPT

## 2023-12-19 NOTE — PROGRESS NOTES
Daily Note     Today's date: 2023  Patient name: Diego Dumont  : 1940  MRN: [id number]  Referring provider: Jamal Gamboa DO  Dx:   Encounter Diagnosis     ICD-10-CM    1. Primary osteoarthritis of right knee  M17.11       2. Status post total right knee replacement  Z96.651       3. S/P total knee arthroplasty, right  Z96.651           Start Time: 946  Stop Time: 1039  Total time in clinic (min): 53 minutes    Subjective: Pt noted that he has been feeling pretty good and noted that he has been walking w/o the walker at times. But still keeping it close by just in case.   Determined to go up his stairs noted he did accomplish 2 stairs at this time in his home.   Noted still lacking in extension > than flexion of R knee.     Objective: See treatment diary below      Assessment: Continued with treatment session with focus on R knee s/p TKA.  At this time pt is 2 weeks and 5 days OOS.  Some progressions added as of this visit with no increase in pain of R knee.  Tolerated treatment well. Verbal cues with quad contraction w/ step ups(increased reps).  At this time trialed prone lying with Knee / stretch off edge of bed  (LLLD stretch) was able to tolerate with purse lip breathing technique due to experiencing a deep stretch. Patient exhibited good technique with therapeutic exercises and would benefit from continued PT    Education reviewed with patient with verbal agreement and understanding.   - HEP compliance with primary on ROM.   - DOMS 24 to 48 hours of muscle soreness.         Plan: Continue per plan of care.      Precautions: R TKA 23, HTN, Hx prostate CA  Access Code: [email or web address]  POC expires Unit limit Auth  expiration date PT/OT + Visit Limit?   24 N/A 24 BOMN  $20                 Visit/Unit Tracking  AUTH Status:  Date          Approved 12 from 23 to 24 Used 1 2 3 4 5 6          Remaining  11 10 9 8 7 6            Manuals   "12/7 12/12 12/14 12/19       R knee PROM with OP   BE BE KB SC       Patellar mobility     BE KB   SC (PROM)       Tubigrip size E   BE BE                                                Neuro Re-Ed             Quad sets towel under heel HEP reviewed 5\"x10  5\"x5 5\"2x10  5\" 2x10  5\" 3x 10        Quad set with LAQ HEP 5\"x10 5\" 2x10 with AAROM from L LE 5\" 2x10 with AAROM from L LE 5\" 2x10 AROM 5\" 2x 10 AROM        Quad set with SLR      2x 10       TKE with TB vs melanie       NV                                              Ther Ex             Rec bike rocking for knee ROM   NV Rocking 10' Rocking 10' Rocking  to full rotation Fwd/ bwd 10 min        Heel slides with strap HEP 5\"x10 5\"2x10   Reviewed for HEP        Standing hamstring stretch at step  HEP sitting 3x30\" sitting 3x30\" step 3x30\" step 3x30\" step 3x 30\" at step        Standing calf stretch with rockerboard HEP sitting 3x30\" sitting 3x30\" rockerboard 3x30\" rockerboard 3x30\" rockerboard NV (missed)       Knee / prone lying off edge of table       2 min  no  OP                    Pt education PT POC, HEP, post op status  HEP review, ice, compression stockings                        Ther Activity             TG squats    L19 2x10  L19 2x10  L20 2x 10        Fwd step ups    4\" x10   VC 4\" x10   VC 4\" 2x10        Lat step downs                                                    Gait Training                                       Modalities                                                      "

## 2023-12-21 ENCOUNTER — OFFICE VISIT (OUTPATIENT)
Dept: PHYSICAL THERAPY | Facility: CLINIC | Age: 83
End: 2023-12-21
Payer: COMMERCIAL

## 2023-12-21 DIAGNOSIS — M17.11 PRIMARY OSTEOARTHRITIS OF RIGHT KNEE: Primary | ICD-10-CM

## 2023-12-21 DIAGNOSIS — Z96.651 S/P TOTAL KNEE ARTHROPLASTY, RIGHT: ICD-10-CM

## 2023-12-21 DIAGNOSIS — Z96.651 STATUS POST TOTAL RIGHT KNEE REPLACEMENT: ICD-10-CM

## 2023-12-21 PROCEDURE — 97140 MANUAL THERAPY 1/> REGIONS: CPT

## 2023-12-21 PROCEDURE — 97110 THERAPEUTIC EXERCISES: CPT

## 2023-12-21 PROCEDURE — 97530 THERAPEUTIC ACTIVITIES: CPT

## 2023-12-21 NOTE — PROGRESS NOTES
"Daily Note     Today's date: 2023  Patient name: Diego Dumont  : 1940  MRN: [id number]  Referring provider: Jamal Gamboa DO  Dx:   Encounter Diagnosis     ICD-10-CM    1. Primary osteoarthritis of right knee  M17.11       2. Status post total right knee replacement  Z96.651       3. S/P total knee arthroplasty, right  Z96.651                      Subjective: Patient reports that he is really eager to be able to walk without his walker as well as go upstairs to the main living quarters of his home.       Objective: See treatment diary below      Assessment: Tolerated treatment well. Performed SPC gait training around clinic with VC's for sequencing, however good stability and technique demonstrated. No instability or LOB noted. Also performed stair negotiation with instruction to lead with L LE ascending and R LE when descending and to complete non-reciprocally. He was able to do so without difficulty and was advised that he may do this at home with use of B/L HR's. Encouraged completing prone knee hangs versus seated knee extension stretches to promote improved knee extension ROM. Patient demonstrated fatigue post treatment, exhibited good technique with therapeutic exercises, and would benefit from continued PT      Plan: Progress treatment as tolerated.       Precautions: R TKA 23, HTN, Hx prostate CA  Access Code: [email or web address]  POC expires Unit limit Auth  expiration date PT/OT + Visit Limit?   24 N/A 24 BOMN  $20                 Visit/Unit Tracking  AUTH Status:  Date         Approved 12  Used 1 2 3 4 5 6 7         Remaining  11 10 9 8 7 6 5           Manuals       R knee PROM with OP   BE BE KB SC BE      Patellar mobility     BE KB   SC (PROM)       Tubigrip size E   BE BE                                                Neuro Re-Ed             Quad sets towel under heel HEP reviewed 5\"x10  5\"x5 5\"2x10  5\" " "2x10  5\" 3x 10        Quad set with LAQ HEP 5\"x10 5\" 2x10 with AAROM from L LE 5\" 2x10 with AAROM from L LE 5\" 2x10 AROM 5\" 2x 10 AROM  5\" 3x10       Quad set with SLR      2x 10 2x10      TKE with TB vs melanie       NV                                              Ther Ex             Rec bike rocking for knee ROM   NV Rocking 10' Rocking 10' Rocking  to full rotation Fwd/ bwd 10 min  Rocking to fwd rotations   10'      Heel slides with strap HEP 5\"x10 5\"2x10   Reviewed for HEP        Standing hamstring stretch at step  HEP sitting 3x30\" sitting 3x30\" step 3x30\" step 3x30\" step 3x 30\" at step        Standing calf stretch with rockerboard HEP sitting 3x30\" sitting 3x30\" rockerboard 3x30\" rockerboard 3x30\" rockerboard NV (missed)       Knee / prone lying off edge of table       2 min  no  OP Reviewed and seated with heel prop                   Pt education PT POC, HEP, post op status  HEP review, ice, compression stockings     HEP                   Ther Activity             TG squats    L19 2x10  L19 2x10  L20 2x 10  L20 3x10      Fwd step ups    4\" x10   VC 4\" x10   VC 4\" 2x10  6\" 2x10      Lat step downs                                                    Gait Training             SPC ambulation       100ft with cues       Stair navigation       3x nonreciprocally       Modalities                                                        "

## 2023-12-26 ENCOUNTER — OFFICE VISIT (OUTPATIENT)
Dept: PHYSICAL THERAPY | Facility: CLINIC | Age: 83
End: 2023-12-26
Payer: COMMERCIAL

## 2023-12-26 DIAGNOSIS — Z96.651 S/P TOTAL KNEE ARTHROPLASTY, RIGHT: ICD-10-CM

## 2023-12-26 DIAGNOSIS — Z96.651 STATUS POST TOTAL RIGHT KNEE REPLACEMENT: ICD-10-CM

## 2023-12-26 DIAGNOSIS — M17.11 PRIMARY OSTEOARTHRITIS OF RIGHT KNEE: Primary | ICD-10-CM

## 2023-12-26 PROCEDURE — 97112 NEUROMUSCULAR REEDUCATION: CPT | Performed by: PHYSICAL THERAPIST

## 2023-12-26 PROCEDURE — 97110 THERAPEUTIC EXERCISES: CPT | Performed by: PHYSICAL THERAPIST

## 2023-12-26 PROCEDURE — 97140 MANUAL THERAPY 1/> REGIONS: CPT | Performed by: PHYSICAL THERAPIST

## 2023-12-26 NOTE — PROGRESS NOTES
"Daily Note     Today's date: 2023  Patient name: Diego Dumont  : 1940  MRN: [id number]  Referring provider: Jamal Gamboa DO  Dx:   Encounter Diagnosis     ICD-10-CM    1. Primary osteoarthritis of right knee  M17.11       2. Status post total right knee replacement  Z96.651       3. S/P total knee arthroplasty, right  Z96.651           Start Time: 1215  Stop Time: 1303  Total time in clinic (min): 48 minutes    Subjective: Patient reports that he has been walking more without his AD, but does keep his RW and SPC close. Patient has been able to negotiate the steps. Patient has found that his knee typically feels more stiff on days that he has therapy.      Objective: See treatment diary below      Assessment: Tolerated treatment well. Patient demonstrated fatigue post treatment and would benefit from continued PT. Patient continues to lack terminal knee extension. Reviewed HEP to maximize compliance.       Plan: Continue per plan of care.  Progress treatment as tolerated.       Precautions: R TKA 23, HTN, Hx prostate CA  Access Code: [email or web address]  POC expires Unit limit Auth  expiration date PT/OT + Visit Limit?   24 N/A 24 BOMN  $20                 Visit/Unit Tracking  AUTH Status:  Date        Approved 12  Used 1 2 3 4 5 6 7 8        Remaining  11 10 9 8 7 6 5 4          Manuals      R knee PROM with OP   BE BE KB SC BE GR     Patellar mobility     BE KB   SC (PROM)  GR     Tubigrip size E   BE BE                                                Neuro Re-Ed             Quad sets towel under heel HEP reviewed 5\"x10  5\"x5 5\"2x10  5\" 2x10  5\" 3x 10        Quad set with LAQ HEP 5\"x10 5\" 2x10 with AAROM from L LE 5\" 2x10 with AAROM from L LE 5\" 2x10 AROM 5\" 2x 10 AROM  5\" 3x10       Quad set with SLR      2x 10 2x10 2x10     TKE with TB vs melanie       NV  [place]  8# 20x5\"                                 " "           Ther Ex             Rec bike rocking for knee ROM   NV Rocking 10' Rocking 10' Rocking  to full rotation Fwd/ bwd 10 min  Rocking to fwd rotations   10' Rocking  10 min     Heel slides with strap HEP 5\"x10 5\"2x10   Reviewed for HEP        Standing hamstring stretch at step  HEP sitting 3x30\" sitting 3x30\" step 3x30\" step 3x30\" step 3x 30\" at step        Standing calf stretch with rockerboard HEP sitting 3x30\" sitting 3x30\" rockerboard 3x30\" rockerboard 3x30\" rockerboard NV (missed)       Knee / prone lying off edge of table       2 min  no  OP Reviewed and seated with heel prop                   Pt education PT POC, HEP, post op status  HEP review, ice, compression stockings     HEP                   Ther Activity             TG squats    L19 2x10  L19 2x10  L20 2x 10  L20 3x10 L20 3x10     Fwd step ups    4\" x10   VC 4\" x10   VC 4\" 2x10  6\" 2x10 6\" 3x10     Lat step downs                                                    Gait Training             SPC ambulation       100ft with cues       Stair navigation       3x nonreciprocally       Modalities                                                          "

## 2023-12-28 ENCOUNTER — OFFICE VISIT (OUTPATIENT)
Dept: PHYSICAL THERAPY | Facility: CLINIC | Age: 83
End: 2023-12-28
Payer: COMMERCIAL

## 2023-12-28 DIAGNOSIS — Z96.651 S/P TOTAL KNEE ARTHROPLASTY, RIGHT: ICD-10-CM

## 2023-12-28 DIAGNOSIS — M17.11 PRIMARY OSTEOARTHRITIS OF RIGHT KNEE: Primary | ICD-10-CM

## 2023-12-28 DIAGNOSIS — Z96.651 STATUS POST TOTAL RIGHT KNEE REPLACEMENT: ICD-10-CM

## 2023-12-28 PROCEDURE — 97530 THERAPEUTIC ACTIVITIES: CPT

## 2023-12-28 PROCEDURE — 97140 MANUAL THERAPY 1/> REGIONS: CPT

## 2023-12-28 PROCEDURE — 97110 THERAPEUTIC EXERCISES: CPT

## 2023-12-28 NOTE — PROGRESS NOTES
Daily Note     Today's date: 2023  Patient name: Diego Dumont  : 1940  MRN: [id number]  Referring provider: Jamal Gamboa DO  Dx:   Encounter Diagnosis     ICD-10-CM    1. Primary osteoarthritis of right knee  M17.11       2. Status post total right knee replacement  Z96.651       3. S/P total knee arthroplasty, right  Z96.651           Start Time: 1130  Stop Time: 1222  Total time in clinic (min): 52 minutes    Subjective: pt noted that he did have pain after last treatment session. He noted some soreness upon arrival and has been focusing on R knee / primarily at home.     Next follow up with Ortho is scheduled for 24    Objective: See treatment diary below      Assessment:  Continued with treatment session with focus on R knee.  ~ 4 weeks  OOS.  Primary focus on knee extension this visit. Did note some increase in muscle soreness w/ progressions added this visit.  Tolerated treatment well. Patient exhibited good technique with therapeutic exercises and would benefit from continued PT.     Education reviewed with patient with verbal agreement and understanding.   - focus on knee extension ROM as well as strengthening.   - ice to be used to swelling of R knee as well as decrease any muscle soreness.     Plan: Continue per plan of care.      Precautions: R TKA 23, HTN, Hx prostate CA  Access Code: [email or web address]  POC expires Unit limit Auth  expiration date PT/OT + Visit Limit?   24 N/A 24 BOMN  $20                 Visit/Unit Tracking  AUTH Status:  Date       Approved 12  Used 1 2 3 4 5 6 7 8 9       Remaining  11 10 9 8 7 6 5 4 3         Manuals     R knee PROM with OP   BE BE KB SC BE GR SC    Patellar mobility     BE KB   SC (PROM)  GR SC PROM     Tubigrip size E   BE BE                                                Neuro Re-Ed             Quad sets towel under heel HEP reviewed  "5\"x10  5\"x5 5\"2x10  5\" 2x10  5\" 3x 10        Quad set with LAQ HEP 5\"x10 5\" 2x10 with AAROM from L LE 5\" 2x10 with AAROM from L LE 5\" 2x10 AROM 5\" 2x 10 AROM  5\" 3x10       Quad set with SLR      2x 10 2x10 2x10 3x 10 3\" hold     TKE with TB vs melanie       NV  [place]  8# 20x5\" [place] 8# 30x5\"                                           Ther Ex             Rec bike rocking for knee ROM   NV Rocking 10' Rocking 10' Rocking  to full rotation Fwd/ bwd 10 min  Rocking to fwd rotations   10' Rocking  10 min Fwd 10 min no tension     Heel slides with strap HEP 5\"x10 5\"2x10   Reviewed for HEP        Standing hamstring stretch at step  HEP sitting 3x30\" sitting 3x30\" step 3x30\" step 3x30\" step 3x 30\" at step        Standing calf stretch with rockerboard HEP sitting 3x30\" sitting 3x30\" rockerboard 3x30\" rockerboard 3x30\" rockerboard NV (missed)       Knee / prone lying off edge of table       2 min  no  OP Reviewed and seated with heel prop  3 min with overpressure      Supine heel props          2 min     Pt education PT POC, HEP, post op status  HEP review, ice, compression stockings     HEP                   Ther Activity             TG squats    L19 2x10  L19 2x10  L20 2x 10  L20 3x10 L20 3x10 L22 3x 10     Fwd step ups    4\" x10   VC 4\" x10   VC 4\" 2x10  6\" 2x10 6\" 3x10 6\" 3x 10     Lat step downs         NV 2\" - 4\"                                            Gait Training             SPC ambulation       100ft with cues       Stair navigation       3x nonreciprocally       Modalities                                                            1 on 1 time for 38 minutes on 12/28/23.   "

## 2024-01-02 ENCOUNTER — OFFICE VISIT (OUTPATIENT)
Dept: PHYSICAL THERAPY | Facility: CLINIC | Age: 84
End: 2024-01-02
Payer: COMMERCIAL

## 2024-01-02 DIAGNOSIS — Z96.651 S/P TOTAL KNEE ARTHROPLASTY, RIGHT: ICD-10-CM

## 2024-01-02 DIAGNOSIS — Z96.651 STATUS POST TOTAL RIGHT KNEE REPLACEMENT: ICD-10-CM

## 2024-01-02 DIAGNOSIS — M17.11 PRIMARY OSTEOARTHRITIS OF RIGHT KNEE: Primary | ICD-10-CM

## 2024-01-02 PROCEDURE — 97140 MANUAL THERAPY 1/> REGIONS: CPT

## 2024-01-02 PROCEDURE — 97530 THERAPEUTIC ACTIVITIES: CPT

## 2024-01-02 PROCEDURE — 97110 THERAPEUTIC EXERCISES: CPT

## 2024-01-04 ENCOUNTER — EVALUATION (OUTPATIENT)
Dept: PHYSICAL THERAPY | Facility: CLINIC | Age: 84
End: 2024-01-04
Payer: COMMERCIAL

## 2024-01-04 DIAGNOSIS — Z96.651 S/P TOTAL KNEE ARTHROPLASTY, RIGHT: ICD-10-CM

## 2024-01-04 DIAGNOSIS — M17.11 PRIMARY OSTEOARTHRITIS OF RIGHT KNEE: Primary | ICD-10-CM

## 2024-01-04 DIAGNOSIS — Z96.651 STATUS POST TOTAL RIGHT KNEE REPLACEMENT: ICD-10-CM

## 2024-01-04 PROCEDURE — 97110 THERAPEUTIC EXERCISES: CPT

## 2024-01-04 PROCEDURE — 97140 MANUAL THERAPY 1/> REGIONS: CPT

## 2024-01-04 NOTE — PROGRESS NOTES
PT Re-Evaluation     Today's date: 2024  Patient name: Diego Dumont  : 1940  MRN: [id number]  Referring provider: Jamal Gamboa DO  Dx:   Encounter Diagnosis     ICD-10-CM    1. Primary osteoarthritis of right knee  M17.11       2. Status post total right knee replacement  Z96.651       3. S/P total knee arthroplasty, right  Z96.651                      Assessment  Assessment details: Diego Dumont is a 83 y.o. male presenting to physical therapy for a reevaluation s/p R TKA on 23. Since his initial evaluation, he reports 70% improvement in his knee The patient has demonstrated improved right knee active and passive ROM, improved knee strength, and reports decreased pain. He demonstrates improved functional mobility as noted by his ability to complete sit to stands from a chair without UE usage as well as ability to ambulate without AD. He has made significant improvements in his scores on the TUG and 5 STS functional assessments.  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 This patient would benefit from continued PT services to address their impairments and functional limitations to maximize functional outcome.  He was educated on updated PT POC, HEP review with emphasis on knee extension stretches with OP, as well as scar tissue massage to improve soft tissue extensibility.   Impairments: abnormal or restricted ROM, activity intolerance, impaired balance, impaired physical strength, lacks appropriate home exercise program, pain with function and weight-bearing intolerance    Symptom irritability: moderateUnderstanding of Dx/Px/POC: excellent   Prognosis: good    Goals  STG to be achieved in 4 weeks:   The patient will report a  "decrease in right knee \"at worst\" subjective pain rating score to at least 6/10 to allow for improved tolerance for weightbearing activities. MET  The patient will increase right knee flexion AROM to at least 70 degrees to allow for improved functional mobility. MET  The patient will increase right knee extension MMT score to at least 3+/5 to allow for improved functional mobility. MET    LTG to be achieved by DC: ALL NOT MET PROGRESSING  The patient will be independent in comprehensive HEP.   The patient will report no pain with usual activities.   The patient will improve right knee flexion and extension AROM to WFL to allow for improved functional mobility.   The patient will increase right knee flexion and extension MMT score to WFL to allow for improved functional mobility.   The patient will be able to ambulate 2-3 miles without pain, difficulty or AD, to allow for return to hospital volunteering.  The patient will be able to ambulate one full flight of stairs without pain, difficulty, or AD to allow for navigation of his home.      Plan  Patient would benefit from: skilled physical therapy  Planned modality interventions: thermotherapy: hydrocollator packs, TENS and cryotherapy  Planned therapy interventions: manual therapy, joint mobilization, balance/weight bearing training, neuromuscular re-education, patient education, flexibility, strengthening, stretching, therapeutic activities, therapeutic exercise, gait training and home exercise program  Frequency: 2x week  Duration in weeks: 12  Plan of Care beginning date: 11/28/2023  Plan of Care expiration date: 2/20/2024  Treatment plan discussed with: patient      Subjective Evaluation    History of Present Illness  Mechanism of injury: Diego reports 70% improvement since beginning PT services. He notes improvement in pain since initial evaluation. He reports that his pain levels are much less than what he was experiencing post operatively. He reports that his " mobility is much better as he is able to walk without any AD at this time both inside of his home and outside of his home. He does report slight balance deficits, mostly on uneven surfaces. He is now able to go up/down his stairs at home, however does need to perform this non-reciprocally and with bilateral handrails. He reports that he does still have tightness/stiffness in the knee in the morning or after sitting for a period of time. 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 He reports that he still is unable to get his knee fully straight, however he has been doing his knee extension stretches is multiple times a day. He is not taking any medications for pain at this time, occasionally does take tylenol. He is icing as needed for the soreness. His next follow up with the surgeon is 24.      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  Pain  Current pain ratin  At best pain ratin  At worst pain ratin  Location: R knee  Quality: dull ache and tight (stiffness)  Relieving factors: change in position and rest  Aggravating factors: standing, walking and stair climbing  Progression: improved    Social Support  Steps to enter house: yes  Stairs in house: yes   Lives in: multiple-level home  Lives with: significant other    Employment status: not working  Treatments  Previous treatment: injection treatment, medication and physical therapy  Current treatment: physical therapy      Objective     Observations     Additional Observation Details  Well approximated TKA incision, mild scar restrictions, mild swelling in R foot     Active Range of Motion   Left Knee   Flexion: 130 degrees   Extension: 0 degrees      Right Knee   Flexion: 119 degrees with pain  Extension: -10 degrees with pain    Passive Range of Motion     Right Knee   Flexion: 120 degrees with pain  Extension: -5 degrees with pain    Strength/Myotome Testing     Left Hip   Planes of Motion   Flexion: 4    Right Hip   Planes of Motion   Flexion: 4    Left Knee   Flexion: 4+  Extension: 4+  Quadriceps contraction: good    Right Knee   Flexion: 4  Extension: 4-  Quadriceps contraction: good    Ambulation   Weight-Bearing Status   Assistive device used: none    Ambulation: Level Surfaces   Ambulation without assistive device: independent    Observational Gait     Additional Observational Gait Details  Non antalgic, however loss of R TKE when ambulating    Functional Assessment        Comments  Post Operative IE 12/4/23:  TUG-21.25 seconds, with use of RW, antalgic gait pattern with loss of TKE in stance phase with decreased stance time on the R LE    5 STS-16.72 seconds, with use of B/L UEs for push off and eccentric control to chair, fair knee flexion when sitting in chair      Re-evaluation 1/4/23:  TUG- 10.41 seconds, no AD, good stability, non antalgic gait pattern with even step length bilaterally, loss of TKE  5 STS- 17.13 seconds, UE resting on LEs, even placement of feet with good knee flexion to chair, decreased eccentric control to chair, loss of TKE             Precautions: R TKA 11/30/23, HTN, Hx prostate CA  Access Code: [email or web address]    POC expires Unit limit Auth  expiration date PT/OT + Visit Limit?   2/20/24 N/A 2/28/24 BOMN  $20                 Visit/Unit Tracking  AUTH Status:  Date 11/28 12/4 12/7 12/12 12/14 12/19 12/21 12/26 12/28 1/2 1/4    Approved 12  Used 1 2 3 4 5 6 7 8 9 10 11     Remaining  11 10 9 8 7 6 5 4 3 2 1       Manuals 1/4 12/4 12/7 12/12 12/14 12/19 12/21 12/26 12/28 1/2   R knee PROM with OP BE  BE BE KB SC BE GR SC BE   Patellar mobility  BE   BE KB   SC (PROM)  GR SC PROM  BE   Tubigrip size E   BE BE                     "  Re-evaluation BE                         Neuro Re-Ed             Quad set with SLR Add # NV     2x 10 2x10 2x10 3x 10 3\" hold     TKE with TB vs chary       NV  [place]  8# 20x5\" [place] 8# 30x5\" [place] 8# 30x5\"   Tandem stance on foam              Mikey step overs fwd and lat                           Ther Ex             Rec bike rocking for knee ROM Fwd 10 min no tension  NV Rocking 10' Rocking 10' Rocking  to full rotation Fwd/ bwd 10 min  Rocking to fwd rotations   10' Rocking  10 min Fwd 10 min no tension  Fwd 10 min no tension   Knee / prone lying off edge of table  Reviewed with weight or OP      2 min  no  OP Reviewed and seated with heel prop  3 min with overpressure      Supine heel props  Reviewed with weight or OP        2 min     Matrix knee ext          10# 2x10   Matrix hamstring curls           15# 2x10   LLLD knee ext stretch on Matrix  Resume NV         40# 3 min   Pt education HEP review, PT POC, scar tissue massage HEP review, ice, compression stockings     HEP                   Ther Activity             TG squats DC   L19 2x10  L19 2x10  L20 2x 10  L20 3x10 L20 3x10 L22 3x 10     Fwd step ups Up and over NV   4\" x10   VC 4\" x10   VC 4\" 2x10  6\" 2x10 6\" 3x10 6\" 3x 10  6\" 3x 10    Lat step downs         NV 2\" - 4\"  4\" 3x5   Sit to stands                                       Gait Training             SPC ambulation       100ft with cues       Stair navigation       3x nonreciprocally       Modalities                                                  "

## 2024-01-09 ENCOUNTER — OFFICE VISIT (OUTPATIENT)
Dept: PHYSICAL THERAPY | Facility: CLINIC | Age: 84
End: 2024-01-09
Payer: COMMERCIAL

## 2024-01-09 DIAGNOSIS — Z96.651 S/P TOTAL KNEE ARTHROPLASTY, RIGHT: ICD-10-CM

## 2024-01-09 DIAGNOSIS — Z96.651 STATUS POST TOTAL RIGHT KNEE REPLACEMENT: ICD-10-CM

## 2024-01-09 DIAGNOSIS — M17.11 PRIMARY OSTEOARTHRITIS OF RIGHT KNEE: Primary | ICD-10-CM

## 2024-01-09 PROCEDURE — 97110 THERAPEUTIC EXERCISES: CPT

## 2024-01-09 PROCEDURE — 97112 NEUROMUSCULAR REEDUCATION: CPT

## 2024-01-09 PROCEDURE — 97140 MANUAL THERAPY 1/> REGIONS: CPT

## 2024-01-09 NOTE — PROGRESS NOTES
"Daily Note     Today's date: 2024  Patient name: Diego Dumont  : 1940  MRN: [id number]  Referring provider: Jamal Gamboa DO  Dx:   Encounter Diagnosis     ICD-10-CM    1. Primary osteoarthritis of right knee  M17.11       2. Status post total right knee replacement  Z96.651       3. S/P total knee arthroplasty, right  Z96.651           Start Time: 1205  Stop Time: 1250  Total time in clinic (min): 45 minutes    Subjective: Patient arrives to PT reporting moderate posterior knee soreness. Patient states performing prone/supine knee extension stretches 4 times yesterday and believes he overdid it.       Objective: See treatment diary below      Assessment: Patient tolerated treatment session well. Treatment session focused on progressing R knee strength and mobility. Progressed lateral step downs to 6\" step which was challenging and fatiguing but patient demonstrated good eccentric control. Patient continues to lack knee extension however, mobility improved by conclusion of session from manual techniques. Patient appropriately fatigued by conclusion of visit and would benefit from continued stretching and strengthening to return to PLOF.      Advised patient to continue focusing on knee extension stretches at home although, reduce reps to 2x daily as 4x may be too much at this time, patient verbalized understanding.       Plan: Continue per POC. Increase reps/resistance as tolerated.     Precautions: R TKA 23, HTN, Hx prostate CA  Access Code: [email or web address]    POC expires Unit limit Auth  expiration date PT/OT + Visit Limit?   24 N/A 24 BOMN  $20                 Visit/Unit Tracking  AUTH Status:  Date 11/28 12/4 12/7 12/12 12/14 12/19 12/21 12/26 12/28 1/2     Approved 12  Used 1 2 3 4 5 6 7 8 9   10 11     Remaining  11 10 9 8 7 6 5 4 3 2 1       Manuals  1/2   R knee PROM with OP BE MS  BE BE KB SC BE GR SC BE   Patellar mobility  BE " "MS rojo   BE KB   SC (PROM)  GR SC PROM  BE   Tubigrip size E    BE BE                         Re-evaluation BE                             Neuro Re-Ed              Quad set with SLR Add # NV #1 2x10     2x 10 2x10 2x10 3x 10 3\" hold     TKE with TB vs chary   [place] 8# 30x5\"     NV  [place]  8# 20x5\" [place] 8# 30x5\" [place] 8# 30x5\"   Tandem stance on foam               Mikey step overs fwd and lat                             Ther Ex              Rec bike rocking for knee ROM Fwd 10 min no tension Fwd 10 min no tension  NV Rocking 10' Rocking 10' Rocking  to full rotation Fwd/ bwd 10 min  Rocking to fwd rotations   10' Rocking  10 min Fwd 10 min no tension  Fwd 10 min no tension   Knee / prone lying off edge of table  Reviewed with weight or OP       2 min  no  OP Reviewed and seated with heel prop  3 min with overpressure      Supine heel props  Reviewed with weight or OP         2 min     Matrix knee ext  10# 2x10         10# 2x10   Matrix hamstring curls   15# 2x10         15# 2x10   LLLD knee ext stretch on Matrix  Resume NV 40# 3 min         40# 3 min   Pt education HEP review, PT POC, scar tissue massage  HEP review, ice, compression stockings     HEP                    Ther Activity              TG squats DC    L19 2x10  L19 2x10  L20 2x 10  L20 3x10 L20 3x10 L22 3x 10     Fwd step ups Up and over NV 6\" 2x10 up and over   4\" x10   VC 4\" x10   VC 4\" 2x10  6\" 2x10 6\" 3x10 6\" 3x 10  6\" 3x 10    Lat step downs  6\" 2x5        NV 2\" - 4\"  4\" 3x5   Sit to stands                                          Gait Training              SPC ambulation        100ft with cues       Stair navigation          3x nonreciprocally       Modalities                                                     "

## 2024-01-11 ENCOUNTER — OFFICE VISIT (OUTPATIENT)
Dept: PHYSICAL THERAPY | Facility: CLINIC | Age: 84
End: 2024-01-11
Payer: COMMERCIAL

## 2024-01-11 DIAGNOSIS — Z96.651 STATUS POST TOTAL RIGHT KNEE REPLACEMENT: ICD-10-CM

## 2024-01-11 DIAGNOSIS — M17.11 PRIMARY OSTEOARTHRITIS OF RIGHT KNEE: Primary | ICD-10-CM

## 2024-01-11 PROCEDURE — 97110 THERAPEUTIC EXERCISES: CPT

## 2024-01-11 PROCEDURE — 97140 MANUAL THERAPY 1/> REGIONS: CPT

## 2024-01-11 PROCEDURE — 97112 NEUROMUSCULAR REEDUCATION: CPT

## 2024-01-11 NOTE — PROGRESS NOTES
"Daily Note     Today's date: 1/10/2024  Patient name: Diego Dumont  : 1940  MRN: [id number]  Referring provider: Jamal Gamboa DO  Dx:   Encounter Diagnosis     ICD-10-CM    1. Primary osteoarthritis of right knee  M17.11       2. Status post total right knee replacement  Z96.651                      Subjective: Patient reports that the past few days and sessions, he has required 1.5 days to recover saying that his knee is very sore and painful. He says that he backed off on completing his knee extension stretches from 4x per day to 2x.       Objective: See treatment diary below      Assessment: Tolerated treatment well. Progressed patient's functional strengthening with addition of sit to stands without UE. He was challenged with this and lateral step downs due to gradually improving quadriceps eccentric control. Remains limited with loss of TKE, however decreased intensity of OP with knee extension stretches to improve patient's tolerance. Patient demonstrated fatigue post treatment, exhibited good technique with therapeutic exercises, and would benefit from continued PT      Plan: Progress treatment as tolerated.       Precautions: R TKA 23, HTN, Hx prostate CA  Access Code: [email or web address]    POC expires Unit limit Auth  expiration date PT/OT + Visit Limit?   24 N/A 24 BOMN  $20                 Visit/Unit Tracking  AUTH Status:  Date    Approved 24 Used 13  3 4 5 6 7 8 9 10 11 12    Remaining  11  21 20 19 18 17 16 15 14 13 12      Manuals    R knee PROM with OP BE MS BE BE BE KB SC BE GR SC BE   Patellar mobility  BE MS glides BE  BE KB   SC (PROM)  GR SC PROM  BE   Tubigrip size E    BE BE                         Re-evaluation BE                             Neuro Re-Ed              Quad set with SLR Add # NV #1 2x10 #1 2x10    2x 10 2x10 2x10 3x 10 3\" hold     TKE with TB vs " "melanie   [place] 8# 30x5\"     NV  [place]  8# 20x5\" [place] 8# 30x5\" [place] 8# 30x5\"   Tandem stance on foam    2x30\" ea           Mikey step overs fwd and lat    3 laps reciprocally fwd                         Ther Ex              Rec bike rocking for knee ROM Fwd 10 min no tension Fwd 10 min no tension Fwd 10 min no tension NV Rocking 10' Rocking 10' Rocking  to full rotation Fwd/ bwd 10 min  Rocking to fwd rotations   10' Rocking  10 min Fwd 10 min no tension  Fwd 10 min no tension   Knee / prone lying off edge of table  Reviewed with weight or OP       2 min  no  OP Reviewed and seated with heel prop  3 min with overpressure      Supine heel props  Reviewed with weight or OP         2 min     Matrix knee ext  10# 2x10 10# 2x10        10# 2x10   Matrix hamstring curls   15# 2x10 25# 2x10        15# 2x10   LLLD knee ext stretch on Matrix  Resume NV 40# 3 min 40# 3 min        40# 3 min   Pt education HEP review, PT POC, scar tissue massage       HEP                    Ther Activity              TG squats DC    L19 2x10  L19 2x10  L20 2x 10  L20 3x10 L20 3x10 L22 3x 10     Fwd step ups Up and over NV 6\" 2x10 up and over   4\" x10   VC 4\" x10   VC 4\" 2x10  6\" 2x10 6\" 3x10 6\" 3x 10  6\" 3x 10    Lat step downs  6\" 2x5 6\" 3x5       NV 2\" - 4\"  4\" 3x5   Sit to stands   2x10 hands on knees                                       Gait Training              SPC ambulation        100ft with cues       Stair navigation          3x nonreciprocally       Modalities                                                       "

## 2024-01-15 ENCOUNTER — OFFICE VISIT (OUTPATIENT)
Dept: PHYSICAL THERAPY | Facility: CLINIC | Age: 84
End: 2024-01-15
Payer: COMMERCIAL

## 2024-01-15 DIAGNOSIS — Z96.651 S/P TOTAL KNEE ARTHROPLASTY, RIGHT: ICD-10-CM

## 2024-01-15 DIAGNOSIS — M17.11 PRIMARY OSTEOARTHRITIS OF RIGHT KNEE: Primary | ICD-10-CM

## 2024-01-15 DIAGNOSIS — Z96.651 STATUS POST TOTAL RIGHT KNEE REPLACEMENT: ICD-10-CM

## 2024-01-15 PROCEDURE — 97140 MANUAL THERAPY 1/> REGIONS: CPT

## 2024-01-15 PROCEDURE — 97110 THERAPEUTIC EXERCISES: CPT

## 2024-01-15 NOTE — PROGRESS NOTES
"Daily Note    Today's date: 01/15/24  Patient name: Diego Dumont  : 1940  MRN: [id number]  Referring provider: Jamal Gamboa DO  Dx: No diagnosis found.    Start Time: 1215  Stop Time: 1300  Total time in clinic (min): 45 minutes      Subjective: Diego reports that his R knee feels very tight. He notes adherence to HEP and might have overdone it recently by doing stretching 4 times a day. He has been only doing his HEP 1 time a day since then.    Objective: See treatment diary below.    Assessment: Diego tolerated treatment well with consistent cuing throughout. TE's were performed with increased reps. No new TE's were performed today. Following treatment, the patient demonstrated fatigue post treatment, exhibited good technique with therapeutic exercises, and would benefit from continued PT. Patient educated on proper HEP technique and frequency including LLLD.    Plan: Continue per plan of care.        Precautions: R TKA 23, HTN, Hx prostate CA  Access Code: [email or web address]    POC expires Unit limit Auth  expiration date PT/OT + Visit Limit?   24 N/A 24 BOMN  $20                 Visit/Unit Tracking  AUTH Status:  Date 1/11 1/15 12/7 12/12 12/14 12/19 12/21 12/26 12/28 1/2 1/4 1/9   Approved 24 Used 13 14 3 4 5 6 7 8 9 10 11 12    Remaining  11 10 21 20 19 18 17 16 15 14 13 12      Manuals 1/4 1/9 11/11 1/15  12/14 12/19 12/21 12/26 12/28 1/2   R knee PROM with OP BE MS BE TS  KB SC BE GR SC BE   Patellar mobility  BE MS glides BE TS  KB   SC (PROM)  GR SC PROM  BE   Tubigrip size E                              Re-evaluation BE                           Neuro Re-Ed              Quad set with SLR Add # NV #1 2x10 #1 2x10    2x 10 2x10 2x10 3x 10 3\" hold     TKE with TB vs chary   [place] 8# 30x5\"     NV  Chary  8# 20x5\" [place] 8# 30x5\" Chary 8# 30x5\"   Tandem stance on foam    2x30\" ea           Mikey step overs fwd and lat    3 laps reciprocally fwd                         Ther Ex            " "  Rec bike rocking for knee ROM Fwd 10 min no tension Fwd 10 min no tension Fwd 10 min no tension Fwd 10' no tension  Rocking 10' Rocking  to full rotation Fwd/ bwd 10 min  Rocking to fwd rotations   10' Rocking  10 min Fwd 10 min no tension  Fwd 10 min no tension   Knee / prone lying off edge of table  Reviewed with weight or OP       2 min  no  OP Reviewed and seated with heel prop  3 min with overpressure      Supine heel props  Reviewed with weight or OP         2 min     Matrix knee ext  10# 2x10 10# 2x10 10# 3x10        10# 2x10   Matrix hamstring curls   15# 2x10 25# 2x10 25# 3x10        15# 2x10   LLLD knee ext stretch on Matrix  Resume NV 40# 3 min 40# 3 min        40# 3 min   Pt education HEP review, PT POC, scar tissue massage       HEP                    Ther Activity              TG squats DC     L19 2x10  L20 2x 10  L20 3x10 L20 3x10 L22 3x 10     Fwd step ups Up and over NV 6\" 2x10 up and over    4\" x10   VC 4\" 2x10  6\" 2x10 6\" 3x10 6\" 3x 10  6\" 3x 10    Lat step downs  6\" 2x5 6\" 3x5 6\" 3x5      NV 2\" - 4\"  4\" 3x5   Sit to stands   2x10 hands on knees 2x10                                       Gait Training              SPC ambulation        100ft with cues       Stair navigation          3x nonreciprocally       Modalities                                                         Luis Martins, PT  1/15/2024,2:04 PM  "

## 2024-01-16 ENCOUNTER — APPOINTMENT (OUTPATIENT)
Dept: PHYSICAL THERAPY | Facility: CLINIC | Age: 84
End: 2024-01-16
Payer: COMMERCIAL

## 2024-01-17 ENCOUNTER — TELEPHONE (OUTPATIENT)
Age: 84
End: 2024-01-17

## 2024-01-17 NOTE — TELEPHONE ENCOUNTER
Caller: Patient    Doctor: Marko    Reason for call:     Patient following up with his question about driving for his appt on Friday, I advised a message was sent and someone will return his call.    Call back#: [phone]

## 2024-01-17 NOTE — TELEPHONE ENCOUNTER
Caller: patient     Doctor: gloria    Reason for call: patient has appointment of 1/19/2024 and does not think his wife will be able to drive him due to weather, patient is asking for permission to drive     Call back#: [phone]

## 2024-01-18 ENCOUNTER — OFFICE VISIT (OUTPATIENT)
Dept: PHYSICAL THERAPY | Facility: CLINIC | Age: 84
End: 2024-01-18
Payer: COMMERCIAL

## 2024-01-18 DIAGNOSIS — M17.11 PRIMARY OSTEOARTHRITIS OF RIGHT KNEE: Primary | ICD-10-CM

## 2024-01-18 DIAGNOSIS — Z96.651 STATUS POST TOTAL RIGHT KNEE REPLACEMENT: ICD-10-CM

## 2024-01-18 DIAGNOSIS — Z96.651 S/P TOTAL KNEE ARTHROPLASTY, RIGHT: ICD-10-CM

## 2024-01-18 PROCEDURE — 97140 MANUAL THERAPY 1/> REGIONS: CPT

## 2024-01-18 PROCEDURE — 97110 THERAPEUTIC EXERCISES: CPT

## 2024-01-18 PROCEDURE — 97112 NEUROMUSCULAR REEDUCATION: CPT

## 2024-01-18 NOTE — PROGRESS NOTES
"Daily Note     Today's date: 2024  Patient name: Diego Dumont  : 1940  MRN: [id number]  Referring provider: Jamal Gamboa DO  Dx:   Encounter Diagnosis     ICD-10-CM    1. Primary osteoarthritis of right knee  M17.11       2. Status post total right knee replacement  Z96.651       3. S/P total knee arthroplasty, right  Z96.651           Start Time: 1023  Stop Time: 1101  Total time in clinic (min): 38 minutes    Subjective: Pt noted that he feels stiff this morning but overall feeling okay with no increase in pain noted. He noted that he was walking in [employer] yesterday for an hour and 15 minutes and had some increase in pain medial HS.       Objective: See treatment diary below      Assessment:  Pt was able to make some progressions but no increase in pain noted t/o treatment session. Tolerated treatment well. Patient exhibited good technique with therapeutic exercises and would benefit from continued PT      Plan: Continue per plan of care.      Precautions: R TKA 23, HTN, Hx prostate CA  Access Code: [email or web address]    POC expires Unit limit Auth  expiration date PT/OT + Visit Limit?   24 N/A 24 BOMN  $20                 Visit/Unit Tracking  AUTH Status:  Date 1/11 1/15 1/18  12/14 12/19 12/21 12/26 12/28 1/2 1/4 1/9   Approved 24 Used 13 14 15  5 6 7 8 9 10 11 12    Remaining  11 10 9  19 18 17 16 15 14 13 12      Manuals 1/4 1/9 11/11 1/15 1/18  12/19 12/21 12/26 12/28 1/2   R knee PROM with OP BE MS BE TS SC  SC BE GR SC BE   Patellar mobility  BE MS glides BE TS   SC (PROM)  GR SC PROM  BE   Tubigrip size E                              Re-evaluation BE                           Neuro Re-Ed              Quad set with SLR Add # NV #1 2x10 #1 2x10  1.5# 3x 10  2x 10 2x10 2x10 3x 10 3\" hold     TKE with TB vs chary   Chary 8# 30x5\"     NV  Chary  8# 20x5\" Simon 8# 30x5\" Simon 8# 30x5\"   Tandem stance on foam    2x30\" ea           Mikey step overs fwd and lat    3 laps reciprocally " "fwd  3 laps rec fwd   & added Lateral                        Ther Ex              Rec bike rocking for knee ROM Fwd 10 min no tension Fwd 10 min no tension Fwd 10 min no tension Fwd 10' no tension Fwd L1 10 min   Rocking  to full rotation Fwd/ bwd 10 min  Rocking to fwd rotations   10' Rocking  10 min Fwd 10 min no tension  Fwd 10 min no tension   Knee / prone lying off edge of table  Reviewed with weight or OP       2 min  no  OP Reviewed and seated with heel prop  3 min with overpressure      Supine heel props  Reviewed with weight or OP         2 min     Matrix knee ext  10# 2x10 10# 2x10 10# 3x10  10# 3x 10       10# 2x10   Matrix hamstring curls   15# 2x10 25# 2x10 25# 3x10  25# 3x 10       15# 2x10   LLLD knee ext stretch on Matrix  Resume NV 40# 3 min 40# 3 min        40# 3 min   Pt education HEP review, PT POC, scar tissue massage       HEP                    Ther Activity              TG squats DC      L20 2x 10  L20 3x10 L20 3x10 L22 3x 10     Fwd step ups Up and over NV 6\" 2x10 up and over   FSD 6\" 2x 10   4\" 2x10  6\" 2x10 6\" 3x10 6\" 3x 10  6\" 3x 10    Lat step downs  6\" 2x5 6\" 3x5 6\" 3x5 6\" 3 x 5      NV 2\" - 4\"  4\" 3x5   Sit to stands   2x10 hands on knees 2x10  2x 10                                      Gait Training              SPC ambulation        100ft with cues       Stair navigation          3x nonreciprocally       Modalities                                                           "

## 2024-01-19 ENCOUNTER — OFFICE VISIT (OUTPATIENT)
Dept: OBGYN CLINIC | Facility: MEDICAL CENTER | Age: 84
End: 2024-01-19

## 2024-01-19 VITALS
SYSTOLIC BLOOD PRESSURE: 145 MMHG | BODY MASS INDEX: 25.19 KG/M2 | WEIGHT: 166.2 LBS | HEIGHT: 68 IN | HEART RATE: 62 BPM | DIASTOLIC BLOOD PRESSURE: 65 MMHG

## 2024-01-19 DIAGNOSIS — Z47.1 AFTERCARE FOLLOWING RIGHT KNEE JOINT REPLACEMENT SURGERY: Primary | ICD-10-CM

## 2024-01-19 DIAGNOSIS — Z96.651 AFTERCARE FOLLOWING RIGHT KNEE JOINT REPLACEMENT SURGERY: Primary | ICD-10-CM

## 2024-01-19 PROCEDURE — 99024 POSTOP FOLLOW-UP VISIT: CPT | Performed by: ORTHOPAEDIC SURGERY

## 2024-01-19 NOTE — PROGRESS NOTES
83 y.o. male presenting to the office for 7 week follow up status post right total knee arthroplasty (dos: 11/30/2023) Patient states that his pain is minimal at this time. He is taking medications for pain - Motrin. Patient denies functional limitations from the knee. Patient continues to have numbness surrounding the surgical site. Patient denies any other radicular symptoms. Patient denies symptoms of an infection. Patient denies any instability. Patient denies any other acute or associated complaints.      Past Medical History:   Diagnosis Date    Actinic keratosis     Last assessed - 10/15/14    Cortical age-related cataract of both eyes 03/22/2022    CPAP (continuous positive airway pressure) dependence     Hypertension     Hypokalemia     Last assessed - 8/13/14    Personal history of COVID-19     09/2023 - mild symptoms    Prostate cancer (HCC)     tx w radiation only    Sinus bradycardia     Last assessed - 8/13/14    Sleep apnea      Past Surgical History:   Procedure Laterality Date    CATARACT EXTRACTION Bilateral     COLONOSCOPY      x 4    HERNIA REPAIR      [place] ARTHRP KNE CONDYLE&PLATU MEDIAL&LAT COMPARTMENTS Right 11/30/2023    Procedure: ARTHROPLASTY KNEE TOTAL W ROBOT;  Surgeon: Ace Zhu MD;  Location: [facility];  Service: Orthopedics    SIGMOIDOSCOPY      (Fiberoptic, Therapeutic) 7/28/2014, 8/25/14     Results Reviewed       None            Physical Exam  Physical Exam  Constitutional:       Appearance: He is well-developed.   HENT:      Head: Normocephalic and atraumatic.   Eyes:      Pupils: Pupils are equal, round, and reactive to light.   Neck:      Trachea: No tracheal deviation.   Cardiovascular:      Rate and Rhythm: Normal rate and regular rhythm.   Pulmonary:      Effort: Pulmonary effort is normal.      Breath sounds: Normal breath sounds.   Musculoskeletal:      Cervical back: Neck supple.      Right knee: No effusion.   Skin:     General: Skin is warm and dry.    Neurological:      Mental Status: He is alert and oriented to person, place, and time.   Psychiatric:         Behavior: Behavior normal.       Right Knee Exam     Muscle Strength   The patient has normal right knee strength.    Tenderness   The patient is experiencing no tenderness.     Range of Motion   Extension:  -5   Flexion:  100     Tests   Varus: negative Valgus: negative    Other   Erythema: absent  Scars: present  Swelling: mild  Effusion: no effusion present    Comments:  Well healed surgical incision without erythema or dehiscence          Assessment/Plan  83 y.o. male 7 week follow up status post right total knee arthroplasty   Continue with activities as tolerated  Continue with physical therapy activities  Hold off on dental appointments for 6 additional weeks   Take antibiotics prior to any dental appointments  Follow up in 6 weeks for evaluation with x-ray

## 2024-01-23 ENCOUNTER — OFFICE VISIT (OUTPATIENT)
Dept: PHYSICAL THERAPY | Facility: CLINIC | Age: 84
End: 2024-01-23
Payer: COMMERCIAL

## 2024-01-23 DIAGNOSIS — Z96.651 STATUS POST TOTAL RIGHT KNEE REPLACEMENT: ICD-10-CM

## 2024-01-23 DIAGNOSIS — Z96.651 S/P TOTAL KNEE ARTHROPLASTY, RIGHT: ICD-10-CM

## 2024-01-23 DIAGNOSIS — M17.11 PRIMARY OSTEOARTHRITIS OF RIGHT KNEE: Primary | ICD-10-CM

## 2024-01-23 PROCEDURE — 97110 THERAPEUTIC EXERCISES: CPT

## 2024-01-23 PROCEDURE — 97140 MANUAL THERAPY 1/> REGIONS: CPT

## 2024-01-23 NOTE — PROGRESS NOTES
"Daily Note    Today's date: 24  Patient name: Diego Dumont  : 1940  MRN: [id number]  Referring provider: Jamal Gamboa DO  Dx:   Encounter Diagnosis     ICD-10-CM    1. Primary osteoarthritis of right knee  M17.11       2. Status post total right knee replacement  Z96.651       3. S/P total knee arthroplasty, right  Z96.651           Start Time: 1100  Stop Time: 1145  Total time in clinic (min): 45 minutes      Subjective: Diego reports adherence to HEP. He had a very sore weekend and notes that it may have been from the extra driving he was doing. He feels fine today.    Objective: See treatment diary below.    Assessment: Diego tolerated treatment well with consistent cuing throughout. TE's were performed with increased reps and increased resistance. No new TE's were performed today. Following treatment, the patient demonstrated fatigue post treatment, exhibited good technique with therapeutic exercises, and would benefit from continued PT.     Plan: Continue per plan of care.        Precautions: R TKA 23, HTN, Hx prostate CA  Access Code: [email or web address]    POC expires Unit limit Auth  expiration date PT/OT + Visit Limit?   24 N/A 24 BOMN  $20                 Visit/Unit Tracking  AUTH Status:  Date 1/11 1/15 1/18 1/23 12/14 12/19 12/21 12/26 12/28 1/2 1/4 1/9   Approved 24 Used 13 14 15 16 5 6 7 8 9 10 11 12    Remaining  11 10 9 8 19 18 17 16 15 14 13 12      Manuals 1/4 1/9 11/11 1/15 1/18 1/23 12/19 12/21 12/26 12/28 1/2   R knee PROM with OP BE MS BE TS SC TS SC BE GR SC BE   Patellar mobility  BE MS glides BE TS  TS SC (PROM)  GR SC PROM  BE   Tubigrip size E                              Re-evaluation BE                           Neuro Re-Ed              Quad set with SLR Add # NV #1 2x10 #1 2x10  1.5# 3x 10  2x 10 2x10 2x10 3x 10 3\" hold     TKE with TB vs chary   Chary 8# 30x5\"     NV  Chary  8# 20x5\" Chary 8# 30x5\" [place] 8# 30x5\"   Tandem stance on foam    2x30\" ea         " "  Mikey step overs fwd and lat    3 laps reciprocally fwd  3 laps rec fwd   & added Lateral                        Ther Ex              Rec bike rocking for knee ROM Fwd 10 min no tension Fwd 10 min no tension Fwd 10 min no tension Fwd 10' no tension Fwd L1 10 min  10' L1  Rocking  to full rotation Fwd/ bwd 10 min  Rocking to fwd rotations   10' Rocking  10 min Fwd 10 min no tension  Fwd 10 min no tension   Knee / prone lying off edge of table  Reviewed with weight or OP      HEP 2 min  no  OP Reviewed and seated with heel prop  3 min with overpressure      Supine heel props  Reviewed with weight or OP         2 min     Matrix knee ext  10# 2x10 10# 2x10 10# 3x10  10# 3x 10  15# 3x10      10# 2x10   Matrix hamstring curls   15# 2x10 25# 2x10 25# 3x10  25# 3x 10  40# 3x10      15# 2x10   LLLD knee ext stretch on Matrix  Resume NV 40# 3 min 40# 3 min        40# 3 min   Pt education HEP review, PT POC, scar tissue massage       HEP                    Ther Activity              TG squats DC      L20 2x 10  L20 3x10 L20 3x10 L22 3x 10     Fwd step ups Up and over NV 6\" 2x10 up and over   FSD 6\" 2x 10  6\" 2x10  4\" 2x10  6\" 2x10 6\" 3x10 6\" 3x 10  6\" 3x 10    Lat step downs  6\" 2x5 6\" 3x5 6\" 3x5 6\" 3 x 5  6\" 2x10     NV 2\" - 4\"  4\" 3x5   Sit to stands   2x10 hands on knees 2x10  2x 10  2x10 no UE                                    Gait Training              SPC ambulation        100ft with cues       Stair navigation          3x nonreciprocally       Modalities                                                             Luis Martins, PT  1/23/2024,1:33 PM  "

## 2024-01-25 ENCOUNTER — OFFICE VISIT (OUTPATIENT)
Dept: PHYSICAL THERAPY | Facility: CLINIC | Age: 84
End: 2024-01-25
Payer: COMMERCIAL

## 2024-01-25 DIAGNOSIS — Z96.651 STATUS POST TOTAL RIGHT KNEE REPLACEMENT: ICD-10-CM

## 2024-01-25 DIAGNOSIS — M17.11 PRIMARY OSTEOARTHRITIS OF RIGHT KNEE: Primary | ICD-10-CM

## 2024-01-25 PROCEDURE — 97140 MANUAL THERAPY 1/> REGIONS: CPT

## 2024-01-25 PROCEDURE — 97110 THERAPEUTIC EXERCISES: CPT

## 2024-01-25 PROCEDURE — 97112 NEUROMUSCULAR REEDUCATION: CPT

## 2024-01-25 NOTE — PROGRESS NOTES
"Daily Note     Today's date: 2024  Patient name: Diego Dumont  : 1940  MRN: [id number]  Referring provider: Jamal Gamboa DO  Dx:   Encounter Diagnosis     ICD-10-CM    1. Primary osteoarthritis of right knee  M17.11       2. Status post total right knee replacement  Z96.651                      Subjective: Patient reports that he saw his surgeon's PA and they were pleased with his progress thus far. He reports that the knee continues to get quite stiff when sitting for prolonged periods of time.       Objective: See treatment diary below      Assessment: Tolerated treatment well. Progressed patient with increased step height to facilitate improved lower extremity strength functionally. He was able to complete, however does demonstrate decreased eccentric quadriceps control. He does continue with loss of TKE. Patient demonstrated fatigue post treatment, exhibited good technique with therapeutic exercises, and would benefit from continued PT      Plan: Progress treatment as tolerated.       Precautions: R TKA 23, HTN, Hx prostate CA  Access Code: [email or web address]    POC expires Unit limit Auth  expiration date PT/OT + Visit Limit?   24 N/A 24 BOMN  $20                 Visit/Unit Tracking  AUTH Status:  Date 1/11 1/15 1/18 1/23 1/25  12/21 12/26 12/28 1/2 1/4 1/9   Approved 24 Used 13 14 15 16 17  7 8 9 10 11 12    Remaining  11 10 9 8 7  17 16 15 14 13 12      Manuals 1/4 1/9 11/11 1/15 1/18 1/23 1/25  12/26 12/28 1/2   R knee PROM with OP BE MS BE TS SC TS BE   Ext focus   GR SC BE   Patellar mobility  BE MS glides BE TS  TS   GR SC PROM  BE   Tubigrip size E                              Re-evaluation BE                           Neuro Re-Ed              Quad set with SLR Add # NV #1 2x10 #1 2x10  1.5# 3x 10    2x10 3x 10 3\" hold     TKE with TB vs chary   Chary 8# 30x5\"       Chary  8# 20x5\" Chary 8# 30x5\" Chary 8# 30x5\"   Tandem stance on foam    2x30\" ea    2x30\" ea       Mikey step " "overs fwd and lat    3 laps reciprocally fwd  3 laps rec fwd   & added Lateral   3 laps  ea reciprocally fwd Add foam NV      R SLS       2x30\"       Ther Ex              Rec bike rocking for knee ROM Fwd 10 min no tension Fwd 10 min no tension Fwd 10 min no tension Fwd 10' no tension Fwd L1 10 min  10' L1  L1 10'  Rocking  10 min Fwd 10 min no tension  Fwd 10 min no tension   Knee / prone lying off edge of table  Reviewed with weight or OP      HEP    3 min with overpressure      Supine heel props  Reviewed with weight or OP         2 min     Matrix knee ext  10# 2x10 10# 2x10 10# 3x10  10# 3x 10  15# 3x10  15# 3x10    10# 2x10   Matrix hamstring curls   15# 2x10 25# 2x10 25# 3x10  25# 3x 10  40# 3x10  40# 3x10    15# 2x10   LLLD knee ext stretch on Matrix  Resume NV 40# 3 min 40# 3 min    40#   3 min    40# 3 min   Pt education HEP review, PT POC, scar tissue massage                           Ther Activity              TG squats DC        L20 3x10 L22 3x 10     Fwd step ups Up and over NV 6\" 2x10 up and over   FSD 6\" 2x 10  6\" 2x10  Up and over 8\" x15  6\" 3x10 6\" 3x 10  6\" 3x 10    Lat step downs  6\" 2x5 6\" 3x5 6\" 3x5 6\" 3 x 5  6\" 2x10  6\" 2x10   NV 2\" - 4\"  4\" 3x5   Sit to stands   2x10 hands on knees 2x10  2x 10  2x10 no UE 2x10 no UE                                   Gait Training              SPC ambulation              Stair navigation                Modalities                                                               "

## 2024-01-30 ENCOUNTER — OFFICE VISIT (OUTPATIENT)
Dept: PHYSICAL THERAPY | Facility: CLINIC | Age: 84
End: 2024-01-30
Payer: COMMERCIAL

## 2024-01-30 DIAGNOSIS — Z96.651 STATUS POST TOTAL RIGHT KNEE REPLACEMENT: ICD-10-CM

## 2024-01-30 DIAGNOSIS — Z96.651 S/P TOTAL KNEE ARTHROPLASTY, RIGHT: ICD-10-CM

## 2024-01-30 DIAGNOSIS — M17.11 PRIMARY OSTEOARTHRITIS OF RIGHT KNEE: Primary | ICD-10-CM

## 2024-01-30 PROCEDURE — 97112 NEUROMUSCULAR REEDUCATION: CPT

## 2024-01-30 PROCEDURE — 97110 THERAPEUTIC EXERCISES: CPT

## 2024-01-30 PROCEDURE — 97140 MANUAL THERAPY 1/> REGIONS: CPT

## 2024-01-30 NOTE — PROGRESS NOTES
Daily Note     Today's date: 2024  Patient name: Diego Dumont  : 1940  MRN: [id number]  Referring provider: Jamal Gamboa DO  Dx:   Encounter Diagnosis     ICD-10-CM    1. Primary osteoarthritis of right knee  M17.11       2. Status post total right knee replacement  Z96.651       3. S/P total knee arthroplasty, right  Z96.651           Start Time: 1033  Stop Time: 1118  Total time in clinic (min): 45 minutes    Subjective: Diego presents to PT reporting an incident happened over the weekend to his R knee. Patient states he was arising from his chair when he felt the top of his knee cap shift. Patient notes since then, his knee has felt very stiff and sore however, denies pain.       Objective: See treatment diary below      Assessment: Patient tolerated treatment session well. Continued following prescribed POC and patient able to complete all TE without incident. Treatment session focused on improving R knee ext as he lacks proper TKE with amb. Patient challenged by addition of foam pad with rhonda fwd/lat step overs however, did not display any significant bouts of unsteadiness or LOB. Patient demonstrates improved knee extension with manual techniques. Patient responded well to POC without any reproduction of knee pain stated at arrival. Patient would benefit from continued stretching and strengthening to restore functional deficits.       Plan: Continue per POC. Increase reps/resistance as tolerated.      Precautions: R TKA 23, HTN, Hx prostate CA  Access Code: [email or web address]    POC expires Unit limit Auth  expiration date PT/OT + Visit Limit?   24 N/A 24 BOMN  $20                 Visit/Unit Tracking  AUTH Status:  Date 1/11 1/15 1/18 1/23 1/25 1/30 12/21 12/26 12/28 1/2 1/4 1/9   Approved 24 Used 13 14 15 16 17 18 7 8 9 10 11 12    Remaining  11 10 9 8 7 6 17 16 15 14 13 12      Manuals 1/4 1/9 11/11 1/15 1/18 1/23 1/25 1/30 12/26 12/28 1/2   R knee PROM with OP BE MS BE TS SC TS BE  "  Ext focus  MS   Ext focus  GR SC BE   Patellar mobility  BE MS glides BE TS  TS   GR SC PROM  BE   Tubigrip size E                              Re-evaluation BE                           Neuro Re-Ed              Quad set with SLR Add # NV #1 2x10 #1 2x10  1.5# 3x 10    2x10 3x 10 3\" hold     TKE with TB vs chary   [place] 8# 30x5\"       Chary  8# 20x5\" [place] 8# 30x5\" Chary 8# 30x5\"   Tandem stance on foam    2x30\" ea    2x30\" ea 2x30\" ea      Mikey step overs fwd and lat    3 laps reciprocally fwd  3 laps rec fwd   & added Lateral   3 laps  ea reciprocally fwd Add foam NV      R SLS       2x30\" 2x30\"      Ther Ex              Rec bike rocking for knee ROM Fwd 10 min no tension Fwd 10 min no tension Fwd 10 min no tension Fwd 10' no tension Fwd L1 10 min  10' L1  L1 10' L1 10' Rocking  10 min Fwd 10 min no tension  Fwd 10 min no tension   Knee / prone lying off edge of table  Reviewed with weight or OP      HEP    3 min with overpressure      Supine heel props  Reviewed with weight or OP         2 min     Matrix knee ext  10# 2x10 10# 2x10 10# 3x10  10# 3x 10  15# 3x10  15# 3x10 15# 3x10   10# 2x10   Matrix hamstring curls   15# 2x10 25# 2x10 25# 3x10  25# 3x 10  40# 3x10  40# 3x10 40# 3x10   15# 2x10   LLLD knee ext stretch on Matrix  Resume NV 40# 3 min 40# 3 min    40#   3 min 40#   3 min   40# 3 min   Pt education HEP review, PT POC, scar tissue massage                           Ther Activity              TG squats DC        L20 3x10 L22 3x 10     Fwd step ups Up and over NV 6\" 2x10 up and over   FSD 6\" 2x 10  6\" 2x10  Up and over 8\" x15 Up and over 8\" x20 6\" 3x10 6\" 3x 10  6\" 3x 10    Lat step downs  6\" 2x5 6\" 3x5 6\" 3x5 6\" 3 x 5  6\" 2x10  6\" 2x10 8\" x 10  NV 2\" - 4\"  4\" 3x5   Sit to stands   2x10 hands on knees 2x10  2x 10  2x10 no UE 2x10 no UE 2x10 no UE                                  Gait Training              SPC ambulation              Stair navigation                Modalities                       "

## 2024-02-01 ENCOUNTER — EVALUATION (OUTPATIENT)
Dept: PHYSICAL THERAPY | Facility: CLINIC | Age: 84
End: 2024-02-01
Payer: COMMERCIAL

## 2024-02-01 DIAGNOSIS — Z96.651 STATUS POST TOTAL RIGHT KNEE REPLACEMENT: ICD-10-CM

## 2024-02-01 DIAGNOSIS — M17.11 PRIMARY OSTEOARTHRITIS OF RIGHT KNEE: Primary | ICD-10-CM

## 2024-02-01 PROCEDURE — 97110 THERAPEUTIC EXERCISES: CPT

## 2024-02-01 PROCEDURE — 97140 MANUAL THERAPY 1/> REGIONS: CPT

## 2024-02-01 NOTE — PROGRESS NOTES
"PT Re-Evaluation     Today's date: 2024  Patient name: Diego Dumont  : 1940  MRN: [id number]  Referring provider: Jamal Gamboa DO  Dx:   Encounter Diagnosis     ICD-10-CM    1. Primary osteoarthritis of right knee  M17.11       2. Status post total right knee replacement  Z96.651                      Assessment  Assessment details: Diego Dumont is a 83 y.o. male presenting to physical therapy for a reevaluation s/p R TKA on 23. Since his initial evaluation, he reports 80% improvement in his knee The patient has demonstrated improved right knee active and passive ROM, improved knee strength, and reports decreased pain/stiffness. He reports improved functional mobility with ability to complete stairs reciprocally and gradually improving endurance for community level ambulation distances. He also has continued to decrease his times on the TUG and 5 STS demonstrating his strength and mobility improvements. Despite his improvements, he continues to have decreased knee strength, stability/balance deficits, as well as loss of full knee extension active and passive ROM. 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 This patient would benefit from continued PT services to address their impairments and functional limitations to maximize functional outcome.  He was educated on updated PT POC, HEP review with emphasis on knee extension stretches to maximize his mobility and improve instability in the knee. He was advised to continue to monitor swelling in knee and \"buckling\" sensations and to notify therapist and/or surgeon if this continues.   Impairments: abnormal or restricted ROM, activity intolerance, impaired balance, impaired physical strength, lacks appropriate home exercise program, pain with function and weight-bearing intolerance    Symptom irritability: moderateUnderstanding of " "Dx/Px/POC: excellent   Prognosis: good    Goals  STG to be achieved in 4 weeks:   The patient will report a decrease in right knee \"at worst\" subjective pain rating score to at least 6/10 to allow for improved tolerance for weightbearing activities. MET  The patient will increase right knee flexion AROM to at least 70 degrees to allow for improved functional mobility. MET  The patient will increase right knee extension MMT score to at least 3+/5 to allow for improved functional mobility. MET    LTG to be achieved by DC:   The patient will be independent in comprehensive HEP. MET  The patient will report no pain with usual activities. NOT MET  The patient will improve right knee flexion and extension AROM to WFL to allow for improved functional mobility. MET flexion, NOT MET EXTENSION  The patient will increase right knee flexion and extension MMT score to WFL to allow for improved functional mobility. NOT MET  The patient will be able to ambulate 2-3 miles without pain, difficulty or AD, to allow for return to hospital volunteering. NOT MET  The patient will be able to ambulate one full flight of stairs without pain, difficulty, or AD to allow for navigation of his home. NOT MET      Plan  Patient would benefit from: skilled physical therapy  Planned modality interventions: thermotherapy: hydrocollator packs, TENS and cryotherapy  Planned therapy interventions: manual therapy, joint mobilization, balance/weight bearing training, neuromuscular re-education, patient education, flexibility, strengthening, stretching, therapeutic activities, therapeutic exercise, gait training and home exercise program  Frequency: 2x week  Duration in weeks: 8  Plan of Care beginning date: 2/1/2024  Plan of Care expiration date: 3/28/2024  Treatment plan discussed with: patient        Subjective Evaluation    History of Present Illness  Mechanism of injury: Diego reports 80% improvement since beginning PT services. He says that he does " still notice increased stiffness, soreness, and swelling in the knee if he does a lot of walking or is on his feet a lot. He says that he notices this especially after shopping at the grocery store. He says that he is able to now complete stairs reciprocally with use of handrails at times. He says that if his knee is stiff or if his right knee is more painful, then he may have to do them non-reciprocally with the handrails. He says that the endurance for being on his feet for prolonged periods of time still isn't what it was prior to surgery. He says that this causes him to limp when walking because his legs are tired. He reports continued difficulties with getting his knee fully straight and says that he notices this when he walks because his knee is bent. He is doing knee extension stretches multiple times per day however. He reports that last Friday he got up out of a chair and began to walk when he felt a slight buckle or movement of the knee/knee cap. He says that he also noticed this again on his walk into therapy today. He also reports that he has been noticing some increased swelling just above the knee. He has a follow up with his surgeon on 3/1/24.       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  Pain  Current pain ratin  At best pain ratin  At worst pain ratin  Location: R knee  Quality: dull ache (stiffness)  Relieving factors: change in position and rest  Aggravating factors: standing, walking and stair climbing  Progression: improved    Social Support  Steps to enter house: yes  Stairs in house: yes   Lives in: multiple-level home  Lives with: significant other    Employment status: not working  Treatments  Previous treatment: injection treatment, medication and physical therapy  Current treatment: physical therapy        Objective     Observations     Additional  Observation Details  Well approximated TKA incision    Active Range of Motion   Left Knee   Flexion: 130 degrees   Extension: 0 degrees     Right Knee   Flexion: 119 degrees   Extension: -6 degrees with pain    Passive Range of Motion     Right Knee   Flexion: 122 degrees with pain  Extension: -3 degrees with pain    Mobility   Patellar Mobility:   Left Knee   WFL: medial, lateral, superior and inferior.     Right Knee   WFL: medial, lateral, superior and inferior    Additional Mobility Details  Mild to moderate swelling suprapatellar area on R knee     Patellar Static Positioning   Left Knee: WFL  Right Knee: WFL    Strength/Myotome Testing     Left Hip   Planes of Motion   Flexion: 4+    Right Hip   Planes of Motion   Flexion: 4+    Left Knee   Flexion: 4+  Extension: 4+  Quadriceps contraction: good    Right Knee   Flexion: 4+  Extension: 4 (discomfort suprapatellar area)  Quadriceps contraction: good    Ambulation   Weight-Bearing Status   Assistive device used: none    Ambulation: Level Surfaces   Ambulation without assistive device: independent    Observational Gait     Additional Observational Gait Details  Non antalgic, however loss of R TKE when ambulating    Functional Assessment        Comments  Post Operative IE 12/4/23:  TUG-21.25 seconds, with use of RW, antalgic gait pattern with loss of TKE in stance phase with decreased stance time on the R LE  5 STS-16.72 seconds, with use of B/L UEs for push off and eccentric control to chair, fair knee flexion when sitting in chair      Re-evaluation 1/4/24:  TUG- 10.41 seconds, no AD, good stability, non antalgic gait pattern with even step length bilaterally, loss of TKE  5 STS- 17.13 seconds, UE resting on LEs, even placement of feet with good knee flexion to chair, decreased eccentric control to chair, loss of TKE      Re-evaluation 2/1/24:  TUG- 8.79 seconds, no AD, good stability, non antalgic gait pattern with even step length bilaterally, loss of TKE,  "however reports of \"movement at the knee\"  5 STS- 12.34 seconds, UE resting on LEs, even placement of feet with good knee flexion to chair, mildly decreased eccentric control to chair, loss of TKE             Precautions: R TKA 11/30/23, HTN, Hx prostate CA  Access Code: [email or web address]    POC expires Unit limit Auth  expiration date PT/OT + Visit Limit?   3/28/24 N/A 5/31/24 BOMN  $20                 Visit/Unit Tracking  AUTH Status:  Date 1/11 1/15 1/18 1/23 1/25 1/30 2/1  12/28 1/2 1/4 1/9   Approved 24 Used 13 14 15 16 17 18 19  9 10 11 12    Remaining  11 10 9 8 7 6 5  15 14 13 12      Manuals 1/4 1/9 11/11 1/15 1/18 1/23 1/25 1/30 2/1 12/28 1/2   R knee PROM with OP BE MS BE TS SC TS BE   Ext focus  MS   Ext focus  BE ext focus SC BE   Patellar mobility  BE MS glides BE TS  TS    SC PROM  BE   Tubigrip size E                              Re-evaluation BE        BE                   Neuro Re-Ed              Quad set with SLR Add # NV #1 2x10 #1 2x10  1.5# 3x 10     3x 10 3\" hold     TKE with TB vs chary   Hope 8# 30x5\"        Chary 8# 30x5\" Chary 8# 30x5\"   Tandem stance on foam    2x30\" ea    2x30\" ea 2x30\" ea      Mikey step overs fwd and lat    3 laps reciprocally fwd  3 laps rec fwd   & added Lateral   3 laps  ea reciprocally fwd Add foam NV      R SLS       2x30\" 2x30\"      Ther Ex              Rec bike rocking for knee ROM Fwd 10 min no tension Fwd 10 min no tension Fwd 10 min no tension Fwd 10' no tension Fwd L1 10 min  10' L1  L1 10' L1 10' L1 10' Fwd 10 min no tension  Fwd 10 min no tension   Knee / prone lying off edge of table  Reviewed with weight or OP      HEP    3 min with overpressure      Supine heel props  Reviewed with weight or OP         2 min     Matrix knee ext  10# 2x10 10# 2x10 10# 3x10  10# 3x 10  15# 3x10  15# 3x10 15# 3x10   10# 2x10   Matrix hamstring curls   15# 2x10 25# 2x10 25# 3x10  25# 3x 10  40# 3x10  40# 3x10 40# 3x10   15# 2x10   LLLD knee ext stretch on Matrix  Resume NV 40# " "3 min 40# 3 min    40#   3 min 40#   3 min   40# 3 min   Lateral and monster walks with TB              Pt education HEP review, PT POC, scar tissue massage        PT POC, HEP, knee extension ROM                   Ther Activity              Fwd step ups Up and over NV 6\" 2x10 up and over   FSD 6\" 2x 10  6\" 2x10  Up and over 8\" x15 Up and over 8\" x20  6\" 3x 10  6\" 3x 10    Lat step downs  6\" 2x5 6\" 3x5 6\" 3x5 6\" 3 x 5  6\" 2x10  6\" 2x10 8\" x 10  NV 2\" - 4\"  4\" 3x5   Sit to stands   2x10 hands on knees 2x10  2x 10  2x10 no UE 2x10 no UE 2x10 no UE                                  Gait Training              SPC ambulation              Stair navigation                Modalities                                                       "

## 2024-02-05 ENCOUNTER — OFFICE VISIT (OUTPATIENT)
Dept: PHYSICAL THERAPY | Facility: CLINIC | Age: 84
End: 2024-02-05
Payer: COMMERCIAL

## 2024-02-05 DIAGNOSIS — M17.11 PRIMARY OSTEOARTHRITIS OF RIGHT KNEE: Primary | ICD-10-CM

## 2024-02-05 DIAGNOSIS — Z96.651 STATUS POST TOTAL RIGHT KNEE REPLACEMENT: ICD-10-CM

## 2024-02-05 DIAGNOSIS — Z96.651 S/P TOTAL KNEE ARTHROPLASTY, RIGHT: ICD-10-CM

## 2024-02-05 PROCEDURE — 97110 THERAPEUTIC EXERCISES: CPT

## 2024-02-05 PROCEDURE — 97530 THERAPEUTIC ACTIVITIES: CPT

## 2024-02-05 PROCEDURE — 97140 MANUAL THERAPY 1/> REGIONS: CPT

## 2024-02-05 NOTE — PROGRESS NOTES
Daily Note     Today's date: 2024  Patient name: Diego Dumont  : 1940  MRN: [id number]  Referring provider: Jamal Gamboa DO  Dx:   Encounter Diagnosis     ICD-10-CM    1. Primary osteoarthritis of right knee  M17.11       2. Status post total right knee replacement  Z96.651       3. S/P total knee arthroplasty, right  Z96.651           Start Time: 1035  Stop Time: 1131  Total time in clinic (min): 56 minutes    Subjective: pt noted that he has been feeling really good yesterday and noted that he was able to do a lot without pain. Stiffness. He reported upon arrival having some increase stiffness of R knee w/ swelling present.       Objective: See treatment diary below      Assessment:  Continued with treatment session w/ focus on R knee s/p surgery. Was able to make progressions w/o increase in pain noted at this time.   Tolerated treatment well.  Increased reps with  lateral step downs w/ challenge reported. Patient exhibited good technique with therapeutic exercises and would benefit from continued PT. S/p treatment session, pt noted some increase in muscle soreness/ tenderness in R knee but no increase in pain noted.       Plan: Continue per plan of care.      Precautions: R TKA 23, HTN, Hx prostate CA  Access Code: [email or web address]    POC expires Unit limit Auth  expiration date PT/OT + Visit Limit?   3/28/24 N/A 24 BOMN  $20                 Visit/Unit Tracking  AUTH Status:  Date 1/11 1/15 1/18 1/23 1/25 1/30 2/1 2/5  1/2 1/4 1/9   Approved 24 Used 13 14 15 16 17 18 19 20  10 11 12    Remaining  11 10 9 8 7 6 5   14 13 12      Manuals 1/4 1/9 11/11 1/15 1/18 1/23 1/25 1/30 2/1 2/5    R knee PROM with OP BE MS BE TS SC TS BE   Ext focus  MS   Ext focus  BE ext focus SC ext focus     Patellar mobility  BE MS glides BE TS  TS        Tubigrip size E                              Re-evaluation BE        BE                   Neuro Re-Ed              Quad set with SLR Add # NV #1 2x10 #1 2x10  1.5#  "3x 10         TKE with TB vs melanie   [place]spect 8# 30x5\"            Tandem stance on foam    2x30\" ea    2x30\" ea 2x30\" ea      Mikey step overs fwd and lat    3 laps reciprocally fwd  3 laps rec fwd   & added Lateral   3 laps  ea reciprocally fwd Add foam NV      Slider clocks           5x                   R SLS       2x30\" 2x30\"      Ther Ex              Rec bike rocking for knee ROM Fwd 10 min no tension Fwd 10 min no tension Fwd 10 min no tension Fwd 10' no tension Fwd L1 10 min  10' L1  L1 10' L1 10' L1 10' L1 10 min     Knee / prone lying off edge of table  Reviewed with weight or OP      HEP        Supine heel props  Reviewed with weight or OP             Matrix knee ext  10# 2x10 10# 2x10 10# 3x10  10# 3x 10  15# 3x10  15# 3x10 15# 3x10   15# 3x 10     Matrix hamstring curls   15# 2x10 25# 2x10 25# 3x10  25# 3x 10  40# 3x10  40# 3x10 40# 3x10  40# 3x 10     LLLD knee ext stretch on Matrix  Resume NV 40# 3 min 40# 3 min    40#   3 min 40#   3 min  40# 5 min     Lateral and monster walks with TB          RTB 5 laps     Pt education HEP review, PT POC, scar tissue massage        PT POC, HEP, knee extension ROM                   Ther Activity              Fwd step ups Up and over NV 6\" 2x10 up and over   FSD 6\" 2x 10  6\" 2x10  Up and over 8\" x15 Up and over 8\" x20  Up and over 8\" 30x     Lat step downs  6\" 2x5 6\" 3x5 6\" 3x5 6\" 3 x 5  6\" 2x10  6\" 2x10 8\" x 10  8\" 2x 10     Sit to stands   2x10 hands on knees 2x10  2x 10  2x10 no UE 2x10 no UE 2x10 no UE  2x 10 no UE                                 Gait Training              SPC ambulation              Stair navigation                Modalities                                                       "

## 2024-02-09 ENCOUNTER — OFFICE VISIT (OUTPATIENT)
Dept: PHYSICAL THERAPY | Facility: CLINIC | Age: 84
End: 2024-02-09
Payer: COMMERCIAL

## 2024-02-09 DIAGNOSIS — Z96.651 S/P TOTAL KNEE ARTHROPLASTY, RIGHT: ICD-10-CM

## 2024-02-09 DIAGNOSIS — M17.11 PRIMARY OSTEOARTHRITIS OF RIGHT KNEE: Primary | ICD-10-CM

## 2024-02-09 DIAGNOSIS — Z96.651 STATUS POST TOTAL RIGHT KNEE REPLACEMENT: ICD-10-CM

## 2024-02-09 PROCEDURE — 97110 THERAPEUTIC EXERCISES: CPT

## 2024-02-09 PROCEDURE — 97530 THERAPEUTIC ACTIVITIES: CPT

## 2024-02-09 NOTE — PROGRESS NOTES
"Daily Note     Today's date: 2024  Patient name: Diego Dumont  : 1940  MRN: [id number]  Referring provider: Jamal Gamboa DO  Dx:   Encounter Diagnosis     ICD-10-CM    1. Primary osteoarthritis of right knee  M17.11       2. Status post total right knee replacement  Z96.651       3. S/P total knee arthroplasty, right  Z96.651                      Subjective: Patient reports that his knee feels very stiff this morning, but he does think he is getting closer to getting his knee fully straight.       Objective: See treatment diary below      Assessment: Tolerated treatment well. Patient with one episode of knee buckling when walking across gym, however he was able to stabilize himself with his UE's. He does demonstrate improving quadrieps eccentric control with step downs forward and laterally. He was advised that the buckling he is experiencing may be due to his loss of TKE at this time.  Patient demonstrated fatigue post treatment, exhibited good technique with therapeutic exercises, and would benefit from continued PT      Plan: Continue per plan of care.      Precautions: R TKA 23, HTN, Hx prostate CA  Access Code: [email or web address]    POC expires Unit limit Auth  expiration date PT/OT + Visit Limit?   3/28/24 N/A 24 BOMN  $20                 Visit/Unit Tracking  AUTH Status:  Date 1/11 1/15 1/18 1/23 1/25 1/30 2/1 2/5 2/9  1/4 1/9   Approved 24 Used 13 14 15 16 17 18 19 20 21  11 12    Remaining  11 10 9 8 7 6 5 4 3  13 12      Manuals 1/4 11/11 1/15 1/18 1/23 1/25 1/30 2/1 2/5 2/9   R knee PROM with OP BE BE TS SC TS BE   Ext focus  MS   Ext focus  BE ext focus SC ext focus  BE  Ext only    Patellar mobility  BE BE TS  TS        Tubigrip size E                            Re-evaluation BE       BE                  Neuro Re-Ed             Quad set with SLR Add # NV #1 2x10  1.5# 3x 10         TKE with TB vs melanie              Tandem stance on foam   2x30\" ea    2x30\" ea 2x30\" ea      Mikey step " "overs fwd and lat   3 laps reciprocally fwd  3 laps rec fwd   & added Lateral   3 laps  ea reciprocally fwd Add foam NV      Slider clocks          5x  5x   R SLS      2x30\" 2x30\"   2x30\"    Ther Ex             Rec bike rocking for knee ROM Fwd 10 min no tension Fwd 10 min no tension Fwd 10' no tension Fwd L1 10 min  10' L1  L1 10' L1 10' L1 10' L1 10 min  L1 10 min    Knee / prone lying off edge of table  Reviewed with weight or OP     HEP        Supine heel props  Reviewed with weight or OP            Matrix knee ext  10# 2x10 10# 3x10  10# 3x 10  15# 3x10  15# 3x10 15# 3x10   15# 3x 10  20# 3x10    Matrix hamstring curls   25# 2x10 25# 3x10  25# 3x 10  40# 3x10  40# 3x10 40# 3x10  40# 3x 10  40# 3x10   LLLD knee ext stretch on Matrix  Resume NV 40# 3 min    40#   3 min 40#   3 min  40# 5 min  40# 5 min    Lateral and monster walks with TB         RTB 5 laps     Pt education HEP review, PT POC, scar tissue massage       PT POC, HEP, knee extension ROM                  Ther Activity             Fwd step ups Up and over NV   FSD 6\" 2x 10  6\" 2x10  Up and over 8\" x15 Up and over 8\" x20  Up and over 8\" 30x  Up and over 8\" 30x    Lat step downs  6\" 3x5 6\" 3x5 6\" 3 x 5  6\" 2x10  6\" 2x10 8\" x 10  8\" 2x 10  8\" 2x10   Sit to stands  2x10 hands on knees 2x10  2x 10  2x10 no UE 2x10 no UE 2x10 no UE  2x 10 no UE  5# KB   2x10 no UE                             Gait Training             SPC ambulation             Stair navigation               Modalities                                                      "

## 2024-02-09 NOTE — PROGRESS NOTES
"Daily Note     Today's date: 2024  Patient name: Diego Dumont  : 1940  MRN: [id number]  Referring provider: Jamal Gamboa DO  Dx:   Encounter Diagnosis     ICD-10-CM    1. Primary osteoarthritis of right knee  M17.11       2. Status post total right knee replacement  Z96.651       3. S/P total knee arthroplasty, right  Z96.651                      Subjective: Patient reports that this morning he rubbed his knee and noticed that there was some \"tan colored liquid\" coming out of the distal end of his incision. He says that the incision was perfectly healed and wasn't sure if this was just a scab that came off. He has it covered by a band-aid and was wondering if he should call his surgeon about it.       Objective: See treatment diary below      Assessment: Tolerated treatment well. Removed band-aid and observed very minimal clear fluid on distal end of incision. There is mild redness at the site of fluid, however no blood or opening to incision present. Advised patient to keep covered with band-aid and to stop at urgent care following appt and/or call surgeon to advise them. Patient able to complete matrix knee extension and hamstring curls with eccentric focus demonstrating improving knee strength. Does continue with loss of TKE. Patient demonstrated fatigue post treatment, exhibited good technique with therapeutic exercises, and would benefit from continued PT      Plan: Progress treatment as tolerated.       Precautions: R TKA 23, HTN, Hx prostate CA  Access Code: [email or web address]    POC expires Unit limit Auth  expiration date PT/OT + Visit Limit?   3/28/24 N/A 24 BOMN  $20                 Visit/Unit Tracking  AUTH Status:  Date 1/11 1/15 1/18 1/23 1/25 1/30 2/1 2/5 2/9 2/12  1/9   Approved 24 Used 13 14 15 16 17 18 19 20 21 22  12    Remaining  11 10 9 8 7 6 5 4 3 2  12      Manuals 2/12 11/11 1/15 1/18 1/23 1/25 1/30 2/1 2/5 2/9   R knee PROM with OP BE  Ext only BE TS SC TS BE   Ext focus  MS " "  Ext focus  BE ext focus SC ext focus  BE  Ext only    Patellar mobility   BE TS  TS        Tubigrip size E                            Re-evaluation        BE                  Neuro Re-Ed             Quad set with SLR  #1 2x10  1.5# 3x 10         TKE with TB vs melanie              Tandem stance on foam  2x30\" 2x30\" ea    2x30\" ea 2x30\" ea      Mikey step overs fwd and lat  3 laps reciprocally fwd with foam pads  3 laps reciprocally fwd  3 laps rec fwd   & added Lateral   3 laps  ea reciprocally fwd Add foam NV      Slider clocks  5x        5x  5x   R SLS 2x30\"      2x30\" 2x30\"   2x30\"    Ther Ex             Rec bike rocking for knee ROM L1 10' Fwd 10 min no tension Fwd 10' no tension Fwd L1 10 min  10' L1  L1 10' L1 10' L1 10' L1 10 min  L1 10 min    Knee / prone lying off edge of table      HEP        Supine heel props              Matrix knee ext 20# 3x10  Up 2, down 1 10# 2x10 10# 3x10  10# 3x 10  15# 3x10  15# 3x10 15# 3x10   15# 3x 10  20# 3x10    Matrix hamstring curls  40# 3x10   Down 2, up 1   25# 2x10 25# 3x10  25# 3x 10  40# 3x10  40# 3x10 40# 3x10  40# 3x 10  40# 3x10   LLLD knee ext stretch on Matrix  40# 5 min 40# 3 min    40#   3 min 40#   3 min  40# 5 min  40# 5 min    Lateral and monster walks with TB         RTB 5 laps     Pt education        PT POC, HEP, knee extension ROM                  Ther Activity             Fwd step ups    FSD 6\" 2x 10  6\" 2x10  Up and over 8\" x15 Up and over 8\" x20  Up and over 8\" 30x  Up and over 8\" 30x    Lat step downs  6\" 3x5 6\" 3x5 6\" 3 x 5  6\" 2x10  6\" 2x10 8\" x 10  8\" 2x 10  8\" 2x10   Sit to stands 5# KB   3x10 no UE 2x10 hands on knees 2x10  2x 10  2x10 no UE 2x10 no UE 2x10 no UE  2x 10 no UE  5# KB   2x10 no UE                             Gait Training             SPC ambulation             Stair navigation             Modalities                                                        "

## 2024-02-12 ENCOUNTER — OFFICE VISIT (OUTPATIENT)
Dept: PHYSICAL THERAPY | Facility: CLINIC | Age: 84
End: 2024-02-12
Payer: COMMERCIAL

## 2024-02-12 ENCOUNTER — TELEPHONE (OUTPATIENT)
Dept: OBGYN CLINIC | Facility: MEDICAL CENTER | Age: 84
End: 2024-02-12

## 2024-02-12 DIAGNOSIS — M17.11 PRIMARY OSTEOARTHRITIS OF RIGHT KNEE: Primary | ICD-10-CM

## 2024-02-12 DIAGNOSIS — Z96.651 STATUS POST TOTAL RIGHT KNEE REPLACEMENT: ICD-10-CM

## 2024-02-12 DIAGNOSIS — Z96.651 S/P TOTAL KNEE ARTHROPLASTY, RIGHT: ICD-10-CM

## 2024-02-12 PROCEDURE — 97110 THERAPEUTIC EXERCISES: CPT

## 2024-02-12 PROCEDURE — 97140 MANUAL THERAPY 1/> REGIONS: CPT

## 2024-02-12 PROCEDURE — 97112 NEUROMUSCULAR REEDUCATION: CPT

## 2024-02-12 NOTE — TELEPHONE ENCOUNTER
Caller: Diego     Doctor: Dr Zhu   sx was 11/30/23    Reason for call: The patient is calling to let you know that  his right knee incision at the very end there is a small amount of puss coming out. Please call to advise. Thank you.    Call back#: [phone]

## 2024-02-12 NOTE — TELEPHONE ENCOUNTER
"Pt reports he woke up in the middle of the night \"1/4 inch round\" area that is raised, discharge of \"mucous like.\" Denies visible area of opening, states \"slight redness, no blood\" and denies any injury to the area. He states he has to change the band aide 3 times due to drainage.     He is trying to send a picture, walked through the steps but unsure he will be able to. We discussed getting him in for a wound check to be safe.     F/U 3/1   "

## 2024-02-15 ENCOUNTER — OFFICE VISIT (OUTPATIENT)
Dept: PHYSICAL THERAPY | Facility: CLINIC | Age: 84
End: 2024-02-15
Payer: COMMERCIAL

## 2024-02-15 DIAGNOSIS — Z96.651 STATUS POST TOTAL RIGHT KNEE REPLACEMENT: ICD-10-CM

## 2024-02-15 DIAGNOSIS — M17.11 PRIMARY OSTEOARTHRITIS OF RIGHT KNEE: Primary | ICD-10-CM

## 2024-02-15 DIAGNOSIS — Z96.651 S/P TOTAL KNEE ARTHROPLASTY, RIGHT: ICD-10-CM

## 2024-02-15 PROCEDURE — 97112 NEUROMUSCULAR REEDUCATION: CPT

## 2024-02-15 PROCEDURE — 97110 THERAPEUTIC EXERCISES: CPT

## 2024-02-15 NOTE — PROGRESS NOTES
"Daily Note     Today's date: 2/15/2024  Patient name: Diego Dumont  : 1940  MRN: [id number]  Referring provider: Jamal Gamboa DO  Dx:   Encounter Diagnosis     ICD-10-CM    1. Primary osteoarthritis of right knee  M17.11       2. Status post total right knee replacement  Z96.651       3. S/P total knee arthroplasty, right  Z96.651                      Subjective: Patient reports that he did reach out to his surgeon and he is scheduled to see him tomorrow in regards to the fluid coming out of the distal part of the knee. He reports that it seems to be leaking less as he only had to use 3 band-aids in one day, which is down from 5-7 of them.       Objective: See treatment diary below      Assessment: Tolerated treatment well. Patient with good stability completing mikey step overs on foam with no LOB. Does continue to lack TKE, however is tolerating low load, long duration knee extension stretches well. Patient demonstrated fatigue post treatment, exhibited good technique with therapeutic exercises, and would benefit from continued PT      Plan: Progress treatment as tolerated.       Precautions: R TKA 23, HTN, Hx prostate CA  Access Code: [email or web address]    POC expires Unit limit Auth  expiration date PT/OT + Visit Limit?   3/28/24 N/A 24 BOMN  $20                 Visit/Unit Tracking  AUTH Status:  Date 1/11 1/15 1/18 1/23 1/25 1/30 2/1 2/5 2/9 2/12 2/15    Approved 24 Used 13 14 15 16 17 18 19 20 21 22 23     Remaining  11 10 9 8 7 6 5 4 3 2 1       Manuals 2/12 2/15 1/15 1/18 1/23 1/25 1/30 2/1 2/5 2/9   R knee PROM with OP BE  Ext only BE  Ext only TS SC TS BE   Ext focus  MS   Ext focus  BE ext focus SC ext focus  BE  Ext only    Patellar mobility    TS  TS        Tubigrip size E                            Re-evaluation        BE                  Neuro Re-Ed             Tandem stance on foam  2x30\"     2x30\" ea 2x30\" ea      Mikey step overs fwd and lat  3 laps reciprocally fwd with foam pads  3 " "laps reciprocally fwd with foam pads  3 laps rec fwd   & added Lateral   3 laps  ea reciprocally fwd Add foam NV      Slider clocks  5x 10x       5x  5x   R SLS 2x30\"      2x30\" 2x30\"   2x30\"    Ther Ex             Rec bike rocking for knee ROM L1 10' L1 10' Fwd 10' no tension Fwd L1 10 min  10' L1  L1 10' L1 10' L1 10' L1 10 min  L1 10 min    Knee / prone lying off edge of table      HEP        Supine heel props              Matrix knee ext 20# 3x10  Up 2, down 1 20# 3x10  Up 2, down 1 10# 3x10  10# 3x 10  15# 3x10  15# 3x10 15# 3x10   15# 3x 10  20# 3x10    Matrix hamstring curls  40# 3x10   Down 2, up 1   40# 3x10   Down 2, up 1   25# 3x10  25# 3x 10  40# 3x10  40# 3x10 40# 3x10  40# 3x 10  40# 3x10   LLLD knee ext stretch on Matrix  40# 5 min 40# 5 min    40#   3 min 40#   3 min  40# 5 min  40# 5 min    Lateral and monster walks with TB  RTB 5 laps ea       RTB 5 laps     Pt education        PT POC, HEP, knee extension ROM                  Ther Activity             Fwd step ups    FSD 6\" 2x 10  6\" 2x10  Up and over 8\" x15 Up and over 8\" x20  Up and over 8\" 30x  Up and over 8\" 30x    Lat step downs   6\" 3x5 6\" 3 x 5  6\" 2x10  6\" 2x10 8\" x 10  8\" 2x 10  8\" 2x10   Sit to stands 5# KB   3x10 no UE 5# KB   3x10 no UE 2x10  2x 10  2x10 no UE 2x10 no UE 2x10 no UE  2x 10 no UE  5# KB   2x10 no UE                             Gait Training             SPC ambulation             Stair navigation             Modalities                                                          "

## 2024-02-16 ENCOUNTER — APPOINTMENT (OUTPATIENT)
Dept: RADIOLOGY | Facility: MEDICAL CENTER | Age: 84
End: 2024-02-16
Payer: COMMERCIAL

## 2024-02-16 ENCOUNTER — OFFICE VISIT (OUTPATIENT)
Dept: OBGYN CLINIC | Facility: MEDICAL CENTER | Age: 84
End: 2024-02-16
Payer: COMMERCIAL

## 2024-02-16 VITALS
HEART RATE: 56 BPM | SYSTOLIC BLOOD PRESSURE: 136 MMHG | WEIGHT: 166 LBS | HEIGHT: 68 IN | DIASTOLIC BLOOD PRESSURE: 72 MMHG | BODY MASS INDEX: 25.16 KG/M2

## 2024-02-16 DIAGNOSIS — Z47.1 AFTERCARE FOLLOWING RIGHT KNEE JOINT REPLACEMENT SURGERY: ICD-10-CM

## 2024-02-16 DIAGNOSIS — Z51.81 ANTICOAGULATION MANAGEMENT ENCOUNTER: ICD-10-CM

## 2024-02-16 DIAGNOSIS — Z79.01 ANTICOAGULATION MANAGEMENT ENCOUNTER: ICD-10-CM

## 2024-02-16 DIAGNOSIS — Z96.651 AFTERCARE FOLLOWING RIGHT KNEE JOINT REPLACEMENT SURGERY: Primary | ICD-10-CM

## 2024-02-16 DIAGNOSIS — Z96.651 AFTERCARE FOLLOWING RIGHT KNEE JOINT REPLACEMENT SURGERY: ICD-10-CM

## 2024-02-16 DIAGNOSIS — M17.12 PRIMARY OSTEOARTHRITIS OF LEFT KNEE: ICD-10-CM

## 2024-02-16 DIAGNOSIS — Z47.1 AFTERCARE FOLLOWING RIGHT KNEE JOINT REPLACEMENT SURGERY: Primary | ICD-10-CM

## 2024-02-16 PROCEDURE — 73560 X-RAY EXAM OF KNEE 1 OR 2: CPT

## 2024-02-16 PROCEDURE — 99214 OFFICE O/P EST MOD 30 MIN: CPT | Performed by: ORTHOPAEDIC SURGERY

## 2024-02-16 RX ORDER — FERROUS SULFATE 324(65)MG
324 TABLET, DELAYED RELEASE (ENTERIC COATED) ORAL
Qty: 60 TABLET | Refills: 0 | Status: SHIPPED | OUTPATIENT
Start: 2024-02-16

## 2024-02-16 RX ORDER — ENOXAPARIN SODIUM 100 MG/ML
40 INJECTION SUBCUTANEOUS DAILY
Qty: 11.2 ML | Refills: 0 | Status: SHIPPED | OUTPATIENT
Start: 2024-02-16 | End: 2024-03-15

## 2024-02-16 RX ORDER — CEFAZOLIN SODIUM 2 G/50ML
2000 SOLUTION INTRAVENOUS ONCE
OUTPATIENT
Start: 2024-02-16 | End: 2024-02-16

## 2024-02-16 RX ORDER — ASCORBIC ACID 500 MG
500 TABLET ORAL DAILY
Qty: 30 TABLET | Refills: 0 | Status: SHIPPED | OUTPATIENT
Start: 2024-02-16

## 2024-02-16 RX ORDER — FOLIC ACID 1 MG/1
1 TABLET ORAL DAILY
Qty: 30 TABLET | Refills: 0 | Status: SHIPPED | OUTPATIENT
Start: 2024-02-16

## 2024-02-16 RX ORDER — SULFAMETHOXAZOLE AND TRIMETHOPRIM 800; 160 MG/1; MG/1
1 TABLET ORAL EVERY 12 HOURS SCHEDULED
Qty: 20 TABLET | Refills: 0 | Status: SHIPPED | OUTPATIENT
Start: 2024-02-16 | End: 2024-02-26

## 2024-02-16 RX ORDER — CHLORHEXIDINE GLUCONATE ORAL RINSE 1.2 MG/ML
15 SOLUTION DENTAL ONCE
OUTPATIENT
Start: 2024-02-16 | End: 2024-02-16

## 2024-02-16 RX ORDER — TRANEXAMIC ACID 10 MG/ML
1000 INJECTION, SOLUTION INTRAVENOUS ONCE
OUTPATIENT
Start: 2024-02-16 | End: 2024-02-16

## 2024-02-16 NOTE — PROGRESS NOTES
Assessment:  1. Aftercare following right knee joint replacement surgery  XR knee 1 or 2 vw right          Plan:  3 months s/p right TKA with robot, 11/30/2023   He is doing well.    He is encouraged to remain active including HEP.    The patient is counseled on prophylactic antibiotic use prior to dental visits.    Bactrim DS for 10 days prescribed  He should follow up in 3 months for right knee    Left knee osteoarthritis  The patient will be scheduled for left total knee arthroplasty.  We feel the patient will find significant relief per current symptoms, findings on imaging and exam.  Risks, benefits, precautions and expectations were discussed. Risks include blood loss, potential infection and blood clots.  Preoperative vitamins were prescribed.     The patient should follow up after surgery.        To do next visit:  Return for post-op with x-rays.    The above stated was discussed in layman's terms and the patient expressed understanding.  All questions were answered to the patient's satisfaction.       Scribe Attestation      I,:  Hilario Jaime am acting as a scribe while in the presence of the attending physician.:       I,:  Ace Zhu MD personally performed the services described in this documentation    as scribed in my presence.:               Subjective:   Diego Dumont is a 83 y.o. male who presents 3 months s/p right TKA with robot, 11/30/2023.  He is doing well.  Today he has no right knee pain complaints.  He mentions open wound of inferior incision that has clear drainage yet this may have resolved.  He continues with physical therapy with progress.      He also complains of continues left generalized knee pain.  Climbing stairs aggravates while rest alleviates.  He has tried activity modification, oral medications, steroid injections with limited benefit.        Review of systems negative unless otherwise specified in HPI    Past Medical History:   Diagnosis Date    Actinic  keratosis     Last assessed - 10/15/14    Cortical age-related cataract of both eyes 03/22/2022    CPAP (continuous positive airway pressure) dependence     Hypertension     Hypokalemia     Last assessed - 8/13/14    Personal history of COVID-19     09/2023 - mild symptoms    Prostate cancer (HCC)     tx w radiation only    Sinus bradycardia     Last assessed - 8/13/14    Sleep apnea        Past Surgical History:   Procedure Laterality Date    CATARACT EXTRACTION Bilateral     COLONOSCOPY      x 4    HERNIA REPAIR      WY ARTHRP KNE CONDYLE&PLATU MEDIAL&LAT COMPARTMENTS Right 11/30/2023    Procedure: ARTHROPLASTY KNEE TOTAL W ROBOT;  Surgeon: Ace Zhu MD;  Location: [facility];  Service: Orthopedics    SIGMOIDOSCOPY      (Fiberoptic, Therapeutic) 7/28/2014, 8/25/14       Family History   Problem Relation Age of Onset    Prostate cancer Father     No Known Problems Mother     Liver cancer Brother         Adenocarcinoma     No Known Problems Sister     No Known Problems Sister     No Known Problems Son     No Known Problems Son     Leukemia Daughter         Remission-Bone Marrow transplant    No Known Problems Daughter        Social History     Occupational History    Occupation: Retired   Tobacco Use    Smoking status: Never    Smokeless tobacco: Never    Tobacco comments:     Former smoker per [employer]    Vaping Use    Vaping status: Never Used   Substance and Sexual Activity    Alcohol use: No     Comment: Seldomly per [employer] - not in years    Drug use: No    Sexual activity: Not Currently         Current Outpatient Medications:     atenolol (TENORMIN) 50 mg tablet, Take 1 tablet (50 mg total) by mouth daily, Disp: 90 tablet, Rfl: 1    betamethasone valerate (VALISONE) 0.1 % cream, Apply topically 2 (two) times a day (Patient taking differently: Apply topically as needed), Disp: 45 g, Rfl: 0    Cholecalciferol (VITAMIN D3) 1000 UNIT/SPRAY LIQD, Take 1 tablet by mouth daily, Disp: , Rfl:      Coenzyme Q10 100 MG CHEW, Chew 200 mg, Disp: , Rfl:     Diclofenac Sodium (VOLTAREN) 1 %, Apply 2 g topically 4 (four) times a day (Patient taking differently: Apply 2 g topically as needed), Disp: 100 g, Rfl: 3    ofloxacin (OCUFLOX) 0.3 % ophthalmic solution, , Disp: , Rfl:     Omega-3-Acid Eth Est, Dietary, 1 g CAPS, Take by mouth, Disp: , Rfl:     oxyCODONE (ROXICODONE) 5 immediate release tablet, 1 pill po Q4 Hrs prn, Disp: 30 tablet, Rfl: 0    Potassium 99 MG TABS, Take 1 tablet by mouth 2 (two) times a day, Disp: , Rfl:     sildenafil (VIAGRA) 100 mg tablet, TAKE 1 TABLET BY MOUTH EVERY DAY AS NEEDED FOR ERECTILE DYSFUNCTION, Disp: 30 tablet, Rfl: 3    azelastine (ASTELIN) 0.1 % nasal spray, SPRAY 1 SPRAY INTO EACH NOSTRIL 2 (TWO) TIMES A DAY USE IN EACH NOSTRIL AS DIRECTED (Patient not taking: Reported on 11/30/2023), Disp: 1 Bottle, Rfl: 1    enoxaparin (LOVENOX) 40 mg/0.4 mL, Inject 0.4 mL (40 mg total) under the skin daily for 28 days Start injections after surgery, Disp: 11.2 mL, Rfl: 0    fluorouracil (EFUDEX) 5 % cream, APPLY TO HANDS TWICE A DAY X 3 WEEKS THEN D/C (Patient not taking: Reported on 12/8/2023), Disp: , Rfl:     No Known Allergies         Vitals:    02/16/24 1034   BP: 136/72   Pulse: 56       Objective:  Physical exam  General: Awake, Alert, Oriented  Eyes: Pupils equal, round and reactive to light  Heart: regular rate and rhythm  Lungs: No audible wheezing  Abdomen: soft                    Ortho Exam  Right knee:  Well healed anterior incision with very small open area with clear drainage  No erythema and no ecchymosis  Stable to varus and valgus stress  Extensor mechanism intact  Extension 2  Flexion 120  Calf compartments soft and supple  Sensation intact  Toes are warm sensate and mobile    Left knee:  TTP over joint line  No erythema or ecchymosis  No effusion or swelling  Normal strength  Good ROM with crepitus   Calf compartments soft and supple  Sensation intact  Toes are warm  "sensate and mobile        Diagnostics, reviewed and taken today if performed as documented:    The attending physician has personally reviewed the pertinent films in PACS and interpretation is as follows:  Right knee x-ray:  Well aligned prosthesis with no acute changes.      Procedures, if performed today:    Procedures    None performed      Portions of the record may have been created with voice recognition software.  Occasional wrong word or \"sound a like\" substitutions may have occurred due to the inherent limitations of voice recognition software.  Read the chart carefully and recognize, using context, where substitutions have occurred.    "

## 2024-02-19 ENCOUNTER — OFFICE VISIT (OUTPATIENT)
Dept: PHYSICAL THERAPY | Facility: CLINIC | Age: 84
End: 2024-02-19
Payer: COMMERCIAL

## 2024-02-19 DIAGNOSIS — M17.11 PRIMARY OSTEOARTHRITIS OF RIGHT KNEE: Primary | ICD-10-CM

## 2024-02-19 DIAGNOSIS — Z96.651 STATUS POST TOTAL RIGHT KNEE REPLACEMENT: ICD-10-CM

## 2024-02-19 PROCEDURE — 97112 NEUROMUSCULAR REEDUCATION: CPT

## 2024-02-19 PROCEDURE — 97110 THERAPEUTIC EXERCISES: CPT

## 2024-02-19 NOTE — PROGRESS NOTES
"Daily Note     Today's date: 2024  Patient name: Diego Dumont  : 1940  MRN: [id number]  Referring provider: Jamal Gamboa DO  Dx:   Encounter Diagnosis     ICD-10-CM    1. Primary osteoarthritis of right knee  M17.11       2. Status post total right knee replacement  Z96.651                      Subjective: He saw his surgeon on 24 and was prescribed Bactrim to be applied to the distal end of his incision. He is scheduled to undergo a L TKA on 24.      Objective: See treatment diary below      Assessment: Tolerated treatment well. Added backwards lunges with slider to promote improved knee strength and dynamic stability. He was challenged with this and utilized UE support to stabilize himself. Continues with mild loss of TKE, however is gradually improving. Patient demonstrated fatigue post treatment, exhibited good technique with therapeutic exercises, and would benefit from continued PT      Plan: Continue per plan of care.      Precautions: R TKA 23, HTN, Hx prostate CA  Access Code: [email or web address]    POC expires Unit limit Auth  expiration date PT/OT + Visit Limit?   3/28/24 N/A 24 BOMN  $20                 Visit/Unit Tracking  AUTH Status:  Date  1/15 1/18 1/23 1/25 1/30 2/1 2/5 2/9 2/12 2/15 2/19   Approved 24 Used  14 15 16 17 18 19 20 21 22 23 24    Remaining   10 9 8 7 6 5 4 3 2 1 0      Manuals 2/12 2/15 2/19  1/23 1/25 1/30 2/1 2/5 2/9   R knee PROM with OP BE  Ext only BE  Ext only BE  Ext only  TS BE   Ext focus  MS   Ext focus  BE ext focus SC ext focus  BE  Ext only    Patellar mobility      TS                       Re-evaluation        BE                  Neuro Re-Ed             Tandem stance on foam  2x30\"     2x30\" ea 2x30\" ea      Mikey step overs fwd and lat  3 laps reciprocally fwd with foam pads  3 laps reciprocally fwd with foam pads    3 laps  ea reciprocally fwd Add foam NV      Slider clocks  5x 10x 10x      5x  5x   R SLS 2x30\"      2x30\" 2x30\"   2x30\"  " "  Bwd lunge with slider    10x          Ther Ex             Rec bike rocking for knee ROM L1 10' L1 10' L1 10'  10' L1  L1 10' L1 10' L1 10' L1 10 min  L1 10 min    Knee / prone lying off edge of table      HEP        Supine heel props              Matrix knee ext 20# 3x10  Up 2, down 1 20# 3x10  Up 2, down 1 20# 3x10  Up 2, down 1  15# 3x10  15# 3x10 15# 3x10   15# 3x 10  20# 3x10    Matrix hamstring curls  40# 3x10   Down 2, up 1   40# 3x10   Down 2, up 1   40# 3x10   Down 2, up 1  40# 3x10  40# 3x10 40# 3x10  40# 3x 10  40# 3x10   LLLD knee ext stretch on Matrix  40# 5 min 40# 5 min 40# 5 min   40#   3 min 40#   3 min  40# 5 min  40# 5 min    Lateral and monster walks with TB  RTB 5 laps ea RTB 5 laps ea      RTB 5 laps     Pt education        PT POC, HEP, knee extension ROM                  Ther Activity             Fwd step ups     6\" 2x10  Up and over 8\" x15 Up and over 8\" x20  Up and over 8\" 30x  Up and over 8\" 30x    Lat step downs     6\" 2x10  6\" 2x10 8\" x 10  8\" 2x 10  8\" 2x10   Sit to stands 5# KB   3x10 no UE 5# KB   3x10 no UE 5# KB   3x10 no UE  2x10 no UE 2x10 no UE 2x10 no UE  2x 10 no UE  5# KB   2x10 no UE                             Gait Training             SPC ambulation             Stair navigation             Modalities                                                            "

## 2024-02-20 DIAGNOSIS — Z01.818 PRE-OP TESTING: Primary | ICD-10-CM

## 2024-02-21 PROBLEM — Z00.00 MEDICARE ANNUAL WELLNESS VISIT, SUBSEQUENT: Status: RESOLVED | Noted: 2019-01-09 | Resolved: 2024-02-21

## 2024-02-22 ENCOUNTER — OFFICE VISIT (OUTPATIENT)
Dept: PHYSICAL THERAPY | Facility: CLINIC | Age: 84
End: 2024-02-22
Payer: COMMERCIAL

## 2024-02-22 DIAGNOSIS — M17.11 PRIMARY OSTEOARTHRITIS OF RIGHT KNEE: Primary | ICD-10-CM

## 2024-02-22 DIAGNOSIS — Z96.651 STATUS POST TOTAL RIGHT KNEE REPLACEMENT: ICD-10-CM

## 2024-02-22 DIAGNOSIS — M17.12 PRIMARY OSTEOARTHRITIS OF LEFT KNEE: ICD-10-CM

## 2024-02-22 PROCEDURE — 97110 THERAPEUTIC EXERCISES: CPT

## 2024-02-22 PROCEDURE — 97112 NEUROMUSCULAR REEDUCATION: CPT

## 2024-02-22 NOTE — PROGRESS NOTES
"Daily Note     Today's date: 2024  Patient name: Diego Dumont  : 1940  MRN: [id number]  Referring provider: Jamal Gamboa DO  Dx:   Encounter Diagnosis     ICD-10-CM    1. Primary osteoarthritis of right knee  M17.11       2. Status post total right knee replacement  Z96.651                      Subjective: Patient reports that he was sore after his last session for 3 days. He says that he has been trying to use less of his left leg and move all of the weight on the matrix with his right and thinks this is what did it.       Objective: See treatment diary below      Assessment: Tolerated treatment well. Patient unable to complete 3x10 of sit to stands this session due to muscular fatigue. Assessed knee extension ROM and patient was able to achieve full knee extension. Patient demonstrated fatigue post treatment, exhibited good technique with therapeutic exercises, and would benefit from continued PT      Plan: Progress treatment as tolerated.       Precautions: R TKA 23, HTN, Hx prostate CA  Access Code: [email or web address]    POC expires Unit limit Auth  expiration date PT/OT + Visit Limit?   3/28/24 N/A 24 BOMN  $20                 Visit/Unit Tracking  AUTH Status:  Date 2/22  1/18 1/23 1/25 1/30 2/1 2/5 2/9 2/12 2/15 2/19   Approved 24 Used 1  15 16 17 18 19 20 21 22 23 24    Remaining  23  9 8 7 6 5 4 3 2 1 0      Manuals 2/12 2/15 2/19 2/22 1/23 1/25 1/30 2/1 2   R knee PROM with OP BE  Ext only BE  Ext only BE  Ext only Not needed, 0* ext  TS BE   Ext focus  MS   Ext focus  BE ext focus SC ext focus  BE  Ext only    Patellar mobility      TS                       Re-evaluation        BE                  Neuro Re-Ed             Tandem stance on foam  2x30\"     2x30\" ea 2x30\" ea      Mikey step overs fwd and lat  3 laps reciprocally fwd with foam pads  3 laps reciprocally fwd with foam pads    3 laps  ea reciprocally fwd Add foam NV      Slider clocks  5x 10x 10x 10x     5x  5x   R SLS " "2x30\"      2x30\" 2x30\"   2x30\"    Bwd lunge with slider    10x 10x         Rockerboard taps fwd/bwd, left right    20x ea dir          Ther Ex             Rec bike rocking for knee ROM L1 10' L1 10' L1 10' L1 10' 10' L1  L1 10' L1 10' L1 10' L1 10 min  L1 10 min    Knee / prone lying off edge of table      HEP        Supine heel props              Matrix knee ext 20# 3x10  Up 2, down 1 20# 3x10  Up 2, down 1 20# 3x10  Up 2, down 1 20# 3x10  Up 2, down 1 15# 3x10  15# 3x10 15# 3x10   15# 3x 10  20# 3x10    Matrix hamstring curls  40# 3x10   Down 2, up 1   40# 3x10   Down 2, up 1   40# 3x10   Down 2, up 1 40# 3x10   Down 2, up 1 40# 3x10  40# 3x10 40# 3x10  40# 3x 10  40# 3x10   LLLD knee ext stretch on Matrix  40# 5 min 40# 5 min 40# 5 min   40#   3 min 40#   3 min  40# 5 min  40# 5 min    Lateral and monster walks with TB  RTB 5 laps ea RTB 5 laps ea RTB 5 laps ea     RTB 5 laps     Pt education        PT POC, HEP, knee extension ROM                  Ther Activity             Fwd step ups     6\" 2x10  Up and over 8\" x15 Up and over 8\" x20  Up and over 8\" 30x  Up and over 8\" 30x    Lat step downs     6\" 2x10  6\" 2x10 8\" x 10  8\" 2x 10  8\" 2x10   Sit to stands 5# KB   3x10 no UE 5# KB   3x10 no UE 5# KB   3x10 no UE 5# KB   2x10 no UE 2x10 no UE 2x10 no UE 2x10 no UE  2x 10 no UE  5# KB   2x10 no UE                             Gait Training             SPC ambulation             Stair navigation             Modalities                                                              "

## 2024-02-26 ENCOUNTER — OFFICE VISIT (OUTPATIENT)
Dept: PHYSICAL THERAPY | Facility: CLINIC | Age: 84
End: 2024-02-26
Payer: COMMERCIAL

## 2024-02-26 DIAGNOSIS — M17.11 PRIMARY OSTEOARTHRITIS OF RIGHT KNEE: Primary | ICD-10-CM

## 2024-02-26 DIAGNOSIS — Z96.651 STATUS POST TOTAL RIGHT KNEE REPLACEMENT: ICD-10-CM

## 2024-02-26 DIAGNOSIS — M17.12 PRIMARY OSTEOARTHRITIS OF LEFT KNEE: ICD-10-CM

## 2024-02-26 PROCEDURE — 97110 THERAPEUTIC EXERCISES: CPT

## 2024-02-26 PROCEDURE — 97112 NEUROMUSCULAR REEDUCATION: CPT

## 2024-02-26 NOTE — PROGRESS NOTES
"Daily Note     Today's date: 2024  Patient name: Diego Dumont  : 1940  MRN: [id number]  Referring provider: Jamal Gamboa DO  Dx:   Encounter Diagnosis     ICD-10-CM    1. Primary osteoarthritis of right knee  M17.11       2. Status post total right knee replacement  Z96.651       3. Primary osteoarthritis of left knee  M17.12           Start Time: 1045  Stop Time: 1125  Total time in clinic (min): 40 minutes    Subjective: Pt noted that the past 2 days has been great and no pain.       Objective: See treatment diary below      Assessment:  Continued with treatment session with focus on R knee s/pTKA completed on 23. Pt noted no changes in pain status t/o PT session. Minimal to no verbal cues required for proper form except with slider exercises (bwd lunges as well as star). Tolerated treatment well. Patient exhibited good technique with therapeutic exercises and would benefit from continued PT      Plan: Continue per plan of care.      Precautions: R TKA 23, HTN, Hx prostate CA  Access Code: [email or web address]    POC expires Unit limit Auth  expiration date PT/OT + Visit Limit?   3/28/24 N/A 24 BOMN  $20                 Visit/Unit Tracking  AUTH Status:  Date 2/22 2/26  1/23 1/25 1/30 2/1 2/5 2/9 2/12 2/15 2/19   Approved 24 Used 1 2  16 17 18 19 20 21 22 23 24    Remaining  23   8 7 6 5 4 3 2 1 0      Manuals 2/12 2/15 2/19 2/22 2/26  1/30 2/1 2/5 2/9   R knee PROM with OP BE  Ext only BE  Ext only BE  Ext only Not needed, 0* ext  D/C  MS   Ext focus  BE ext focus SC ext focus  BE  Ext only    Patellar mobility                             Re-evaluation        BE                  Neuro Re-Ed             Tandem stance on foam  2x30\"      2x30\" ea      Mikey step overs fwd and lat  3 laps reciprocally fwd with foam pads  3 laps reciprocally fwd with foam pads     Add foam NV      Slider clocks  5x 10x 10x 10x 10x     5x  5x   R SLS 2x30\"       2x30\"   2x30\"    Bwd lunge with slider    10x 10x " "10x         Rockerboard taps fwd/bwd, left right    20x ea dir  20x ea. Direction         Ther Ex             Rec bike rocking for knee ROM L1 10' L1 10' L1 10' L1 10' L1 10 min   L1 10' L1 10' L1 10 min  L1 10 min    Knee / prone lying off edge of table              Supine heel props              Matrix knee ext 20# 3x10  Up 2, down 1 20# 3x10  Up 2, down 1 20# 3x10  Up 2, down 1 20# 3x10  Up 2, down 1 20# 3x10  Up 2, down 1  15# 3x10   15# 3x 10  20# 3x10    Matrix hamstring curls  40# 3x10   Down 2, up 1   40# 3x10   Down 2, up 1   40# 3x10   Down 2, up 1 40# 3x10   Down 2, up 1 40# 3x10   Down 2, up 1  40# 3x10  40# 3x 10  40# 3x10   LLLD knee ext stretch on Matrix  40# 5 min 40# 5 min 40# 5 min    40#   3 min  40# 5 min  40# 5 min    Lateral and monster walks with TB  RTB 5 laps ea RTB 5 laps ea RTB 5 laps ea RTB 5 laps     RTB 5 laps     Pt education        PT POC, HEP, knee extension ROM                  Ther Activity             Fwd step ups       Up and over 8\" x20  Up and over 8\" 30x  Up and over 8\" 30x    Lat step downs       8\" x 10  8\" 2x 10  8\" 2x10   Sit to stands 5# KB   3x10 no UE 5# KB   3x10 no UE 5# KB   3x10 no UE 5# KB   2x10 no UE 5# 2x 10   2x10 no UE  2x 10 no UE  5# KB   2x10 no UE                             Gait Training             SPC ambulation             Stair navigation             Modalities                                                                 1 on1 time for 25 minutes on 2/26/24.   "

## 2024-02-27 DIAGNOSIS — I10 BENIGN ESSENTIAL HYPERTENSION: ICD-10-CM

## 2024-02-27 RX ORDER — ATENOLOL 50 MG/1
50 TABLET ORAL DAILY
Qty: 90 TABLET | Refills: 1 | Status: SHIPPED | OUTPATIENT
Start: 2024-02-27

## 2024-02-29 ENCOUNTER — EVALUATION (OUTPATIENT)
Dept: PHYSICAL THERAPY | Facility: CLINIC | Age: 84
End: 2024-02-29
Payer: COMMERCIAL

## 2024-02-29 DIAGNOSIS — M17.11 PRIMARY OSTEOARTHRITIS OF RIGHT KNEE: Primary | ICD-10-CM

## 2024-02-29 DIAGNOSIS — Z96.651 STATUS POST TOTAL RIGHT KNEE REPLACEMENT: ICD-10-CM

## 2024-02-29 PROCEDURE — 97140 MANUAL THERAPY 1/> REGIONS: CPT

## 2024-02-29 PROCEDURE — 97110 THERAPEUTIC EXERCISES: CPT

## 2024-02-29 NOTE — PROGRESS NOTES
"PT Discharge    Today's date: 2024  Patient name: Diego Dumont  : 1940  MRN: [id number]  Referring provider: Jamal Gamboa DO  Dx:   Encounter Diagnosis     ICD-10-CM    1. Primary osteoarthritis of right knee  M17.11       2. Status post total right knee replacement  Z96.651                      Assessment  Assessment details: Diego Dumont is a 83 y.o. male presenting to physical therapy for a reevaluation s/p R TKA on 23. Since his initial evaluation, he reports 90% improvement in his knee The patient has demonstrated improved right knee active and passive ROM as well as knee strength to WFL. He reports very minimal soreness in his knee and is able to complete all of his ADLs and household tasks without difficulties. He is navigating stairs reciprocally and without UE support and is able to complete TUG and 5 STS WNL. 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 He has met his goals, is independent with completing his HEP, and is to be DC to a comprehensive HEP at this time.       Understanding of Dx/Px/POC: excellent   Prognosis: good    Goals  STG to be achieved in 4 weeks:   The patient will report a decrease in right knee \"at worst\" subjective pain rating score to at least 6/10 to allow for improved tolerance for weightbearing activities. MET  The patient will increase right knee flexion AROM to at least 70 degrees to allow for improved functional mobility. MET  The patient will increase right knee extension MMT score to at least 3+/5 to allow for improved functional mobility. MET    LTG to be achieved by DC:   The patient will be independent in comprehensive HEP. MET  The patient will report no pain with usual activities. NOT MET  The patient will improve right knee flexion and extension AROM to WFL to allow for improved functional mobility. MET   The patient will increase right knee flexion and extension " "MMT score to WFL to allow for improved functional mobility. MET  The patient will be able to ambulate 2-3 miles without pain, difficulty or AD, to allow for return to hospital volunteering. MET  The patient will be able to ambulate one full flight of stairs without pain, difficulty, or AD to allow for navigation of his home. MET      Plan  Plan of Care beginning date: 2024  Plan of Care expiration date: 2024  Treatment plan discussed with: patient        Subjective Evaluation    History of Present Illness  Mechanism of injury: Diego reports 90% improvement since beginning PT services. He reports that his knee has full motion as he is able to fully bend and straighten the knee. He says that he is now able to ascend and descend the stairs without holding on to the handrails and reciprocally. He says that he continues to walk without an AD and is having no trouble with his everyday walking or community walking when at stores. 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 He says that on average he would do 5,000-7,000 steps in one shift and this is something he is really eager to get back to. He says that he hasn't experienced any buckling in the knee in the past few weeks. He reports mild soreness or \"bruised\" feeling in the knee and feels that this is normal and to be expected as he continues to heal following the surgery.                 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  Pain  Current pain ratin  At best pain ratin  At worst pain ratin  Location: R knee  Quality: dull ache (soreness)  Relieving factors: change in position and rest  Progression: improved    Social Support  Steps to enter house: yes  Stairs in house: yes   Lives in: multiple-level home  Lives with: " significant other    Employment status: not working  Treatments  Previous treatment: injection treatment, medication and physical therapy  Current treatment: physical therapy        Objective     Observations     Additional Observation Details  Well approximated TKA incision    Active Range of Motion   Left Knee   Flexion: 130 degrees   Extension: 0 degrees     Right Knee   Flexion: 120 degrees   Extension: 0 degrees     Passive Range of Motion     Right Knee   Flexion: 122 degrees   Extension: 0 degrees     Mobility   Patellar Mobility:   Left Knee   WFL: medial, lateral, superior and inferior.     Right Knee   WFL: medial, lateral, superior and inferior    Additional Mobility Details  Mild to moderate swelling suprapatellar area on R knee     Patellar Static Positioning   Left Knee: WFL  Right Knee: WFL    Strength/Myotome Testing     Left Hip   Planes of Motion   Flexion: 4+    Right Hip   Planes of Motion   Flexion: 4+    Left Knee   Flexion: 4+  Extension: 4+  Quadriceps contraction: good    Right Knee   Flexion: 4+  Extension: 4+  Quadriceps contraction: good    Ambulation   Weight-Bearing Status   Assistive device used: none    Ambulation: Level Surfaces   Ambulation without assistive device: independent    Observational Gait     Additional Observational Gait Details  Non antalgic, full TKE when ambulating    Functional Assessment        Comments  Post Operative IE 12/4/23:  TUG-21.25 seconds, with use of RW, antalgic gait pattern with loss of TKE in stance phase with decreased stance time on the R LE  5 STS-16.72 seconds, with use of B/L UEs for push off and eccentric control to chair, fair knee flexion when sitting in chair      Re-evaluation 1/4/24:  TUG- 10.41 seconds, no AD, good stability, non antalgic gait pattern with even step length bilaterally, loss of TKE  5 STS- 17.13 seconds, UE resting on LEs, even placement of feet with good knee flexion to chair, decreased eccentric control to chair, loss of  "TKE      Re-evaluation 2/1/24:  TUG- 8.79 seconds, no AD, good stability, non antalgic gait pattern with even step length bilaterally, loss of TKE, however reports of \"movement at the knee\"  5 STS- 12.34 seconds, UE resting on LEs, even placement of feet with good knee flexion to chair, mildly decreased eccentric control to chair, loss of TKE      Re-evaluation 2/29/24:  TUG- 6.69 seconds, no AD, good stability, non antalgic gait pattern with even step length bilaterally, full knee extension  5 STS- 10.95 seconds, UE resting on LEs, even placement of feet with good knee flexion to chair, good eccentric control to chair             Precautions: R TKA 11/30/23, HTN, Hx prostate CA  Access Code: [email or web address]    POC expires Unit limit Auth  expiration date PT/OT + Visit Limit?   3/28/24 N/A 8/30/24 BOMN  $20                 Visit/Unit Tracking  AUTH Status:  Date 2/22 2/26 2/29  1/25 1/30 2/1 2/5 2/9 2/12 2/15 2/19   Approved 24 Used 1 2 3  17 18 19 20 21 22 23 24    Remaining  23 22 21  7 6 5 4 3 2 1 0      Manuals 2/12 2/15 2/19 2/22 2/26 2/29 1/30 2/1 2/5 2/9   R knee PROM with OP BE  Ext only BE  Ext only BE  Ext only Not needed, 0* ext  D/C  MS   Ext focus  BE ext focus SC ext focus  BE  Ext only    Patellar mobility                             Re-evaluation      BE  BE                  Neuro Re-Ed             Tandem stance on foam  2x30\"      2x30\" ea      Mikey step overs fwd and lat  3 laps reciprocally fwd with foam pads  3 laps reciprocally fwd with foam pads     Add foam NV      Slider clocks  5x 10x 10x 10x 10x     5x  5x   R SLS 2x30\"       2x30\"   2x30\"    Bwd lunge with slider    10x 10x 10x         Rockerboard taps fwd/bwd, left right    20x ea dir  20x ea. Direction         Ther Ex             Rec bike rocking for knee ROM L1 10' L1 10' L1 10' L1 10' L1 10 min  L1 10' L1 10' L1 10' L1 10 min  L1 10 min    Knee / prone lying off edge of table              Supine heel props              Matrix knee ext 20# " "3x10  Up 2, down 1 20# 3x10  Up 2, down 1 20# 3x10  Up 2, down 1 20# 3x10  Up 2, down 1 20# 3x10  Up 2, down 1 20# 3x10  Up 2, down 1 15# 3x10   15# 3x 10  20# 3x10    Matrix hamstring curls  40# 3x10   Down 2, up 1   40# 3x10   Down 2, up 1   40# 3x10   Down 2, up 1 40# 3x10   Down 2, up 1 40# 3x10   Down 2, up 1 40# 3x10   Down 2, up 1 40# 3x10  40# 3x 10  40# 3x10   LLLD knee ext stretch on Matrix  40# 5 min 40# 5 min 40# 5 min    40#   3 min  40# 5 min  40# 5 min    Lateral and monster walks with TB  RTB 5 laps ea RTB 5 laps ea RTB 5 laps ea RTB 5 laps     RTB 5 laps     Pt education      HEP   PT POC, HEP, knee extension ROM                  Ther Activity             Fwd step ups       Up and over 8\" x20  Up and over 8\" 30x  Up and over 8\" 30x    Lat step downs       8\" x 10  8\" 2x 10  8\" 2x10   Sit to stands 5# KB   3x10 no UE 5# KB   3x10 no UE 5# KB   3x10 no UE 5# KB   2x10 no UE 5# 2x 10   2x10 no UE  2x 10 no UE  5# KB   2x10 no UE                             Gait Training             SPC ambulation             Stair navigation             Modalities                                                      "

## 2024-03-11 ENCOUNTER — RA CDI HCC (OUTPATIENT)
Dept: OTHER | Facility: HOSPITAL | Age: 84
End: 2024-03-11

## 2024-03-14 ENCOUNTER — RA CDI HCC (OUTPATIENT)
Dept: OTHER | Facility: HOSPITAL | Age: 84
End: 2024-03-14

## 2024-03-20 ENCOUNTER — OFFICE VISIT (OUTPATIENT)
Dept: FAMILY MEDICINE CLINIC | Facility: CLINIC | Age: 84
End: 2024-03-20
Payer: COMMERCIAL

## 2024-03-20 VITALS
OXYGEN SATURATION: 99 % | DIASTOLIC BLOOD PRESSURE: 76 MMHG | RESPIRATION RATE: 16 BRPM | WEIGHT: 168 LBS | HEIGHT: 68 IN | SYSTOLIC BLOOD PRESSURE: 144 MMHG | HEART RATE: 58 BPM | BODY MASS INDEX: 25.46 KG/M2

## 2024-03-20 DIAGNOSIS — Z00.00 MEDICARE ANNUAL WELLNESS VISIT, SUBSEQUENT: ICD-10-CM

## 2024-03-20 DIAGNOSIS — R60.9 DEPENDENT EDEMA: ICD-10-CM

## 2024-03-20 DIAGNOSIS — E55.9 VITAMIN D DEFICIENCY: ICD-10-CM

## 2024-03-20 DIAGNOSIS — Z85.46 PERSONAL HISTORY OF PROSTATE CANCER: ICD-10-CM

## 2024-03-20 DIAGNOSIS — N52.1 ERECTILE DYSFUNCTION DUE TO DISEASES CLASSIFIED ELSEWHERE: ICD-10-CM

## 2024-03-20 DIAGNOSIS — I10 BENIGN ESSENTIAL HYPERTENSION: Primary | ICD-10-CM

## 2024-03-20 DIAGNOSIS — N52.31 ERECTILE DYSFUNCTION AFTER RADICAL PROSTATECTOMY: ICD-10-CM

## 2024-03-20 PROBLEM — M23.8X1 CHONDRAL DEFECT OF CONDYLE OF RIGHT FEMUR: Status: RESOLVED | Noted: 2023-01-26 | Resolved: 2024-03-20

## 2024-03-20 PROBLEM — M25.361 INSTABILITY OF RIGHT KNEE JOINT: Status: RESOLVED | Noted: 2022-12-15 | Resolved: 2024-03-20

## 2024-03-20 PROBLEM — M25.361 RIGHT KNEE BUCKLING: Status: RESOLVED | Noted: 2022-12-15 | Resolved: 2024-03-20

## 2024-03-20 PROCEDURE — G0439 PPPS, SUBSEQ VISIT: HCPCS | Performed by: FAMILY MEDICINE

## 2024-03-20 PROCEDURE — 99213 OFFICE O/P EST LOW 20 MIN: CPT | Performed by: FAMILY MEDICINE

## 2024-03-20 RX ORDER — SILDENAFIL 100 MG/1
100 TABLET, FILM COATED ORAL AS NEEDED
Qty: 30 TABLET | Refills: 3 | Status: SHIPPED | OUTPATIENT
Start: 2024-03-20

## 2024-03-20 NOTE — ASSESSMENT & PLAN NOTE
Patient has had dependent edema in bilateral ankles previously.  Now that he is status post right total knee arthroplasty is experiencing slightly more edema in his right lower extremity.  He does take over-the-counter diuretic

## 2024-03-20 NOTE — PATIENT INSTRUCTIONS
Medicare Preventive Visit Patient Instructions  Thank you for completing your Welcome to Medicare Visit or Medicare Annual Wellness Visit today. Your next wellness visit will be due in one year (3/21/2025).  The screening/preventive services that you may require over the next 5-10 years are detailed below. Some tests may not apply to you based off risk factors and/or age. Screening tests ordered at today's visit but not completed yet may show as past due. Also, please note that scanned in results may not display below.  Preventive Screenings:  Service Recommendations Previous Testing/Comments   Colorectal Cancer Screening  Colonoscopy    Fecal Occult Blood Test (FOBT)/Fecal Immunochemical Test (FIT)  Fecal DNA/Cologuard Test  Flexible Sigmoidoscopy Age: 45-75 years old   Colonoscopy: every 10 years (May be performed more frequently if at higher risk)  OR  FOBT/FIT: every 1 year  OR  Cologuard: every 3 years  OR  Sigmoidoscopy: every 5 years  Screening may be recommended earlier than age 45 if at higher risk for colorectal cancer. Also, an individualized decision between you and your healthcare provider will decide whether screening between the ages of 76-85 would be appropriate. Colonoscopy: 03/03/2020  FOBT/FIT: Not on file  Cologuard: Not on file  Sigmoidoscopy: Not on file    Screening Current     Prostate Cancer Screening Individualized decision between patient and health care provider in men between ages of 55-69   Medicare will cover every 12 months beginning on the day after your 50th birthday PSA: 0.3 ng/mL     History Prostate Cancer  Screening Not Indicated     Hepatitis C Screening Once for adults born between 1945 and 1965  More frequently in patients at high risk for Hepatitis C Hep C Antibody: Not on file    Screening Not Indicated   Diabetes Screening 1-2 times per year if you're at risk for diabetes or have pre-diabetes Fasting glucose: 88 mg/dL (9/11/2023)  A1C: 5.7 % (11/3/2023)  Screening Current    Cholesterol Screening Once every 5 years if you don't have a lipid disorder. May order more often based on risk factors. Lipid panel: 09/11/2023  Screening Current      Other Preventive Screenings Covered by Medicare:  Abdominal Aortic Aneurysm (AAA) Screening: covered once if your at risk. You're considered to be at risk if you have a family history of AAA or a male between the age of 65-75 who smoking at least 100 cigarettes in your lifetime.  Lung Cancer Screening: covers low dose CT scan once per year if you meet all of the following conditions: (1) Age 55-77; (2) No signs or symptoms of lung cancer; (3) Current smoker or have quit smoking within the last 15 years; (4) You have a tobacco smoking history of at least 20 pack years (packs per day x number of years you smoked); (5) You get a written order from a healthcare provider.  Glaucoma Screening: covered annually if you're considered high risk: (1) You have diabetes OR (2) Family history of glaucoma OR (3)  aged 50 and older OR (4)  American aged 65 and older  Osteoporosis Screening: covered every 2 years if you meet one of the following conditions: (1) Have a vertebral abnormality; (2) On glucocorticoid therapy for more than 3 months; (3) Have primary hyperparathyroidism; (4) On osteoporosis medications and need to assess response to drug therapy.  HIV Screening: covered annually if you're between the age of 15-65. Also covered annually if you are younger than 15 and older than 65 with risk factors for HIV infection. For pregnant patients, it is covered up to 3 times per pregnancy.    Immunizations:  Immunization Recommendations   Influenza Vaccine Annual influenza vaccination during flu season is recommended for all persons aged >= 6 months who do not have contraindications   Pneumococcal Vaccine   * Pneumococcal conjugate vaccine = PCV13 (Prevnar 13), PCV15 (Vaxneuvance), PCV20 (Prevnar 20)  * Pneumococcal polysaccharide vaccine =  PPSV23 (Pneumovax) Adults 19-63 yo with certain risk factors or if 65+ yo  If never received any pneumonia vaccine: recommend Prevnar 20 (PCV20)  Give PCV20 if previously received 1 dose of PCV13 or PPSV23   Hepatitis B Vaccine 3 dose series if at intermediate or high risk (ex: diabetes, end stage renal disease, liver disease)   Respiratory syncytial virus (RSV) Vaccine - COVERED BY MEDICARE PART D  * RSVPreF3 (Arexvy) CDC recommends that adults 60 years of age and older may receive a single dose of RSV vaccine using shared clinical decision-making (SCDM)   Tetanus (Td) Vaccine - COST NOT COVERED BY MEDICARE PART B Following completion of primary series, a booster dose should be given every 10 years to maintain immunity against tetanus. Td may also be given as tetanus wound prophylaxis.   Tdap Vaccine - COST NOT COVERED BY MEDICARE PART B Recommended at least once for all adults. For pregnant patients, recommended with each pregnancy.   Shingles Vaccine (Shingrix) - COST NOT COVERED BY MEDICARE PART B  2 shot series recommended in those 19 years and older who have or will have weakened immune systems or those 50 years and older     Health Maintenance Due:      Topic Date Due   • Colorectal Cancer Screening  03/03/2023     Immunizations Due:      Topic Date Due   • COVID-19 Vaccine (6 - 2023-24 season) 09/01/2023     Advance Directives   What are advance directives?  Advance directives are legal documents that state your wishes and plans for medical care. These plans are made ahead of time in case you lose your ability to make decisions for yourself. Advance directives can apply to any medical decision, such as the treatments you want, and if you want to donate organs.   What are the types of advance directives?  There are many types of advance directives, and each state has rules about how to use them. You may choose a combination of any of the following:  Living will:  This is a written record of the treatment you  want. You can also choose which treatments you do not want, which to limit, and which to stop at a certain time. This includes surgery, medicine, IV fluid, and tube feedings.   Durable power of  for healthcare (DPAHC):  This is a written record that states who you want to make healthcare choices for you when you are unable to make them for yourself. This person, called a proxy, is usually a family member or a friend. You may choose more than 1 proxy.  Do not resuscitate (DNR) order:  A DNR order is used in case your heart stops beating or you stop breathing. It is a request not to have certain forms of treatment, such as CPR. A DNR order may be included in other types of advance directives.  Medical directive:  This covers the care that you want if you are in a coma, near death, or unable to make decisions for yourself. You can list the treatments you want for each condition. Treatment may include pain medicine, surgery, blood transfusions, dialysis, IV or tube feedings, and a ventilator (breathing machine).  Values history:  This document has questions about your views, beliefs, and how you feel and think about life. This information can help others choose the care that you would choose.  Why are advance directives important?  An advance directive helps you control your care. Although spoken wishes may be used, it is better to have your wishes written down. Spoken wishes can be misunderstood, or not followed. Treatments may be given even if you do not want them. An advance directive may make it easier for your family to make difficult choices about your care.   Weight Management   Why it is important to manage your weight:  Being overweight increases your risk of health conditions such as heart disease, high blood pressure, type 2 diabetes, and certain types of cancer. It can also increase your risk for osteoarthritis, sleep apnea, and other respiratory problems. Aim for a slow, steady weight loss. Even a  small amount of weight loss can lower your risk of health problems.  How to lose weight safely:  A safe and healthy way to lose weight is to eat fewer calories and get regular exercise. You can lose up about 1 pound a week by decreasing the number of calories you eat by 500 calories each day.   Healthy meal plan for weight management:  A healthy meal plan includes a variety of foods, contains fewer calories, and helps you stay healthy. A healthy meal plan includes the following:  Eat whole-grain foods more often.  A healthy meal plan should contain fiber. Fiber is the part of grains, fruits, and vegetables that is not broken down by your body. Whole-grain foods are healthy and provide extra fiber in your diet. Some examples of whole-grain foods are whole-wheat breads and pastas, oatmeal, brown rice, and bulgur.  Eat a variety of vegetables every day.  Include dark, leafy greens such as spinach, kale, joanna greens, and mustard greens. Eat yellow and orange vegetables such as carrots, sweet potatoes, and winter squash.   Eat a variety of fruits every day.  Choose fresh or canned fruit (canned in its own juice or light syrup) instead of juice. Fruit juice has very little or no fiber.  Eat low-fat dairy foods.  Drink fat-free (skim) milk or 1% milk. Eat fat-free yogurt and low-fat cottage cheese. Try low-fat cheeses such as mozzarella and other reduced-fat cheeses.  Choose meat and other protein foods that are low in fat.  Choose beans or other legumes such as split peas or lentils. Choose fish, skinless poultry (chicken or turkey), or lean cuts of red meat (beef or pork). Before you cook meat or poultry, cut off any visible fat.   Use less fat and oil.  Try baking foods instead of frying them. Add less fat, such as margarine, sour cream, regular salad dressing and mayonnaise to foods. Eat fewer high-fat foods. Some examples of high-fat foods include french fries, doughnuts, ice cream, and cakes.  Eat fewer sweets.   Limit foods and drinks that are high in sugar. This includes candy, cookies, regular soda, and sweetened drinks.  Exercise:  Exercise at least 30 minutes per day on most days of the week. Some examples of exercise include walking, biking, dancing, and swimming. You can also fit in more physical activity by taking the stairs instead of the elevator or parking farther away from stores. Ask your healthcare provider about the best exercise plan for you.      © Copyright [employer] 2018 Information is for End User's use only and may not be sold, redistributed or otherwise used for commercial purposes. All illustrations and images included in CareNotes® are the copyrighted property of [employer] or [employer]

## 2024-03-20 NOTE — PROGRESS NOTES
Assessment and Plan:     Problem List Items Addressed This Visit        Cardiovascular and Mediastinum    Benign essential hypertension - Primary     Well-controlled on atenolol 50 mg once daily.  Continue same         Relevant Orders    Lipid panel    TSH, 3rd generation with Free T4 reflex       Oncology    Personal history of prostate cancer     Diagnostic PSA has been ordered by urology.  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  Now that he is status post right total knee arthroplasty is experiencing slightly more edema in his right lower extremity.  He does take over-the-counter diuretic         Relevant Orders    Lipid panel    TSH, 3rd generation with Free T4 reflex    Medicare annual wellness visit, subsequent     -Subsequent annual wellness visit completed         Vitamin D deficiency     Remains on vitamin D supplementation        Other Visit Diagnoses     Erectile dysfunction due to diseases classified elsewhere        Relevant Medications    sildenafil (VIAGRA) 100 mg tablet          BMI Counseling: Body mass index is 25.54 kg/m². The BMI is above normal. Exercise recommendations include moderate physical activity 150 minutes/week. Rationale for BMI follow-up plan is due to patient being overweight or obese.     Depression Screening and Follow-up Plan: Patient was screened for depression during today's encounter. They screened negative with a PHQ-2 score of 0.      Preventive health issues were discussed with patient, and age appropriate screening tests were ordered as noted in patient's After Visit Summary.  Personalized health advice and appropriate referrals for health education or  preventive services given if needed, as noted in patient's After Visit Summary.     History of Present Illness:     Patient presents for Medicare Annual Wellness visit    Patient Care Team:  Jamal Gamboa DO as PCP - General  Jamal Gamboa DO as PCP - PCP-[facility] ([facility])  Jamal Gamboa DO as PCP - PCP-[facility] ([facility])  MD Collin Acosta MD (Dermatology)  Thierry Chowdhury DO (Gastroenterology)  Marlin Batres PA-C as Physician Assistant (Physician Assistant)  Ace Zhu MD (Orthopedic Surgery)  Patrica Barreto RN as Nurse Navigator (Orthopedics)     Problem List:     Patient Active Problem List   Diagnosis   • ZAINAB (obstructive sleep apnea)   • Benign essential hypertension   • Vitamin D deficiency   • Medicare annual wellness visit, subsequent   • 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   • Bradycardia   • Arthritis of lumbar spine   • Low back pain   • Squamous cell carcinoma of skin   • Toe pain, right   • Palpitations   • Environmental allergies   • Dermatitis of right foot   • Arthralgia of both knees   • Raynaud's phenomenon without gangrene   • Dependent edema   • Encounter for geriatric assessment   • Personal history of prostate cancer      Past Medical and Surgical History:     Past Medical History:   Diagnosis Date   • Actinic keratosis     Last assessed - 10/15/14   • Cortical age-related cataract of both eyes 03/22/2022   • CPAP (continuous positive airway pressure) dependence    • Hypertension    • Hypokalemia     Last assessed - 8/13/14   • Personal history of COVID-19     09/2023 - mild symptoms   • Prostate cancer (HCC)     tx w radiation only   • Prostate cancer (HCC) 02/12/2014   • Sinus bradycardia     Last assessed - 8/13/14   • Sleep apnea      Past Surgical History:   Procedure Laterality Date   • CATARACT EXTRACTION Bilateral    • COLONOSCOPY      x 4   • HERNIA REPAIR     • OR ARTHRP KNE CONDYLE&PLATU MEDIAL&LAT COMPARTMENTS Right  11/30/2023    Procedure: ARTHROPLASTY KNEE TOTAL W ROBOT;  Surgeon: Ace Zhu MD;  Location: [facility];  Service: Orthopedics   • SIGMOIDOSCOPY      (Fiberoptic, Therapeutic) 7/28/2014, 8/25/14      Family History:     Family History   Problem Relation Age of Onset   • Prostate cancer Father    • No Known Problems Mother    • Liver cancer Brother         Adenocarcinoma    • No Known Problems Sister    • No Known Problems Sister    • No Known Problems Son    • No Known Problems Son    • Leukemia Daughter         Remission-Bone Marrow transplant   • No Known Problems Daughter       Social History:     Social History     Socioeconomic History   • Marital status:      Spouse name: None   • Number of children: None   • Years of education: None   • Highest education level: None   Occupational History   • Occupation: Retired   Tobacco Use   • Smoking status: Never   • Smokeless tobacco: Never   • Tobacco comments:     Former smoker per [employer]    Vaping Use   • Vaping status: Never Used   Substance and Sexual Activity   • Alcohol use: No     Comment: Seldomly per [employer] - not in years   • Drug use: No   • Sexual activity: Not Currently   Other Topics Concern   • None   Social History Narrative    Caffeine use - 2 cups daily     and  noted per [employer]     Social Determinants of Health     Financial Resource Strain: Low Risk  (3/15/2023)    Overall Financial Resource Strain (CARDIA)    • Difficulty of Paying Living Expenses: Not very hard   Food Insecurity: No Food Insecurity (3/20/2024)    Hunger Vital Sign    • Worried About Running Out of Food in the Last Year: Never true    • Ran Out of Food in the Last Year: Never true   Transportation Needs: No Transportation Needs (3/20/2024)    PRAPARE - Transportation    • Lack of Transportation (Medical): No    • Lack of Transportation (Non-Medical): No   Physical Activity: Not on file   Stress: Not on file   Social Connections: Not on  file   Intimate Partner Violence: Not on file   Housing Stability: Low Risk  (3/20/2024)    Housing Stability Vital Sign    • Unable to Pay for Housing in the Last Year: No    • Number of Places Lived in the Last Year: 1    • Unstable Housing in the Last Year: No      Medications and Allergies:     Current Outpatient Medications   Medication Sig Dispense Refill   • ascorbic acid (VITAMIN C) 500 MG tablet Take 1 tablet (500 mg total) by mouth daily Start 30 days prior to surgery 30 tablet 0   • atenolol (TENORMIN) 50 mg tablet Take 1 tablet (50 mg total) by mouth daily 90 tablet 1   • Cholecalciferol (VITAMIN D3) 1000 UNIT/SPRAY LIQD Take 1 tablet by mouth daily     • Coenzyme Q10 100 MG CHEW Chew 200 mg     • Diclofenac Sodium (VOLTAREN) 1 % Apply 2 g topically 4 (four) times a day 100 g 3   • ferrous sulfate 324 (65 Fe) mg Take 1 tablet (324 mg total) by mouth 2 (two) times a day before meals Start 30 days prior to surgery 60 tablet 0   • folic acid (FOLVITE) 1 mg tablet Take 1 tablet (1 mg total) by mouth daily Start 30 days prior to surgery 30 tablet 0   • ofloxacin (OCUFLOX) 0.3 % ophthalmic solution      • Omega-3-Acid Eth Est, Dietary, 1 g CAPS Take by mouth     • oxyCODONE (ROXICODONE) 5 immediate release tablet 1 pill po Q4 Hrs prn 30 tablet 0   • Potassium 99 MG TABS Take 1 tablet by mouth 2 (two) times a day     • sildenafil (VIAGRA) 100 mg tablet Take 1 tablet (100 mg total) by mouth as needed for erectile dysfunction 30 tablet 3   • azelastine (ASTELIN) 0.1 % nasal spray SPRAY 1 SPRAY INTO EACH NOSTRIL 2 (TWO) TIMES A DAY USE IN EACH NOSTRIL AS DIRECTED (Patient not taking: Reported on 11/30/2023) 1 Bottle 1   • betamethasone valerate (VALISONE) 0.1 % cream Apply topically 2 (two) times a day (Patient not taking: Reported on 3/20/2024) 45 g 0   • enoxaparin (LOVENOX) 40 mg/0.4 mL Inject 0.4 mL (40 mg total) under the skin daily for 28 days Start injections after surgery 11.2 mL 0   • fluorouracil  "(EFUDEX) 5 % cream APPLY TO HANDS TWICE A DAY X 3 WEEKS THEN D/C (Patient not taking: Reported on 12/8/2023)       No current facility-administered medications for this visit.     No Known Allergies     Immunizations:     Immunization History   Administered Date(s) Administered   • COVID-19 MODERNA VACC 0.5 ML IM 01/06/2021, 02/03/2021, 10/29/2021, 05/10/2022   • COVID-19 Moderna Vac BIVALENT 12 Yr+ IM 0.5 ML 10/13/2022   • INFLUENZA 10/20/2015, 09/13/2016, 10/03/2016, 10/17/2017, 09/22/2018, 11/09/2018, 10/09/2019, 10/15/2021, 10/04/2022, 10/18/2023   • Influenza Split High Dose Preservative Free IM 10/20/2015   • Influenza, seasonal, injectable 10/03/2016   • Pneumococcal Conjugate 13-Valent 12/18/2015   • Pneumococcal Polysaccharide PPV23 02/12/2014, 10/03/2016, 03/27/2018   • Respiratory Syncytial Virus Vaccine (Recombinant, Adjuvanted) 12/29/2023   • Tdap 01/01/2006, 10/17/2017   • Zoster 08/02/2016   • Zoster Vaccine Recombinant 07/03/2018, 11/09/2018   • influenza, trivalent, adjuvanted 10/09/2019      Health Maintenance:         Topic Date Due   • Colorectal Cancer Screening  03/03/2023         Topic Date Due   • COVID-19 Vaccine (6 - 2023-24 season) 09/01/2023      Medicare Health Risk Assessment:     /76   Pulse 58   Resp 16   Ht 5' 8\" (1.727 m)   Wt 76.2 kg (168 lb)   SpO2 99%   BMI 25.54 kg/m²      Diego is here for his Subsequent Wellness visit. Last Medicare Wellness visit information reviewed, patient interviewed, no change since last AWV.     Health Risk Assessment:   Patient rates overall health as very good. Patient feels that their physical health rating is same. Patient is very satisfied with their life. Eyesight was rated as same. Hearing was rated as same. Patient feels that their emotional and mental health rating is same. Patients states they are never, rarely angry. Patient states they are never, rarely unusually tired/fatigued. Pain experienced in the last 7 days has been none. " Patient states that he has experienced no weight loss or gain in last 6 months.     Depression Screening:   PHQ-2 Score: 0      Fall Risk Screening:   In the past year, patient has experienced: no history of falling in past year      Home Safety:  Patient has trouble with stairs inside or outside of their home. Patient has working smoke alarms and has no working carbon monoxide detector. Home safety hazards include: none.     Nutrition:   Current diet is Regular and Limited junk food.     Medications:   Patient is currently taking over-the-counter supplements. OTC medications include: see medication list. Patient is able to manage medications.     Activities of Daily Living (ADLs)/Instrumental Activities of Daily Living (IADLs):   Walk and transfer into and out of bed and chair?: Yes  Dress and groom yourself?: Yes    Bathe or shower yourself?: Yes    Feed yourself? Yes  Do your laundry/housekeeping?: Yes  Manage your money, pay your bills and track your expenses?: Yes  Make your own meals?: Yes    Do your own shopping?: Yes    Previous Hospitalizations:   Any hospitalizations or ED visits within the last 12 months?: No      Advance Care Planning:   Living will: Yes    Durable POA for healthcare: Yes    Advanced directive: Yes    Advanced directive counseling given: No    Five wishes given: No    Patient declined ACP directive: No    End of Life Decisions reviewed with patient: Yes    Provider agrees with end of life decisions: Yes      PREVENTIVE SCREENINGS      Cardiovascular Screening:    General: Screening Current      Diabetes Screening:     General: Screening Current      Colorectal Cancer Screening:     General: Screening Current      Prostate Cancer Screening:    General: History Prostate Cancer and Screening Not Indicated      Osteoporosis Screening:    General: Screening Not Indicated      Abdominal Aortic Aneurysm (AAA) Screening:        General: Screening Not Indicated      Lung Cancer Screening:      General: Screening Not Indicated      Hepatitis C Screening:    General: Screening Not Indicated    Screening, Brief Intervention, and Referral to Treatment (SBIRT)    Screening  Typical number of drinks in a day: 0  Typical number of drinks in a week: 0  Interpretation: Low risk drinking behavior.    AUDIT-C Screenin) How often did you have a drink containing alcohol in the past year? never  2) How many drinks did you have on a typical day when you were drinking in the past year? 0  3) How often did you have 6 or more drinks on one occasion in the past year? never    AUDIT-C Score: 0  Interpretation: Score 0-3 (male): Negative screen for alcohol misuse    Single Item Drug Screening:  How often have you used an illegal drug (including marijuana) or a prescription medication for non-medical reasons in the past year? never    Single Item Drug Screen Score: 0  Interpretation: Negative screen for possible drug use disorder      Jamal Gamboa DO    84-year-old male presents for with his wife for subsequent annual wellness visit and follow-up of chronic conditions including hypertension, history of migraine headaches.  Overall the patient has been feeling well.  Patient did have right knee total arthroplasty performed back in December.  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  He does develop some lower extremity edema and his right calf since having surgery performed.  No recent labs.  He is planning on having left total knee replacement done in July.  Has been using Efudex on the dorsum of both hands        Review of Systems   Constitutional: Negative.    HENT: Negative.     Eyes: Negative.    Respiratory: Negative.     Cardiovascular:  Positive for leg swelling (Right calf and foot since having the surgery).   Gastrointestinal: Negative.    Endocrine: Negative.    Genitourinary: Negative.    Musculoskeletal:  Positive for arthralgias (Now only his left knee).   Skin: Negative.     Allergic/Immunologic: Negative.    Neurological: Negative.    Hematological: Negative.    Psychiatric/Behavioral: Negative.     All other systems reviewed and are negative.      Physical Exam  Vitals and nursing note reviewed.   Constitutional:       Appearance: Normal appearance. He is well-developed.   HENT:      Head: Normocephalic and atraumatic.      Right Ear: Tympanic membrane, ear canal and external ear normal.      Left Ear: Tympanic membrane, ear canal and external ear normal.      Nose: Nose normal.   Eyes:      General: No scleral icterus.        Right eye: No discharge.         Left eye: No discharge.      Extraocular Movements: Extraocular movements intact.      Conjunctiva/sclera: Conjunctivae normal.      Pupils: Pupils are equal, round, and reactive to light.   Cardiovascular:      Rate and Rhythm: Normal rate and regular rhythm.      Pulses: Normal pulses.      Heart sounds: Normal heart sounds.   Pulmonary:      Effort: Pulmonary effort is normal.      Breath sounds: Normal breath sounds.   Abdominal:      General: Bowel sounds are normal.      Palpations: Abdomen is soft.   Genitourinary:     Penis: Normal.       Prostate: Normal.      Rectum: Normal.   Musculoskeletal:         General: No swelling or deformity. Normal range of motion.      Cervical back: Normal range of motion and neck supple.      Right lower leg: Edema (1+ pitting right lower extremity edema.  No calf tenderness) present.   Skin:     General: Skin is warm and dry.   Neurological:      General: No focal deficit present.      Mental Status: He is alert and oriented to person, place, and time. Mental status is at baseline.   Psychiatric:         Behavior: Behavior normal.         Thought Content: Thought content normal.         Judgment: Judgment normal.

## 2024-03-29 ENCOUNTER — TELEPHONE (OUTPATIENT)
Dept: OBGYN CLINIC | Facility: MEDICAL CENTER | Age: 84
End: 2024-03-29

## 2024-03-29 NOTE — TELEPHONE ENCOUNTER
Pt Called and will like to push back his sx date from July to end aug/ beg sept. Changed his sx date to 9/9 @[facility], also changed his post ops to 9/20 and 9/27, and his pre op w/ Dr Gamboa to 8/26/24. Will mail new letter with all changes in dates to pt

## 2024-04-04 ENCOUNTER — DOCUMENTATION (OUTPATIENT)
Dept: OBGYN CLINIC | Facility: CLINIC | Age: 84
End: 2024-04-04

## 2024-05-01 PROBLEM — Z00.00 MEDICARE ANNUAL WELLNESS VISIT, SUBSEQUENT: Status: RESOLVED | Noted: 2019-01-09 | Resolved: 2024-05-01

## 2024-08-05 ENCOUNTER — TELEPHONE (OUTPATIENT)
Dept: OBGYN CLINIC | Facility: HOSPITAL | Age: 84
End: 2024-08-05

## 2024-08-05 NOTE — TELEPHONE ENCOUNTER
Patient requesting surgery be postponed until October boubacar to caring for his wife currently. Please call patient to reschedule. Thank you.

## 2024-08-07 NOTE — TELEPHONE ENCOUNTER
Caller: Patient- Diego Dumont    Doctor: Dr Zhu    Reason for call: Patient is calling in stating that he is currently scheduled to have surgery on 9/9/24 with the Dr and is wanting to reschedule until October if he is able too. Please reach out to patient to provide him with another surgery date.    Call back#: [phone]

## 2024-08-16 ENCOUNTER — TELEPHONE (OUTPATIENT)
Age: 84
End: 2024-08-16

## 2024-08-16 NOTE — TELEPHONE ENCOUNTER
Caller: Self    Doctor: Marko    Reason for call: After rescheduling surgery, patient did not get a new timeframe to get preop testing done. Can he be notified of when he should get labs, preop clearance, etc?    Call back#: [phone]

## 2024-08-28 ENCOUNTER — TELEPHONE (OUTPATIENT)
Dept: UROLOGY | Facility: CLINIC | Age: 84
End: 2024-08-28

## 2024-08-28 DIAGNOSIS — I10 BENIGN ESSENTIAL HYPERTENSION: ICD-10-CM

## 2024-08-28 RX ORDER — ATENOLOL 50 MG/1
50 TABLET ORAL DAILY
Qty: 90 TABLET | Refills: 1 | Status: SHIPPED | OUTPATIENT
Start: 2024-08-28

## 2024-08-28 NOTE — TELEPHONE ENCOUNTER
Called and left VM for the PT's . PT PSA is needed PTV. Office number left for the PT if any questions.

## 2024-09-04 ENCOUNTER — TELEPHONE (OUTPATIENT)
Dept: UROLOGY | Facility: CLINIC | Age: 84
End: 2024-09-04

## 2024-09-04 ENCOUNTER — APPOINTMENT (OUTPATIENT)
Dept: LAB | Facility: CLINIC | Age: 84
End: 2024-09-04
Payer: COMMERCIAL

## 2024-09-04 LAB — PSA SERPL-MCNC: 0.25 NG/ML (ref 0–4)

## 2024-09-04 NOTE — TELEPHONE ENCOUNTER
Called and spoke to the PT. PSA is needed PTV for PT appt with Elaine SALTER on 9/6/24. PT verbalized understanding and will get done either today or tomorrow.

## 2024-09-06 ENCOUNTER — OFFICE VISIT (OUTPATIENT)
Dept: UROLOGY | Facility: CLINIC | Age: 84
End: 2024-09-06
Payer: COMMERCIAL

## 2024-09-06 VITALS
DIASTOLIC BLOOD PRESSURE: 82 MMHG | BODY MASS INDEX: 25.46 KG/M2 | OXYGEN SATURATION: 98 % | WEIGHT: 168 LBS | TEMPERATURE: 97.9 F | HEART RATE: 59 BPM | HEIGHT: 68 IN | SYSTOLIC BLOOD PRESSURE: 136 MMHG

## 2024-09-06 DIAGNOSIS — C61 PROSTATE CANCER (HCC): Primary | ICD-10-CM

## 2024-09-06 PROCEDURE — 1160F RVW MEDS BY RX/DR IN RCRD: CPT | Performed by: PHYSICIAN ASSISTANT

## 2024-09-06 PROCEDURE — 3075F SYST BP GE 130 - 139MM HG: CPT | Performed by: PHYSICIAN ASSISTANT

## 2024-09-06 PROCEDURE — 3079F DIAST BP 80-89 MM HG: CPT | Performed by: PHYSICIAN ASSISTANT

## 2024-09-06 PROCEDURE — 99213 OFFICE O/P EST LOW 20 MIN: CPT | Performed by: PHYSICIAN ASSISTANT

## 2024-09-06 PROCEDURE — 1159F MED LIST DOCD IN RCRD: CPT | Performed by: PHYSICIAN ASSISTANT

## 2024-09-06 NOTE — PROGRESS NOTES
9/6/2024      Chief Complaint   Patient presents with    Follow-up         Assessment and Plan    84 y.o. male     1. Prostate cancer status post XRT (2010)   - PSA (9/4/24) 0.252  - Follow up in 1 year with PSA prior     2. Erectile dysfunction  - Using Viagra 100 mg at this time  - Call with any questions or concerns in the meantime  - All questions answered; patient understands and agrees with plan       History of Present Illness  Diego Dumont is a 84 y.o. male patient with history of prostate cancer status post XRT (2010) here for follow up.      Patient's PSA continues to demonstrate stability and is currently 0.252.     Patient continues to report that his urination continues to be stable.  He feels that he has a good stream, empty after urination, and denies nocturia.  He denies any dysuria, gross hematuria, suprapubic pressure, flank pain, bone pain, or weight loss.     He continues to use Viagra 100 mg as needed for sexual dysfunction.  He does not suffer from any side effects from this medication.      Review of Systems   Constitutional:  Negative for activity change, appetite change, chills and fever.   HENT:  Negative for congestion and trouble swallowing.    Respiratory:  Negative for cough and shortness of breath.    Cardiovascular:  Negative for chest pain, palpitations and leg swelling.   Gastrointestinal:  Negative for abdominal pain, constipation, diarrhea, nausea and vomiting.   Genitourinary:  Negative for difficulty urinating, dysuria, flank pain, frequency, hematuria and urgency.   Musculoskeletal:  Negative for back pain and gait problem.   Skin:  Negative for wound.   Allergic/Immunologic: Negative for immunocompromised state.   Neurological:  Negative for dizziness and syncope.   Hematological:  Does not bruise/bleed easily.   Psychiatric/Behavioral:  Negative for confusion.    All other systems reviewed and are negative.          AUA SYMPTOM SCORE      Flowsheet Row Most Recent Value  "  AUA SYMPTOM SCORE    How often have you had a sensation of not emptying your bladder completely after you finished urinating? 0 (P)     How often have you had to urinate again less than two hours after you finished urinating? 1 (P)     How often have you found you stopped and started again several times when you urinate? 1 (P)     How often have you found it difficult to postpone urination? 3 (P)     How often have you had a weak urinary stream? 1 (P)     How often have you had to push or strain to begin urination? 0 (P)     How many times did you most typically get up to urinate from the time you went to bed at night until the time you got up in the morning? 1 (P)     Quality of Life: If you were to spend the rest of your life with your urinary condition just the way it is now, how would you feel about that? 2 (P)     AUA SYMPTOM SCORE 7 (P)               Vitals  Vitals:    09/06/24 1207   BP: 136/82   Pulse: 59   Temp: 97.9 °F (36.6 °C)   TempSrc: Temporal   SpO2: 98%   Weight: 76.2 kg (168 lb)   Height: 5' 8\" (1.727 m)       Physical Exam  Constitutional:       General: He is not in acute distress.     Appearance: Normal appearance. He is not ill-appearing, toxic-appearing or diaphoretic.   HENT:      Head: Normocephalic.      Nose: No congestion.   Eyes:      General: No scleral icterus.        Right eye: No discharge.         Left eye: No discharge.      Conjunctiva/sclera: Conjunctivae normal.      Pupils: Pupils are equal, round, and reactive to light.   Pulmonary:      Effort: Pulmonary effort is normal.   Musculoskeletal:      Cervical back: Normal range of motion.   Skin:     General: Skin is warm and dry.      Coloration: Skin is not jaundiced or pale.      Findings: No bruising, erythema, lesion or rash.   Neurological:      General: No focal deficit present.      Mental Status: He is alert and oriented to person, place, and time. Mental status is at baseline.      Gait: Gait normal.   Psychiatric:    "      Mood and Affect: Mood normal.         Behavior: Behavior normal.         Thought Content: Thought content normal.         Judgment: Judgment normal.           Past History  Past Medical History:   Diagnosis Date    Actinic keratosis     Last assessed - 10/15/14    Cortical age-related cataract of both eyes 03/22/2022    CPAP (continuous positive airway pressure) dependence     Hypertension     Hypokalemia     Last assessed - 8/13/14    Personal history of COVID-19     09/2023 - mild symptoms    Prostate cancer (HCC)     tx w radiation only    Prostate cancer (HCC) 02/12/2014    Sinus bradycardia     Last assessed - 8/13/14    Sleep apnea      Social History     Socioeconomic History    Marital status:      Spouse name: None    Number of children: None    Years of education: None    Highest education level: None   Occupational History    Occupation: Retired   Tobacco Use    Smoking status: Never     Passive exposure: Past    Smokeless tobacco: Never    Tobacco comments:     Former smoker per [employer]    Vaping Use    Vaping status: Never Used   Substance and Sexual Activity    Alcohol use: No     Comment: Seldomly per [employer] - not in years    Drug use: No    Sexual activity: Not Currently   Other Topics Concern    None   Social History Narrative    Caffeine use - 2 cups daily     and  noted per [employer]     Social Determinants of Health     Financial Resource Strain: Low Risk  (3/15/2023)    Overall Financial Resource Strain (CARDIA)     Difficulty of Paying Living Expenses: Not very hard   Food Insecurity: No Food Insecurity (3/20/2024)    Hunger Vital Sign     Worried About Running Out of Food in the Last Year: Never true     Ran Out of Food in the Last Year: Never true   Transportation Needs: No Transportation Needs (3/20/2024)    PRAPARE - Transportation     Lack of Transportation (Medical): No     Lack of Transportation (Non-Medical): No   Physical Activity: Not on file    Stress: Not on file   Social Connections: Not on file   Intimate Partner Violence: Not on file   Housing Stability: Low Risk  (3/20/2024)    Housing Stability Vital Sign     Unable to Pay for Housing in the Last Year: No     Number of Times Moved in the Last Year: 1     Homeless in the Last Year: No     Social History     Tobacco Use   Smoking Status Never    Passive exposure: Past   Smokeless Tobacco Never   Tobacco Comments    Former smoker per [employer]      Family History   Problem Relation Age of Onset    Prostate cancer Father     No Known Problems Mother     Liver cancer Brother         Adenocarcinoma     No Known Problems Sister     No Known Problems Sister     No Known Problems Son     No Known Problems Son     Leukemia Daughter         Remission-Bone Marrow transplant    No Known Problems Daughter        The following portions of the patient's history were reviewed and updated as appropriate: allergies, current medications, past medical history, past social history, past surgical history and problem list.    Results  No results found for this or any previous visit (from the past 1 hour(s)).]  Lab Results   Component Value Date    PSA 0.252 09/04/2024    PSA 0.3 02/28/2023    PSA 0.4 08/31/2022    PSA 0.5 08/11/2021     Lab Results   Component Value Date    CALCIUM 8.2 (L) 12/01/2023    K 4.0 12/01/2023    CO2 25 12/01/2023     (H) 12/01/2023    BUN 19 12/01/2023    CREATININE 0.88 12/01/2023     Lab Results   Component Value Date    WBC 8.20 12/01/2023    HGB 11.6 (L) 12/01/2023    HCT 35.1 (L) 12/01/2023    MCV 93 12/01/2023     12/01/2023       Elaine Farrell PA-C

## 2024-09-18 ENCOUNTER — OFFICE VISIT (OUTPATIENT)
Dept: URGENT CARE | Facility: CLINIC | Age: 84
End: 2024-09-18
Payer: COMMERCIAL

## 2024-09-18 VITALS
DIASTOLIC BLOOD PRESSURE: 74 MMHG | HEART RATE: 59 BPM | SYSTOLIC BLOOD PRESSURE: 152 MMHG | TEMPERATURE: 97.6 F | OXYGEN SATURATION: 97 % | RESPIRATION RATE: 18 BRPM

## 2024-09-18 DIAGNOSIS — U07.1 COVID: Primary | ICD-10-CM

## 2024-09-18 DIAGNOSIS — R05.1 ACUTE COUGH: ICD-10-CM

## 2024-09-18 LAB
SARS-COV-2 AG UPPER RESP QL IA: POSITIVE
VALID CONTROL: ABNORMAL

## 2024-09-18 PROCEDURE — 99214 OFFICE O/P EST MOD 30 MIN: CPT

## 2024-09-18 PROCEDURE — 87811 SARS-COV-2 COVID19 W/OPTIC: CPT

## 2024-09-18 PROCEDURE — S9088 SERVICES PROVIDED IN URGENT: HCPCS

## 2024-09-18 NOTE — PROGRESS NOTES
[facility]        NAME: Diego Dumont is a 84 y.o. male  : 1940    MRN: [id number]  DATE: 2024  TIME: 10:53 AM    Assessment and Plan   COVID [U07.1]  1. COVID        2. Acute cough  Poct Covid 19 Rapid Antigen Test        Rapid COVID positive.     Patient Instructions     Vitamin D3 2000 IU daily.  Vitamin C 1000mg twice per day.  Multivitamin daily.  Some studies suggest that Zinc 12.5-15mg every 2 hours while awake x 5 days may shorten the duration cold symptoms by 1-2 days.   Fluids and rest.  Nasal saline spray; Afrin if severe congestion (do not use for more than 3 days)  Over the counter cough/cold medications as needed.   Flonase nasal spray.  Tylenol/Ibuprofen for pain/fever.  Salt water gargles and/or chloraseptic spray.  Warm tea with honey.  Warm compresses over sinuses.  Nasal rinses with distilled water.     Follow up with PCP if symptoms persist past 10-14 days.  Proceed to the ER with worsening symptoms.     Chief Complaint     Chief Complaint   Patient presents with    Nasal Congestion     Pt with congestion and cough x2 week. Headache/sinus pressure x3 days. Very fatigued.          History of Present Illness       The patient presents today with complaints of nasal congestion, post nasal drip x 2 weeks. He states during that time his symptoms were quite mild and he did not feel ill. Over the past 2 days, he states his congestion has worsened, has a frequent runny nose, post nasal drip, fatigue, and dry cough. Denies fever/chills, body aches, SOB, wheezing. Reports he had COVID about 1-2 months ago. Works as a volunteer in the ER.         Review of Systems   Review of Systems   Constitutional:  Positive for fatigue. Negative for chills and fever.   HENT:  Positive for congestion, postnasal drip, rhinorrhea, sinus pressure and sinus pain. Negative for ear pain and sore throat.    Respiratory:  Positive for cough. Negative for shortness of breath and wheezing.     Gastrointestinal:  Negative for abdominal pain, diarrhea, nausea and vomiting.   Genitourinary:  Negative for difficulty urinating.   Musculoskeletal:  Negative for myalgias.   Skin:  Negative for rash.   Neurological:  Positive for headaches.         Current Medications       Current Outpatient Medications:     ascorbic acid (VITAMIN C) 500 MG tablet, Take 1 tablet (500 mg total) by mouth daily Start 30 days prior to surgery, Disp: 30 tablet, Rfl: 0    atenolol (TENORMIN) 50 mg tablet, TAKE 1 TABLET BY MOUTH EVERY DAY, Disp: 90 tablet, Rfl: 1    Cholecalciferol (VITAMIN D3) 1000 UNIT/SPRAY LIQD, Take 1 tablet by mouth daily, Disp: , Rfl:     Coenzyme Q10 100 MG CHEW, Chew 200 mg, Disp: , Rfl:     Potassium 99 MG TABS, Take 1 tablet by mouth 2 (two) times a day, Disp: , Rfl:     sildenafil (VIAGRA) 100 mg tablet, Take 1 tablet (100 mg total) by mouth as needed for erectile dysfunction, Disp: 30 tablet, Rfl: 3    azelastine (ASTELIN) 0.1 % nasal spray, SPRAY 1 SPRAY INTO EACH NOSTRIL 2 (TWO) TIMES A DAY USE IN EACH NOSTRIL AS DIRECTED (Patient not taking: Reported on 11/30/2023), Disp: 1 Bottle, Rfl: 1    betamethasone valerate (VALISONE) 0.1 % cream, Apply topically 2 (two) times a day (Patient not taking: Reported on 3/20/2024), Disp: 45 g, Rfl: 0    Diclofenac Sodium (VOLTAREN) 1 %, Apply 2 g topically 4 (four) times a day (Patient not taking: Reported on 9/18/2024), Disp: 100 g, Rfl: 3    enoxaparin (LOVENOX) 40 mg/0.4 mL, Inject 0.4 mL (40 mg total) under the skin daily for 28 days Start injections after surgery (Patient not taking: Reported on 9/18/2024), Disp: 11.2 mL, Rfl: 0    ferrous sulfate 324 (65 Fe) mg, Take 1 tablet (324 mg total) by mouth 2 (two) times a day before meals Start 30 days prior to surgery (Patient not taking: Reported on 9/18/2024), Disp: 60 tablet, Rfl: 0    fluorouracil (EFUDEX) 5 % cream, APPLY TO HANDS TWICE A DAY X 3 WEEKS THEN D/C (Patient not taking: Reported on 12/8/2023), Disp:  , Rfl:     folic acid (FOLVITE) 1 mg tablet, Take 1 tablet (1 mg total) by mouth daily Start 30 days prior to surgery (Patient not taking: Reported on 9/18/2024), Disp: 30 tablet, Rfl: 0    ofloxacin (OCUFLOX) 0.3 % ophthalmic solution, , Disp: , Rfl:     Omega-3-Acid Eth Est, Dietary, 1 g CAPS, Take by mouth (Patient not taking: Reported on 9/18/2024), Disp: , Rfl:     oxyCODONE (ROXICODONE) 5 immediate release tablet, 1 pill po Q4 Hrs prn (Patient not taking: Reported on 9/18/2024), Disp: 30 tablet, Rfl: 0    Current Allergies     Allergies as of 09/18/2024    (No Known Allergies)            The following portions of the patient's history were reviewed and updated as appropriate: allergies, current medications, past family history, past medical history, past social history, past surgical history and problem list.     Past Medical History:   Diagnosis Date    Actinic keratosis     Last assessed - 10/15/14    Cortical age-related cataract of both eyes 03/22/2022    CPAP (continuous positive airway pressure) dependence     Hypertension     Hypokalemia     Last assessed - 8/13/14    Personal history of COVID-19     09/2023 - mild symptoms    Prostate cancer (HCC)     tx w radiation only    Prostate cancer (HCC) 02/12/2014    Sinus bradycardia     Last assessed - 8/13/14    Sleep apnea        Past Surgical History:   Procedure Laterality Date    CATARACT EXTRACTION Bilateral     COLONOSCOPY      x 4    HERNIA REPAIR      IL ARTHRP KNE CONDYLE&PLATU MEDIAL&LAT COMPARTMENTS Right 11/30/2023    Procedure: ARTHROPLASTY KNEE TOTAL W ROBOT;  Surgeon: Ace Zhu MD;  Location: [facility];  Service: Orthopedics    SIGMOIDOSCOPY      (Fiberoptic, Therapeutic) 7/28/2014, 8/25/14       Family History   Problem Relation Age of Onset    Prostate cancer Father     No Known Problems Mother     Liver cancer Brother         Adenocarcinoma     No Known Problems Sister     No Known Problems Sister     No Known Problems Son      No Known Problems Son     Leukemia Daughter         Remission-Bone Marrow transplant    No Known Problems Daughter          Medications have been verified.        Objective   /74   Pulse 59   Temp 97.6 °F (36.4 °C)   Resp 18   SpO2 97%        Physical Exam     Physical Exam  Vitals and nursing note reviewed.   Constitutional:       General: He is not in acute distress.     Appearance: Normal appearance. He is not ill-appearing.   HENT:      Head: Normocephalic and atraumatic.      Right Ear: Tympanic membrane, ear canal and external ear normal.      Left Ear: Tympanic membrane, ear canal and external ear normal.      Nose: Congestion present. No rhinorrhea.      Right Sinus: No maxillary sinus tenderness or frontal sinus tenderness.      Left Sinus: No maxillary sinus tenderness or frontal sinus tenderness.      Mouth/Throat:      Lips: Pink.      Mouth: Mucous membranes are moist.      Pharynx: No oropharyngeal exudate or posterior oropharyngeal erythema.      Tonsils: No tonsillar exudate.   Eyes:      General: Vision grossly intact.      Extraocular Movements: Extraocular movements intact.      Pupils: Pupils are equal, round, and reactive to light.   Cardiovascular:      Rate and Rhythm: Regular rhythm. Bradycardia present.      Heart sounds: Normal heart sounds. No murmur heard.  Pulmonary:      Effort: Pulmonary effort is normal. No respiratory distress.      Breath sounds: Normal breath sounds. No decreased air movement. No decreased breath sounds, wheezing, rhonchi or rales.   Musculoskeletal:         General: Normal range of motion.      Cervical back: Normal range of motion.   Lymphadenopathy:      Cervical: No cervical adenopathy.   Skin:     General: Skin is warm.      Findings: No rash.   Neurological:      Mental Status: He is alert and oriented to person, place, and time.      Motor: Motor function is intact.      Gait: Gait is intact.   Psychiatric:         Attention and Perception:  Attention normal.         Mood and Affect: Mood normal.

## 2024-09-18 NOTE — PATIENT INSTRUCTIONS
Vitamin D3 2000 IU daily.  Vitamin C 1000mg twice per day.  Multivitamin daily.  Some studies suggest that Zinc 12.5-15mg every 2 hours while awake x 5 days may shorten the duration cold symptoms by 1-2 days.   Fluids and rest.  Nasal saline spray; Afrin if severe congestion (do not use for more than 3 days)  Over the counter cough/cold medications as needed.   Flonase nasal spray.  Tylenol/Ibuprofen for pain/fever.  Salt water gargles and/or chloraseptic spray.  Warm tea with honey.  Warm compresses over sinuses.  Nasal rinses with distilled water.     Follow up with PCP if symptoms persist past 10-14 days.  Proceed to the ER with worsening symptoms.

## 2024-09-23 ENCOUNTER — TELEPHONE (OUTPATIENT)
Dept: OBGYN CLINIC | Facility: HOSPITAL | Age: 84
End: 2024-09-23

## 2024-09-23 NOTE — TELEPHONE ENCOUNTER
Caller: Bill- Diego     Doctor: Dr Zhu    Reason for call: Patient is calling in from a missed call from the nurse navigator I advised that she will contact him right back.        Immediate Brief Procedure Note    Patient: Dave Hill    Pre-op Diagnosis: ascites     Post-op Diagnosis: same     Procedure: Right paracentesis     Proceduralist: Abril Chandra PA-C    Assistants: none     Anesthesia Staff: No anesthesia staff entered.    Anesthesia Type: local     Findings: 11.5 L cloudy serous fluid removed    Estimated Blood Loss: < 5cc     Complications: None immediately     Specimens Removed: 11.5 L cloudy serous fluid removed    Under direct supervision of Dr. Black

## 2024-09-28 ENCOUNTER — RA CDI HCC (OUTPATIENT)
Dept: OTHER | Facility: HOSPITAL | Age: 84
End: 2024-09-28

## 2024-10-03 ENCOUNTER — APPOINTMENT (OUTPATIENT)
Dept: LAB | Facility: CLINIC | Age: 84
End: 2024-10-03
Payer: COMMERCIAL

## 2024-10-03 DIAGNOSIS — C61 PROSTATE CANCER (HCC): ICD-10-CM

## 2024-10-03 DIAGNOSIS — Z01.818 OTHER SPECIFIED PRE-OPERATIVE EXAMINATION: ICD-10-CM

## 2024-10-03 DIAGNOSIS — R60.9 DEPENDENT EDEMA: ICD-10-CM

## 2024-10-03 DIAGNOSIS — I10 BENIGN ESSENTIAL HYPERTENSION: ICD-10-CM

## 2024-10-03 DIAGNOSIS — Z51.81 ANTICOAGULATION MANAGEMENT ENCOUNTER: ICD-10-CM

## 2024-10-03 DIAGNOSIS — M17.12 PRIMARY OSTEOARTHRITIS OF LEFT KNEE: ICD-10-CM

## 2024-10-03 DIAGNOSIS — Z79.01 ANTICOAGULATION MANAGEMENT ENCOUNTER: ICD-10-CM

## 2024-10-03 LAB
ALBUMIN SERPL BCG-MCNC: 4.2 G/DL (ref 3.5–5)
ALP SERPL-CCNC: 66 U/L (ref 34–104)
ALT SERPL W P-5'-P-CCNC: 24 U/L (ref 7–52)
ANION GAP SERPL CALCULATED.3IONS-SCNC: 4 MMOL/L (ref 4–13)
APTT PPP: 31 SECONDS (ref 23–34)
AST SERPL W P-5'-P-CCNC: 24 U/L (ref 13–39)
BASOPHILS # BLD AUTO: 0.06 THOUSANDS/ΜL (ref 0–0.1)
BASOPHILS NFR BLD AUTO: 1 % (ref 0–1)
BILIRUB SERPL-MCNC: 0.89 MG/DL (ref 0.2–1)
BUN SERPL-MCNC: 20 MG/DL (ref 5–25)
CALCIUM SERPL-MCNC: 9.3 MG/DL (ref 8.4–10.2)
CHLORIDE SERPL-SCNC: 109 MMOL/L (ref 96–108)
CHOLEST SERPL-MCNC: 188 MG/DL
CO2 SERPL-SCNC: 32 MMOL/L (ref 21–32)
CREAT SERPL-MCNC: 0.92 MG/DL (ref 0.6–1.3)
EOSINOPHIL # BLD AUTO: 0.42 THOUSAND/ΜL (ref 0–0.61)
EOSINOPHIL NFR BLD AUTO: 7 % (ref 0–6)
ERYTHROCYTE [DISTWIDTH] IN BLOOD BY AUTOMATED COUNT: 13.6 % (ref 11.6–15.1)
EST. AVERAGE GLUCOSE BLD GHB EST-MCNC: 126 MG/DL
GFR SERPL CREATININE-BSD FRML MDRD: 76 ML/MIN/1.73SQ M
GLUCOSE P FAST SERPL-MCNC: 95 MG/DL (ref 65–99)
HBA1C MFR BLD: 6 %
HCT VFR BLD AUTO: 42.8 % (ref 36.5–49.3)
HDLC SERPL-MCNC: 52 MG/DL
HGB BLD-MCNC: 13.9 G/DL (ref 12–17)
IMM GRANULOCYTES # BLD AUTO: 0.02 THOUSAND/UL (ref 0–0.2)
IMM GRANULOCYTES NFR BLD AUTO: 0 % (ref 0–2)
INR PPP: 1.1 (ref 0.85–1.19)
LDLC SERPL CALC-MCNC: 122 MG/DL (ref 0–100)
LYMPHOCYTES # BLD AUTO: 1.02 THOUSANDS/ΜL (ref 0.6–4.47)
LYMPHOCYTES NFR BLD AUTO: 18 % (ref 14–44)
MCH RBC QN AUTO: 30.6 PG (ref 26.8–34.3)
MCHC RBC AUTO-ENTMCNC: 32.5 G/DL (ref 31.4–37.4)
MCV RBC AUTO: 94 FL (ref 82–98)
MONOCYTES # BLD AUTO: 0.57 THOUSAND/ΜL (ref 0.17–1.22)
MONOCYTES NFR BLD AUTO: 10 % (ref 4–12)
NEUTROPHILS # BLD AUTO: 3.57 THOUSANDS/ΜL (ref 1.85–7.62)
NEUTS SEG NFR BLD AUTO: 64 % (ref 43–75)
NONHDLC SERPL-MCNC: 136 MG/DL
NRBC BLD AUTO-RTO: 0 /100 WBCS
PLATELET # BLD AUTO: 258 THOUSANDS/UL (ref 149–390)
PMV BLD AUTO: 10.3 FL (ref 8.9–12.7)
POTASSIUM SERPL-SCNC: 4.5 MMOL/L (ref 3.5–5.3)
PROT SERPL-MCNC: 7 G/DL (ref 6.4–8.4)
PROTHROMBIN TIME: 14.5 SECONDS (ref 12.3–15)
RBC # BLD AUTO: 4.54 MILLION/UL (ref 3.88–5.62)
SODIUM SERPL-SCNC: 145 MMOL/L (ref 135–147)
TRIGL SERPL-MCNC: 68 MG/DL
TSH SERPL DL<=0.05 MIU/L-ACNC: 1.14 UIU/ML (ref 0.45–4.5)
WBC # BLD AUTO: 5.66 THOUSAND/UL (ref 4.31–10.16)

## 2024-10-03 PROCEDURE — 86900 BLOOD TYPING SEROLOGIC ABO: CPT | Performed by: ORTHOPAEDIC SURGERY

## 2024-10-03 PROCEDURE — 80061 LIPID PANEL: CPT

## 2024-10-03 PROCEDURE — 80053 COMPREHEN METABOLIC PANEL: CPT

## 2024-10-03 PROCEDURE — 36415 COLL VENOUS BLD VENIPUNCTURE: CPT

## 2024-10-03 PROCEDURE — 86901 BLOOD TYPING SEROLOGIC RH(D): CPT | Performed by: ORTHOPAEDIC SURGERY

## 2024-10-03 PROCEDURE — 84443 ASSAY THYROID STIM HORMONE: CPT

## 2024-10-03 PROCEDURE — 85610 PROTHROMBIN TIME: CPT

## 2024-10-03 PROCEDURE — 83036 HEMOGLOBIN GLYCOSYLATED A1C: CPT

## 2024-10-03 PROCEDURE — 85730 THROMBOPLASTIN TIME PARTIAL: CPT

## 2024-10-03 PROCEDURE — 85025 COMPLETE CBC W/AUTO DIFF WBC: CPT

## 2024-10-03 PROCEDURE — 86850 RBC ANTIBODY SCREEN: CPT | Performed by: ORTHOPAEDIC SURGERY

## 2024-10-04 ENCOUNTER — LAB REQUISITION (OUTPATIENT)
Dept: LAB | Facility: HOSPITAL | Age: 84
End: 2024-10-04
Payer: COMMERCIAL

## 2024-10-04 DIAGNOSIS — Z01.818 ENCOUNTER FOR OTHER PREPROCEDURAL EXAMINATION: ICD-10-CM

## 2024-10-08 ENCOUNTER — CONSULT (OUTPATIENT)
Dept: FAMILY MEDICINE CLINIC | Facility: CLINIC | Age: 84
End: 2024-10-08

## 2024-10-08 VITALS
DIASTOLIC BLOOD PRESSURE: 70 MMHG | BODY MASS INDEX: 25.01 KG/M2 | WEIGHT: 165 LBS | HEART RATE: 64 BPM | HEIGHT: 68 IN | SYSTOLIC BLOOD PRESSURE: 124 MMHG | RESPIRATION RATE: 16 BRPM | OXYGEN SATURATION: 97 %

## 2024-10-08 DIAGNOSIS — Z01.818 PREOPERATIVE CLEARANCE: Primary | ICD-10-CM

## 2024-10-08 DIAGNOSIS — M17.10 ARTHRITIS OF KNEE: ICD-10-CM

## 2024-10-08 DIAGNOSIS — I10 BENIGN ESSENTIAL HYPERTENSION: ICD-10-CM

## 2024-10-08 NOTE — PATIENT INSTRUCTIONS
Pre-operative Medication Instructions    Avoid herbs or non-directed vitamins one week prior to surgery  Avoid aspirin containing medications or non-steroidal anti-inflammatory drugs one week preceding surgery  May take tylenol for pain up until the night before surgery    Beta Blockers     Medication Name     atenolol (TENORMIN) 50 mg tablet      Continue to take this heart medication on your normal schedule.  If this is an oral medication and you take it in the morning, then you may take this medicine with a sip of water.    Opioid Medications     Medication Name     oxyCODONE (ROXICODONE) 5 immediate release tablet      Continue to take this medication on your normal schedule.  If this is an oral medication and you take it in the morning, then you may take this medicine with a sip of water.

## 2024-10-08 NOTE — PROGRESS NOTES
Pre-operative Clearance  Name: Diego Dumont      : 1940      MRN: [id number]  Encounter Provider: Abigail Lamar MD  Encounter Date: 10/8/2024   Encounter department: [facility]    Assessment & Plan  Preoperative clearance  Labs reviewed, EKG reviewed, he is cleared for the surgery  Orders:    POCT ECG  normal EKG   Benign essential hypertension  Stable, cont betablocker, advised to check his blood pressure daily in the morning as sometimes he feels headaches and if his blood pressure remains high in the morning he should talk to his PCP today blood pressure is normal       Arthritis of knee  Left knee arthritis, he is going for knee arthroplasty       Pre-operative Clearance:     Revised Cardiac Risk Index:  RCI RISK CLASS I (0 risk factors, risk of major cardiac complications approximately 0.5%)    Clearance:  Patient is medically optimized (CLEARED) for proposed surgery without any additional cardiac testing.      Medication Instructions:   - Avoid herbs or non-directed vitamins one week prior to surgery    - Avoid aspirin containing medications or non-steroidal anti-inflammatory drugs one week preceding surgery    - May take tylenol for pain up until the night before surgery    - Beta blockers:  Continue to take this medication on your normal schedule.  - Opioids: Continue to take this medication on your normal schedule.       History of Present Illness     Pre-op Exam  Surgery: Left knee arthroplasty  Anticipated Date of Surgery: 10/28/24  Surgeon: Ace Zhu    Previous history of bleeding disorders or clots?: No  Previous Anesthesia reaction?: Yes  Prolonged steroid use in the last 6 months?: No    Assessment of Cardiac Risk:   - Unstable or severe angina or MI in the last 6 weeks or history of stent placement in the last year?: No   - Decompensated heart failure (e.g. New onset heart failure, NYHA  Class IV heart failure, or worsening existing heart failure)?: No  -  Significant arrhythmias such as high grade AV block, symptomatic ventricular arrhythmia, newly recognized ventricular tachycardia, supraventricular tachycardia with resting heart rate >100, or symptomatic bradycardia?: No  - Severe heart valve disease including aortic stenosis or symptomatic mitral stenosis?: No      Pre-operative Risk Factors:  Elevated-risk surgery: No    History of cerebrovascular disease: No    History of ischemic heart disease: No  Pre-operative treatment with insulin: No  Pre-operative creatinine >2 mg/dL: No    History of congestive heart failure: No    Review of Systems   Constitutional:  Negative for activity change, appetite change, chills, fatigue, fever and unexpected weight change.   HENT:  Negative for congestion, ear discharge, ear pain, nosebleeds, postnasal drip, rhinorrhea, sinus pressure, sneezing, sore throat, trouble swallowing and voice change.    Eyes:  Negative for photophobia, pain, discharge, redness and itching.   Respiratory:  Negative for cough, chest tightness, shortness of breath and wheezing.    Cardiovascular:  Negative for chest pain, palpitations and leg swelling.   Gastrointestinal:  Negative for abdominal pain, constipation, diarrhea, nausea and vomiting.   Endocrine: Negative for polyuria.   Genitourinary:  Negative for dysuria, frequency and urgency.   Musculoskeletal:  Positive for arthralgias. Negative for back pain, myalgias and neck pain.   Skin:  Negative for color change, pallor and rash.   Allergic/Immunologic: Negative for environmental allergies and food allergies.   Neurological:  Negative for dizziness, weakness, light-headedness and headaches.   Hematological:  Negative for adenopathy. Does not bruise/bleed easily.   Psychiatric/Behavioral:  Negative for behavioral problems. The patient is not nervous/anxious.      Past Medical History   Past Medical History:   Diagnosis Date    Actinic keratosis     Last assessed - 10/15/14    Cortical age-related  cataract of both eyes 03/22/2022    CPAP (continuous positive airway pressure) dependence     Hypertension     Hypokalemia     Last assessed - 8/13/14    Personal history of COVID-19     09/2023 - mild symptoms    Prostate cancer (HCC)     tx w radiation only    Prostate cancer (HCC) 02/12/2014    Sinus bradycardia     Last assessed - 8/13/14    Sleep apnea      Past Surgical History:   Procedure Laterality Date    CATARACT EXTRACTION Bilateral     COLONOSCOPY      x 4    HERNIA REPAIR      SC ARTHRP KNE CONDYLE&PLATU MEDIAL&LAT COMPARTMENTS Right 11/30/2023    Procedure: ARTHROPLASTY KNEE TOTAL W ROBOT;  Surgeon: Ace Zhu MD;  Location: [facility];  Service: Orthopedics    SIGMOIDOSCOPY      (Fiberoptic, Therapeutic) 7/28/2014, 8/25/14     Family History   Problem Relation Age of Onset    Prostate cancer Father     No Known Problems Mother     Liver cancer Brother         Adenocarcinoma     No Known Problems Sister     No Known Problems Sister     No Known Problems Son     No Known Problems Son     Leukemia Daughter         Remission-Bone Marrow transplant    No Known Problems Daughter      Social History     Tobacco Use    Smoking status: Never     Passive exposure: Past    Smokeless tobacco: Never    Tobacco comments:     Former smoker per [employer]    Vaping Use    Vaping status: Never Used   Substance and Sexual Activity    Alcohol use: No     Comment: Seldomly per [employer] - not in years    Drug use: No    Sexual activity: Not Currently     Current Outpatient Medications on File Prior to Visit   Medication Sig    ascorbic acid (VITAMIN C) 500 MG tablet Take 1 tablet (500 mg total) by mouth daily Start 30 days prior to surgery    atenolol (TENORMIN) 50 mg tablet TAKE 1 TABLET BY MOUTH EVERY DAY    azelastine (ASTELIN) 0.1 % nasal spray SPRAY 1 SPRAY INTO EACH NOSTRIL 2 (TWO) TIMES A DAY USE IN EACH NOSTRIL AS DIRECTED (Patient not taking: Reported on 11/30/2023)    betamethasone valerate  "(VALISONE) 0.1 % cream Apply topically 2 (two) times a day (Patient not taking: Reported on 3/20/2024)    Cholecalciferol (VITAMIN D3) 1000 UNIT/SPRAY LIQD Take 1 tablet by mouth daily    Coenzyme Q10 100 MG CHEW Chew 200 mg    Diclofenac Sodium (VOLTAREN) 1 % Apply 2 g topically 4 (four) times a day (Patient not taking: Reported on 9/18/2024)    enoxaparin (LOVENOX) 40 mg/0.4 mL Inject 0.4 mL (40 mg total) under the skin daily for 28 days Start injections after surgery (Patient not taking: Reported on 9/18/2024)    ferrous sulfate 324 (65 Fe) mg Take 1 tablet (324 mg total) by mouth 2 (two) times a day before meals Start 30 days prior to surgery (Patient not taking: Reported on 9/18/2024)    fluorouracil (EFUDEX) 5 % cream APPLY TO HANDS TWICE A DAY X 3 WEEKS THEN D/C (Patient not taking: Reported on 12/8/2023)    folic acid (FOLVITE) 1 mg tablet Take 1 tablet (1 mg total) by mouth daily Start 30 days prior to surgery (Patient not taking: Reported on 9/18/2024)    ofloxacin (OCUFLOX) 0.3 % ophthalmic solution  (Patient not taking: Reported on 9/18/2024)    Omega-3-Acid Eth Est, Dietary, 1 g CAPS Take by mouth (Patient not taking: Reported on 9/18/2024)    oxyCODONE (ROXICODONE) 5 immediate release tablet 1 pill po Q4 Hrs prn (Patient not taking: Reported on 9/18/2024)    Potassium 99 MG TABS Take 1 tablet by mouth 2 (two) times a day    sildenafil (VIAGRA) 100 mg tablet Take 1 tablet (100 mg total) by mouth as needed for erectile dysfunction     No Known Allergies  Objective     /70   Pulse 64   Resp 16   Ht 5' 8\" (1.727 m)   Wt 74.8 kg (165 lb)   SpO2 97%   BMI 25.09 kg/m²     Physical Exam  Vitals and nursing note reviewed.   Constitutional:       General: He is not in acute distress.     Appearance: Normal appearance.   HENT:      Head: Normocephalic and atraumatic.      Right Ear: Tympanic membrane and ear canal normal.      Left Ear: Tympanic membrane and ear canal normal.      Nose: Nose normal. " No rhinorrhea.      Mouth/Throat:      Mouth: Mucous membranes are moist.      Pharynx: Oropharynx is clear. No oropharyngeal exudate or posterior oropharyngeal erythema.   Eyes:      Extraocular Movements: Extraocular movements intact.      Conjunctiva/sclera: Conjunctivae normal.      Pupils: Pupils are equal, round, and reactive to light.   Cardiovascular:      Rate and Rhythm: Normal rate and regular rhythm.      Heart sounds: No murmur heard.  Pulmonary:      Effort: Pulmonary effort is normal.      Breath sounds: Normal breath sounds. No wheezing or rales.   Abdominal:      General: Abdomen is flat. Bowel sounds are normal. There is no distension.      Palpations: Abdomen is soft. There is no mass.      Tenderness: There is no abdominal tenderness. There is no guarding.   Musculoskeletal:         General: No swelling, tenderness, deformity or signs of injury. Normal range of motion.      Cervical back: Normal range of motion and neck supple.      Right lower leg: Edema present.      Left lower leg: No edema.   Skin:     Coloration: Skin is not jaundiced or pale.      Findings: No rash.   Neurological:      General: No focal deficit present.      Mental Status: He is alert and oriented to person, place, and time.      Motor: No weakness.   Psychiatric:         Mood and Affect: Mood normal.         Behavior: Behavior normal.         Thought Content: Thought content normal.         Judgment: Judgment normal.           Abigail Lamar MD

## 2024-10-08 NOTE — ASSESSMENT & PLAN NOTE
Stable, cont betablocker, advised to check his blood pressure daily in the morning as sometimes he feels headaches and if his blood pressure remains high in the morning he should talk to his PCP today blood pressure is normal

## 2024-10-09 NOTE — PRE-PROCEDURE INSTRUCTIONS
Pre-Surgery Instructions:   Medication Instructions    ascorbic acid (VITAMIN C) 500 MG tablet Take day of surgery.    atenolol (TENORMIN) 50 mg tablet Take day of surgery.    Cholecalciferol (VITAMIN D3) 1000 UNIT/SPRAY LIQD Stop taking 7 days prior to surgery.    Coenzyme Q10 100 MG CHEW Stop taking 7 days prior to surgery.    ferrous sulfate 324 (65 Fe) mg Take day of surgery.    folic acid (FOLVITE) 1 mg tablet Take day of surgery.    Potassium 99 MG TABS Stop taking 7 days prior to surgery.    sildenafil (VIAGRA) 100 mg tablet Stop taking 1 day prior to surgery.   Medication instructions for day surgery reviewed. Please use only a sip of water to take your instructed medications. Avoid all over the counter vitamins, supplements and NSAIDS for one week prior to surgery per anesthesia guidelines. Tylenol is ok to take as needed.     You will receive a call one business day prior to surgery with an arrival time and hospital directions. If your surgery is scheduled on a Monday, the hospital will be calling you on the Friday prior to your surgery. If you have not heard from anyone by 8pm, please call the hospital supervisor through the hospital  at [phone].  or [place] [phone]).    Do not eat or drink anything after midnight the night before your surgery, including candy, mints, lifesavers, or chewing gum. Do not drink alcohol 24hrs before your surgery. Try not to smoke at least 24hrs before your surgery.       Follow the pre surgery showering instructions as listed in the “My Surgical Experience Booklet” or otherwise provided by your surgeon's office. Do not use a blade to shave the surgical area 1 week before surgery. It is okay to use a clean electric clippers up to 24 hours before surgery. Do not apply any lotions, creams, including makeup, cologne, deodorant, or perfumes after showering on the day of your surgery. Do not use dry shampoo, hair spray, hair gel, or any type of hair products.      No contact lenses, eye make-up, or artificial eyelashes. Remove nail polish, including gel polish, and any artificial, gel, or acrylic nails if possible. Remove all jewelry including rings and body piercing jewelry.     Wear causal clothing that is easy to take on and off. Consider your type of surgery.    Keep any valuables, jewelry, piercings at home. Please bring any specially ordered equipment (sling, braces) if indicated.    Arrange for a responsible person to drive you to and from the hospital on the day of your surgery. Please confirm the visitor policy for the day of your procedure when you receive your phone call with an arrival time.     Call the surgeon's office with any new illnesses, exposures, or additional questions prior to surgery.    Please reference your “My Surgical Experience Booklet” for additional information to prepare for your upcoming surgery.    See Geriatric Assessment below...  Cognitive Assessment: n/a  CAM: n/a  TUG <15 sec:n/a  Falls (last 6 months): no  Hand  score:n/a  -Attempt 1:  -Attempt 2:  -Attempt 3:  Douglas Total Score: 22  PHQ- 9 Depression Scale:9  Nutrition Assessment Score:14  METS:9.25  Incentive Spirometry Level: n/a  Health goals: To get back to all daily activites  -What are your overall health goals? (quit smoking, wt. loss, rest, decrease stress) Enjoy life    -What brings you strength? (family, friends, Yarsanism, health) family    -What activities are important to you? (exercise, reading, travel, work) staying active

## 2024-10-14 ENCOUNTER — ANESTHESIA (OUTPATIENT)
Age: 84
End: 2024-10-14

## 2024-10-14 ENCOUNTER — ANESTHESIA EVENT (OUTPATIENT)
Age: 84
End: 2024-10-14

## 2024-10-25 NOTE — DISCHARGE INSTR - AVS FIRST PAGE
Discharge Instructions - Orthopedics  Diego Dumont 84 y.o. male MRN: [id number]  Unit/Bed#:     Weight Bearing Status:                                           Weight Bearing as tolerated to the left lower extremity with assistive devices.     Pain Management/Medications  You may resume your usual medications.  You may stop pre-operative vitamins (Folic acid, Ferrous sulfate, and Vitamin C).   Please take the following medications:  Anti-coagulation (blood clot prevention) - Complete Lovenox injections for 28 days.   Pain medication:  Oxycodone 2.5 m/2 tablet every 6 hours as needed for severe pain  Robaxin (Muscle relaxer) 500 m tablet up to 3 times a day as needed for mild pain and muscle discomfort  Tylenol 1000 mg: up to three times daily as needed for mild to moderate pain. Do not exceed 3000mg daily   Continue applying ice to your knee on and off for about 20 minutes as needed.  Continue to elevate your operative leg, with ankle above the level of your heart when possible.    If you have questions or pain concerns, please contact the office.   If you need refills of your medications prior to your next office visit, please contact the office.     Showering/Dressing Instructions:   Do not shower until first follow up appointment.  Keep surgical dressing clean and dry until follow up appointment.  You may adjust the ACE bandage as it slides down   No baths, swimming, or submerging your leg until cleared to do so.      Driving Instructions:  No driving until cleared by Orthopaedic Surgery.    PT/OT:  Continue PT/OT on outpatient basis as directed    Follow up instructions:   Follow up as scheduled on 2024 in [place]  If you need to change or cancel your appointment for any reason, please call the clinic at [phone]    Please contact the office if you experience any of the following:  Excessive bleeding (bleeding through your dressing)  Fever greater than 101 degrees F after 48 hours (low grade  fevers the day or two after surgery are normal)  Persistent nausea or vomiting  Decreased sensation or discoloration of the operative limb  Pain or swelling that is getting worse and not better with medication    Miscellaneous:  Advise against any dental cleanings or procedures for 3 months after surgery.   - If there is a dental emergency, please contact the office for further instructions.

## 2024-10-28 ENCOUNTER — HOSPITAL ENCOUNTER (OUTPATIENT)
Age: 84
Setting detail: OUTPATIENT SURGERY
Discharge: HOME | End: 2024-10-29
Attending: ORTHOPAEDIC SURGERY | Admitting: ORTHOPAEDIC SURGERY
Payer: COMMERCIAL

## 2024-10-28 ENCOUNTER — ANESTHESIA EVENT (OUTPATIENT)
Age: 84
End: 2024-10-28
Payer: COMMERCIAL

## 2024-10-28 ENCOUNTER — ANESTHESIA (OUTPATIENT)
Age: 84
End: 2024-10-28
Payer: COMMERCIAL

## 2024-10-28 DIAGNOSIS — M17.12 PRIMARY OSTEOARTHRITIS OF LEFT KNEE: ICD-10-CM

## 2024-10-28 PROCEDURE — NC001 PR NO CHARGE: Performed by: ORTHOPAEDIC SURGERY

## 2024-10-28 PROCEDURE — C1776 JOINT DEVICE (IMPLANTABLE): HCPCS | Performed by: ORTHOPAEDIC SURGERY

## 2024-10-28 PROCEDURE — 99024 POSTOP FOLLOW-UP VISIT: CPT | Performed by: PHYSICIAN ASSISTANT

## 2024-10-28 PROCEDURE — 97163 PT EVAL HIGH COMPLEX 45 MIN: CPT | Performed by: PHYSICAL THERAPIST

## 2024-10-28 PROCEDURE — 97166 OT EVAL MOD COMPLEX 45 MIN: CPT

## 2024-10-28 PROCEDURE — S2900 ROBOTIC SURGICAL SYSTEM: HCPCS | Performed by: ORTHOPAEDIC SURGERY

## 2024-10-28 PROCEDURE — C9290 INJ, BUPIVACAINE LIPOSOME: HCPCS | Performed by: ANESTHESIOLOGY

## 2024-10-28 PROCEDURE — 27447 TOTAL KNEE ARTHROPLASTY: CPT | Performed by: ORTHOPAEDIC SURGERY

## 2024-10-28 PROCEDURE — C1713 ANCHOR/SCREW BN/BN,TIS/BN: HCPCS | Performed by: ORTHOPAEDIC SURGERY

## 2024-10-28 DEVICE — ATTUNE KNEE SYSTEM FEMORAL POSTERIOR STABILIZED SIZE 7 LEFT CEMENTED
Type: IMPLANTABLE DEVICE | Site: KNEE | Status: FUNCTIONAL
Brand: ATTUNE

## 2024-10-28 DEVICE — ATTUNE KNEE SYSTEM TIBIAL INSERT FIXED BEARING POSTERIOR STABILIZED 7 5MM AOX
Type: IMPLANTABLE DEVICE | Site: KNEE | Status: FUNCTIONAL
Brand: ATTUNE

## 2024-10-28 DEVICE — ATTUNE PATELLA MEDIALIZED DOME 38MM CEMENTED AOX
Type: IMPLANTABLE DEVICE | Site: KNEE | Status: FUNCTIONAL
Brand: ATTUNE

## 2024-10-28 DEVICE — ATTUNE KNEE SYSTEM TIBIAL BASE FIXED BEARING SIZE 6 CEMENTED
Type: IMPLANTABLE DEVICE | Site: KNEE | Status: FUNCTIONAL
Brand: ATTUNE

## 2024-10-28 DEVICE — SMARTSET HV HIGH VISCOSITY BONE CEMENT 40G
Type: IMPLANTABLE DEVICE | Site: KNEE | Status: FUNCTIONAL
Brand: SMARTSET

## 2024-10-28 RX ORDER — TRANEXAMIC ACID 10 MG/ML
1000 INJECTION, SOLUTION INTRAVENOUS ONCE
Status: COMPLETED | OUTPATIENT
Start: 2024-10-28 | End: 2024-10-28

## 2024-10-28 RX ORDER — OXYCODONE HYDROCHLORIDE 5 MG/1
2.5 TABLET ORAL EVERY 6 HOURS PRN
Qty: 30 TABLET | Refills: 0 | Status: SHIPPED | OUTPATIENT
Start: 2024-10-28

## 2024-10-28 RX ORDER — FENTANYL CITRATE 50 UG/ML
INJECTION, SOLUTION INTRAMUSCULAR; INTRAVENOUS AS NEEDED
Status: DISCONTINUED | OUTPATIENT
Start: 2024-10-28 | End: 2024-10-28

## 2024-10-28 RX ORDER — HYDROMORPHONE HCL/PF 1 MG/ML
0.5 SYRINGE (ML) INJECTION EVERY 2 HOUR PRN
Status: DISCONTINUED | OUTPATIENT
Start: 2024-10-28 | End: 2024-10-29 | Stop reason: HOSPADM

## 2024-10-28 RX ORDER — ENOXAPARIN SODIUM 100 MG/ML
40 INJECTION SUBCUTANEOUS DAILY
Status: DISCONTINUED | OUTPATIENT
Start: 2024-10-28 | End: 2024-10-29 | Stop reason: HOSPADM

## 2024-10-28 RX ORDER — PROPOFOL 10 MG/ML
INJECTION, EMULSION INTRAVENOUS CONTINUOUS PRN
Status: DISCONTINUED | OUTPATIENT
Start: 2024-10-28 | End: 2024-10-28

## 2024-10-28 RX ORDER — CEFAZOLIN SODIUM 1 G/50ML
1000 SOLUTION INTRAVENOUS EVERY 8 HOURS
Status: COMPLETED | OUTPATIENT
Start: 2024-10-28 | End: 2024-10-29

## 2024-10-28 RX ORDER — SODIUM CHLORIDE, SODIUM LACTATE, POTASSIUM CHLORIDE, CALCIUM CHLORIDE 600; 310; 30; 20 MG/100ML; MG/100ML; MG/100ML; MG/100ML
125 INJECTION, SOLUTION INTRAVENOUS CONTINUOUS
Status: DISCONTINUED | OUTPATIENT
Start: 2024-10-28 | End: 2024-10-29 | Stop reason: HOSPADM

## 2024-10-28 RX ORDER — ATENOLOL 25 MG/1
50 TABLET ORAL DAILY
Status: DISCONTINUED | OUTPATIENT
Start: 2024-10-28 | End: 2024-10-28

## 2024-10-28 RX ORDER — METHOCARBAMOL 500 MG/1
500 TABLET, FILM COATED ORAL EVERY 6 HOURS SCHEDULED
Status: DISCONTINUED | OUTPATIENT
Start: 2024-10-28 | End: 2024-10-29 | Stop reason: HOSPADM

## 2024-10-28 RX ORDER — BUPIVACAINE HYDROCHLORIDE 2.5 MG/ML
INJECTION, SOLUTION EPIDURAL; INFILTRATION; INTRACAUDAL
Status: COMPLETED | OUTPATIENT
Start: 2024-10-28 | End: 2024-10-28

## 2024-10-28 RX ORDER — CALCIUM CARBONATE 500 MG/1
1000 TABLET, CHEWABLE ORAL DAILY PRN
Status: DISCONTINUED | OUTPATIENT
Start: 2024-10-28 | End: 2024-10-29 | Stop reason: HOSPADM

## 2024-10-28 RX ORDER — CHLORHEXIDINE GLUCONATE ORAL RINSE 1.2 MG/ML
15 SOLUTION DENTAL ONCE
Status: COMPLETED | OUTPATIENT
Start: 2024-10-28 | End: 2024-10-28

## 2024-10-28 RX ORDER — ONDANSETRON 2 MG/ML
4 INJECTION INTRAMUSCULAR; INTRAVENOUS EVERY 6 HOURS PRN
Status: DISCONTINUED | OUTPATIENT
Start: 2024-10-28 | End: 2024-10-29 | Stop reason: HOSPADM

## 2024-10-28 RX ORDER — ACETAMINOPHEN 500 MG
1000 TABLET ORAL EVERY 6 HOURS PRN
Qty: 90 TABLET | Refills: 0 | Status: SHIPPED | OUTPATIENT
Start: 2024-10-28

## 2024-10-28 RX ORDER — OXYCODONE HYDROCHLORIDE 5 MG/1
5 TABLET ORAL EVERY 4 HOURS PRN
Status: DISCONTINUED | OUTPATIENT
Start: 2024-10-28 | End: 2024-10-29 | Stop reason: HOSPADM

## 2024-10-28 RX ORDER — FENTANYL CITRATE/PF 50 MCG/ML
50 SYRINGE (ML) INJECTION
Status: DISCONTINUED | OUTPATIENT
Start: 2024-10-28 | End: 2024-10-28 | Stop reason: HOSPADM

## 2024-10-28 RX ORDER — METHOCARBAMOL 500 MG/1
500 TABLET, FILM COATED ORAL 3 TIMES DAILY PRN
Qty: 60 TABLET | Refills: 0 | Status: SHIPPED | OUTPATIENT
Start: 2024-10-28

## 2024-10-28 RX ORDER — OXYCODONE HYDROCHLORIDE 5 MG/1
2.5 TABLET ORAL EVERY 4 HOURS PRN
Status: DISCONTINUED | OUTPATIENT
Start: 2024-10-28 | End: 2024-10-29 | Stop reason: HOSPADM

## 2024-10-28 RX ORDER — DOCUSATE SODIUM 100 MG/1
100 CAPSULE, LIQUID FILLED ORAL 2 TIMES DAILY
Status: DISCONTINUED | OUTPATIENT
Start: 2024-10-28 | End: 2024-10-29 | Stop reason: HOSPADM

## 2024-10-28 RX ORDER — HYDROMORPHONE HCL/PF 1 MG/ML
0.4 SYRINGE (ML) INJECTION
Status: DISCONTINUED | OUTPATIENT
Start: 2024-10-28 | End: 2024-10-28 | Stop reason: HOSPADM

## 2024-10-28 RX ORDER — ATENOLOL 25 MG/1
50 TABLET ORAL DAILY
Status: DISCONTINUED | OUTPATIENT
Start: 2024-10-29 | End: 2024-10-29 | Stop reason: HOSPADM

## 2024-10-28 RX ORDER — SODIUM CHLORIDE, SODIUM LACTATE, POTASSIUM CHLORIDE, CALCIUM CHLORIDE 600; 310; 30; 20 MG/100ML; MG/100ML; MG/100ML; MG/100ML
INJECTION, SOLUTION INTRAVENOUS CONTINUOUS PRN
Status: DISCONTINUED | OUTPATIENT
Start: 2024-10-28 | End: 2024-10-28

## 2024-10-28 RX ORDER — ACETAMINOPHEN 325 MG/1
975 TABLET ORAL EVERY 6 HOURS PRN
Status: DISCONTINUED | OUTPATIENT
Start: 2024-10-28 | End: 2024-10-29 | Stop reason: HOSPADM

## 2024-10-28 RX ORDER — MAGNESIUM HYDROXIDE 1200 MG/15ML
LIQUID ORAL AS NEEDED
Status: DISCONTINUED | OUTPATIENT
Start: 2024-10-28 | End: 2024-10-28 | Stop reason: HOSPADM

## 2024-10-28 RX ORDER — CEFAZOLIN SODIUM 2 G/50ML
2000 SOLUTION INTRAVENOUS ONCE
Status: COMPLETED | OUTPATIENT
Start: 2024-10-28 | End: 2024-10-28

## 2024-10-28 RX ORDER — ONDANSETRON 2 MG/ML
4 INJECTION INTRAMUSCULAR; INTRAVENOUS ONCE AS NEEDED
Status: DISCONTINUED | OUTPATIENT
Start: 2024-10-28 | End: 2024-10-28 | Stop reason: HOSPADM

## 2024-10-28 RX ORDER — PROPOFOL 10 MG/ML
INJECTION, EMULSION INTRAVENOUS AS NEEDED
Status: DISCONTINUED | OUTPATIENT
Start: 2024-10-28 | End: 2024-10-28

## 2024-10-28 RX ORDER — GLYCOPYRROLATE 0.2 MG/ML
INJECTION INTRAMUSCULAR; INTRAVENOUS AS NEEDED
Status: DISCONTINUED | OUTPATIENT
Start: 2024-10-28 | End: 2024-10-28

## 2024-10-28 RX ORDER — GABAPENTIN 100 MG/1
100 CAPSULE ORAL EVERY 8 HOURS
Status: DISCONTINUED | OUTPATIENT
Start: 2024-10-28 | End: 2024-10-29 | Stop reason: HOSPADM

## 2024-10-28 RX ORDER — LIDOCAINE HYDROCHLORIDE 10 MG/ML
INJECTION, SOLUTION EPIDURAL; INFILTRATION; INTRACAUDAL; PERINEURAL AS NEEDED
Status: DISCONTINUED | OUTPATIENT
Start: 2024-10-28 | End: 2024-10-28

## 2024-10-28 RX ADMIN — ENOXAPARIN SODIUM 40 MG: 40 INJECTION SUBCUTANEOUS at 19:57

## 2024-10-28 RX ADMIN — FENTANYL CITRATE 25 MCG: 50 INJECTION INTRAMUSCULAR; INTRAVENOUS at 08:54

## 2024-10-28 RX ADMIN — OXYCODONE 5 MG: 5 TABLET ORAL at 20:13

## 2024-10-28 RX ADMIN — SODIUM CHLORIDE, SODIUM LACTATE, POTASSIUM CHLORIDE, AND CALCIUM CHLORIDE: .6; .31; .03; .02 INJECTION, SOLUTION INTRAVENOUS at 07:39

## 2024-10-28 RX ADMIN — CHLORHEXIDINE GLUCONATE 15 ML: 1.2 RINSE ORAL at 06:40

## 2024-10-28 RX ADMIN — ACETAMINOPHEN 325MG 975 MG: 325 TABLET ORAL at 20:13

## 2024-10-28 RX ADMIN — BUPIVACAINE HYDROCHLORIDE 10 ML: 2.5 INJECTION, SOLUTION EPIDURAL; INFILTRATION; INTRACAUDAL; PERINEURAL at 07:25

## 2024-10-28 RX ADMIN — METHOCARBAMOL TABLETS 500 MG: 500 TABLET, COATED ORAL at 23:51

## 2024-10-28 RX ADMIN — SODIUM CHLORIDE, SODIUM LACTATE, POTASSIUM CHLORIDE, AND CALCIUM CHLORIDE 125 ML/HR: .6; .31; .03; .02 INJECTION, SOLUTION INTRAVENOUS at 06:42

## 2024-10-28 RX ADMIN — GLYCOPYRROLATE 0.1 MCG: 0.2 INJECTION INTRAMUSCULAR; INTRAVENOUS at 07:54

## 2024-10-28 RX ADMIN — SODIUM CHLORIDE, SODIUM LACTATE, POTASSIUM CHLORIDE, AND CALCIUM CHLORIDE 125 ML/HR: .6; .31; .03; .02 INJECTION, SOLUTION INTRAVENOUS at 10:06

## 2024-10-28 RX ADMIN — CEFAZOLIN SODIUM 1000 MG: 1 SOLUTION INTRAVENOUS at 15:35

## 2024-10-28 RX ADMIN — TRANEXAMIC ACID 1000 MG: 10 INJECTION, SOLUTION INTRAVENOUS at 07:44

## 2024-10-28 RX ADMIN — PROPOFOL 4 MG: 10 INJECTION, EMULSION INTRAVENOUS at 07:39

## 2024-10-28 RX ADMIN — PROPOFOL 40 MG: 10 INJECTION, EMULSION INTRAVENOUS at 07:35

## 2024-10-28 RX ADMIN — FENTANYL CITRATE 50 MCG: 50 INJECTION INTRAMUSCULAR; INTRAVENOUS at 09:45

## 2024-10-28 RX ADMIN — DOCUSATE SODIUM 100 MG: 100 CAPSULE, LIQUID FILLED ORAL at 18:17

## 2024-10-28 RX ADMIN — LIDOCAINE HYDROCHLORIDE 30 MG: 10 INJECTION, SOLUTION EPIDURAL; INFILTRATION; INTRACAUDAL; PERINEURAL at 07:40

## 2024-10-28 RX ADMIN — GABAPENTIN 100 MG: 100 CAPSULE ORAL at 11:16

## 2024-10-28 RX ADMIN — PROPOFOL 30 MG: 10 INJECTION, EMULSION INTRAVENOUS at 08:37

## 2024-10-28 RX ADMIN — ATENOLOL 50 MG: 25 TABLET ORAL at 11:16

## 2024-10-28 RX ADMIN — GLYCOPYRROLATE 0.2 MCG: 0.2 INJECTION INTRAMUSCULAR; INTRAVENOUS at 07:45

## 2024-10-28 RX ADMIN — PROPOFOL 10 MG: 10 INJECTION, EMULSION INTRAVENOUS at 08:40

## 2024-10-28 RX ADMIN — BUPIVACAINE 20 ML: 13.3 INJECTION, SUSPENSION, LIPOSOMAL INFILTRATION at 07:25

## 2024-10-28 RX ADMIN — METHOCARBAMOL TABLETS 500 MG: 500 TABLET, COATED ORAL at 18:17

## 2024-10-28 RX ADMIN — FENTANYL CITRATE 50 MCG: 50 INJECTION INTRAMUSCULAR; INTRAVENOUS at 07:23

## 2024-10-28 RX ADMIN — SODIUM CHLORIDE, SODIUM LACTATE, POTASSIUM CHLORIDE, AND CALCIUM CHLORIDE 125 ML/HR: .6; .31; .03; .02 INJECTION, SOLUTION INTRAVENOUS at 18:55

## 2024-10-28 RX ADMIN — CEFAZOLIN SODIUM 1000 MG: 1 SOLUTION INTRAVENOUS at 23:51

## 2024-10-28 RX ADMIN — PROPOFOL 75 MCG/KG/MIN: 10 INJECTION, EMULSION INTRAVENOUS at 07:40

## 2024-10-28 RX ADMIN — FENTANYL CITRATE 25 MCG: 50 INJECTION INTRAMUSCULAR; INTRAVENOUS at 08:47

## 2024-10-28 RX ADMIN — METHOCARBAMOL TABLETS 500 MG: 500 TABLET, COATED ORAL at 11:17

## 2024-10-28 RX ADMIN — CEFAZOLIN SODIUM 2000 MG: 2 SOLUTION INTRAVENOUS at 07:39

## 2024-10-28 RX ADMIN — GABAPENTIN 100 MG: 100 CAPSULE ORAL at 18:17

## 2024-10-28 NOTE — OCCUPATIONAL THERAPY NOTE
Occupational Therapy Evaluation     Patient Name: Diego Dumont  Today's Date: 10/28/2024  Problem List  Active Problems:  There are no active Hospital Problems.    Past Medical History  Past Medical History:   Diagnosis Date    Actinic keratosis     Last assessed - 10/15/14    Cortical age-related cataract of both eyes 03/22/2022    CPAP (continuous positive airway pressure) dependence     Hypertension     Hypokalemia     Last assessed - 8/13/14    Personal history of COVID-19     09/2023 - mild symptoms    Prostate cancer (HCC)     tx w radiation only    Prostate cancer (HCC) 02/12/2014    Sinus bradycardia     Last assessed - 8/13/14    Sleep apnea      Past Surgical History  Past Surgical History:   Procedure Laterality Date    CATARACT EXTRACTION Bilateral     COLONOSCOPY      x 4    HERNIA REPAIR      ID ARTHRP KNE CONDYLE&PLATU MEDIAL&LAT COMPARTMENTS Right 11/30/2023    Procedure: ARTHROPLASTY KNEE TOTAL W ROBOT;  Surgeon: Ace Zhu MD;  [facility];  Service: Orthopedics    SIGMOIDOSCOPY      (Fiberoptic, Therapeutic) 7/28/2014, 8/25/14           10/28/24 1129   OT Last Visit   OT Visit Date 10/28/24   Note Type   Note type Evaluation   Pain Assessment   Pain Assessment Tool 0-10   Pain Score 4   Pain Location/Orientation Orientation: Left;Location: Knee   Hospital Pain Intervention(s) Repositioned;Cold applied;Ambulation/increased activity   Restrictions/Precautions   Weight Bearing Precautions Per Order Yes   RLE Weight Bearing Per Order WBAT   Other Precautions WBS;Fall Risk;Pain;Bed Alarm;Chair Alarm   Home Living   Type of Home House   Home Layout Multi-level;Able to live on main level with bedroom/bathroom;1/2 bath on main level;Bed/bath upstairs  (split level)   Bathroom Shower/Tub Walk-in shower   Bathroom Toilet Standard   Bathroom Equipment Shower chair;Commode   Home Equipment Walker;Cane   Additional Comments Pt resides with spouse in a split level house with 0 BRITTANY. Pt  plans to reside on first level with use of couch.   Prior Function   Level of Centre Independent with ADLs;Independent with functional mobility;Independent with IADLS   Lives With Spouse   Receives Help From Family   IADLs Independent with driving;Independent with medication management;Independent with meal prep   Falls in the last 6 months 0   Vocational Retired   Comments PTA, Pt reports being I with ADLs/IADLs/no AD/ +/volunteers. Pt with supportive spouse able to assist upon DC.   Lifestyle   Autonomy I with ADLs/IADLs/no AD/ +/volunteers   Reciprocal Relationships Supportive spouse   Service to Others Retired   Intrinsic Gratification Volunteer work at [employer]   ADL   Where Assessed Edge of bed   Eating Assistance 7  Independent   Grooming Assistance 5  Supervision/Setup   UB Bathing Assistance 5  Supervision/Setup   LB Bathing Assistance 4  Minimal Assistance   UB Dressing Assistance 5  Supervision/Setup   LB Dressing Assistance 4  Minimal Assistance   Toileting Assistance  4  Minimal Assistance   Functional Assistance 4  Minimal Assistance   Functional Deficit Supervision/safety;Increased time to complete;Setup   Additional Comments Pt provided with TKA handout and educated on safety with ADLs.   Bed Mobility   Supine to Sit 4  Minimal assistance   Additional items Assist x 1;Increased time required;HOB elevated;LE management   Sit to Supine Unable to assess   Additional Comments Pt greeted supine in bed.   Transfers   Sit to Stand 4  Minimal assistance   Additional items Assist x 1;Increased time required;Verbal cues   Stand to Sit 4  Minimal assistance   Additional items Assist x 1;Increased time required;Verbal cues   Additional Comments with RW   Functional Mobility   Functional Mobility 5  Supervision   Additional Comments CS with functional mobility with RW- short distances   Additional items Rolling walker   Balance   Static Sitting Good   Dynamic Sitting Fair +   Static Standing  Fair   Dynamic Standing Fair -   Ambulatory Fair -   Activity Tolerance   Activity Tolerance Patient limited by pain   Medical Staff Made Aware Co-eval with JAMEL Ya 2* to Pt's medical complexity, post-surgical day 0, and decreased endurance.   Nurse Made Aware RN cleared/updated.   RUE Assessment   RUE Assessment WFL   LUE Assessment   LUE Assessment WFL   Hand Function   Gross Motor Coordination Functional   Fine Motor Coordination Functional   Sensation   Light Touch No apparent deficits   Psychosocial   Psychosocial (WDL) WDL   Cognition   Overall Cognitive Status WFL   Arousal/Participation Alert;Cooperative   Attention Within functional limits   Orientation Level Oriented X4   Memory Decreased recall of precautions   Following Commands Follows one step commands without difficulty   Comments Pt very pleasant and cooperative during OT session.   Assessment   Limitation Decreased ADL status;Decreased UE ROM;Decreased Safe judgement during ADL;Decreased UE strength;Decreased cognition;Decreased endurance;Decreased self-care trans;Decreased high-level ADLs   Prognosis Fair   Assessment Pt is a 84 y.o. male who presented to [facility] on 10/28/2024 with OA of L knee. Pt s/p L TKA with robot on 10/28. Pt WBAT on LLE. Pt  has a past medical history of Actinic keratosis, Cortical age-related cataract of both eyes (03/22/2022), CPAP (continuous positive airway pressure) dependence, Hypertension, Hypokalemia, Personal history of COVID-19, Prostate cancer (HCC), Prostate cancer (HCC) (02/12/2014), Sinus bradycardia, and Sleep apnea. Pt greeted bedside for OT evaluation on 10/28/24. Pt resides with spouse in a split level house with 0 BRITTANY. Pt plans to reside on first level with use of couch. PTA, Pt reports being I with ADLs/IADLs/no AD/ +/volunteers. Pt with supportive spouse able to assist upon DC. Pt demonstrating the following occupational performance levels: S with UB ADLs, min A with LB ADLs, min A with bed  mobility, min A with functional transfers, and CS with functional mobility with RW. Limitations that impact functional performance include decreased ADL status, UE ROM, UE strength, safe judgement during ADLs, cognition, endurance, self care transfers, and high level ADLs. Occupational performance areas to address ADL retraining, functional transfer training, UE strengthening/ROM, endurance training, cognitive reorientation, Pt/caregiver education, equipment evaluation/education, compensatory technique education, energy conservation, and activity engagement . Pt would benefit from continued skilled OT services while in hospital to maximize independence with ADLs. Will continue to follow Pt's progress. Pt would benefit from returning home with increased social support (pending progress) upon DC to maximize safety and independence with ADLs and functional tasks of choice.   Goals   Patient Goals To decrease pain   LTG Time Frame 10-14   Long Term Goal #1 See goals listed below.   Plan   Treatment Interventions ADL retraining;Functional transfer training;UE strengthening/ROM;Endurance training;Patient/family training;Cognitive reorientation;Equipment evaluation/education;Compensatory technique education;Activityengagement;Energy conservation   Goal Expiration Date 11/11/24   OT Frequency 3-5x/wk  (BID PRN)   Discharge Recommendation   Rehab Resource Intensity Level, OT No post-acute rehabilitation needs  (pending progress)   Additional Comments  The patient's raw score on the AM-PAC Daily Activity Inpatient Short Form is 19. A raw score of greater than or equal to 19 suggests the patient may benefit from discharge to home. Please refer to the recommendation of the Occupational Therapist for safe discharge planning.   AM-PAC Daily Activity Inpatient   Lower Body Dressing 3   Bathing 3   Toileting 3   Upper Body Dressing 3   Grooming 3   Eating 4   Daily Activity Raw Score 19   Daily Activity Standardized Score (Calc for  Raw Score >=11) 40.22   AM-PAC Applied Cognition Inpatient   Following a Speech/Presentation 4   Understanding Ordinary Conversation 4   Taking Medications 4   Remembering Where Things Are Placed or Put Away 4   Remembering List of 4-5 Errands 4   Taking Care of Complicated Tasks 4   Applied Cognition Raw Score 24   Applied Cognition Standardized Score 62.21   End of Consult   Education Provided Yes;Family or social support of family present for education by provider   Patient Position at End of Consult Bedside chair;Bed/Chair alarm activated;All needs within reach   Nurse Communication Nurse aware of consult       GOALS:  Pt will complete UB ADLs with I in order to maximize participation with ADLs.   Pt will complete LB ADLs with I in order to maximize safety with ADLs.   Pt will complete toileting routine (transfer, hygiene, and clothing management) with I in order to return to prior level of function.  Pt will complete bed mobility with I in order to maximize participation with ADLs.   Pt will complete functional transfers at I level in order to increase participation with ADLs.  Pt will increase dynamic standing balance to F+ in order to increase safety with ADLs.  Pt will increase standing tolerance x10 min in order to increase participation with ADLs.   Pt will complete functional mobility with AD PRN for item retrieval task at [place] level in order to increase participation with ADLs.  Pt will complete IADL tasks/simulation of IADLs tasks with I in order to return to PLOF.  Pt will demonstrate G energy conservation techniques with ADLs/IADLs in order to reduce the risk of falls.  Pt will be attentive 100% of the time for ongoing functional/formal cognitive assessment to assist with safe dc planning prn.        Marleni May MS, OTR/L

## 2024-10-28 NOTE — H&P
H&P Exam - Orthopedics   Diego Dumont 84 y.o. male MRN: [id number]      Assessment/Plan   Assessment:  left Knee Osteoarthritis in this adult male who continues to have both pain and dysfunction despite appropriate nonsurgical treatments    Plan:  left  Total Knee Arthroplasty.  Patient is familiar with risks, benefits, alternatives    TOTAL KNEE REPLACEMENT INDICATIONS AND RISKS    We had a lengthy discussion with the patient regarding the potential options for treatment. The patient has had an extensive conservative management course up until this time and therefore I do not feel that any additional conservative management will provide additional relief. The patient's symptoms have progressively worsened to the point where they now limit the patient's daily activities and quality of life.     At this time, I feel that this patient would be an excellent candidate for a total knee replacement. The patient has failed non-operative care and continues to have unacceptable symptoms. We discussed the treatment options and alternatives and the risks and benefits of surgery in great detail.    While no guarantees can be made, total knee replacement has a very high success rate in terms of relieving a patient's knee pain and returning them to a more active, independent lifestyle for 10-15 years or more. All surgery carries some risk; for knee replacement, the complication rate is low but may include: death (very rare), infection, bleeding requiring transfusion, blood clots in legs traveling to lungs, nerve and/or blood vessel damage, bone fracture, persistent knee pain and/or stiffness, and repeat surgery(ies). The risk of a major complication is about 1-2 per 1000 cases. Total knee replacement should only be done if conservative treatment has failed. The revision rate for total knee replacements is about 1-2% per year; in other words, 85-95% of knee replacements may last 10 years; 75-85% last 20 years; and so on,  assuming no injury to the knee. Finally we discussed anesthesia related complications which will be discussed in greater detail with the anesthesia team before surgery.     The patient was shown total knee booklets, diagrams and/or models and all of their questions have been answered at the present time. The patient may call or come in if they have any other concerns or questions. The patient also understands the post-operative rehabilitation process and the need for their cooperation and participation, and that their results may be compromised by their lack of compliance. The patient would like to proceed. Patient is encouraged to seek additional opinions if they so desire. The patient voiced their understanding of the surgical plan and potential complications and wishes to proceed with surgery.      History of Present Illness   HPI:  Diego Dumont is a 84 y.o. male who presents with pain in the left knee. Patient is no longer getting adequate relief from non-operative modalities. Patient is continuing to have debilitating pain from their knee, interfering with daily activities and sleep. Patient denies any concerns with infections, new neuropathies, or any acute injuries.     Historical Information  Review Of Systems:   Skin: Normal  Neuro: See HPI  Musculoskeletal: See HPI  14 point review of systems negative except as stated above     Past Medical History:   Past Medical History:   Diagnosis Date    Actinic keratosis     Last assessed - 10/15/14    Cortical age-related cataract of both eyes 03/22/2022    CPAP (continuous positive airway pressure) dependence     Hypertension     Hypokalemia     Last assessed - 8/13/14    Personal history of COVID-19     09/2023 - mild symptoms    Prostate cancer (HCC)     tx w radiation only    Prostate cancer (HCC) 02/12/2014    Sinus bradycardia     Last assessed - 8/13/14    Sleep apnea        Past Surgical History:   Past Surgical History:   Procedure Laterality Date     CATARACT EXTRACTION Bilateral     COLONOSCOPY      x 4    HERNIA REPAIR      IL ARTHRP KNE CONDYLE&PLATU MEDIAL&LAT COMPARTMENTS Right 11/30/2023    Procedure: ARTHROPLASTY KNEE TOTAL W ROBOT;  Surgeon: Ace Zhu MD;  Location[facility];  Service: Orthopedics    SIGMOIDOSCOPY      (Fiberoptic, Therapeutic) 7/28/2014, 8/25/14       Family History:  Family history reviewed and non-contributory  Family History   Problem Relation Age of Onset    Prostate cancer Father     No Known Problems Mother     Liver cancer Brother         Adenocarcinoma     No Known Problems Sister     No Known Problems Sister     No Known Problems Son     No Known Problems Son     Leukemia Daughter         Remission-Bone Marrow transplant    No Known Problems Daughter        Social History:  Social History     Socioeconomic History    Marital status:      Spouse name: None    Number of children: None    Years of education: None    Highest education level: None   Occupational History    Occupation: Retired   Tobacco Use    Smoking status: Never     Passive exposure: Past    Smokeless tobacco: Never    Tobacco comments:     Former smoker per [employer]    Vaping Use    Vaping status: Never Used   Substance and Sexual Activity    Alcohol use: Never     Comment: Seldomly per [employer] - not in years    Drug use: No    Sexual activity: Not Currently   Other Topics Concern    None   Social History Narrative    Caffeine use - 2 cups daily     and  noted per [employer]     Social Determinants of Health     Financial Resource Strain: Low Risk  (3/15/2023)    Overall Financial Resource Strain (CARDIA)     Difficulty of Paying Living Expenses: Not very hard   Food Insecurity: No Food Insecurity (3/20/2024)    Nursing - Inadequate Food Risk Classification     Worried About Running Out of Food in the Last Year: Never true     Ran Out of Food in the Last Year: Never true     Ran Out of Food in the Last Year: Not on file  "  Transportation Needs: No Transportation Needs (3/20/2024)    PRAPARE - Transportation     Lack of Transportation (Medical): No     Lack of Transportation (Non-Medical): No   Physical Activity: Not on file   Stress: Not on file   Social Connections: Not on file   Intimate Partner Violence: Not on file   Housing Stability: Low Risk  (3/20/2024)    Housing Stability Vital Sign     Unable to Pay for Housing in the Last Year: No     Number of Times Moved in the Last Year: 1     Homeless in the Last Year: No       Allergies:   No Known Allergies        Labs:  0   Lab Value Date/Time    HCT 42.8 10/03/2024 0743    HCT 35.1 (L) 12/01/2023 0518    HCT 40.8 09/11/2023 0719    HGB 13.9 10/03/2024 0743    HGB 11.6 (L) 12/01/2023 0518    HGB 13.7 09/11/2023 0719    PT 14.2 11/03/2023 1232    INR 1.10 10/03/2024 0743    INR 1.1 11/03/2023 1232    WBC 5.66 10/03/2024 0743    WBC 8.20 12/01/2023 0518    WBC 3.50 (L) 09/11/2023 0719       Meds:    Current Facility-Administered Medications:     ceFAZolin (ANCEF) IVPB (premix in dextrose) 2,000 mg 50 mL, 2,000 mg, Intravenous, Once, Ace Zhu MD    chlorhexidine (PERIDEX) 0.12 % oral rinse 15 mL, 15 mL, Swish & Spit, Once, Ace Zhu MD    tranexamic acid (CYKLOKAPRON) 1000-0.7 MG/100ML-% injection 1,000 mg, 1,000 mg, Intravenous, Once, Ace Zhu MD    Blood Culture:   No results found for: \"BLOODCX\"    Wound Culture:   No results found for: \"WOUNDCULT\"    Ins and Outs:  No intake/output data recorded.          Physical Exam  Ht 5' 8\" (1.727 m)   Wt 74.8 kg (165 lb)   BMI 25.09 kg/m²   Ht 5' 8\" (1.727 m)   Wt 74.8 kg (165 lb)   BMI 25.09 kg/m²   Gen: No acute distress, resting comfortably in bed  HEENT: Eyes clear, moist mucus membranes, hearing intact  Respiratory: No audible wheezing or stridor  Cardiovascular: Well Perfused peripherally, 2+ distal pulse  Abdomen: nondistended, no peritoneal signs  Ortho Exam: limited ROM due to pain and " mechanical blocking  Neuro Exam: intact    Lab Results: Reviewed  Imaging: Reviewed

## 2024-10-28 NOTE — OP NOTE
OPERATIVE REPORT  PATIENT NAME: Diego Dumont  : 1940  MRN: [id number]  Pt Location:  [facility] OR ROOM 04    Surgery Date: 10/28/2024    Surgeons and Role:     * Ace Zhu MD - Primary     * Anthony Benson MD - Assisting     * Vaishali Seals PA-C - Assisting     Preop Diagnosis:  Primary osteoarthritis of left knee [M17.12]    Post-Op Diagnosis Codes:     * Primary osteoarthritis of left knee [M17.12]    Procedure(s):  Left - ARTHROPLASTY KNEE TOTAL W ROBOT    Specimens:  * No specimens in log *    Estimated Blood Loss:   Minimal    Drains:  Urethral Catheter Non-latex 16 Fr. (Active)   Number of days: 0       Anesthesia Type:   Choice     Operative Indications:  Primary osteoarthritis of left knee [M17.12]    Operative Findings:  Depuy attune   Femur-7   Poly-5   Tibia-6   Patella-38    Complications:   None    Knee Technique: Suture (direct) Repair  Knee Approach: Medial Parapatellar    Chronic Narcotic Use:  No      Procedure and Technique:  Following the induction medical level of spinal anesthesia, a Del Cid catheter was then sterilely introduced to this patient's bladder.  Antibiotics administered.  The left thigh was  fitted with a thigh-high tourniquet.  The left lower extremity then underwent sterile prep drape.  The left lower extremity was exsanguinated to gravity, and the tourniquet inflated to 300 mmHg.  A midline knee incision was greater than in flexion.  Full-thickness flaps were raised in order to access the extensor mechanism.  A medial arthrotomy was created to open up the knee joint.  2 half pins were placed on proximal tibia, 2 half pins in place for distal femur.  In doing so, modules were created.  The arrays were then attached to the modules.  Checkpoints made the proximal tibia distal femur.  The knee was then registered.  The surgery was planned out on the computer, the plan was finalized.  The robot was docked.  The first maneuver of all the distal femoral cut probably anterior  posterior cuts.  The chamfer cuts completed the process.  The proximal tibia cut was made next.  Care was taken preserve the taken the medial collateral, lateral collateral, posterior structures during these maneuvers.  The box cut was then made for the posterior stabilized unit, and remnant medial and lateral meniscectomies then performed.  Trials were inserted, the knee was taken through range of motion, found to be capable full extension, good flexion, stable throughout.  The patella was then resurfaced while utilizing manufactures equipment, it was found to be a size 38 mm button.  The trial components removed and the knee was prepared for insertion of cemented components.  The cemented tibia, cemented femur, trial poly-, cemented patella were then placed.  Excess cement was removed, the knee was brought into extension.  The cement was not a cure.  The trial poly was taken out, and the knee was packed off.  The tourniquet was deflated, and hemostasis was secured.  The insert polyethylene was then snapped into position.  The knee was taken through a final range of motion, found to be capable full extension, good flexion, stable throughout, and X patella tracking.  Satisfied with the extent of surgery, the wounds were flushed with saline and closed.  A Betadine soak initiated.  A drain is placed deep and brought out via separate lateral stab incision.  The arthrotomy was closed with number Vicryl suture.  The subcu tissues were closed with 2-0 Vicryl suture.  The skin was closed with staples.  Sterile dressings were applied.  He was then transferred to recovery room in stable condition with plans to include physical therapy for weightbearing to tolerance.  He will require DVT prophylaxis with Lovenox   I was present for the entire procedure.    Patient Disposition:  PACU             SIGNATURE: Ace Zhu MD  DATE: October 28, 2024  TIME: 8:47 AM

## 2024-10-28 NOTE — ANESTHESIA POSTPROCEDURE EVALUATION
Post-Op Assessment Note    CV Status:  Stable  Pain Score: 0    Pain management: adequate       Mental Status:  Alert and awake   Hydration Status:  Euvolemic   PONV Controlled:  Controlled   Airway Patency:  Patent     Post Op Vitals Reviewed: Yes    No anethesia notable event occurred.    Staff: CRNA           Last Filed PACU Vitals:  Vitals Value Taken Time   Temp 97.5 °F (36.4 °C) 10/28/24 0904   Pulse 75 10/28/24 0907   /59 10/28/24 0905   Resp 13 10/28/24 0907   SpO2 95 % 10/28/24 0907   Vitals shown include unfiled device data.    Modified Tavon:  Activity: 1 (10/28/2024  9:04 AM)  Respiration: 2 (10/28/2024  9:04 AM)  Circulation: 1 (10/28/2024  9:04 AM)  Consciousness: 1 (10/28/2024  9:04 AM)  Oxygen Saturation: 2 (10/28/2024  9:04 AM)  Modified Tavon Score: 7 (10/28/2024  9:04 AM)

## 2024-10-28 NOTE — ANESTHESIA PROCEDURE NOTES
Peripheral Block    Patient location during procedure: holding area  Start time: 10/28/2024 7:25 AM  Reason for block: at surgeon's request and post-op pain management  Staffing  Performed by: Paul Mir MD  Authorized by: Paul Mir MD    Preanesthetic Checklist  Completed: patient identified, IV checked, site marked, risks and benefits discussed, surgical consent, monitors and equipment checked, pre-op evaluation and timeout performed  Peripheral Block  Patient position: supine  Prep: ChloraPrep  Patient monitoring: frequent blood pressure checks, continuous pulse oximetry and heart rate  Block type: Adductor Canal  Laterality: left  Injection technique: single-shot  Procedures: ultrasound guided, Ultrasound guidance required for the procedure to increase accuracy and safety of medication placement and decrease risk of complications.  Ultrasound permanent image saved  bupivacaine (PF) (MARCAINE) 0.25 % injection 20 mL - Perineural   10 mL - 10/28/2024 7:25:00 AM  bupivacaine liposomal (EXPAREL) 1.3 % injection 20 mL - Perineural   20 mL - 10/28/2024 7:25:00 AM  Needle  Needle type: Stimuplex   Needle gauge: 20 G  Needle length: 4 in  Needle localization: anatomical landmarks and ultrasound guidance  Assessment  Injection assessment: incremental injection, frequent aspiration, injected with ease, negative aspiration, negative for heart rate change, no paresthesia on injection, no symptoms of intraneural/intravenous injection and needle tip visualized at all times  Paresthesia pain: none  Post-procedure:  site cleaned  patient tolerated the procedure well with no immediate complications

## 2024-10-28 NOTE — ANESTHESIA POSTPROCEDURE EVALUATION
Post-Op Assessment Note    CV Status:  Stable    Pain management: adequate       Mental Status:  Alert and awake   Hydration Status:  Euvolemic   PONV Controlled:  Controlled   Airway Patency:  Patent     Post Op Vitals Reviewed: Yes    No anethesia notable event occurred.    Staff: Anesthesiologist           Last Filed PACU Vitals:  Vitals Value Taken Time   Temp 97.3 °F (36.3 °C) 10/28/24 0930   Pulse 67 10/28/24 0947   /72 10/28/24 0945   Resp 6 10/28/24 0947   SpO2 99 % 10/28/24 0947   Vitals shown include unfiled device data.    Modified Tavon:  Activity: 2 (10/28/2024  9:45 AM)  Respiration: 2 (10/28/2024  9:45 AM)  Circulation: 2 (10/28/2024  9:45 AM)  Consciousness: 2 (10/28/2024  9:45 AM)  Oxygen Saturation: 2 (10/28/2024  9:45 AM)  Modified Tavon Score: 10 (10/28/2024  9:45 AM)

## 2024-10-28 NOTE — ANESTHESIA PROCEDURE NOTES
Spinal Block    Patient location during procedure: OR  Start time: 10/28/2024 7:37 AM  Reason for block: procedure for pain and at surgeon's request  Staffing  Performed by: Carlos García CRNA  Authorized by: Paul Mir MD    Preanesthetic Checklist  Completed: patient identified, IV checked, site marked, risks and benefits discussed, surgical consent, monitors and equipment checked, pre-op evaluation and timeout performed  Spinal Block  Patient position: sitting  Prep: ChloraPrep and site prepped and draped  Patient monitoring: frequent blood pressure checks, continuous pulse ox and heart rate  Approach: midline  Location: L3-4  Needle  Needle type: Pencan   Needle gauge: 24 G  Needle length: 4 in  Assessment  Sensory level: T4  Injection Assessment:  negative aspiration for heme, no paresthesia on injection and positive aspiration for clear CSF.  Post-procedure:  site cleaned  Additional Notes  3 ml 1.5 percent mepivivaine

## 2024-10-28 NOTE — ANESTHESIA PREPROCEDURE EVALUATION
Procedure:  ARTHROPLASTY KNEE TOTAL W ROBOT (Left: Knee)    Relevant Problems   CARDIO   (+) Benign essential hypertension      MUSCULOSKELETAL   (+) Arthritis of knee   (+) Arthritis of lumbar spine   (+) Low back pain      PULMONARY   (+) ZAINAB (obstructive sleep apnea)        Physical Exam    Airway    Mallampati score: II  TM Distance: >3 FB  Neck ROM: full     Dental    upper dentures and lower dentures    Cardiovascular      Pulmonary      Other Findings        Anesthesia Plan  ASA Score- 2     Anesthesia Type- spinal with ASA Monitors.         Additional Monitors:     Airway Plan:     Comment: Plan for long acting adductor canal nerve block for postoperative analgesia discussed with risks/benefits/alternatives. Patient understands that this block is intended to be motor sparing, allowing for immediate postoperative physical therapy. Consequentially, the block is not expected to provide complete analgesia to the joint and is primarily intended to reduce postoperative opioid consumption. Surgeon to infiltrate posterior capsule in the field for additional analgesia.    Discussed plan for spinal anesthetic with risks/benefits/alternatives, including extremely low risk of spinal hematoma, infection, peripheral nerve damage, and paralysis. Patient aware of benefits including improved postoperative analgesia, decreased intraoperative bleeding, decreased intraoperative DVT risk, and avoidance of general anesthetic. Discussed possibility of general anesthesia as a back-up to neuraxial anesthetic given frequent concomitant degenerative disease of the lumbar spine  .       Plan Factors-Exercise tolerance (METS): >4 METS.    Chart reviewed.   Existing labs reviewed. Patient summary reviewed.                  Induction- intravenous.    Postoperative Plan- Plan for postoperative opioid use.         Informed Consent- Anesthetic plan and risks discussed with patient.  I personally reviewed this patient with the CRNA. Discussed  and agreed on the Anesthesia Plan with the CRNA..

## 2024-10-28 NOTE — PLAN OF CARE
Problem: OCCUPATIONAL THERAPY ADULT  Goal: Performs self-care activities at highest level of function for planned discharge setting.  See evaluation for individualized goals.  Description: Treatment Interventions: ADL retraining, Functional transfer training, UE strengthening/ROM, Endurance training, Patient/family training, Cognitive reorientation, Equipment evaluation/education, Compensatory technique education, Activityengagement, Energy conservation          See flowsheet documentation for full assessment, interventions and recommendations.   Note: Limitation: Decreased ADL status, Decreased UE ROM, Decreased Safe judgement during ADL, Decreased UE strength, Decreased cognition, Decreased endurance, Decreased self-care trans, Decreased high-level ADLs  Prognosis: Fair  Assessment: Pt is a 84 y.o. male who presented to [facility] on 10/28/2024 with OA of L knee. Pt s/p L TKA with robot on 10/28. Pt WBAT on LLE. Pt  has a past medical history of Actinic keratosis, Cortical age-related cataract of both eyes (03/22/2022), CPAP (continuous positive airway pressure) dependence, Hypertension, Hypokalemia, Personal history of COVID-19, Prostate cancer (HCC), Prostate cancer (HCC) (02/12/2014), Sinus bradycardia, and Sleep apnea. Pt greeted bedside for OT evaluation on 10/28/24. Pt resides with spouse in a split level house with 0 BRITTANY. Pt plans to reside on first level with use of couch. PTA, Pt reports being I with ADLs/IADLs/no AD/ +/volunteers. Pt with supportive spouse able to assist upon DC. Pt demonstrating the following occupational performance levels: S with UB ADLs, min A with LB ADLs, min A with bed mobility, min A with functional transfers, and CS with functional mobility with RW. Limitations that impact functional performance include decreased ADL status, UE ROM, UE strength, safe judgement during ADLs, cognition, endurance, self care transfers, and high level ADLs. Occupational performance areas to address  ADL retraining, functional transfer training, UE strengthening/ROM, endurance training, cognitive reorientation, Pt/caregiver education, equipment evaluation/education, compensatory technique education, energy conservation, and activity engagement . Pt would benefit from continued skilled OT services while in hospital to maximize independence with ADLs. Will continue to follow Pt's progress. Pt would benefit from returning home with increased social support (pending progress) upon DC to maximize safety and independence with ADLs and functional tasks of choice.     Rehab Resource Intensity Level, OT: No post-acute rehabilitation needs (pending progress)

## 2024-10-28 NOTE — PLAN OF CARE
Problem: PAIN - ADULT  Goal: Verbalizes/displays adequate comfort level or baseline comfort level  Description: Interventions:  - Encourage patient to monitor pain and request assistance  - Assess pain using appropriate pain scale  - Administer analgesics based on type and severity of pain and evaluate response  - Implement non-pharmacological measures as appropriate and evaluate response  - Consider cultural and social influences on pain and pain management  - Notify physician/advanced practitioner if interventions unsuccessful or patient reports new pain  Outcome: Progressing     Problem: INFECTION - ADULT  Goal: Absence or prevention of progression during hospitalization  Description: INTERVENTIONS:  - Assess and monitor for signs and symptoms of infection  - Monitor lab/diagnostic results  - Monitor all insertion sites, i.e. indwelling lines, tubes, and drains  - Monitor endotracheal if appropriate and nasal secretions for changes in amount and color  - [place] appropriate cooling/warming therapies per order  - Administer medications as ordered  - Instruct and encourage patient and family to use good hand hygiene technique  - Identify and instruct in appropriate isolation precautions for identified infection/condition  Outcome: Progressing  Goal: Absence of fever/infection during neutropenic period  Description: INTERVENTIONS:  - Monitor WBC    Outcome: Progressing     Problem: SAFETY ADULT  Goal: Patient will remain free of falls  Description: INTERVENTIONS:  - Educate patient/family on patient safety including physical limitations  - Instruct patient to call for assistance with activity   - Consult OT/PT to assist with strengthening/mobility   - Keep Call bell within reach  - Keep bed low and locked with side rails adjusted as appropriate  - Keep care items and personal belongings within reach  - Initiate and maintain comfort rounds  - Make Fall Risk Sign visible to staff  - Offer Toileting every 3 Hours,  in advance of need  - Initiate/Maintain bed alarm  - Obtain necessary fall risk management equipment:   - Apply yellow socks and bracelet for high fall risk patients  - Consider moving patient to room near nurses station  Outcome: Progressing  Goal: Maintain or return to baseline ADL function  Description: INTERVENTIONS:  -  Assess patient's ability to carry out ADLs; assess patient's baseline for ADL function and identify physical deficits which impact ability to perform ADLs (bathing, care of mouth/teeth, toileting, grooming, dressing, etc.)  - Assess/evaluate cause of self-care deficits   - Assess range of motion  - Assess patient's mobility; develop plan if impaired  - Assess patient's need for assistive devices and provide as appropriate  - Encourage maximum independence but intervene and supervise when necessary  - Involve family in performance of ADLs  - Assess for home care needs following discharge   - Consider OT consult to assist with ADL evaluation and planning for discharge  - Provide patient education as appropriate  Outcome: Progressing  Goal: Maintains/Returns to pre admission functional level  Description: INTERVENTIONS:  - Perform AM-PAC 6 Click Basic Mobility/ Daily Activity assessment daily.  - Set and communicate daily mobility goal to care team and patient/family/caregiver.   - Collaborate with rehabilitation services on mobility goals if consulted  - Perform Range of Motion 3 times a day.  - Reposition patient every 3 hours.  - Dangle patient 3 times a day  - Stand patient 3 times a day  - Ambulate patient 3 times a day  - Out of bed to chair 3 times a day   - Out of bed for meals 3 times a day  - Out of bed for toileting  - Record patient progress and toleration of activity level   Outcome: Progressing

## 2024-10-28 NOTE — PHYSICAL THERAPY NOTE
PT EVALUATION    Pt. Name: Diego Dumont  Pt. Age: 84 y.o.  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    Bradycardia    Arthritis of lumbar spine    Low back pain    Squamous cell carcinoma of skin    Toe pain, right    Palpitations    Environmental allergies    Dermatitis of right foot    Arthralgia of both knees    Raynaud's phenomenon without gangrene    Preoperative clearance    Dependent edema    Encounter for geriatric assessment    Personal history of prostate cancer    Arthritis of knee       Admitting Diagnoses:   Primary osteoarthritis of left knee [M17.12]    Past Medical History:   Diagnosis Date    Actinic keratosis     Last assessed - 10/15/14    Cortical age-related cataract of both eyes 03/22/2022    CPAP (continuous positive airway pressure) dependence     Hypertension     Hypokalemia     Last assessed - 8/13/14    Personal history of COVID-19     09/2023 - mild symptoms    Prostate cancer (HCC)     tx w radiation only    Prostate cancer (HCC) 02/12/2014    Sinus bradycardia     Last assessed - 8/13/14    Sleep apnea        Past Surgical History:   Procedure Laterality Date    CATARACT EXTRACTION Bilateral     COLONOSCOPY      x 4    HERNIA REPAIR      NM ARTHRP KNE CONDYLE&PLATU MEDIAL&LAT COMPARTMENTS Right 11/30/2023    Procedure: ARTHROPLASTY KNEE TOTAL W ROBOT;  Surgeon: Ace Zhu MD;  Location: [facility];  Service: Orthopedics    SIGMOIDOSCOPY      (Fiberoptic, Therapeutic) 7/28/2014, 8/25/14       Imaging Studies:  No orders to display        10/28/24 1151   PT Last Visit   PT Visit Date 10/28/24   Note Type   Note type Evaluation   Pain Assessment   Pain Assessment Tool 0-10   Pain Score 4   Pain Location/Orientation Orientation: Left;Location: Knee   Hospital Pain Intervention(s) Cold  applied;Repositioned;Ambulation/increased activity;Elevated;Emotional support;Rest   Restrictions/Precautions   Weight Bearing Precautions Per Order Yes   LLE Weight Bearing Per Order WBAT   Other Precautions WBS;Fall Risk;Pain;Multiple lines  (hemovac)   Home Living   Type of Home House  (Split level)   Home Layout Multi-level;Bed/bath upstairs  (0 BRITTANY to lower level)   Bathroom Shower/Tub Walk-in shower   Bathroom Toilet Standard   Bathroom Equipment Shower chair;Commode   Home Equipment Walker;Cane   Prior Function   Level of [place] Independent with ADLs;Independent with functional mobility;Needs assistance with IADLS;Independent with IADLS  (w/o AD)   Lives With Spouse   Receives Help From Family   IADLs Independent with driving;Independent with medication management;Independent with meal prep   Falls in the last 6 months 0   Vocational Retired   General   Family/Caregiver Present Yes   Cognition   Overall Cognitive Status WFL   Arousal/Participation Alert   Orientation Level Oriented X4   Following Commands Follows all commands and directions without difficulty   Subjective   Subjective Pt agreeable to PT/OT evaluations.   RUE Assessment   RUE Assessment   (refer to OT)   LUE Assessment   LUE Assessment   (refer to OT)   RLE Assessment   RLE Assessment WFL  (4+/5 grossly)   LLE Assessment   LLE Assessment X   Strength LLE   L Knee Flexion 3-/5   L Knee Extension 3-/5   L Ankle Dorsiflexion 4+/5   L Ankle Plantar Flexion 4+/5   Light Touch   RLE Light Touch Grossly intact   LLE Light Touch Grossly intact   Bed Mobility   Supine to Sit 4  Minimal assistance   Additional items Assist x 1;HOB elevated;Bedrails;Increased time required;Verbal cues;LE management   Additional Comments Pt greeted in supine. /94. BP /92.   Transfers   Sit to Stand 4  Minimal assistance   Additional items Assist x 1;Bedrails;Increased time required;Verbal cues  (w/ RW)   Stand to Sit 4  Minimal assistance   Additional  items Assist x 1;Armrests;Increased time required;Verbal cues  (w/ RW)   Additional Comments cues for hand placement   Ambulation/Elevation   Gait pattern Improper Weight shift;Antalgic;Decreased foot clearance;Decreased L stance;Short stride;Step to;Excessively slow;Knees flexed   Gait Assistance 5  Supervision   Additional items Verbal cues   Assistive Device Rolling walker   Distance 20'x1   Ambulation/Elevation Additional Comments cues for RW management   Balance   Static Sitting Good   Dynamic Sitting Fair +   Static Standing Fair  (w/ RW)   Dynamic Standing Fair -  (w/ RW)   Ambulatory Fair -  (w/ RW)   Endurance Deficit   Endurance Deficit Yes   Endurance Deficit Description pain   Activity Tolerance   Activity Tolerance Patient limited by pain   Medical Staff Made Aware OT Marleni   Nurse Made Aware RN Anni   Assessment   Prognosis Good   Problem List Decreased strength;Decreased range of motion;Decreased endurance;Impaired balance;Decreased mobility;Pain   Assessment Pt. 84 y.o.male presents for elective surgery. Past medical hx includes CPAP, HTN, prostate CA, hx of R TKA 2023. Pt admitted for Primary osteoarthritis of left knee (M17.12). S/p L elective TKR POD #0. Pt referred to PT for functional mobility evaluation & D/C planning w/ orders of  activity beginning POD#0 . WBAT LLE. PTA, pt reports being I w/o AD. Personal factors affecting pt at time of IE include: bed/bath on 2nd floor, decreased independence in ADLs/IADLs, and two story home. During evaluation, deficits included dec mobility, balance, ambulation. Required minAx1 for supine to sit, sit<>stand, and S for ambulation. Pt able to ambulate 20' with RW in room. Antalgic step to gait with L knee flexion. Pt demonstrated dec endurance and tolerance to activity. Denies reports of dizziness or SOB t/o session. Pt was educated on fall precautions and reinforced w/ good understanding. Pt would benefit from continued PT to address deficits as defined  above and maximize level of independence with functional mobility and safety. Based on pt presentation and impaired function, pt would benefit from level III, (minimum resource intensity) at D/C. The patient's -[facility] Basic Mobility Inpatient Short Form Raw Score is 19. A Raw score of greater than 16 suggests the patient may benefit from discharge to home. Please also refer to the recommendation of the Physical Therapist for safe discharge planning. Nsg staff to continue to mobilized pt (OOB in chair for all meals & ambulate in room/unit) as tolerated to prevent further decline in function. Nsg notified. Co-eval performed to complete this PT evaluation for the pts best interest given pts medical complexity and functional level.   Goals   Patient Goals to have less pain   STG Expiration Date 11/04/24   Short Term Goal #1 1) Inc overall LE strength by 1/2 MMT grade to improve functional mobility; 2) Pt will demonstrate improved bed mobility with mod I to dec caregiver burden; 3) Pt will demonstrate improved transfers w/ mod I for inc safety; 4) Pt will be able to amb w/ mod I >150' w/ RW for household distances to inc safety and dec caregiver burden; 5) Pt will be able to navigate stairs with mod I to dec caregiver burden and inc safety with functional mobility; 6) Improve general balance by 1 grade to inc safety; 7) PT for ongoing patient and caregiver education   PT Treatment Day 0   Plan   Treatment/Interventions Functional transfer training;LE strengthening/ROM;Elevations;Therapeutic exercise;Endurance training;Patient/family training;Bed mobility;Gait training;Spoke to nursing;OT   PT Frequency Twice a day  (PRN)   Discharge Recommendation   Rehab Resource Intensity Level, PT III (Minimum Resource Intensity)   Equipment Recommended Walker  (pt owns)   -[facility] Basic Mobility Inpatient   Turning in Flat Bed Without Bedrails 4   Lying on Back to Sitting on Edge of Flat Bed Without Bedrails 3   Moving Bed to Chair 3    Standing Up From Chair Using Arms 3   Walk in Room 3   Climb 3-5 Stairs With Railing 3   Basic Mobility Inpatient Raw Score 19   Basic Mobility Standardized Score 42.48   [facility] Highest Level Of Mobility   -HL Goal 6: Walk 10 steps or more   -HL Achieved 6: Walk 10 steps or more   End of Consult   Patient Position at End of Consult Bedside chair;Bed/Chair alarm activated;All needs within reach       Hx/personal factors: co-morbidities, advanced age, mutliple lines, use of AD, pain, and fall risk, coping styles, social background, past experience, behavior pattern  Examination: dec mobility, dec balance, dec endurance, dec amb, risk for falls, pain, assessed body system, balance, endurance, amb, D/C disposition & fall risk, impairements in locomotion, musculoskeletal, balance, endurance, posture, coordination, assessed cognition, impairments in systems including multiple body structures involved; musculoskeletal (ROM, strength, posture, BMI), neuromuscular (balance,locomotion, gait, transfers, motor control and learning, sensation), joint integrity, integumentary (skin integrity, presence of scars or wounds), cardiopulmonary (vitals, edema); activity limitations (difficulties executing an action); participation restrictions (problems associated w involvement in life situations)  Clinical: unpredictable (ongoing medical status, risk for falls, POD #0, and pain mgt)  Complexity: high      Bhakti Goff, PT

## 2024-10-28 NOTE — PLAN OF CARE
Problem: PHYSICAL THERAPY ADULT  Goal: Performs mobility at highest level of function for planned discharge setting.  See evaluation for individualized goals.  Description: Treatment/Interventions: Functional transfer training, LE strengthening/ROM, Elevations, Therapeutic exercise, Endurance training, Patient/family training, Bed mobility, Gait training, Spoke to nursing, OT  Equipment Recommended: Walker (pt owns)       See flowsheet documentation for full assessment, interventions and recommendations.  Note: Prognosis: Good  Problem List: Decreased strength, Decreased range of motion, Decreased endurance, Impaired balance, Decreased mobility, Pain  Assessment: Pt. 84 y.o.male presents for elective surgery. Past medical hx includes CPAP, HTN, prostate CA, hx of R TKA 2023. Pt admitted for Primary osteoarthritis of left knee (M17.12). S/p L elective TKR POD #0. Pt referred to PT for functional mobility evaluation & D/C planning w/ orders of  activity beginning POD#0 . WBAT LLE. PTA, pt reports being I w/o AD. Personal factors affecting pt at time of IE include: bed/bath on 2nd floor, decreased independence in ADLs/IADLs, and two story home. During evaluation, deficits included dec mobility, balance, ambulation. Required minAx1 for supine to sit, sit<>stand, and S for ambulation. Pt able to ambulate 20' with RW in room. Antalgic step to gait with L knee flexion. Pt demonstrated dec endurance and tolerance to activity. Denies reports of dizziness or SOB t/o session. Pt was educated on fall precautions and reinforced w/ good understanding. Pt would benefit from continued PT to address deficits as defined above and maximize level of independence with functional mobility and safety. Based on pt presentation and impaired function, pt would benefit from level III, (minimum resource intensity) at D/C. The patient's AM-PAC Basic Mobility Inpatient Short Form Raw Score is 19. A Raw score of greater than 16 suggests the  patient may benefit from discharge to home. Please also refer to the recommendation of the Physical Therapist for safe discharge planning. Nsg staff to continue to mobilized pt (OOB in chair for all meals & ambulate in room/unit) as tolerated to prevent further decline in function. Nsg notified. Co-eval performed to complete this PT evaluation for the pts best interest given pts medical complexity and functional level.        Rehab Resource Intensity Level, PT: III (Minimum Resource Intensity)    See flowsheet documentation for full assessment.

## 2024-10-29 VITALS
OXYGEN SATURATION: 99 % | BODY MASS INDEX: 23.99 KG/M2 | WEIGHT: 162 LBS | TEMPERATURE: 97.7 F | RESPIRATION RATE: 18 BRPM | DIASTOLIC BLOOD PRESSURE: 77 MMHG | HEIGHT: 69 IN | SYSTOLIC BLOOD PRESSURE: 141 MMHG | HEART RATE: 66 BPM

## 2024-10-29 PROBLEM — M17.12 PRIMARY OSTEOARTHRITIS OF LEFT KNEE: Status: ACTIVE | Noted: 2024-10-29

## 2024-10-29 LAB
ALBUMIN SERPL BCG-MCNC: 3.7 G/DL (ref 3.5–5)
ALP SERPL-CCNC: 53 U/L (ref 34–104)
ALT SERPL W P-5'-P-CCNC: 11 U/L (ref 7–52)
ANION GAP SERPL CALCULATED.3IONS-SCNC: 8 MMOL/L (ref 4–13)
AST SERPL W P-5'-P-CCNC: 19 U/L (ref 13–39)
BILIRUB SERPL-MCNC: 0.88 MG/DL (ref 0.2–1)
BUN SERPL-MCNC: 18 MG/DL (ref 5–25)
CALCIUM SERPL-MCNC: 8.7 MG/DL (ref 8.4–10.2)
CHLORIDE SERPL-SCNC: 106 MMOL/L (ref 96–108)
CO2 SERPL-SCNC: 25 MMOL/L (ref 21–32)
CREAT SERPL-MCNC: 0.96 MG/DL (ref 0.6–1.3)
ERYTHROCYTE [DISTWIDTH] IN BLOOD BY AUTOMATED COUNT: 13.2 % (ref 11.6–15.1)
GFR SERPL CREATININE-BSD FRML MDRD: 72 ML/MIN/1.73SQ M
GLUCOSE SERPL-MCNC: 122 MG/DL (ref 65–140)
HCT VFR BLD AUTO: 33.9 % (ref 36.5–49.3)
HGB BLD-MCNC: 11.8 G/DL (ref 12–17)
MCH RBC QN AUTO: 31.6 PG (ref 26.8–34.3)
MCHC RBC AUTO-ENTMCNC: 34.8 G/DL (ref 31.4–37.4)
MCV RBC AUTO: 91 FL (ref 82–98)
PLATELET # BLD AUTO: 208 THOUSANDS/UL (ref 149–390)
PMV BLD AUTO: 9.9 FL (ref 8.9–12.7)
POTASSIUM SERPL-SCNC: 4 MMOL/L (ref 3.5–5.3)
PROT SERPL-MCNC: 5.9 G/DL (ref 6.4–8.4)
RBC # BLD AUTO: 3.73 MILLION/UL (ref 3.88–5.62)
SODIUM SERPL-SCNC: 139 MMOL/L (ref 135–147)
WBC # BLD AUTO: 8.32 THOUSAND/UL (ref 4.31–10.16)

## 2024-10-29 PROCEDURE — 97530 THERAPEUTIC ACTIVITIES: CPT | Performed by: PHYSICAL THERAPIST

## 2024-10-29 PROCEDURE — 85027 COMPLETE CBC AUTOMATED: CPT

## 2024-10-29 PROCEDURE — 80053 COMPREHEN METABOLIC PANEL: CPT

## 2024-10-29 PROCEDURE — 99024 POSTOP FOLLOW-UP VISIT: CPT | Performed by: PHYSICIAN ASSISTANT

## 2024-10-29 PROCEDURE — 97116 GAIT TRAINING THERAPY: CPT | Performed by: PHYSICAL THERAPIST

## 2024-10-29 RX ADMIN — GABAPENTIN 100 MG: 100 CAPSULE ORAL at 03:11

## 2024-10-29 RX ADMIN — METHOCARBAMOL TABLETS 500 MG: 500 TABLET, COATED ORAL at 06:44

## 2024-10-29 RX ADMIN — ATENOLOL 50 MG: 25 TABLET ORAL at 08:05

## 2024-10-29 RX ADMIN — OXYCODONE 5 MG: 5 TABLET ORAL at 03:10

## 2024-10-29 RX ADMIN — DOCUSATE SODIUM 100 MG: 100 CAPSULE, LIQUID FILLED ORAL at 08:04

## 2024-10-29 RX ADMIN — OXYCODONE 2.5 MG: 5 TABLET ORAL at 12:14

## 2024-10-29 RX ADMIN — ENOXAPARIN SODIUM 40 MG: 40 INJECTION SUBCUTANEOUS at 08:05

## 2024-10-29 RX ADMIN — ACETAMINOPHEN 325MG 975 MG: 325 TABLET ORAL at 04:06

## 2024-10-29 RX ADMIN — GABAPENTIN 100 MG: 100 CAPSULE ORAL at 12:15

## 2024-10-29 NOTE — QUICK NOTE
"Subjective: 85 yo POD1 s/p left TKA. Patient is doing well postop. No new complaints at this time. No incidents overnight.     Objective: Afeb VSS. Blood pressure 152/80, pulse 60, temperature 98.4 °F (36.9 °C), temperature source Temporal, resp. rate 18, height 5' 9\" (1.753 m), weight 73.5 kg (162 lb), SpO2 98%.,Body mass index is 23.92 kg/m².    Recent Labs     10/29/24  0419   WBC 8.32   HGB 11.8*      SODIUM 139   K 4.0      CO2 25   BUN 18   CREATININE 0.96   GLUC 122   CALCIUM 8.7   AST 19   ALT 11   ALKPHOS 53   TBILI 0.88     HV drain - 125 cc overnight, 510 cc since surgery  Dressing C/D/I. Motor and sensation grossly intact.     Assessment/Plan: 85 yo POD1 s/p left TKA. Patient is doing well.  -Remove drain before discharge  -Discharge when cleared by PT.    Teresa Garcia PA-C   "

## 2024-10-29 NOTE — PROGRESS NOTES
"Progress Note/Post Op Check - Surgery-General   Name: Diego Dumont 84 y.o. male I MRN: [id number]  Unit/Bed#: WE 2 N -01 I Date of Admission: 10/28/2024   Date of Service: 10/28/2024       Subjective:   83 yo s/p left total knee arthroplasty. Patient is doing well postop. Pain is controlled with medication. His medical history includes hypertension, prostate  cancer (followed by urology), and sleep apnea. He denies headache, chest pain, palpitations, or N/V. He denies numbness or tingling in his left leg.    Objective:   Blood pressure 112/70, pulse 65, temperature 97.8 °F (36.6 °C), resp. rate 18, height 5' 9\" (1.753 m), weight 73.5 kg (162 lb), SpO2 100%.,Body mass index is 23.92 kg/m².    Physical Exam:  General: Alert and oriented x 4  CV: RRR; no murmurs, gallops, or rubs  Resp: No SOB; lungs CTA - no wheezes, rales or rhonchi  Abd: BS x 4 quadrants, soft and nontender    Left lower extremity:  Dressings C/D/I  Toes warm and well perfused  Motor and sensation grossly intact distally  HV x 1 - 160cc recorded since surgery  2+ DP Pulse    Meds:  Current Facility-Administered Medications:     acetaminophen (TYLENOL) tablet 975 mg, 975 mg, Oral, Q6H PRN, Vaishali Seals PA-C    [START ON 10/29/2024] atenolol (TENORMIN) tablet 50 mg, 50 mg, Oral, Daily, Vaishali Seals PA-C    calcium carbonate (TUMS) chewable tablet 1,000 mg, 1,000 mg, Oral, Daily PRN, Vaishali Seals PA-C    ceFAZolin (ANCEF) IVPB (premix in dextrose) 1,000 mg 50 mL, 1,000 mg, Intravenous, Q8H, Vaishali Seals PA-C, Last Rate: 100 mL/hr at 10/28/24 1535, 1,000 mg at 10/28/24 1535    docusate sodium (COLACE) capsule 100 mg, 100 mg, Oral, BID, Vaishali Seals PA-C, 100 mg at 10/28/24 1817    enoxaparin (LOVENOX) subcutaneous injection 40 mg, 40 mg, Subcutaneous, Daily, Vaishali Seals PA-C, 40 mg at 10/28/24 1957    gabapentin (NEURONTIN) capsule 100 mg, 100 mg, Oral, Q8H, Vaishali Seals PA-C, 100 mg at 10/28/24 1817    HYDROmorphone (DILAUDID) injection 0.5 " mg, 0.5 mg, Intravenous, Q2H PRN, Vaishali Ciano, PA-C    lactated ringers bolus 1,000 mL, 1,000 mL, Intravenous, Once PRN **AND** lactated ringers bolus 1,000 mL, 1,000 mL, Intravenous, Once PRN, Vaishali Ciano, PA-C    lactated ringers infusion, 125 mL/hr, Intravenous, Continuous, Vaishali Ciano, PA-C, Last Rate: 125 mL/hr at 10/28/24 1855, 125 mL/hr at 10/28/24 1855    lactated ringers infusion, 125 mL/hr, Intravenous, Continuous, Ace Zhu MD, Last Rate: 125 mL/hr at 10/28/24 0906, 125 mL/hr at 10/28/24 0906    methocarbamol (ROBAXIN) tablet 500 mg, 500 mg, Oral, Q6H RAUL, Vaishali Bozena, PA-C, 500 mg at 10/28/24 1817    ondansetron (ZOFRAN) injection 4 mg, 4 mg, Intravenous, Q6H PRN, Vasihali Seals, PA-C    oxyCODONE (ROXICODONE) IR tablet 2.5 mg, 2.5 mg, Oral, Q4H PRN, Vaishali Ciano, PA-C    oxyCODONE (ROXICODONE) IR tablet 5 mg, 5 mg, Oral, Q4H PRN, Vaishali Ciano, PA-C    sodium chloride 0.9 % bolus 1,000 mL, 1,000 mL, Intravenous, Once PRN **AND** sodium chloride 0.9 % bolus 1,000 mL, 1,000 mL, Intravenous, Once PRN, Vaishali Ciano, PA-C    Assessment/Plan:   Assessment:   84 y.o.male post operative day 0 left total knee arthroplasty. Doing well. Pain controlled.    Plan:  Incentive spirometry  Weight Bearing as tolerated LLE  Up and out of bed with walker  DVT prophylaxis -SCDs and Lovenox  Drain: remove before discharge  Analgesics and ice  PT/OT  Will continue to assess for acute blood loss anemia  Discharge when cleared by PT  Point of care follow-up with PCP  Postop appointment with nadia Garcia PA-C

## 2024-10-29 NOTE — PROGRESS NOTES
"Progress Note - Orthopedics   Name: Diego Dumont 84 y.o. male I MRN: [id number]  Unit/Bed#: WE 2 N -01 I Date of Admission: 10/28/2024   Date of Service: 10/29/2024 I Hospital Day: 0     Assessment & Plan  Primary osteoarthritis of left knee  Weightbearing as tolerated left lower extremity  PT/OT for ambulation training  DVT prophylaxis with Lovenox  Analgesics for pain  Drain was removed today without complication, total output since surgery 510 cc        Subjective   84 y.o.male POD 1 s/p left total knee arthroplasty no acute events, no new complaints. Pain well controlled. Denies fevers, chills, CP, SOB, N/V, numbness or tingling. Patient reports no issues with urination or bowel movements.    Objective :  Temp:  [97.3 °F (36.3 °C)-99 °F (37.2 °C)] 97.7 °F (36.5 °C)  HR:  [60-76] 66  BP: (110-169)/(55-94) 141/77  Resp:  [15-20] 18  SpO2:  [96 %-100 %] 99 %  O2 Device: None (Room air)    Physical Exam  Musculoskeletal: leftlower  Dressing clean, dry and intact  TTP fusilli noted over the knee  Hemovac drain was removed today without complication, 510 cc total drainage since surgery  Motor intact to +FHL/EHL, +ankle dorsi/plantar flexion  Sensation intact to saphenous, sural, tibial, superficial peroneal nerve, and deep peroneal  2+ DP pulse  No calf swelling or tenderness to palpation      Lab Results: I have reviewed the following results:  Recent Labs     10/29/24  0419   WBC 8.32   HGB 11.8*   HCT 33.9*      BUN 18   CREATININE 0.96     Blood Culture:  No results found for: \"BLOODCX\"  Wound Culture: No results found for: \"WOUNDCULT\"  "

## 2024-10-29 NOTE — PLAN OF CARE
Problem: PHYSICAL THERAPY ADULT  Goal: Performs mobility at highest level of function for planned discharge setting.  See evaluation for individualized goals.  Description: Treatment/Interventions: Functional transfer training, LE strengthening/ROM, Elevations, Therapeutic exercise, Endurance training, Patient/family training, Bed mobility, Gait training, Spoke to nursing, OT  Equipment Recommended: Walker (pt owns)       See flowsheet documentation for full assessment, interventions and recommendations.  10/29/2024 1254 by Bhakti Goff PT  Outcome: Adequate for Discharge  Note: Prognosis: Good  Problem List: Decreased strength, Decreased range of motion, Decreased endurance, Impaired balance, Decreased mobility, Pain  Assessment: Patient was seen today per POC. Overall, pt demonstrated improved mobility and inc tolerance to activity. Pain 3/10 in L knee. Required S for sit<>stand, and ambulation. Pt able to ambulate 220' with RW and S in unit. Antalgic step to  gait with no gross LOB noted. Reviewed HEP and provided handout. All questions and concerns addressed at this time. No reports of dizziness t/o session. Will continue to see pt per POC as tolerated. From PT standpoint continued recommendation for level III, (minimum resource intensity) at D/C when medically cleared based current function. The patient's AM-PAC Basic Mobility Inpatient Short Form Raw Score is 23. A Raw score of greater than 16 suggests the patient may benefit from discharge to home. Please also refer to the recommendation of the Physical Therapist for safe discharge planning. Nsg staff to continue to mobilized pt (OOB in chair for all meals & ambulate in room/unit) as tolerated to prevent further decline in function. Nsg staff notified. From PT stand point, pt cleared functionally for D/C when medically appropriate.  Barriers to Discharge: None  Barriers to Discharge Comments: From PT stand point, pt cleared functionally for D/C when  medically appropriate.  Rehab Resource Intensity Level, PT: III (Minimum Resource Intensity)    See flowsheet documentation for full assessment.

## 2024-10-29 NOTE — PLAN OF CARE
Problem: PAIN - ADULT  Goal: Verbalizes/displays adequate comfort level or baseline comfort level  Description: Interventions:  - Encourage patient to monitor pain and request assistance  - Assess pain using appropriate pain scale  - Administer analgesics based on type and severity of pain and evaluate response  - Implement non-pharmacological measures as appropriate and evaluate response  - Consider cultural and social influences on pain and pain management  - Notify physician/advanced practitioner if interventions unsuccessful or patient reports new pain  Outcome: Progressing     Problem: SAFETY ADULT  Goal: Patient will remain free of falls  Description: INTERVENTIONS:  - Educate patient/family on patient safety including physical limitations  - Instruct patient to call for assistance with activity   - Consult OT/PT to assist with strengthening/mobility   - Keep Call bell within reach  - Keep bed low and locked with side rails adjusted as appropriate  - Keep care items and personal belongings within reach  - Initiate and maintain comfort rounds  - Make Fall Risk Sign visible to staff  - Offer Toileting every  Hours, in advance of need  - Initiate/Maintain bed / chair alarm  - Obtain necessary fall risk management equipment:   - Apply yellow socks and bracelet for high fall risk patients  - Consider moving patient to room near nurses station  Outcome: Progressing     Problem: SAFETY ADULT  Goal: Maintain or return to baseline ADL function  Description: INTERVENTIONS:  -  Assess patient's ability to carry out ADLs; assess patient's baseline for ADL function and identify physical deficits which impact ability to perform ADLs (bathing, care of mouth/teeth, toileting, grooming, dressing, etc.)  - Assess/evaluate cause of self-care deficits   - Assess range of motion  - Assess patient's mobility; develop plan if impaired  - Assess patient's need for assistive devices and provide as appropriate  - Encourage maximum  independence but intervene and supervise when necessary  - Involve family in performance of ADLs  - Assess for home care needs following discharge   - Consider OT consult to assist with ADL evaluation and planning for discharge  - Provide patient education as appropriate  Outcome: Progressing     Problem: DISCHARGE PLANNING  Goal: Discharge to home or other facility with appropriate resources  Description: INTERVENTIONS:  - Identify barriers to discharge w/patient and caregiver  - Arrange for needed discharge resources and transportation as appropriate  - Identify discharge learning needs (meds, wound care, etc.)  - Arrange for interpretive services to assist at discharge as needed  - Refer to Case Management Department for coordinating discharge planning if the patient needs post-hospital services based on physician/advanced practitioner order or complex needs related to functional status, cognitive ability, or social support system  Outcome: Progressing     Problem: Knowledge Deficit  Goal: Patient/family/caregiver demonstrates understanding of disease process, treatment plan, medications, and discharge instructions  Description: Complete learning assessment and assess knowledge base.  Interventions:  - Provide teaching at level of understanding  - Provide teaching via preferred learning methods  Outcome: Progressing

## 2024-10-29 NOTE — PHYSICAL THERAPY NOTE
PT PROGRESS NOTE    Name: Diego Dumont  AGE: 84 y.o.  MRN: [id number]  LENGTH OF STAY: 0     10/29/24 0825   PT Last Visit   PT Visit Date 10/29/24   Note Type   Note Type Treatment   Pain Assessment   Pain Assessment Tool 0-10   Pain Score 3   Pain Location/Orientation Orientation: Left;Location: Knee   Hospital Pain Intervention(s) Cold applied;Repositioned;Ambulation/increased activity;Elevated;Emotional support;Rest   Restrictions/Precautions   Weight Bearing Precautions Per Order Yes   LLE Weight Bearing Per Order WBAT   Other Precautions WBS;Fall Risk;Pain   General   Chart Reviewed Yes   Response to Previous Treatment Patient with no complaints from previous session.   Family/Caregiver Present No   Cognition   Overall Cognitive Status WFL   Arousal/Participation Alert;Cooperative   Attention Within functional limits   Orientation Level Oriented X4   Memory Decreased recall of precautions   Following Commands Follows all commands and directions without difficulty   Subjective   Subjective I have no pain when I am just sitting here.   Bed Mobility   Additional Comments Pt greeted OOB in chair. /77.   Transfers   Sit to Stand 5  Supervision   Additional items Armrests;Increased time required;Verbal cues  (w/ RW)   Stand to Sit 5  Supervision   Additional items Armrests;Increased time required;Verbal cues  (w/ RW)   Toilet transfer 5  Supervision   Additional items Increased time required;Verbal cues;Commode  (w/ RW)   Additional Comments cues for hand placement   Ambulation/Elevation   Gait pattern Improper Weight shift;Antalgic;Decreased foot clearance;Decreased L stance;Short stride;Step to   Gait Assistance 5  Supervision   Additional items Verbal cues   Assistive Device Rolling walker   Distance 220'x1   Balance   Static Sitting Normal   Dynamic Sitting Good   Static Standing Fair +  (w/ RW)   Dynamic Standing Fair  (w/ RW)   Ambulatory Fair  (w/ RW)   Activity Tolerance   Activity Tolerance  Patient tolerated treatment well   Nurse Made Aware RN Anni   Exercises   TKR   (Reviewed HEP and provided handout. All questions and concerns addressed at this time.)   Assessment   Prognosis Good   Problem List Decreased strength;Decreased range of motion;Decreased endurance;Impaired balance;Decreased mobility;Pain   Assessment Patient was seen today per POC. Overall, pt demonstrated improved mobility and inc tolerance to activity. Pain 3/10 in L knee. Required S for sit<>stand, and ambulation. Pt able to ambulate 220' with RW and S in unit. Antalgic step to  gait with no gross LOB noted. Reviewed HEP and provided handout. All questions and concerns addressed at this time. No reports of dizziness t/o session. Will continue to see pt per POC as tolerated. From PT standpoint continued recommendation for level III, (minimum resource intensity) at D/C when medically cleared based current function. The patient's AM-PAC Basic Mobility Inpatient Short Form Raw Score is 23. A Raw score of greater than 16 suggests the patient may benefit from discharge to home. Please also refer to the recommendation of the Physical Therapist for safe discharge planning. Nsg staff to continue to mobilized pt (OOB in chair for all meals & ambulate in room/unit) as tolerated to prevent further decline in function. Nsg staff notified. From PT stand point, pt cleared functionally for D/C when medically appropriate.   Barriers to Discharge None   Barriers to Discharge Comments From PT stand point, pt cleared functionally for D/C when medically appropriate.   Goals   Patient Goals to go home   STG Expiration Date 11/04/24   PT Treatment Day 1   Plan   Treatment/Interventions Functional transfer training;LE strengthening/ROM;Therapeutic exercise;Endurance training;Patient/family training;Bed mobility;Compensatory technique education;Spoke to nursing;OT   Progress Progressing toward goals   PT Frequency 5-7x/wk   Discharge Recommendation   Rehab  Resource Intensity Level, PT III (Minimum Resource Intensity)   AM-PAC Basic Mobility Inpatient   Turning in Flat Bed Without Bedrails 4   Lying on Back to Sitting on Edge of Flat Bed Without Bedrails 4   Moving Bed to Chair 4   Standing Up From Chair Using Arms 4   Walk in Room 4   Climb 3-5 Stairs With Railing 3   Basic Mobility Inpatient Raw Score 23   Basic Mobility Standardized Score 50.88   [place] Highest Level Of Mobility   JH-HLM Goal 7: Walk 25 feet or more   JH-HLM Achieved 7: Walk 25 feet or more   Education   Education Provided Mobility training;Home exercise program;Assistive device   Patient Demonstrates acceptance/verbal understanding   End of Consult   Patient Position at End of Consult Bedside chair;All needs within reach       Bhakti Goff, PT

## 2024-10-29 NOTE — PLAN OF CARE
Problem: PAIN - ADULT  Goal: Verbalizes/displays adequate comfort level or baseline comfort level  Description: Interventions:  - Encourage patient to monitor pain and request assistance  - Assess pain using appropriate pain scale  - Administer analgesics based on type and severity of pain and evaluate response  - Implement non-pharmacological measures as appropriate and evaluate response  - Consider cultural and social influences on pain and pain management  - Notify physician/advanced practitioner if interventions unsuccessful or patient reports new pain  Outcome: Progressing     Problem: INFECTION - ADULT  Goal: Absence or prevention of progression during hospitalization  Description: INTERVENTIONS:  - Assess and monitor for signs and symptoms of infection  - Monitor lab/diagnostic results  - Monitor all insertion sites, i.e. indwelling lines, tubes, and drains  - Monitor endotracheal if appropriate and nasal secretions for changes in amount and color  - [place] appropriate cooling/warming therapies per order  - Administer medications as ordered  - Instruct and encourage patient and family to use good hand hygiene technique  - Identify and instruct in appropriate isolation precautions for identified infection/condition  Outcome: Progressing  Goal: Absence of fever/infection during neutropenic period  Description: INTERVENTIONS:  - Monitor WBC    Outcome: Progressing     Problem: SAFETY ADULT  Goal: Patient will remain free of falls  Description: INTERVENTIONS:  - Educate patient/family on patient safety including physical limitations  - Instruct patient to call for assistance with activity   - Consult OT/PT to assist with strengthening/mobility   - Keep Call bell within reach  - Keep bed low and locked with side rails adjusted as appropriate  - Keep care items and personal belongings within reach  - Initiate and maintain comfort rounds  - Make Fall Risk Sign visible to staff  - Offer Toileting every 3 Hours,  in advance of need  - Initiate/Maintain bed alarm  - Obtain necessary fall risk management equipment:   - Apply yellow socks and bracelet for high fall risk patients  - Consider moving patient to room near nurses station  Outcome: Progressing  Goal: Maintain or return to baseline ADL function  Description: INTERVENTIONS:  -  Assess patient's ability to carry out ADLs; assess patient's baseline for ADL function and identify physical deficits which impact ability to perform ADLs (bathing, care of mouth/teeth, toileting, grooming, dressing, etc.)  - Assess/evaluate cause of self-care deficits   - Assess range of motion  - Assess patient's mobility; develop plan if impaired  - Assess patient's need for assistive devices and provide as appropriate  - Encourage maximum independence but intervene and supervise when necessary  - Involve family in performance of ADLs  - Assess for home care needs following discharge   - Consider OT consult to assist with ADL evaluation and planning for discharge  - Provide patient education as appropriate  Outcome: Progressing  Goal: Maintains/Returns to pre admission functional level  Description: INTERVENTIONS:  - Perform AM-PAC 6 Click Basic Mobility/ Daily Activity assessment daily.  - Set and communicate daily mobility goal to care team and patient/family/caregiver.   - Collaborate with rehabilitation services on mobility goals if consulted  - Perform Range of Motion 3 times a day.  - Reposition patient every 3 hours.  - Dangle patient 3 times a day  - Stand patient 3 times a day  - Ambulate patient 3 times a day  - Out of bed to chair 3 times a day   - Out of bed for meals 3 times a day  - Out of bed for toileting  - Record patient progress and toleration of activity level   Outcome: Progressing

## 2024-10-29 NOTE — ASSESSMENT & PLAN NOTE
Weightbearing as tolerated left lower extremity  PT/OT for ambulation training  DVT prophylaxis with Lovenox  Analgesics for pain  Drain was removed today without complication, total output since surgery 510 cc

## 2024-10-30 ENCOUNTER — TELEPHONE (OUTPATIENT)
Dept: OBGYN CLINIC | Facility: HOSPITAL | Age: 84
End: 2024-10-30

## 2024-10-30 NOTE — TELEPHONE ENCOUNTER
Caller: Diego     Doctor: Dr Zhu    Reason for call: Returning your call     Call back#: [phone]

## 2024-10-31 ENCOUNTER — EVALUATION (OUTPATIENT)
Dept: PHYSICAL THERAPY | Facility: CLINIC | Age: 84
End: 2024-10-31
Payer: COMMERCIAL

## 2024-10-31 DIAGNOSIS — Z96.652 STATUS POST TOTAL LEFT KNEE REPLACEMENT: Primary | ICD-10-CM

## 2024-10-31 PROCEDURE — 97140 MANUAL THERAPY 1/> REGIONS: CPT

## 2024-10-31 PROCEDURE — 97110 THERAPEUTIC EXERCISES: CPT

## 2024-10-31 PROCEDURE — 97161 PT EVAL LOW COMPLEX 20 MIN: CPT

## 2024-10-31 NOTE — PROGRESS NOTES
PT Evaluation     Today's date: 10/31/24  Patient name: Diego Dumont  : 1940  MRN: [id number]  Referring provider: Ace Zhu,*  Dx:   Encounter Diagnosis     ICD-10-CM    1. Status post total left knee replacement  Z96.652           Start Time:   Stop Time:   Total time in clinic (min): 60 minutes     Assessment  Impairments: abnormal coordination, abnormal gait, abnormal muscle firing, abnormal or restricted ROM, abnormal movement, activity intolerance, impaired balance, impaired physical strength, lacks appropriate home exercise program, pain with function, safety issue, weight-bearing intolerance and poor body mechanics    Assessment details: Diego Dumont is a pleasant 84 y.o. male who presents today for post-operative evaluation for their L TKA on 10/28. The patient reports pain, decreased strength, decreased ROM, decreased joint mobility, joint effusion, ambulatory dysfunction, and balance dysfunction. Diego complains of aching, sharp, and tight pain in the left knee ranging from 0/10 to 8/10. Pain is exacerbated by activity, standing, or stairs and made better by ice, medication, or rest.     The patient demonstrates moderately decreased strength during resisted muscle testing. Pt ROM is moderately decreased with empty end feel.     The patient has difficulty with ambulation, transfers, leisure, athletics, and work. Patient will benefit from skilled physical therapy, including therapeutic exercise, stretching, balance training, manual therapy, and modalities prn to improve their level of function, to increase overall quality of life, and to address his impairments.    Dispensed home exercise program and educated patient on proper technique, frequency, and the possibility of DOMS for the next 24 to 48 hours. Patient demonstrated understanding and was advised to call us if he has any further questions.        Goals  ST. Independent with HEP in 2 weeks.   2. Pt will have  verbal report of improvement in symptoms by >/=25% in 2 weeks.   3. Decrease pain to 6/10 at it's worst.   4. Increase strength by 1/2 grade in all deficient planes.     To be achieved by D/C   LT. Pt will improve FOTO score by >/= goal points in 6 weeks.   2. Pt will improve FOTO score to >/= goal score by visit # 12.   3. Pt will be able to stand for an hour with little to no difficulty.   4. Pt will be able to walk a mile with little to no difficulty.   5. Pt will be able to perform his usual activities including stairs and hiking with little to no difficulty.   6. Pt will improve five times sit to stand time from TBD to 12 seconds or less  7. Pt will improve TUG time from TBD to 13.5 seconds or less. (MDC 3 - 5 seconds)    Plan    Frequency: 2x week  Duration in weeks: 8  Plan of Care beginning date: 10/31/2024  Plan of Care expiration date: 2024  Treatment plan discussed with: patient        Subjective Evaluation    History of Present Illness  Mechanism of injury: surgery  Mechanism of injury: Diego presents today following L TKR on 10/28. The patient reports increased pain today. The patient is currently ambulating with a rolling walker. They report functional difficulty with walking, getting up from a chair, and stairs.    The patient will follow up with surgeon next Friday. He is not currently performing any exercise at home. He notes that he is elevating his leg, but only with one pillow. He is sitting for several hours at a time and getting up to walk once or twice a day.    The patient also reports diminished strength, decreased ROM, decreased balance, decreased endurance, and difficulty with function.    Diego  has a past medical history of Actinic keratosis, Cortical age-related cataract of both eyes (2022), CPAP (continuous positive airway pressure) dependence, Hypertension, Hypokalemia, Personal history of COVID-19, Prostate cancer (HCC), Prostate cancer (HCC) (2014), Sinus  bradycardia, and Sleep apnea. The patient also  has a past surgical history that includes Hernia repair; Sigmoidoscopy; Colonoscopy; Cataract extraction (Bilateral); pr arthrp kne condyle&platu medial&lat compartments (Right, 2023); and pr arthrp kne condyle&platu medial&lat compartments (Left, 10/28/2024).  Patient Goals  Patient goals for therapy: return to work, return to sport/leisure activities, independence with ADLs/IADLs, increased strength, decreased edema, decreased pain, increased motion and improved balance    Pain  Current pain ratin  At best pain ratin  At worst pain ratin  Quality: dull ache, tight and throbbing  Relieving factors: ice, medications and rest  Aggravating factors: stair climbing, walking and standing          Objective     Neurological Testing     Sensation     Knee   Left Knee   Intact: Light touch    Right Knee   Intact: light touch     Active Range of Motion   Left Knee   Flexion: 95 degrees   Extension: -20 degrees     Right Knee   Flexion: 122 degrees   Extension: 0 degrees     Passive Range of Motion   Left Knee   Flexion: 105 degrees with pain  Extension: -15 degrees with pain    Right Knee   Flexion: 125 degrees   Extension: 0 degrees     Strength/Myotome Testing     Additional Strength Details  Strength not tested at this time.         Precautions: L TKR 10/28/24    POC expires Unit limit Auth  expiration date PT/OT + Visit Limit?   24 BOMN 24 BOMN                 Visit/Unit Tracking  AUTH Status:  Date 10/31              Approved Used 1               Remaining                        Manuals 10/31            PROM Knee TS            Patellar mobs TS                         Re-eval             Neuro Re-Ed             Rockerboard             SL balance             StS walk             Heel toe walk             Mikey walking             Bosu balance                                       5xSTS, TUG, TONY NV            Ther Ex             Nustep for ROM  "and cardio NV            Pt Ed. - pathophysiology, HEP, prognosis TS            Quad Set 20x5\"             Glute Set             Ankle Pumps 40x            LAQ/SAQ HEP             SLR 3 way             Heel slides 20x5\"             HL clamshells             HL adduction             Bridges             Step Stretch             Standing HS st.                                       Matrix flexion             Matrix ext.             Ther Activity             TG Squats             FSU             FSD             LSU             LSD             StS w/ TB             Gait Training                                       Modalities             CP 10' post                              "

## 2024-10-31 NOTE — TELEPHONE ENCOUNTER
"Patient contacted for a postoperative follow up assessment. Patient reports doing     \"pretty good\" and currently resting with elevation. Patient states current pain level of a  0]/10  when sitting and 6-7/10 when walking with RW. Patient denies increase in swelling, stating \"the same\" and dressing is clean, dry and intact. Patient is icing the site and we discussed postoperative swelling, continuing to ICE areas of pain/swelling, especially after increased activity over the next few weeks.      He is not currently scheduled for PT-  location, I will send to the PT .     We reviewed patients AVS medication list. Patient is taking Tylenol 500mg every 8 hours (aware he can take 1000mg, but states \"not in much pain), Robaxin 500mg TID, Oxycodone 5mg PRN, Lovenox daily,  and denies taking an OTC stool softener, reports \"going regularly.\"     Patient denies nausea, vomiting, abdominal pain, chest pain, shortness of breath, fever, dizziness and calf pain. Patient does not have any other questions or concerns at this time. Pt was encouraged to call with any questions, concerns or issues.    "

## 2024-11-04 ENCOUNTER — OFFICE VISIT (OUTPATIENT)
Dept: PHYSICAL THERAPY | Facility: CLINIC | Age: 84
End: 2024-11-04
Payer: COMMERCIAL

## 2024-11-04 DIAGNOSIS — Z96.652 STATUS POST TOTAL LEFT KNEE REPLACEMENT: Primary | ICD-10-CM

## 2024-11-04 PROCEDURE — 97140 MANUAL THERAPY 1/> REGIONS: CPT

## 2024-11-04 PROCEDURE — 97110 THERAPEUTIC EXERCISES: CPT

## 2024-11-04 NOTE — PROGRESS NOTES
"Daily Note    Today's date: 24  Patient name: Diego Dumont  : 1940  MRN: [id number]  Referring provider: Ace Zhu,*  Dx:   Encounter Diagnosis     ICD-10-CM    1. Status post total left knee replacement  Z96.652           Start Time: 1400  Stop Time: 1445  Total time in clinic (min): 45 minutes      Subjective: Diego reports adherence to HEP and is elevating his leg more often now. He notes bruising all along the back of his leg.    Objective: See treatment diary below.    Assessment: Diego tolerated treatment well with consistent cuing throughout. TE's were performed with increased reps. New TE's were demonstrated with proper technique, and tolerated well. Following treatment, the patient demonstrated fatigue and would benefit from continued physical therapy.    Plan: Continue per plan of care.  Progress treatment as tolerated.         Precautions: L TKR 10/28/24    POC expires Unit limit Auth  expiration date PT/OT + Visit Limit?   24 BOMN 24 BOMN                 Visit/Unit Tracking  AUTH Status:  Date 10/31 11/4             Approved Used 1 2              Remaining                        Manuals 10/31 11/4           PROM Knee TS TS           Patellar mobs TS                         Re-eval             Neuro Re-Ed             Rockerboard             SL balance             StS walk             Heel toe walk             Mikey walking             Bosu balance                                       5xSTS, TUG, TONY NV            Ther Ex             Nustep for ROM and cardio NV 10' L1 UE 12           Pt Ed. - pathophysiology, HEP, prognosis TS            Quad Set 20x5\"  20x5\"            Glute Set             Ankle Pumps 40x            LAQ/SAQ HEP  20x5\"            SLR 3 way             Heel slides 20x5\"  20x5\"            Supine hip abduction  3x10            HL adduction             Bridges             Step Stretch             Standing HS st.                                     "   Matrix flexion             Matrix ext.             Ther Activity             TG Squats             FSU             FSD             LSU             LSD             StS w/ TB             Gait Training                                       Modalities             CP 10' post 10' post                             Luis Martins, PT  11/4/2024,3:19 PM

## 2024-11-07 ENCOUNTER — OFFICE VISIT (OUTPATIENT)
Dept: PHYSICAL THERAPY | Facility: CLINIC | Age: 84
End: 2024-11-07
Payer: COMMERCIAL

## 2024-11-07 DIAGNOSIS — Z96.652 STATUS POST TOTAL LEFT KNEE REPLACEMENT: Primary | ICD-10-CM

## 2024-11-07 PROCEDURE — 97010 HOT OR COLD PACKS THERAPY: CPT

## 2024-11-07 NOTE — PROGRESS NOTES
Daily Note    Today's date: 24  Patient name: Diego Dumont  : 1940  MRN: [id number]  Referring provider: Ace Zhu,*  Dx:   Encounter Diagnosis     ICD-10-CM    1. Status post total left knee replacement  Z96.652           Start Time: 1445  Stop Time: 1542  Total time in clinic (min): 57 minutes      Subjective: Diego presents to PT feeling some discomfort and pain. Patient reports feeling soreness following previous PT session. Patient mentioned how he was fine until he took a fall backwards last night. Patient reports HEP is not performed often because patient reports feeling fatigued from PT treatment the previous day. Patient mentioned that he will see the doctor tomorrow for f/u.      Objective: See treatment diary below.    Assessment: Diego tolerated treatment well with minimal cuing. TE's were performed with increased reps. No new TE's were performed today. Diego felt tightness in the front of his knee with passive flexion. Following treatment, the patient demonstrated fatigue and would benefit from continued physical therapy. Patient was able to complete all of their exercises without any modifications and adverse symptoms. Patient was educated on the benefits of HEP and icing and elevation.    Plan: Continue per plan of care.  Progress treatment as tolerated.         Precautions: L TKR 10/28/24    POC expires Unit limit Auth  expiration date PT/OT + Visit Limit?   24 BOMN 24 BOMN                 Visit/Unit Tracking  AUTH Status:  Date 10/31 11/4 11/7            Approved Used 1 2 3             Remaining                        Manuals 10/31 11/4 11/7          PROM Knee TS TS TW          Patellar mobs TS                         Re-eval             Neuro Re-Ed             Rockerboard             SL balance             StS walk             Heel toe walk             Mikey walking             Bosu balance                                       5xSTS, TUG, TONY NV           "  Ther Ex             Nustep for ROM and cardio NV 10' L1 UE 12 10' L1 UE 12          Pt Ed. - pathophysiology, HEP, prognosis TS            Quad Set 20x5\"  20x5\"  10x10\"          Glute Set             Ankle Pumps 40x            LAQ/SAQ HEP  20x5\"  SAQ 20x5\"          SLR 3 way             Heel slides 20x5\"  20x5\"  20x5\"          Supine hip abduction  3x10            HL adduction             Bridges             Step Stretch             Standing HS st.                                       Matrix flexion             Matrix ext.             Ther Activity             TG Squats             FSU             FSD             LSU             LSD             StS w/ TB             Gait Training                                       Modalities             CP 10' post 10' post 10' post elevated                                             "

## 2024-11-08 ENCOUNTER — APPOINTMENT (OUTPATIENT)
Dept: RADIOLOGY | Facility: MEDICAL CENTER | Age: 84
End: 2024-11-08
Payer: COMMERCIAL

## 2024-11-08 ENCOUNTER — OFFICE VISIT (OUTPATIENT)
Dept: OBGYN CLINIC | Facility: MEDICAL CENTER | Age: 84
End: 2024-11-08

## 2024-11-08 VITALS
DIASTOLIC BLOOD PRESSURE: 76 MMHG | BODY MASS INDEX: 23.99 KG/M2 | HEART RATE: 76 BPM | HEIGHT: 69 IN | WEIGHT: 162 LBS | SYSTOLIC BLOOD PRESSURE: 135 MMHG

## 2024-11-08 DIAGNOSIS — Z96.651 AFTERCARE FOLLOWING RIGHT KNEE JOINT REPLACEMENT SURGERY: ICD-10-CM

## 2024-11-08 DIAGNOSIS — Z47.1 AFTERCARE FOLLOWING RIGHT KNEE JOINT REPLACEMENT SURGERY: ICD-10-CM

## 2024-11-08 DIAGNOSIS — Z96.652 S/P TOTAL KNEE ARTHROPLASTY, LEFT: Primary | ICD-10-CM

## 2024-11-08 DIAGNOSIS — Z96.652 S/P TOTAL KNEE ARTHROPLASTY, LEFT: ICD-10-CM

## 2024-11-08 PROCEDURE — 73560 X-RAY EXAM OF KNEE 1 OR 2: CPT

## 2024-11-08 PROCEDURE — 99024 POSTOP FOLLOW-UP VISIT: CPT | Performed by: ORTHOPAEDIC SURGERY

## 2024-11-08 NOTE — PROGRESS NOTES
Assessment:  1. S/P total knee arthroplasty, left  XR knee 1 or 2 vw left      2. Aftercare following right knee joint replacement surgery  XR knee 1 or 2 vw right          Plan:  One week s/p left TKA with robot, 10/28/2024  The patient is doing well   He should continue daily Lovenox, pain medications as needed and current physical therapy regimen.    The patient can shower letting soapy water over incision, yet should not to submerge the incision, and then pat dry.    The patient should follow up in 4 weeks    One year s/p right TKA with robot, 11/30/2023.  Patient is currently doing well in regard to right knee    To do next visit:  Return in about 4 weeks (around 12/6/2024).    The above stated was discussed in layman's terms and the patient expressed understanding.  All questions were answered to the patient's satisfaction.       Scribe Attestation      I,:  Hilario Jaime MA am acting as a scribe while in the presence of the attending physician.:       I,:  Ace Zhu MD personally performed the services described in this documentation    as scribed in my presence.:               Subjective:   Diego Dumont is a 84 y.o. male who presents one week s/p left TKA with robot, 10/28/2024.  The patient is doing well.  Today he complains of generalized left knee pain.  He does participate in physical therapy.  He does use Lovenox daily.  He does use oxycodone, Tylenol and methocarbamol for pain control.  He denies fever, chills or shortness of breath.    He is also nearly one year s/p right TKA with robot, 11/30/2023.  Today he has no right knee pain complaints.  He is active without repercussion to this knee.          Review of systems negative unless otherwise specified in HPI    Past Medical History:   Diagnosis Date    Actinic keratosis     Last assessed - 10/15/14    Cortical age-related cataract of both eyes 03/22/2022    CPAP (continuous positive airway pressure) dependence     Hypertension      Hypokalemia     Last assessed - 8/13/14    Personal history of COVID-19     09/2023 - mild symptoms    Prostate cancer (HCC)     tx w radiation only    Prostate cancer (HCC) 02/12/2014    Sinus bradycardia     Last assessed - 8/13/14    Sleep apnea        Past Surgical History:   Procedure Laterality Date    CATARACT EXTRACTION Bilateral     COLONOSCOPY      x 4    HERNIA REPAIR      DE ARTHRP KNE CONDYLE&PLATU MEDIAL&LAT COMPARTMENTS Right 11/30/2023    Procedure: ARTHROPLASTY KNEE TOTAL W ROBOT;  Surgeon: Ace Zhu MD;  Location: [facility];  Service: Orthopedics    DE ARTHRP KNE CONDYLE&PLATU MEDIAL&LAT COMPARTMENTS Left 10/28/2024    Procedure: ARTHROPLASTY KNEE TOTAL W ROBOT;  Surgeon: Ace Zhu MD;  Location: [facility];  Service: Orthopedics    SIGMOIDOSCOPY      (Fiberoptic, Therapeutic) 7/28/2014, 8/25/14       Family History   Problem Relation Age of Onset    Prostate cancer Father     No Known Problems Mother     Liver cancer Brother         Adenocarcinoma     No Known Problems Sister     No Known Problems Sister     No Known Problems Son     No Known Problems Son     Leukemia Daughter         Remission-Bone Marrow transplant    No Known Problems Daughter        Social History     Occupational History    Occupation: Retired   Tobacco Use    Smoking status: Never     Passive exposure: Past    Smokeless tobacco: Never    Tobacco comments:     Former smoker per [employer]    Vaping Use    Vaping status: Never Used   Substance and Sexual Activity    Alcohol use: Never     Comment: Seldomly per [employer] - not in years    Drug use: No    Sexual activity: Not Currently         Current Outpatient Medications:     acetaminophen (TYLENOL) 500 mg tablet, Take 2 tablets (1,000 mg total) by mouth every 6 (six) hours as needed for mild pain, Disp: 90 tablet, Rfl: 0    atenolol (TENORMIN) 50 mg tablet, TAKE 1 TABLET BY MOUTH EVERY DAY, Disp: 90 tablet, Rfl: 1    Cholecalciferol (VITAMIN D3)  1000 UNIT/SPRAY LIQD, Take 1 tablet by mouth daily, Disp: , Rfl:     Coenzyme Q10 100 MG CHEW, Chew 200 mg, Disp: , Rfl:     methocarbamol (ROBAXIN) 500 mg tablet, Take 1 tablet (500 mg total) by mouth 3 (three) times a day as needed for muscle spasms, Disp: 60 tablet, Rfl: 0    oxyCODONE (Roxicodone) 5 immediate release tablet, Take 0.5 tablets (2.5 mg total) by mouth every 6 (six) hours as needed for moderate pain Max Daily Amount: 10 mg, Disp: 30 tablet, Rfl: 0    Potassium 99 MG TABS, Take 1 tablet by mouth 2 (two) times a day, Disp: , Rfl:     sildenafil (VIAGRA) 100 mg tablet, Take 1 tablet (100 mg total) by mouth as needed for erectile dysfunction, Disp: 30 tablet, Rfl: 3    enoxaparin (LOVENOX) 40 mg/0.4 mL, Inject 0.4 mL (40 mg total) under the skin daily for 28 days Start injections after surgery (Patient not taking: Reported on 9/18/2024), Disp: 11.2 mL, Rfl: 0    No Known Allergies         Vitals:    11/08/24 1349   BP: 135/76   Pulse: 76       Objective:  Physical exam  General: Awake, Alert, Oriented  Eyes: Pupils equal, round and reactive to light  Heart: regular rate and rhythm  Lungs: No audible wheezing  Abdomen: soft                    Ortho Exam  Right knee:  Well healed anterior incision  No erythema and no ecchymosis  Stable to varus and valgus stress  Extensor mechanism intact  Extension 2  Flexion 120  Calf compartments soft and supple  Sensation intact  Toes are warm sensate and mobile    Left knee:  Incision clean dry and intact  Staples well approximated and removed today   No erythema and no ecchymosis  Appropriate swelling of lower limb  Appropriate warmth of knee  Extensor mechanism intact  Extension 10  Flexion 90  Calf compartments soft and supple  Sensation intact  Toes are warm sensate and mobile        Diagnostics, reviewed and taken today if performed as documented:    The attending physician has personally reviewed the pertinent films in PACS and interpretation is as  "follows:  Right knee x-ray:  Well aligned prosthesis with no acute changes.    Left knee x-ray:  Well aligned prosthesis with no acute changes.          Procedures, if performed today:    Procedures    None performed      Portions of the record may have been created with voice recognition software.  Occasional wrong word or \"sound a like\" substitutions may have occurred due to the inherent limitations of voice recognition software.  Read the chart carefully and recognize, using context, where substitutions have occurred.    "

## 2024-11-11 ENCOUNTER — OFFICE VISIT (OUTPATIENT)
Dept: PHYSICAL THERAPY | Facility: CLINIC | Age: 84
End: 2024-11-11
Payer: COMMERCIAL

## 2024-11-11 DIAGNOSIS — Z96.652 STATUS POST TOTAL LEFT KNEE REPLACEMENT: Primary | ICD-10-CM

## 2024-11-11 PROCEDURE — 97140 MANUAL THERAPY 1/> REGIONS: CPT

## 2024-11-11 PROCEDURE — 97010 HOT OR COLD PACKS THERAPY: CPT

## 2024-11-11 PROCEDURE — 97110 THERAPEUTIC EXERCISES: CPT

## 2024-11-11 NOTE — PROGRESS NOTES
Daily Note    Today's date: 24  Patient name: Diego Dumont  : 1940  MRN: [id number]  Referring provider: Ace Zhu,*  Dx:   Encounter Diagnosis     ICD-10-CM    1. Status post total left knee replacement  Z96.652           Start Time: 1215  Stop Time: 1305  Total time in clinic (min): 50 minutes      Subjective: Diego presents to PT feeling well today. Patient reports okay following previous PT session. Patient mentioned that he felt a little stiff over the weekend. Patient discussed how he was able to use the RW with two finger support after the first 2-3 steps out of bed.   Objective: See treatment diary below.    Assessment: Diego tolerated treatment fair with moderate cuing. TE's were performed with the same resistance and reps. New TE's were demonstrated with proper technique, and tolerated fair. Diego required cuing with forward and lateral step ups. Patient also required contact guarding when performing theraputic activities. Following treatment, the patient demonstrated fatigue and would benefit from continued physical therapy. Patient was able to complete all of their exercises without any modifications and adverse symptoms. Patient was educated on proper SPC handling and usage.    Plan: Continue per plan of care.  Progress treatment as tolerated.         Precautions: L TKR 10/28/24    POC expires Unit limit Auth  expiration date PT/OT + Visit Limit?   24 BOMN 24 BOMN                 Visit/Unit Tracking  AUTH Status:  Date 10/31 11/4 11/7 11/11           Approved Used 1 2 3 4            Remaining                        Manuals 10/31 11/4 11/7 11/11         PROM Knee TS TS TW TW         Patellar mobs TS   TW                      Re-eval             Neuro Re-Ed             Rockerboard             SL balance             StS walk             Heel toe walk             Mikey walking             Bosu balance                                       5xSTS, TUG, TONY NV          "   Ther Ex             Nustep for ROM and cardio NV 10' L1 UE 12 10' L1 UE 12 10' L1 UE 12         Pt Ed. - pathophysiology, HEP, prognosis TS            Quad Set 20x5\"  20x5\"  10x10\"          Glute Set             Ankle Pumps 40x            LAQ/SAQ HEP  20x5\"  SAQ 20x5\" SAQ 20x 5\"         SLR 3 way             Heel slides 20x5\"  20x5\"  20x5\" 20x5\"         Supine hip abduction  3x10            HL adduction             Bridges             Step Stretch             Standing HS st.                                       Matrix flexion             Matrix ext.             Ther Activity             TG Squats             FSU    4\" 2x10 w/ CG and UE support         FSD             LSU    4\" 2x10 w/ CG and UE support         LSD             StS w/ TB             Gait Training                                       Modalities             CP 10' post 10' post 10' post elevated  10' Post w/ elevation                                             "

## 2024-11-14 ENCOUNTER — OFFICE VISIT (OUTPATIENT)
Dept: PHYSICAL THERAPY | Facility: CLINIC | Age: 84
End: 2024-11-14
Payer: COMMERCIAL

## 2024-11-14 DIAGNOSIS — Z96.652 STATUS POST TOTAL LEFT KNEE REPLACEMENT: Primary | ICD-10-CM

## 2024-11-14 PROCEDURE — 97140 MANUAL THERAPY 1/> REGIONS: CPT | Performed by: PHYSICAL THERAPIST

## 2024-11-14 PROCEDURE — 97110 THERAPEUTIC EXERCISES: CPT | Performed by: PHYSICAL THERAPIST

## 2024-11-14 NOTE — PROGRESS NOTES
Daily Note     Today's date: 2024  Patient name: Diego Dumont  : 1940  MRN: [id number]  Referring provider: Ace Zhu,*  Dx:   Encounter Diagnosis     ICD-10-CM    1. Status post total left knee replacement  Z96.652           Start Time: 1445  Stop Time: 1545  Total time in clinic (min): 60 minutes    Subjective: Patient reports that he was walking a lot on his feet the other day and also thinks he may have stretched his knee too far. Patient has increased pain and has had limited sleep.      Objective: See treatment diary below      Assessment: Tolerated treatment fair. Patient demonstrated fatigue post treatment and would benefit from continued PT. Discussed the importance of graded activity. Limited there ex this visit and backed off stretching to prevent further aggravation of symptoms. Patient mentioned that he was sleeping with a pillow beneath his knee. Discussed that this is not optimal and should be avoided as much as possible to prevent knee from becoming stiff. Patient reported feeling better at end of session.      Plan: Continue per plan of care.  Progress treatment as tolerated.       Precautions: L TKR 10/28/24    POC expires Unit limit Auth  expiration date PT/OT + Visit Limit?   24 BOMN 24 BOMN                 Visit/Unit Tracking  AUTH Status:  Date 10/31 11/4 11/7 11/11 11/14          Approved Used 1 2 3 4 5           Remaining                        Manuals 10/31 11/4 11/7 11/11 11/14        PROM Knee TS TS TW TW GR        Patellar mobs TS   TW GR        Gastroc str.     GR        Re-eval             Neuro Re-Ed             Rockerboard             SL balance             StS walk             Heel toe walk             Mikey walking             Bosu balance                                       5xSTS, TUG, TONY NV            Ther Ex             Nustep for ROM and cardio NV 10' L1 UE 12 10' L1 UE 12 10' L1 UE 12 10 min L3        Pt Ed. - pathophysiology, HEP,  "prognosis TS            Quad Set 20x5\"  20x5\"  10x10\"  20x5\"        Glute Set             Ankle Pumps 40x            LAQ/SAQ HEP  20x5\"  SAQ 20x5\" SAQ 20x 5\"         SLR 3 way     Supine SLR 2x10        Heel slides 20x5\"  20x5\"  20x5\" 20x5\"         Supine hip abduction  3x10            HL adduction             Bridges             Step Stretch             Standing HS st.             Long-sitting calf      Long-sitting calf str. With heel prop 5x30\"                     Matrix flexion             Matrix ext.             Ther Activity             TG Squats             FSU    4\" 2x10 w/ CG and UE support         FSD             LSU    4\" 2x10 w/ CG and UE support         LSD             StS w/ TB             Gait Training                                       Modalities             CP 10' post 10' post 10' post elevated  10' Post w/ elevation                                 "

## 2024-11-15 ENCOUNTER — TELEPHONE (OUTPATIENT)
Age: 84
End: 2024-11-15

## 2024-11-15 DIAGNOSIS — M17.12 PRIMARY OSTEOARTHRITIS OF LEFT KNEE: ICD-10-CM

## 2024-11-15 RX ORDER — OXYCODONE HYDROCHLORIDE 5 MG/1
2.5 TABLET ORAL EVERY 6 HOURS PRN
Qty: 30 TABLET | Refills: 0 | Status: SHIPPED | OUTPATIENT
Start: 2024-11-15 | End: 2024-11-18 | Stop reason: SDUPTHER

## 2024-11-15 RX ORDER — ACETAMINOPHEN 500 MG
1000 TABLET ORAL EVERY 6 HOURS PRN
Qty: 90 TABLET | Refills: 0 | Status: SHIPPED | OUTPATIENT
Start: 2024-11-15

## 2024-11-15 NOTE — TELEPHONE ENCOUNTER
DOS 10/28     Pt denies injury, reports for the last week having increased pain in the knee and now using the RW again.   We discussed he could have aggravated it with activity. I recommended rest and ice through the weekend. He is taking Tylenol 1000mg TID, Robaxin 500mg TID, and Oxycodone 2.5 mg as needed with no relief. He reports the pain is keeping him awake and unable to rest.     He is aware I will notify the surgeon for further recommendations.

## 2024-11-15 NOTE — TELEPHONE ENCOUNTER
Caller: patient     Doctor: Marko    Reason for call: patient losing sleep over pain in the l knee   Currently using extra strength Tylenol 3000 mg/daily  Oxycodone half a tablet 3x daily.     Preferred pharmacy is [facility] in [place].      Declined an appt at time of call.     Call back#: [phone]

## 2024-11-16 NOTE — TELEPHONE ENCOUNTER
Caller: Patient- Diego    Doctor: Dr Zhu    Reason for call: Patient is calling in stating that he spoke with the nurse navigator and was awaiting the Dr's recommendation. He is asking if he is able to get a medication refill of Oxycodone, he is asking if this can be sent to [facility] pharmacy in [place] on file and for someone to reach out to him on what further to do.     Call back#: [phone]

## 2024-11-18 ENCOUNTER — OFFICE VISIT (OUTPATIENT)
Dept: PHYSICAL THERAPY | Facility: CLINIC | Age: 84
End: 2024-11-18
Payer: COMMERCIAL

## 2024-11-18 DIAGNOSIS — Z96.652 STATUS POST TOTAL LEFT KNEE REPLACEMENT: Primary | ICD-10-CM

## 2024-11-18 DIAGNOSIS — M17.12 PRIMARY OSTEOARTHRITIS OF LEFT KNEE: ICD-10-CM

## 2024-11-18 PROCEDURE — 97140 MANUAL THERAPY 1/> REGIONS: CPT

## 2024-11-18 PROCEDURE — 97110 THERAPEUTIC EXERCISES: CPT

## 2024-11-18 RX ORDER — OXYCODONE HYDROCHLORIDE 5 MG/1
2.5 TABLET ORAL EVERY 6 HOURS PRN
Qty: 30 TABLET | Refills: 0 | Status: SHIPPED | OUTPATIENT
Start: 2024-11-18

## 2024-11-18 NOTE — PROGRESS NOTES
Daily Note    Today's date: 24  Patient name: Diego Dumont  : 1940  MRN: [id number]  Referring provider: Ace Zhu,*  Dx:   Encounter Diagnosis     ICD-10-CM    1. Status post total left knee replacement  Z96.652           Start Time: 1101  Stop Time: 1145  Total time in clinic (min): 44 minutes      Subjective: Diego presents to PT feeling some pain along the inside of his left knee. Patient reports feeling well following previous PT session. Patient mentioned that he had gotten out of bed today and after 15 steps w/ the SPC, he felt pain along his left knee. Patient had mentioned how he went back to using the RW for mobility.  Patient reports how a few exercises at home are difficult to perform.     Objective: See treatment diary below.    Assessment: Diego tolerated treatment well with minimal cuing. TE's were performed with the same resistance and reps. No new TE's were performed today. Diego had required  some cuing to perform his SAQ exercises. Patient felt some tightness and discomfort with manual techniques, but after treatment was performed, Diego felt fine. Following treatment, the patient demonstrated fatigue and would benefit from continued physical therapy. Patient was able to complete all of their exercises without any modifications and lasting adverse symptoms. Patient was educated on HEP.    Plan: Continue per plan of care.  Progress treatment as tolerated.         Precautions: L TKR 10/28/24    POC expires Unit limit Auth  expiration date PT/OT + Visit Limit?   24 BOMN 24 BOMN                 Visit/Unit Tracking  AUTH Status:  Date 10/31 11/4 11/7 11/11 11/14 11/18         Approved Used 1 2 3 4 5 6          Remaining                        Manuals 10/31 11/4 11/7 11/11 11/14 11/18       PROM Knee TS TS TW TW GR TW       Patellar mobs TS   TW GR TW        Gastroc str.     GR        Re-eval             Neuro Re-Ed             Rockerboard             SL balance   "           StS walk             Heel toe walk             Mikey walking             Bosu balance                                       5xSTS, TUG, TONY NV            Ther Ex             Nustep for ROM and cardio NV 10' L1 UE 12 10' L1 UE 12 10' L1 UE 12 10 min L3 10' L1   UE 12       Pt Ed. - pathophysiology, HEP, prognosis TS     HEP       Quad Set 20x5\"  20x5\"  10x10\"  20x5\"        Glute Set             Ankle Pumps 40x            LAQ/SAQ HEP  20x5\"  SAQ 20x5\" SAQ 20x 5\"  SAQ 20x5\"       SLR 3 way     Supine SLR 2x10        Heel slides 20x5\"  20x5\"  20x5\" 20x5\"  20x5\"       Supine hip abduction  3x10            HL adduction             Bridges             Step Stretch             Standing HS st.             Long-sitting calf      Long-sitting calf str. With heel prop 5x30\"                     Matrix flexion             Matrix ext.             Ther Activity             TG Squats             FSU    4\" 2x10 w/ CG and UE support         FSD             LSU    4\" 2x10 w/ CG and UE support         LSD             StS w/ TB             Gait Training                                       Modalities             CP 10' post 10' post 10' post elevated  10' Post w/ elevation                                               "

## 2024-11-21 ENCOUNTER — OFFICE VISIT (OUTPATIENT)
Dept: PHYSICAL THERAPY | Facility: CLINIC | Age: 84
End: 2024-11-21
Payer: COMMERCIAL

## 2024-11-21 DIAGNOSIS — Z96.652 STATUS POST TOTAL LEFT KNEE REPLACEMENT: Primary | ICD-10-CM

## 2024-11-21 PROCEDURE — 97110 THERAPEUTIC EXERCISES: CPT | Performed by: PHYSICAL THERAPIST

## 2024-11-21 PROCEDURE — 97140 MANUAL THERAPY 1/> REGIONS: CPT | Performed by: PHYSICAL THERAPIST

## 2024-11-21 NOTE — PROGRESS NOTES
Daily Note    Today's date: 24  Patient name: Diego Dumont  : 1940  MRN: [id number]  Referring provider: Ace Zhu,*  Dx:   Encounter Diagnosis     ICD-10-CM    1. Status post total left knee replacement  Z96.652           Start Time: 1208  Stop Time: 1255  Total time in clinic (min): 47 minutes      Subjective: Diego presents to PT with stiffness along left knee today. Patient mentioned that he is taking tylenol and half a tablet of oxycodone 3x a day. Patient reports that his HEP is going well. Patient reports how he has noticed improvements with prone hangs.     Objective: See treatment diary below.    Assessment: Diego tolerated treatment well with minimal cuing. TE's were performed with increased resistance. No new TE's were performed today. Diego demonstrates a quad lag with SAQ. Patient required cuing with performing a quad set for corrective technique and form. Following treatment, the patient demonstrated fatigue and would benefit from continued physical therapy. Patient was able to complete all of their exercises without any modifications or adverse symptoms.     Plan: Continue per plan of care.  Progress treatment as tolerated.         Precautions: L TKR 10/28/24    POC expires Unit limit Auth  expiration date PT/OT + Visit Limit?   24 BOMN 24 BOMN                 Visit/Unit Tracking  AUTH Status:  Date 10/31 11/4 11/7 11/11 11/14 11/18 11/21        Approved Used 1 2 3 4 5 6 7         Remaining                        Manuals 10/31 11/4 11/7 11/11 11/14 11/18 11/21      PROM Knee TS TS TW TW GR TW TW      Patellar mobs TS   TW GR TW  TW      Gastroc str.     GR        Re-eval             Neuro Re-Ed             Rockerboard             SL balance             StS walk             Heel toe walk             Mikey walking             Bosu balance                                       5xSTS, TUG, TONY NV            Ther Ex             Nustep for ROM and cardio NV 10' L1  "UE 12 10' L1 UE 12 10' L1 UE 12 10 min L3 10' L1   UE 12 10' L5   UE 10      Pt Ed. - pathophysiology, HEP, prognosis TS     HEP       Quad Set 20x5\"  20x5\"  10x10\"  20x5\"  15x10\"       Glute Set             Ankle Pumps 40x            LAQ/SAQ HEP  20x5\"  SAQ 20x5\" SAQ 20x 5\"  SAQ 20x5\" SAQ 20x5\"   LAQ  10x 5\"       SLR 3 way     Supine SLR 2x10  Supine SLR 10x      Heel slides 20x5\"  20x5\"  20x5\" 20x5\"  20x5\"       Supine hip abduction  3x10            HL adduction             Bridges             Step Stretch             Standing HS st.             Long-sitting calf      Long-sitting calf str. With heel prop 5x30\"        Prone w/ pillow& 1/2 foam under thigh        1.5# 5'       Matrix flexion             Matrix ext.             Ther Activity             TG Squats             FSU    4\" 2x10 w/ CG and UE support         FSD             LSU    4\" 2x10 w/ CG and UE support         LSD             StS w/ TB             Gait Training                                       Modalities             CP 10' post 10' post 10' post elevated  10' Post w/ elevation                                               "

## 2024-11-25 ENCOUNTER — OFFICE VISIT (OUTPATIENT)
Dept: PHYSICAL THERAPY | Facility: CLINIC | Age: 84
End: 2024-11-25
Payer: COMMERCIAL

## 2024-11-25 DIAGNOSIS — Z96.652 STATUS POST TOTAL LEFT KNEE REPLACEMENT: Primary | ICD-10-CM

## 2024-11-25 PROCEDURE — 97530 THERAPEUTIC ACTIVITIES: CPT

## 2024-11-25 PROCEDURE — 97140 MANUAL THERAPY 1/> REGIONS: CPT

## 2024-11-25 PROCEDURE — 97110 THERAPEUTIC EXERCISES: CPT

## 2024-11-25 NOTE — PROGRESS NOTES
"Daily Note    Today's date: 24  Patient name: Diego Dumont  : 1940  MRN: [id number]  Referring provider: Ace Zhu,*  Dx:   Encounter Diagnosis     ICD-10-CM    1. Status post total left knee replacement  Z96.652           Start Time: 1215  Stop Time: 1300  Total time in clinic (min): 45 minutes      Subjective: Diego presents to PT feeling okay today. Patient reports feeling soreness following previous PT session. Patient mentioned that he felt discomfort from Thursday's session until Saturday. Patient reports that he had performed prone knee hangs two to three times over the weekend.     Objective: See treatment diary below.    Assessment: Diego tolerated treatment well with minimal cuing. TE's were performed with increased reps and increased resistance. New TE's were demonstrated with proper technique, and tolerated well. Diego required cuing for corrective form and body positioning with TKE. Following treatment, the patient demonstrated fatigue and would benefit from continued physical therapy. Patient was able to complete all of their exercises without any modifications or adverse symptoms.     Plan: Continue per plan of care.  Progress treatment as tolerated.         Precautions: L TKR 10/28/24    POC expires Unit limit Auth  expiration date PT/OT + Visit Limit?   24 BOMN 24 BOMN                 Visit/Unit Tracking  AUTH Status:  Date 10/31 11/4 11/7 11/11 11/14 11/18 11/21 11/25       Approved Used 1 2 3 4 5 6 7 8        Remaining                        Manuals 10/31 11/4 11/7 11/11 11/14 11/18 11/21 11/25     PROM Knee TS TS TW TW GR TW TW TW     Patellar mobs TS   TW GR TW  TW TW     Gastroc str.     GR        Re-eval             Neuro Re-Ed             Rockerboard             SL balance             StS walk             Heel toe walk             Mikey walking             Bosu balance             TKE        [place] 5# 20x5\"                  5xSTS, TUG, TONY NV         " "   Ther Ex             Nustep for ROM and cardio NV 10' L1 UE 12 10' L1 UE 12 10' L1 UE 12 10 min L3 10' L1   UE 12 10' L5   UE 10 10' L5   UE 10      Pt Ed. - pathophysiology, HEP, prognosis TS     HEP       Quad Set 20x5\"  20x5\"  10x10\"  20x5\"  15x10\"  20x5\"     Glute Set             Ankle Pumps 40x            LAQ/SAQ HEP  20x5\"  SAQ 20x5\" SAQ 20x 5\"  SAQ 20x5\" SAQ 20x5\"   LAQ  10x 5\"       SLR 3 way     Supine SLR 2x10  Supine SLR 10x Supine SLR 2x10     Heel slides 20x5\"  20x5\"  20x5\" 20x5\"  20x5\"       Supine hip abduction  3x10            HL adduction             Bridges             Step Stretch             Standing HS st.             Long-sitting calf      Long-sitting calf str. With heel prop 5x30\"        Prone w/ pillow& 1/2 foam under thigh        1.5# 5'       Matrix flexion             Matrix ext.             Ther Activity             TG Squats             FSU    4\" 2x10 w/ CG and UE support    4\" 2x10 w/ CG and UE support     FSD             LSU    4\" 2x10 w/ CG and UE support    4\" 2x10 w/ CG and UE support     LSD             StS w/ TB             Gait Training                                       Modalities             CP 10' post 10' post 10' post elevated  10' Post w/ elevation                                               "

## 2024-11-29 ENCOUNTER — OFFICE VISIT (OUTPATIENT)
Dept: PHYSICAL THERAPY | Facility: CLINIC | Age: 84
End: 2024-11-29
Payer: COMMERCIAL

## 2024-11-29 DIAGNOSIS — Z96.652 STATUS POST TOTAL LEFT KNEE REPLACEMENT: Primary | ICD-10-CM

## 2024-11-29 PROCEDURE — 97110 THERAPEUTIC EXERCISES: CPT

## 2024-11-29 PROCEDURE — 97140 MANUAL THERAPY 1/> REGIONS: CPT

## 2024-11-29 PROCEDURE — 97530 THERAPEUTIC ACTIVITIES: CPT

## 2024-11-29 NOTE — PROGRESS NOTES
"Daily Note    Today's date: 24  Patient name: Diego Dumont  : 1940  MRN: [id number]  Referring provider: Ace Zhu,*  Dx:   Encounter Diagnosis     ICD-10-CM    1. Status post total left knee replacement  Z96.652           Start Time: 1147  Stop Time: 1230  Total time in clinic (min): 43 minutes      Subjective: Diego presents to PT feeling okay today. Patient reports feeling fine following previous PT session. Patient mentioned that he used the treadmill for 15' at 1.0 mph and felt fine following. Patient mentioned that he noticed improvements in his knee with the exercises.      Objective: See treatment diary below.    Assessment: Diego tolerated treatment well with minimal cuing. TE's were performed with increased reps and increased resistance. New TE's were demonstrated with proper technique, and tolerated well. Diego required cuing to avoid UE use with FSU and LSU. LSU was modified to 4\" for proper technique. LSD on 4\" step was modified to 4\" on to 2\" step. Patient was able to complete the exercise after the modifications. Following treatment, the patient demonstrated fatigue and would benefit from continued physical therapy. Patient was able to complete all of their exercises without any  adverse symptoms. Patient was educated on activity modification and reinforced of handrail use.      Plan: Continue per plan of care.  Progress treatment as tolerated.         Precautions: L TKR 10/28/24    POC expires Unit limit Auth  expiration date PT/OT + Visit Limit?   24 BOMN 24 BOMN                 Visit/Unit Tracking  AUTH Status:  Date 10/31 11/4 11/7 11/11 11/14 11/18 11/21 11/25 11/29      Approved Used 1 2 3 4 5 6 7 8 9       Remaining                        Manuals 10/31 11/4 11/7 11/11 11/14 11/18 11/21 11/25 11/29    PROM Knee TS TS TW TW GR TW TW TW TW    Patellar mobs TS   TW GR TW  TW TW     Gastroc str.     GR        Re-eval             Neuro Re-Ed           " "  Rockerboard             SL balance             StS walk             Heel toe walk             Mikey walking             Bosu balance             TKE        [place] 5# 20x5\" Nv                 5xSTS, TUG, TONY NV            Ther Ex             Nustep for ROM and cardio NV 10' L1 UE 12 10' L1 UE 12 10' L1 UE 12 10 min L3 10' L1   UE 12 10' L5   UE 10 10' L5   UE 10  10' L4  UE 11    Pt Ed. - pathophysiology, HEP, prognosis TS     HEP   Activity modification and HRR use     Quad Set 20x5\"  20x5\"  10x10\"  20x5\"  15x10\"  20x5\"     Glute Set             Ankle Pumps 40x            LAQ/SAQ HEP  20x5\"  SAQ 20x5\" SAQ 20x 5\"  SAQ 20x5\" SAQ 20x5\"   LAQ  10x 5\"       SLR 3 way     Supine SLR 2x10  Supine SLR 10x Supine SLR 2x10 Nv standing     Heel slides 20x5\"  20x5\"  20x5\" 20x5\"  20x5\"       Supine hip abduction  3x10            HL adduction             Bridges             Step Stretch             Standing HS st.             Long-sitting calf      Long-sitting calf str. With heel prop 5x30\"        Prone w/ pillow& 1/2 foam under thigh        1.5# 5'   4# 5'     Matrix flexion             Matrix ext.             Ther Activity             TG Squats             FSU    4\" 2x10 w/ CG and UE support    4\" 2x10 w/ CG and UE support 6\" 2x10 w/ CG and UE support  Nv on 2\" w/ UE    FSD         4\" 2x10 w/ CG and UE support Nv on 2\" w/ UE    LSU    4\" 2x10 w/ CG and UE support    4\" 2x10 w/ CG and UE support 6\" 2x10 w/ CG and UE support  Nv on 4\" w/ no UE    LSD         4\" 2x10 w/ CG and UE support    StS w/ TB             Gait Training                                       Modalities             CP 10' post 10' post 10' post elevated  10' Post w/ elevation                                               "

## 2024-12-02 ENCOUNTER — APPOINTMENT (OUTPATIENT)
Dept: PHYSICAL THERAPY | Facility: CLINIC | Age: 84
End: 2024-12-02
Payer: COMMERCIAL

## 2024-12-03 ENCOUNTER — APPOINTMENT (OUTPATIENT)
Dept: PHYSICAL THERAPY | Facility: CLINIC | Age: 84
End: 2024-12-03
Payer: COMMERCIAL

## 2024-12-05 ENCOUNTER — EVALUATION (OUTPATIENT)
Dept: PHYSICAL THERAPY | Facility: CLINIC | Age: 84
End: 2024-12-05
Payer: COMMERCIAL

## 2024-12-05 DIAGNOSIS — Z96.652 STATUS POST TOTAL LEFT KNEE REPLACEMENT: Primary | ICD-10-CM

## 2024-12-05 PROCEDURE — 97530 THERAPEUTIC ACTIVITIES: CPT | Performed by: PHYSICAL THERAPIST

## 2024-12-05 PROCEDURE — 97110 THERAPEUTIC EXERCISES: CPT | Performed by: PHYSICAL THERAPIST

## 2024-12-05 NOTE — PROGRESS NOTES
PT Re-Evaluation     Today's date: 24  Patient name: Diego Dumont  : 1940  MRN: [id number]  Referring provider: Ace Zhu,*  Dx:   Encounter Diagnosis     ICD-10-CM    1. Status post total left knee replacement  Z96.652           Start Time: 1112  Stop Time: 1151  Total time in clinic (min): 39 minutes     Assessment  Impairments: abnormal coordination, abnormal gait, abnormal muscle firing, abnormal or restricted ROM, abnormal movement, activity intolerance, impaired balance, impaired physical strength, lacks appropriate home exercise program, pain with function, safety issue, weight-bearing intolerance and poor body mechanics    Assessment details: Diego Dumont is a pleasant 84 y.o. male who presents today for a re-evaluation s/p L TKA on 10/28/24. Diego complains of aching, sharp, and tight pain in the left knee ranging from 1/10 to 5/10. Pain is exacerbated by prolonged standing, getting up from a low chair or couch. Patient states how their pain is made better by ice, medication, or rest.     Upon examination, the patient demonstrates increased strength during resisted muscle testing. Diego's L knee ROM is moderately decreased with firm end feel in extension. Patient AROM and PROM with left knee flexion has increased since initial evaluation.     Patient would benefit from continued physical therapy interventions to address their decreased knee flexion and improve their ADL performance. Patient was educated on the occurrence of swelling and normal tissue healing.   Understanding of Dx/Px/POC: excellent     Prognosis: excellent    Goals  ST. Independent with HEP in 2 weeks. GOAL MET  2. Pt will have verbal report of improvement in symptoms by >/=25% in 2 weeks. GOAL MET  3. Decrease pain to 6/10 at it's worst. GOAL MET  4. Increase strength by 1/2 grade in all deficient planes. GOAL MET    To be achieved by D/C   LT. Pt will improve FOTO score by >/= goal points in 6  weeks. GOAL PROGRESSING   2. Pt will improve FOTO score to >/= goal score by visit # 12. GOAL PROGRESSING   3. Pt will be able to stand for an hour with little to no difficulty. GOAL PROGRESSING  4. Pt will be able to walk a mile with little to no difficulty. GOAL PROGRESSING  5. Pt will be able to perform his usual activities including stairs and hiking with little to no difficulty. GOAL NOT MET  6. Pt will improve five times sit to stand time from TBD to 12 seconds or less GOAL MET  7. Pt will improve TUG time from TBD to 13.5 seconds or less. (MDC 3 - 5 seconds) GOAL MET    Plan  Patient would benefit from: skilled physical therapy  Planned modality interventions: biofeedback, cryotherapy, TENS, thermotherapy: hydrocollator packs, ultrasound and unattended electrical stimulation    Planned therapy interventions: IASTM, joint mobilization, kinesiology taping, manual therapy, massage, neuromuscular re-education, patient/caregiver education, strengthening, stretching, therapeutic activities, therapeutic exercise, home exercise program, functional ROM exercises, gait training, flexibility, compression, body mechanics training, balance/weight bearing training, balance and ADL training    Frequency: 2x week  Duration in weeks: 5  Plan of Care beginning date: 12/5/2024  Plan of Care expiration date: 1/9/2025  Treatment plan discussed with: patient        Subjective Evaluation    History of Present Illness  Mechanism of injury: surgery  Mechanism of injury: Diego presents today for a re-evaluation on their eft knee s/p L TKR on 10/28/24. Patient reports to PT with some discomfort. Patient mentioned that he had slept 8 hours today and woke up with stiffness in both of his knees. Patient reports that PT has been helpful with walking, stair climbing and weaning off the walker and cane. Diego reports improve stair climbing since he was educated on going up one step at a time. Patient mentioned that he normally feels his  "muscles fatigued the next day from previous PT sessions. Patient mentioned that they can get up from chairs, but the initial movement is annoying pain. Patient mentioned they have minor pain with sleeping.  Patient Goals  Patient goals for therapy: return to work, return to sport/leisure activities, independence with ADLs/IADLs, increased strength, decreased edema, decreased pain, increased motion and improved balance    Pain  Current pain ratin  At best pain ratin  At worst pain ratin  Location: Left  knee in the front  Quality: dull ache, tight and throbbing  Relieving factors: ice, medications and rest  Aggravating factors: stair climbing, walking and standing          Objective     Neurological Testing     Sensation     Knee   Left Knee   Intact: Light touch    Right Knee   Intact: light touch     Active Range of Motion   Left Knee   Flexion: 115 degrees   Extension: -10 degrees     Right Knee   Flexion: 122 degrees   Extension: 0 degrees     Passive Range of Motion   Left Knee   Flexion: 119 degrees with pain  Extension: -9 degrees with pain    Right Knee   Flexion: 125 degrees   Extension: 0 degrees     Strength/Myotome Testing     Left Knee   Flexion: 4  Extension: 4-  Quadriceps contraction: good    Right Knee   Flexion: 4+  Extension: 4+  Quadriceps contraction: good    Additional Strength Details        Ambulation   Weight-Bearing Status   Assistive device used: none    Functional Assessment        Forward Step Down 6\"   Left Leg  Positive Trendelenburg, contralateral trunk side bending and increased forward trunk lean. No pain and no ipsilateral trunk side bending.     Comments  Post OP 24  5xSTS 12.46s w/ UE support  TUG 8.82          Precautions: L TKR 10/28/24    POC expires Unit limit Auth  expiration date PT/OT + Visit Limit?   24 BOMN 24 BOMN                 Visit/Unit Tracking  AUTH Status:  Date 10/31 11/4 11/7 11/11 11/14 11/18 11/21 11/25 11/29 12/5     Approved Used " "1 2 3 4 5 6 7 8 9 10      Remaining                        Manuals  11/4 11/7 11/11 11/14 11/18 11/21 11/25 11/29 12/5   PROM Knee  TS TW TW GR TW TW TW TW    Patellar mobs    TW GR TW  TW TW     Gastroc str.     GR                     Neuro Re-Ed             Rockerboard             SL balance             StS walk             Heel toe walk             Mikey walking             Bosu balance             TKE        [place] 5# 20x5\" Nv                 5xSTS, TUG, TONY             Ther Ex             Nustep for ROM and cardio  10' L1 UE 12 10' L1 UE 12 10' L1 UE 12 10 min L3 10' L1   UE 12 10' L5   UE 10 10' L5   UE 10  10' L4  UE 11    Pt Ed. - pathophysiology, HEP, prognosis      HEP   Activity modification and HRR use     Quad Set  20x5\"  10x10\"  20x5\"  15x10\"  20x5\"     Glute Set             Ankle Pumps             LAQ/SAQ  20x5\"  SAQ 20x5\" SAQ 20x 5\"  SAQ 20x5\" SAQ 20x5\"   LAQ  10x 5\"       SLR 3 way     Supine SLR 2x10  Supine SLR 10x Supine SLR 2x10 Nv standing     Heel slides  20x5\"  20x5\" 20x5\"  20x5\"       Supine hip abduction  3x10            HL adduction             Bridges             Step Stretch             Standing HS st.             Long-sitting calf      Long-sitting calf str. With heel prop 5x30\"        Prone w/ pillow& 1/2 foam under thigh        1.5# 5'   4# 5'     Matrix flexion             Matrix ext.             Re-eval          TW   Ther Activity             TG Squats             FSU    4\" 2x10 w/ CG and UE support    4\" 2x10 w/ CG and UE support 6\" 2x10 w/ CG and UE support  Nv on 2\" w/ UE    FSD         4\" 2x10 w/ CG and UE support Nv on 2\" w/ UE    LSU    4\" 2x10 w/ CG and UE support    4\" 2x10 w/ CG and UE support 6\" 2x10 w/ CG and UE support  Nv on 4\" w/ no UE    LSD         4\" 2x10 w/ CG and UE support    StS w/ TB                          5xSTS, TUG, TONY          TW   Gait Training                                       Modalities             CP  10' post 10' post elevated  10' Post w/ " elevation

## 2024-12-06 ENCOUNTER — APPOINTMENT (OUTPATIENT)
Dept: RADIOLOGY | Facility: MEDICAL CENTER | Age: 84
End: 2024-12-06
Payer: COMMERCIAL

## 2024-12-06 ENCOUNTER — OFFICE VISIT (OUTPATIENT)
Dept: OBGYN CLINIC | Facility: MEDICAL CENTER | Age: 84
End: 2024-12-06

## 2024-12-06 VITALS
HEART RATE: 75 BPM | DIASTOLIC BLOOD PRESSURE: 86 MMHG | SYSTOLIC BLOOD PRESSURE: 130 MMHG | HEIGHT: 69 IN | WEIGHT: 162 LBS | BODY MASS INDEX: 23.99 KG/M2

## 2024-12-06 DIAGNOSIS — Z47.1 AFTERCARE FOLLOWING RIGHT KNEE JOINT REPLACEMENT SURGERY: Primary | ICD-10-CM

## 2024-12-06 DIAGNOSIS — Z96.651 AFTERCARE FOLLOWING RIGHT KNEE JOINT REPLACEMENT SURGERY: ICD-10-CM

## 2024-12-06 DIAGNOSIS — Z47.1 AFTERCARE FOLLOWING RIGHT KNEE JOINT REPLACEMENT SURGERY: ICD-10-CM

## 2024-12-06 DIAGNOSIS — Z96.652 S/P TOTAL KNEE ARTHROPLASTY, LEFT: ICD-10-CM

## 2024-12-06 DIAGNOSIS — Z96.651 AFTERCARE FOLLOWING RIGHT KNEE JOINT REPLACEMENT SURGERY: Primary | ICD-10-CM

## 2024-12-06 PROCEDURE — 73560 X-RAY EXAM OF KNEE 1 OR 2: CPT

## 2024-12-06 PROCEDURE — 99024 POSTOP FOLLOW-UP VISIT: CPT | Performed by: ORTHOPAEDIC SURGERY

## 2024-12-06 NOTE — PROGRESS NOTES
Assessment:  1. Aftercare following right knee joint replacement surgery  XR knee 1 or 2 vw right      2. S/P total knee arthroplasty, left            Plan:  0ne year s/p right TKA with robot, 11/30/2023  He is doing well.    He is encouraged to remain active including HEP.    The patient is counseled on prophylactic antibiotic use prior to dental visits.      2 months s/p left TKA, 10/28/2024   He is doing well.    He should continue physical therapy.    He should follow up in one month    To do next visit:  Return in about 4 weeks (around 1/3/2025) for re-check with x-rays.    The above stated was discussed in layman's terms and the patient expressed understanding.  All questions were answered to the patient's satisfaction.       Scribe Attestation      I,:  Hilario Jaime MA am acting as a scribe while in the presence of the attending physician.:       I,:  Ace Zhu MD personally performed the services described in this documentation    as scribed in my presence.:               Subjective:   Diego Dumont is a 84 y.o. male who presents for one year s/p right TKA with robot, 11/30/2023 and left TKA with robot, 10/28/2024.  He is doing well.  Today he complains of occasional bilateral knee stiffness.  He continues physical therapy for left knee.  He does use Tylenol for pain.  He denies fever, chills or shortness of breath.        Review of systems negative unless otherwise specified in HPI    Past Medical History:   Diagnosis Date    Actinic keratosis     Last assessed - 10/15/14    Cortical age-related cataract of both eyes 03/22/2022    CPAP (continuous positive airway pressure) dependence     Hypertension     Hypokalemia     Last assessed - 8/13/14    Personal history of COVID-19     09/2023 - mild symptoms    Prostate cancer (HCC)     tx w radiation only    Prostate cancer (HCC) 02/12/2014    Sinus bradycardia     Last assessed - 8/13/14    Sleep apnea        Past Surgical History:   Procedure  Laterality Date    CATARACT EXTRACTION Bilateral     COLONOSCOPY      x 4    HERNIA REPAIR      MI ARTHRP KNE CONDYLE&PLATU MEDIAL&LAT COMPARTMENTS Right 11/30/2023    Procedure: ARTHROPLASTY KNEE TOTAL W ROBOT;  Surgeon: Ace Zhu MD;  Location: [facility];  Service: Orthopedics    MI ARTHRP KNE CONDYLE&PLATU MEDIAL&LAT COMPARTMENTS Left 10/28/2024    Procedure: ARTHROPLASTY KNEE TOTAL W ROBOT;  Surgeon: Ace Zhu MD;  Location: [facility];  Service: Orthopedics    SIGMOIDOSCOPY      (Fiberoptic, Therapeutic) 7/28/2014, 8/25/14       Family History   Problem Relation Age of Onset    Prostate cancer Father     No Known Problems Mother     Liver cancer Brother         Adenocarcinoma     No Known Problems Sister     No Known Problems Sister     No Known Problems Son     No Known Problems Son     Leukemia Daughter         Remission-Bone Marrow transplant    No Known Problems Daughter        Social History     Occupational History    Occupation: Retired   Tobacco Use    Smoking status: Never     Passive exposure: Past    Smokeless tobacco: Never    Tobacco comments:     Former smoker per [employer]    Vaping Use    Vaping status: Never Used   Substance and Sexual Activity    Alcohol use: Never     Comment: Seldomly per [employer] - not in years    Drug use: No    Sexual activity: Not Currently         Current Outpatient Medications:     acetaminophen (TYLENOL) 500 mg tablet, Take 2 tablets (1,000 mg total) by mouth every 6 (six) hours as needed for mild pain, Disp: 90 tablet, Rfl: 0    atenolol (TENORMIN) 50 mg tablet, TAKE 1 TABLET BY MOUTH EVERY DAY, Disp: 90 tablet, Rfl: 1    Cholecalciferol (VITAMIN D3) 1000 UNIT/SPRAY LIQD, Take 1 tablet by mouth daily, Disp: , Rfl:     Coenzyme Q10 100 MG CHEW, Chew 200 mg, Disp: , Rfl:     methocarbamol (ROBAXIN) 500 mg tablet, Take 1 tablet (500 mg total) by mouth 3 (three) times a day as needed for muscle spasms, Disp: 60 tablet, Rfl: 0    oxyCODONE  "(Roxicodone) 5 immediate release tablet, Take 0.5 tablets (2.5 mg total) by mouth every 6 (six) hours as needed for moderate pain Max Daily Amount: 10 mg, Disp: 30 tablet, Rfl: 0    Potassium 99 MG TABS, Take 1 tablet by mouth 2 (two) times a day, Disp: , Rfl:     sildenafil (VIAGRA) 100 mg tablet, Take 1 tablet (100 mg total) by mouth as needed for erectile dysfunction, Disp: 30 tablet, Rfl: 3    enoxaparin (LOVENOX) 40 mg/0.4 mL, Inject 0.4 mL (40 mg total) under the skin daily for 28 days Start injections after surgery (Patient not taking: Reported on 9/18/2024), Disp: 11.2 mL, Rfl: 0    No Known Allergies         Vitals:    12/06/24 1351   BP: 130/86   Pulse: 75       Objective:  Physical exam  General: Awake, Alert, Oriented  Eyes: Pupils equal, round and reactive to light  Heart: regular rate and rhythm  Lungs: No audible wheezing  Abdomen: soft                    Ortho Exam  Right knee:  Well healed anterior incision  No erythema and no ecchymosis  Stable to varus and valgus stress  Extensor mechanism intact  Extension 5  Flexion 130  Calf compartments soft and supple  Sensation intact  Toes are warm sensate and mobile    Left knee:  Well healed anterior incision  No erythema and no ecchymosis  Appropriate swelling of lower limb  Appropriate warmth of knee  Extensor mechanism intact  Extension 5  Flexion 120  Calf compartments soft and supple  Sensation intact  Toes are warm sensate and mobile        Diagnostics, reviewed and taken today if performed as documented:    The attending physician has personally reviewed the pertinent films in PACS and interpretation is as follows:  Right knee x-ray:  Well aligned prosthesis with no acute changes.      Procedures, if performed today:    Procedures    None performed      Portions of the record may have been created with voice recognition software.  Occasional wrong word or \"sound a like\" substitutions may have occurred due to the inherent limitations of voice " recognition software.  Read the chart carefully and recognize, using context, where substitutions have occurred.

## 2024-12-09 ENCOUNTER — OFFICE VISIT (OUTPATIENT)
Dept: PHYSICAL THERAPY | Facility: CLINIC | Age: 84
End: 2024-12-09
Payer: COMMERCIAL

## 2024-12-09 DIAGNOSIS — Z96.652 STATUS POST TOTAL LEFT KNEE REPLACEMENT: Primary | ICD-10-CM

## 2024-12-09 PROCEDURE — 97110 THERAPEUTIC EXERCISES: CPT

## 2024-12-09 PROCEDURE — 97112 NEUROMUSCULAR REEDUCATION: CPT

## 2024-12-09 PROCEDURE — 97530 THERAPEUTIC ACTIVITIES: CPT

## 2024-12-09 NOTE — PROGRESS NOTES
"Daily Note    Today's date: 24  Patient name: Diego Dumont  : 1940  MRN: [id number]  Referring provider: Ace Zhu,*  Dx:   Encounter Diagnosis     ICD-10-CM    1. Status post total left knee replacement  Z96.652           Start Time: 1100  Stop Time: 1145  Total time in clinic (min): 45 minutes      Subjective: Diego reports some soreness this morning. He notes adherence to HEP and is going up and down stairs multiple times a day.    Objective: See treatment diary below.    Assessment: Diego tolerated treatment well with consistent cuing throughout. TE's were performed with increased reps and increased resistance. New TE's were demonstrated with proper technique, and tolerated well. Following treatment, the patient demonstrated fatigue and would benefit from continued physical therapy.    Plan: Continue per plan of care.  Progress treatment as tolerated.         Precautions: L TKR 10/28/24    POC expires Unit limit Auth  expiration date PT/OT + Visit Limit?   24 BOMN 24 BOMN                 Visit/Unit Tracking  AUTH Status:  Date 10/31 11/4 11/7 11/11 11/14 11/18 11/21 11/25 11/29 12/5 12/9    Approved Used 1 2 3 4 5 6 7 8 9 10 11     Remaining                        Manuals    PROM Knee   TW TW GR TW TW TW TW    Patellar mobs    TW GR TW  TW TW     Gastroc str.     GR                     Neuro Re-Ed             Rockerboard 2'/2'             SL balance             Tandem stance walk Foam 5 laps            StS walk Foam 5 laps            Heel toe walk             Mikey walking             Bosu balance             TKE        [place] 5# 20x5\" Nv                 5xSTS, TUG, TONY             Ther Ex             Nustep for ROM and cardio 10' L4  10' L1 UE 12 10' L1 UE 12 10 min L3 10' L1   UE 12 10' L5   UE 10 10' L5   UE 10  10' L4  UE 11    Pt Ed. - pathophysiology, HEP, prognosis TS     HEP   Activity modification and HRR use   " "  Quad Set   10x10\"  20x5\"  15x10\"  20x5\"     Glute Set             Ankle Pumps             LAQ/SAQ   SAQ 20x5\" SAQ 20x 5\"  SAQ 20x5\" SAQ 20x5\"   LAQ  10x 5\"       SLR 3 way     Supine SLR 2x10  Supine SLR 10x Supine SLR 2x10 Nv standing     Heel slides   20x5\" 20x5\"  20x5\"       Supine hip abduction             HL adduction             Bridges             Step Stretch             Standing HS st.             Long-sitting calf      Long-sitting calf str. With heel prop 5x30\"        Prone w/ pillow& 1/2 foam under thigh        1.5# 5'   4# 5'     Matrix flexion 3x10 30#            Matrix ext. 3x10 20#            Re-eval          TW   Ther Activity             TG Squats             FSU 6\" 3x10    4\" 2x10 w/ CG and UE support    4\" 2x10 w/ CG and UE support 6\" 2x10 w/ CG and UE support  Nv on 2\" w/ UE    FSD 6\" 3x10         4\" 2x10 w/ CG and UE support Nv on 2\" w/ UE    LSU 6\" 3x10    4\" 2x10 w/ CG and UE support    4\" 2x10 w/ CG and UE support 6\" 2x10 w/ CG and UE support  Nv on 4\" w/ no UE    LSD         4\" 2x10 w/ CG and UE support    StS w/ TB                          5xSTS, TUG, TONY          TW   Gait Training                                       Modalities             CP   10' post elevated  10' Post w/ elevation                           Luis Martins, PT  12/9/2024,11:51 AM  "

## 2024-12-12 ENCOUNTER — OFFICE VISIT (OUTPATIENT)
Dept: PHYSICAL THERAPY | Facility: CLINIC | Age: 84
End: 2024-12-12
Payer: COMMERCIAL

## 2024-12-12 DIAGNOSIS — Z96.652 STATUS POST TOTAL LEFT KNEE REPLACEMENT: Primary | ICD-10-CM

## 2024-12-12 PROCEDURE — 97110 THERAPEUTIC EXERCISES: CPT

## 2024-12-12 PROCEDURE — 97530 THERAPEUTIC ACTIVITIES: CPT

## 2024-12-12 NOTE — PROGRESS NOTES
"Daily Note     Today's date: 2024  Patient name: Diego Dumont  : 1940  MRN: [id number]  Referring provider: Ace Zhu,*  Dx:   Encounter Diagnosis     ICD-10-CM    1. Status post total left knee replacement  Z96.652           Start Time: 1215  Stop Time: 1300  Total time in clinic (min): 45 minutes    Subjective: Pt noted that he has been taking the oxy again instead of the tylonol since having quite a lot of pain.       Objective: See treatment diary below      Assessment:  Continued with treatment session with some progressions at this time. Still having some additional tightness and stiffness into extension of L knee. Tolerated treatment well. Patient exhibited good technique with therapeutic exercises and would benefit from continued PT  Advised may have some additional soreness.     Plan: Continue per plan of care.      Precautions: L TKR 10/28/24    POC expires Unit limit Auth  expiration date PT/OT + Visit Limit?   24 BOMN 24 BOMN                 Visit/Unit Tracking  AUTH Status:  Date 10/31 11/4 11/7 11/11 11/14 11/18 11/21 11/25 11/29 12/5 12/9 12/12   Approved Used 1 2 3 4 5 6 7 8 9 10 11 12    Remaining                        Manuals    PROM Knee  SC   GR TW TW TW TW    Patellar mobs     GR TW  TW TW     Gastroc str.     GR                     Neuro Re-Ed             Rockerboard 2'/2'             SL balance             Tandem stance walk Foam 5 laps            StS walk Foam 5 laps            Heel toe walk             Mikey walking             Bosu balance             TKE        [place] 5# 20x5\" Nv                 5xSTS, TUG, TONY             Ther Ex             Nustep for ROM and cardio 10' L4 10 min fwd on bike no tension    10 min L3 10' L1   UE 12 10' L5   UE 10 10' L5   UE 10  10' L4  UE 11    Pt Ed. - pathophysiology, HEP, prognosis TS     HEP   Activity modification and HRR use     Quad Set     20x5\"  15x10\"  " "20x5\"     Glute Set             Ankle Pumps             LAQ/SAQ      SAQ 20x5\" SAQ 20x5\"   LAQ  10x 5\"       SLR 3 way     Supine SLR 2x10  Supine SLR 10x Supine SLR 2x10 Nv standing     Heel slides      20x5\"       Supine hip abduction             HL adduction             Bridges             Step Stretch             Standing HS st.  30\"X 3  on stairs            Long-sitting calf      Long-sitting calf str. With heel prop 5x30\"        Prone w/ pillow& 1/2 foam under thigh        1.5# 5'   4# 5'     Matrix flexion 3x10 30# 3x 10 30#            Matrix ext. 3x10 20# 3x 10 30#            Re-eval          TW   Ther Activity             TG Squats             FSU 6\" 3x10  6\" 3x 10       4\" 2x10 w/ CG and UE support 6\" 2x10 w/ CG and UE support  Nv on 2\" w/ UE    FSD 6\" 3x10  6\" 2x 10        4\" 2x10 w/ CG and UE support Nv on 2\" w/ UE    LSU 6\" 3x10  6\" 2x 10       4\" 2x10 w/ CG and UE support 6\" 2x10 w/ CG and UE support  Nv on 4\" w/ no UE    LSD         4\" 2x10 w/ CG and UE support    StS w/ TB                          5xSTS, TUG, TONY          TW   Gait Training                                       Modalities             CP                                 "

## 2024-12-16 ENCOUNTER — OFFICE VISIT (OUTPATIENT)
Dept: PHYSICAL THERAPY | Facility: CLINIC | Age: 84
End: 2024-12-16
Payer: COMMERCIAL

## 2024-12-16 DIAGNOSIS — Z96.652 STATUS POST TOTAL LEFT KNEE REPLACEMENT: Primary | ICD-10-CM

## 2024-12-16 PROCEDURE — 97112 NEUROMUSCULAR REEDUCATION: CPT

## 2024-12-16 PROCEDURE — 97530 THERAPEUTIC ACTIVITIES: CPT

## 2024-12-16 PROCEDURE — 97110 THERAPEUTIC EXERCISES: CPT

## 2024-12-16 NOTE — PROGRESS NOTES
"Daily Note     Today's date: 2024  Patient name: Diego Dumont  : 1940  MRN: [id number]  Referring provider: Ace Zhu,*  Dx:   Encounter Diagnosis     ICD-10-CM    1. Status post total left knee replacement  Z96.652           Start Time: 1215  Stop Time: 1257  Total time in clinic (min): 42 minutes    Subjective: Presents to therapy noting a little bit of stiffness today, as he was working on his stretches a little bit more this weekend. Reports doing the steps are still a bit uneasy.       Objective: See treatment diary below      Assessment: Patient completed full program this session with good effort throughout. Able to complete FSU with UE support, although requires UE assistance performing LSU.Decreased left quad strength displayed during Matrix knee extension, but able to perform all repetitions. Visibly limited left knee extension displayed during manual PROM; may benefit from prolonged weight knee extension stretch next visit for improve ROM. Patient demonstrated fatigue post treatment and would benefit from continued PT      Plan: Continue per plan of care.      Precautions: L TKR 10/28/24    POC expires Unit limit Auth  expiration date PT/OT + Visit Limit?   24 BOMN 24 BOMN                 Visit/Unit Tracking  AUTH Status:  Date 10/31 11/4 11/7 11/11 11/14 11/18 11/21 11/25 11/29 12/5 12/9 12/12   Approved Used 1 2 3 4 5 6 7 8 9 10 11 12    Remaining                        Manuals    PROM Knee  SC JS  GR TW TW TW TW    Patellar mobs     GR TW  TW TW     Gastroc str.     GR                     Neuro Re-Ed             Rockerboard 2'/2'             SL balance             Tandem stance walk Foam 5 laps  Fwd/retro  Foam  5 laps          StS walk Foam 5 laps  Foam  5 laps          Heel toe walk             Mikey walking             Bosu balance             TKE        [place] 5# 20x5\" Nv                 5xSTS, TUG, TONY   " "          Ther Ex             Nustep for ROM and cardio 10' L4 10 min fwd on bike no tension  Bike  10'  10 min L3 10' L1   UE 12 10' L5   UE 10 10' L5   UE 10  10' L4  UE 11    Pt Ed. - pathophysiology, HEP, prognosis TS     HEP   Activity modification and HRR use     Quad Set     20x5\"  15x10\"  20x5\"     Glute Set             Ankle Pumps             LAQ/SAQ      SAQ 20x5\" SAQ 20x5\"   LAQ  10x 5\"       SLR 3 way     Supine SLR 2x10  Supine SLR 10x Supine SLR 2x10 Nv standing     Heel slides      20x5\"       Supine hip abduction             HL adduction             Bridges             Step Stretch             Standing HS st.  30\"X 3  on stairs  30''x3  On stairs          Long-sitting calf      Long-sitting calf str. With heel prop 5x30\"        Prone w/ pillow& 1/2 foam under thigh        1.5# 5'   4# 5'     Matrix flexion 3x10 30# 3x 10 30#  3x10  30#          Matrix ext. 3x10 20# 3x 10 30#  3x10  25#          Re-eval          TW   Ther Activity             TG Squats             FSU 6\" 3x10  6\" 3x 10  6'' 2x10     4\" 2x10 w/ CG and UE support 6\" 2x10 w/ CG and UE support  Nv on 2\" w/ UE    FSD 6\" 3x10  6\" 2x 10  6'' 2x10      4\" 2x10 w/ CG and UE support Nv on 2\" w/ UE    LSU 6\" 3x10  6\" 2x 10  6'' 2x10     4\" 2x10 w/ CG and UE support 6\" 2x10 w/ CG and UE support  Nv on 4\" w/ no UE    LSD         4\" 2x10 w/ CG and UE support    StS w/ TB                          5xSTS, TUG, TONY          TW   Gait Training                                       Modalities             CP                                   "

## 2024-12-19 ENCOUNTER — OFFICE VISIT (OUTPATIENT)
Dept: PHYSICAL THERAPY | Facility: CLINIC | Age: 84
End: 2024-12-19
Payer: COMMERCIAL

## 2024-12-19 DIAGNOSIS — Z96.652 STATUS POST TOTAL LEFT KNEE REPLACEMENT: Primary | ICD-10-CM

## 2024-12-19 PROCEDURE — 97110 THERAPEUTIC EXERCISES: CPT | Performed by: PHYSICAL THERAPIST

## 2024-12-19 PROCEDURE — 97530 THERAPEUTIC ACTIVITIES: CPT | Performed by: PHYSICAL THERAPIST

## 2024-12-19 NOTE — PROGRESS NOTES
"Daily Note     Today's date: 2024  Patient name: Diego Dumont  : 1940  MRN: [id number]  Referring provider: Ace Zhu,*  Dx:   Encounter Diagnosis     ICD-10-CM    1. Status post total left knee replacement  Z96.652           Start Time: 1215  Stop Time: 1300  Total time in clinic (min): 45 minutes    Subjective: Patient reports that his knee has been achy today.      Objective: See treatment diary below      Assessment: Tolerated treatment well. Patient demonstrated fatigue post treatment and would benefit from continued PT. Patient with improving functional mobility with ability to perform squats and lunges with greater ease.      Plan: Continue per plan of care.  Progress treatment as tolerated.       Precautions: L TKR 10/28/24    POC expires Unit limit Auth  expiration date PT/OT + Visit Limit?   24 BOMN 24 BOMN                 Visit/Unit Tracking  AUTH Status:  Date    Approved Used 13  3 4 5 6 7 8 9 10 11 12    Remaining                        Manuals    PROM Knee  SC JS   TW TW TW TW    Patellar mobs      TW  TW TW     Gastroc str.                          Neuro Re-Ed             Rockerboard 2'/2'             SL balance             Tandem stance walk Foam 5 laps  Fwd/retro  Foam  5 laps          StS walk Foam 5 laps  Foam  5 laps          Heel toe walk             Mikey walking             Bosu balance             TKE        Chary 5# 20x5\" Nv                 5xSTS, TUG, TONY             Ther Ex             Nustep for ROM and cardio 10' L4 10 min fwd on bike no tension  Bike  10' Rec bike 10 min  10' L1   UE 12 10' L5   UE 10 10' L5   UE 10  10' L4  UE 11    Pt Ed. - pathophysiology, HEP, prognosis TS     HEP   Activity modification and HRR use     Quad Set       15x10\"  20x5\"     Glute Set             Ankle Pumps             LAQ/SAQ      SAQ 20x5\" SAQ " "20x5\"   LAQ  10x 5\"       SLR 3 way    Supine, side lying SLR  3x10 ea.   Supine SLR 10x Supine SLR 2x10 Nv standing     Heel slides      20x5\"       Supine hip abduction             HL adduction             Bridges             Step Stretch             Standing HS st.  30\"X 3  on stairs  30''x3  On stairs          Long-sitting calf              Prone w/ pillow& 1/2 foam under thigh        1.5# 5'   4# 5'     Matrix flexion 3x10 30# 3x 10 30#  3x10  30# 35# 3x10         Matrix ext. 3x10 20# 3x 10 30#  3x10  25# 25# 3x10         Re-eval          TW   Ther Activity             TG Squats    L22 3x10         FSU 6\" 3x10  6\" 3x 10  6'' 2x10     4\" 2x10 w/ CG and UE support 6\" 2x10 w/ CG and UE support  Nv on 2\" w/ UE    FSD 6\" 3x10  6\" 2x 10  6'' 2x10      4\" 2x10 w/ CG and UE support Nv on 2\" w/ UE    LSU 6\" 3x10  6\" 2x 10  6'' 2x10     4\" 2x10 w/ CG and UE support 6\" 2x10 w/ CG and UE support  Nv on 4\" w/ no UE    LSD         4\" 2x10 w/ CG and UE support    StS w/ TB    10# KB 2x10         Stationary lunge    2x10         5xSTS, TUG, TONY          TW   Gait Training                                       Modalities             CP                                     "

## 2024-12-23 ENCOUNTER — EVALUATION (OUTPATIENT)
Dept: PHYSICAL THERAPY | Facility: CLINIC | Age: 84
End: 2024-12-23
Payer: COMMERCIAL

## 2024-12-23 DIAGNOSIS — Z96.652 STATUS POST TOTAL LEFT KNEE REPLACEMENT: Primary | ICD-10-CM

## 2024-12-23 PROCEDURE — 97112 NEUROMUSCULAR REEDUCATION: CPT

## 2024-12-23 PROCEDURE — 97140 MANUAL THERAPY 1/> REGIONS: CPT

## 2024-12-23 PROCEDURE — 97110 THERAPEUTIC EXERCISES: CPT

## 2024-12-23 NOTE — PROGRESS NOTES
PT Re-Evaluation     Today's date: 24  Patient name: Diego Dumont  : 1940  MRN: [id number]  Referring provider: Ace Zhu,*  Dx:   Encounter Diagnosis     ICD-10-CM    1. Status post total left knee replacement  Z96.652                       Assessment  Impairments: abnormal coordination, abnormal gait, abnormal muscle firing, abnormal or restricted ROM, abnormal movement, activity intolerance, impaired balance, impaired physical strength, lacks appropriate home exercise program, pain with function, safety issue, weight-bearing intolerance and poor body mechanics    Assessment details: Diego Dumont is a pleasant 84 y.o. male who presents today for a re-evaluation s/p L TKA on 10/28/24. Diego complains of aching, sharp, and tight pain in the left knee ranging from 0/10 to 4/10. Pain is exacerbated by prolonged standing, down stairs, or bending. Patient states pain is made better by ice, medication, or rest.    Upon examination, the patient demonstrates WNL strength during resisted muscle testing. Diego's L knee ROM is minimally decreased with firm end feel in extension and flexion, which has improved since previous evaluation.     Patient would benefit from continued physical therapy interventions to address their ADL performance and improve his functional tolerance in preparation for returning to work. Patient was educated on DOMS and proper technique for HEP.  Understanding of Dx/Px/POC: excellent     Prognosis: excellent    Goals  ST. Independent with HEP in 2 weeks. GOAL MET  2. Pt will have verbal report of improvement in symptoms by >/=25% in 2 weeks. GOAL MET  3. Decrease pain to 6/10 at it's worst. GOAL MET  4. Increase strength by 1/2 grade in all deficient planes. GOAL MET    To be achieved by D/C   LT. Pt will improve FOTO score by >/= goal points in 6 weeks. - GOAL MET   2. Pt will improve FOTO score to >/= goal score by visit # 12. - GOAL MET   3. Pt will be able  to stand for an hour with little to no difficulty. GOAL PROGRESSING  4. Pt will be able to walk a mile with little to no difficulty. - GOAL MET   5. Pt will be able to perform his usual activities including stairs and hiking with little to no difficulty. GOAL NOT MET  6. Pt will improve five times sit to stand time from TBD to 12 seconds or less GOAL MET  7. Pt will improve TUG time from TBD to 13.5 seconds or less. (MDC 3 - 5 seconds) GOAL MET    Plan  Patient would benefit from: skilled physical therapy  Planned modality interventions: biofeedback, cryotherapy, TENS, thermotherapy: hydrocollator packs, ultrasound and unattended electrical stimulation    Planned therapy interventions: IASTM, joint mobilization, kinesiology taping, manual therapy, massage, neuromuscular re-education, patient/caregiver education, strengthening, stretching, therapeutic activities, therapeutic exercise, home exercise program, functional ROM exercises, gait training, flexibility, compression, body mechanics training, balance/weight bearing training, balance and ADL training    Frequency: 2x week  Duration in weeks: 5  Plan of Care beginning date: 2024  Plan of Care expiration date: 2025  Treatment plan discussed with: patient        Subjective Evaluation    History of Present Illness  Mechanism of injury: surgery  Mechanism of injury: Diego reports that he is feeling better since starting therapy 2 months ago.    he notes little difficulty with his usual tasks including stairs and walking.     he notes no pain at this time.     he notes adherence to HEP and would like to continue with therapy.    Patient Goals  Patient goals for therapy: return to work, return to sport/leisure activities, independence with ADLs/IADLs, increased strength, decreased edema, decreased pain, increased motion and improved balance    Pain  Current pain ratin  At best pain ratin  At worst pain ratin  Quality: dull ache and  "tight  Relieving factors: ice, medications and rest  Aggravating factors: stair climbing, walking and standing          Objective     Neurological Testing     Sensation     Knee   Left Knee   Intact: Light touch    Right Knee   Intact: light touch     Active Range of Motion   Left Knee   Flexion: 122 degrees   Extension: -8 degrees     Right Knee   Flexion: 122 degrees   Extension: -4 degrees     Passive Range of Motion   Left Knee   Flexion: 125 degrees   Extension: -7 degrees     Right Knee   Flexion: 125 degrees   Extension: -2 degrees     Strength/Myotome Testing     Left Knee   Flexion: 4+  Extension: 4+    Right Knee   Flexion: 4+  Extension: 4+    Additional Strength Details        Functional Assessment        Comments  Post OP 12/5/24  5xSTS 12.46s w/ UE support  TUG 8.82     12/23/2024  5xSTS - 12s  TUG - 7.5s           Precautions: L TKR 10/28/24    POC expires Unit limit Auth  expiration date PT/OT + Visit Limit?   12/26/24 BOMN 12/31/24 BOMN                 Visit/Unit Tracking  AUTH Status:  Date 12/19 11/7 11/11 11/14 11/18 11/21 11/25 11/29 12/5 12/9 12/12   Approved Used 13  3 4 5 6 7 8 9 10 11 12    Remaining                        Manuals 12/9 12/12 12/16 12/19 12/23 11/21 11/25 11/29 12/5   PROM Knee  SC JS  TS  TW TW TW    Patellar mobs       TW TW     Gastroc str.             Re eval     TS        Neuro Re-Ed             Rockerboard 2'/2'             SL balance             Tandem stance walk Foam 5 laps  Fwd/retro  Foam  5 laps          StS walk Foam 5 laps  Foam  5 laps          Heel toe walk             Imkey walking             Bosu balance             TKE        Chary 5# 20x5\" Nv                 5xSTS, TUG, TONY     TS        Ther Ex             Nustep for ROM and cardio 10' L4 10 min fwd on bike no tension  Bike  10' Rec bike 10 min 10' L5 UE 11  10' L5   UE 10 10' L5   UE 10  10' L4  UE 11    Pt Ed. - pathophysiology, HEP, prognosis TS    TS    Activity modification and HRR use     Quad " "Set       15x10\"  20x5\"     Glute Set             Ankle Pumps             LAQ/SAQ       SAQ 20x5\"   LAQ  10x 5\"       SLR 3 way    Supine, side lying SLR  3x10 ea.   Supine SLR 10x Supine SLR 2x10 Nv standing     Heel slides             Supine hip abduction             HL adduction             Bridges             Step Stretch             Standing HS st.  30\"X 3  on stairs  30''x3  On stairs          Long-sitting calf              Prone w/ pillow& 1/2 foam under thigh        1.5# 5'   4# 5'     Matrix flexion 3x10 30# 3x 10 30#  3x10  30# 35# 3x10 50# 3x10         Matrix ext. 3x10 20# 3x 10 30#  3x10  25# 25# 3x10 30# 3x10         Re-eval          TW   Ther Activity             TG Squats    L22 3x10         FSU 6\" 3x10  6\" 3x 10  6'' 2x10     4\" 2x10 w/ CG and UE support 6\" 2x10 w/ CG and UE support  Nv on 2\" w/ UE    FSD 6\" 3x10  6\" 2x 10  6'' 2x10      4\" 2x10 w/ CG and UE support Nv on 2\" w/ UE    LSU 6\" 3x10  6\" 2x 10  6'' 2x10     4\" 2x10 w/ CG and UE support 6\" 2x10 w/ CG and UE support  Nv on 4\" w/ no UE    LSD         4\" 2x10 w/ CG and UE support    StS w/ TB    10# KB 2x10         Stationary lunge    2x10         5xSTS, TUG, TONY          TW   Gait Training                                       Modalities             CP                                     "

## 2024-12-27 ENCOUNTER — OFFICE VISIT (OUTPATIENT)
Dept: PHYSICAL THERAPY | Facility: CLINIC | Age: 84
End: 2024-12-27
Payer: COMMERCIAL

## 2024-12-27 DIAGNOSIS — Z96.652 STATUS POST TOTAL LEFT KNEE REPLACEMENT: Primary | ICD-10-CM

## 2024-12-27 PROCEDURE — 97112 NEUROMUSCULAR REEDUCATION: CPT

## 2024-12-27 PROCEDURE — 97110 THERAPEUTIC EXERCISES: CPT

## 2024-12-27 NOTE — PROGRESS NOTES
Daily Note     Today's date: 2024  Patient name: Diego Dumont  : 1940  MRN: [id number]  Referring provider: Ace Zhu,*  Dx:   Encounter Diagnosis     ICD-10-CM    1. Status post total left knee replacement  Z96.652                      Subjective: Patient offers no new complaints.       Objective: See treatment diary below      Assessment: Patient doing well with charted program. He completed charted exercises with min discomfort and min cues. Cues for full ext with LAQ on matrix. Progressed step height with no issues. Cont to last last few degrees of ext, he works on this daily cont to perform for HEP. Patient demonstrated fatigue post treatment and would benefit from continued PT      Plan: Progress treatment as tolerated.       Precautions: L TKR 10/28/24    POC expires Unit limit Auth  expiration date PT/OT + Visit Limit?   24 BOMN 24 BOMN                 Visit/Unit Tracking  AUTH Status:  Date    Approved Used 13 12  4 5 6 7 8 9 10 11 12    Remaining                        Manuals    PROM Knee  SC JS  TS    TW    Patellar mobs             Gastroc str.             Re eval     TS        Neuro Re-Ed             Rockerboard 2'/2'             SL balance             Tandem stance walk Foam 5 laps  Fwd/retro  Foam  5 laps          StS walk Foam 5 laps  Foam  5 laps          Heel toe walk             Mikey walking             Bosu balance             TKE         Nv                 5xSTS, TUG, TONY     TS        Ther Ex             Nustep for ROM and cardio 10' L4 10 min fwd on bike no tension  Bike  10' Rec bike 10 min 10' L5 UE 11 10' L5 UE 11   10' L4  UE 11    Pt Ed. - pathophysiology, HEP, prognosis TS    TS    Activity modification and HRR use     Quad Set             Glute Set             Ankle Pumps             LAQ/SAQ             SLR 3 way    Supine, side  "lying SLR  3x10 ea.     Nv standing     Heel slides             Supine hip abduction             HL adduction             Bridges             Step Stretch             Standing HS st.  30\"X 3  on stairs  30''x3  On stairs   On steps 30\"x3 ext focus        Long-sitting calf              Prone w/ pillow& 1/2 foam under thigh          4# 5'     Matrix flexion 3x10 30# 3x 10 30#  3x10  30# 35# 3x10 50# 3x10  50# 3x10       Matrix ext. 3x10 20# 3x 10 30#  3x10  25# 25# 3x10 30# 3x10  30#   3x10        Re-eval          TW   Ther Activity             TG Squats    L22 3x10  L22 3x10       FSU 6\" 3x10  6\" 3x 10  6'' 2x10   8\" 2x10    6\" 2x10 w/ CG and UE support  Nv on 2\" w/ UE    FSD 6\" 3x10  6\" 2x 10  6'' 2x10      4\" 2x10 w/ CG and UE support Nv on 2\" w/ UE    LSU 6\" 3x10  6\" 2x 10  6'' 2x10      6\" 2x10 w/ CG and UE support  Nv on 4\" w/ no UE    LSD         4\" 2x10 w/ CG and UE support    StS w/ TB    10# KB 2x10  10# KB 2x10       Stationary lunge    2x10  2x10        5xSTS, TUG, TONY          TW   Gait Training                                       Modalities             CP                                     "

## 2025-01-03 ENCOUNTER — OFFICE VISIT (OUTPATIENT)
Dept: OBGYN CLINIC | Facility: MEDICAL CENTER | Age: 85
End: 2025-01-03

## 2025-01-03 ENCOUNTER — APPOINTMENT (OUTPATIENT)
Dept: RADIOLOGY | Facility: MEDICAL CENTER | Age: 85
End: 2025-01-03
Payer: COMMERCIAL

## 2025-01-03 VITALS — BODY MASS INDEX: 23.99 KG/M2 | HEIGHT: 69 IN | WEIGHT: 162 LBS

## 2025-01-03 DIAGNOSIS — Z96.652 S/P TOTAL KNEE ARTHROPLASTY, LEFT: ICD-10-CM

## 2025-01-03 DIAGNOSIS — Z96.652 AFTERCARE FOLLOWING LEFT KNEE JOINT REPLACEMENT SURGERY: Primary | ICD-10-CM

## 2025-01-03 DIAGNOSIS — Z47.1 AFTERCARE FOLLOWING LEFT KNEE JOINT REPLACEMENT SURGERY: Primary | ICD-10-CM

## 2025-01-03 PROCEDURE — 99024 POSTOP FOLLOW-UP VISIT: CPT

## 2025-01-03 PROCEDURE — 73560 X-RAY EXAM OF KNEE 1 OR 2: CPT

## 2025-01-03 NOTE — PROGRESS NOTES
Assessment:   Diagnosis ICD-10-CM Associated Orders   1. Aftercare following left knee joint replacement surgery  Z47.1 Diclofenac Sodium (VOLTAREN) 1 %    Z96.652       2. S/P total knee arthroplasty, left  Z96.652 XR knee 1 or 2 vw left          Plan:  84 y.o. 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  All questions were answered to the patient's satisfaction.     To do next visit:  Return in about 3 months (around 4/3/2025) for 6-month postop appointment with repeat x-rays.      Subjective:   Diego Dumont is a 84 y.o. male who presents 10 weeks status post left total knee arthroplasty.  He is doing well.  He admits to mild pain over the medial aspect of the left knee, correlating to the deep surgical scar.  Patient explains that the pain is worse at night when he tries to lay with his legs together.  Prescription for topical Voltaren gel was created.  He otherwise denies any pain of the left knee, if he needs any medications he will take Aleve as needed.  Patient explains completing physical therapy last week, and he is currently walking about half a mile a day and stretching multiple times a day.    [facility] - note in chart to return end of month     Review of systems negative unless otherwise specified in HPI    Past Medical History:   Diagnosis Date   • Actinic keratosis     Last assessed -  10/15/14   • Cortical age-related cataract of both eyes 03/22/2022   • CPAP (continuous positive airway pressure) dependence    • Hypertension    • Hypokalemia     Last assessed - 8/13/14   • Personal history of COVID-19     09/2023 - mild symptoms   • Prostate cancer (HCC)     tx w radiation only   • Prostate cancer (HCC) 02/12/2014   • Sinus bradycardia     Last assessed - 8/13/14   • Sleep apnea        Past Surgical History:   Procedure Laterality Date   • CATARACT EXTRACTION Bilateral    • COLONOSCOPY      x 4   • HERNIA REPAIR     • MA ARTHRP KNE CONDYLE&PLATU MEDIAL&LAT COMPARTMENTS Right 11/30/2023    Procedure: ARTHROPLASTY KNEE TOTAL W ROBOT;  Surgeon: Ace Zhu MD;  Location: [facility];  Service: Orthopedics   • MA ARTHRP KNE CONDYLE&PLATU MEDIAL&LAT COMPARTMENTS Left 10/28/2024    Procedure: ARTHROPLASTY KNEE TOTAL W ROBOT;  Surgeon: Ace Zhu MD;  Location: [facility];  Service: Orthopedics   • SIGMOIDOSCOPY      (Fiberoptic, Therapeutic) 7/28/2014, 8/25/14       Family History   Problem Relation Age of Onset   • Prostate cancer Father    • No Known Problems Mother    • Liver cancer Brother         Adenocarcinoma    • No Known Problems Sister    • No Known Problems Sister    • No Known Problems Son    • No Known Problems Son    • Leukemia Daughter         Remission-Bone Marrow transplant   • No Known Problems Daughter        Social History     Occupational History   • Occupation: Retired   Tobacco Use   • Smoking status: Never     Passive exposure: Past   • Smokeless tobacco: Never   • Tobacco comments:     Former smoker per [employer]    Vaping Use   • Vaping status: Never Used   Substance and Sexual Activity   • Alcohol use: Never     Comment: Seldomly per [employer] - not in years   • Drug use: No   • Sexual activity: Not Currently         Current Outpatient Medications:   •  acetaminophen (TYLENOL) 500 mg tablet, Take 2 tablets (1,000 mg total) by mouth every 6 (six) hours  as needed for mild pain, Disp: 90 tablet, Rfl: 0  •  atenolol (TENORMIN) 50 mg tablet, TAKE 1 TABLET BY MOUTH EVERY DAY, Disp: 90 tablet, Rfl: 1  •  Cholecalciferol (VITAMIN D3) 1000 UNIT/SPRAY LIQD, Take 1 tablet by mouth daily, Disp: , Rfl:   •  Coenzyme Q10 100 MG CHEW, Chew 200 mg, Disp: , Rfl:   •  Diclofenac Sodium (VOLTAREN) 1 %, Apply 2 g topically 4 (four) times a day, Disp: 240 g, Rfl: 2  •  methocarbamol (ROBAXIN) 500 mg tablet, Take 1 tablet (500 mg total) by mouth 3 (three) times a day as needed for muscle spasms, Disp: 60 tablet, Rfl: 0  •  oxyCODONE (Roxicodone) 5 immediate release tablet, Take 0.5 tablets (2.5 mg total) by mouth every 6 (six) hours as needed for moderate pain Max Daily Amount: 10 mg, Disp: 30 tablet, Rfl: 0  •  Potassium 99 MG TABS, Take 1 tablet by mouth 2 (two) times a day, Disp: , Rfl:   •  sildenafil (VIAGRA) 100 mg tablet, Take 1 tablet (100 mg total) by mouth as needed for erectile dysfunction, Disp: 30 tablet, Rfl: 3  •  enoxaparin (LOVENOX) 40 mg/0.4 mL, Inject 0.4 mL (40 mg total) under the skin daily for 28 days Start injections after surgery (Patient not taking: Reported on 9/18/2024), Disp: 11.2 mL, Rfl: 0    No Known Allergies       There were no vitals filed for this visit.    Objective:  Physical exam  General: Awake, Alert, Oriented  Eyes: Pupils equal, round and reactive to light  Heart: regular rate and rhythm  Lungs: No audible wheezing  Abdomen: soft                    Ortho Exam  Left knee:  Well healed anterior incision  No erythema and no ecchymosis  Stable to varus and valgus stress  Extensor mechanism intact  Extension 3  Flexion 120  Calf compartments soft and supple  Sensation intact  Toes are warm sensate and mobile     Diagnostics, reviewed and taken today if performed as documented:    Left knee x-ray:   - Well aligned prosthesis without evidence of fractures, acute changes, or hardware failure  - Prosthesis appears properly sized and properly bonded to  "surrounding bone         Procedures, if performed today:    None performed      Portions of the record may have been created with voice recognition software.  Occasional wrong word or \"sound a like\" substitutions may have occurred due to the inherent limitations of voice recognition software.  Read the chart carefully and recognize, using context, where substitutions have occurred.      "

## 2025-01-03 NOTE — LETTER
January 3, 2025     Patient: Diego Dumont  YOB: 1940  Date of Visit: 1/3/2025      To Whom it May Concern:    Diego Dumont is under my professional care. Diego was seen in my office on 1/3/2025. 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     If you have any questions or concerns, please don't hesitate to call.         Sincerely,          Vaishali Seals PA-C        CC: No Recipients

## 2025-03-01 DIAGNOSIS — I10 BENIGN ESSENTIAL HYPERTENSION: ICD-10-CM

## 2025-03-01 RX ORDER — ATENOLOL 50 MG/1
50 TABLET ORAL DAILY
Qty: 90 TABLET | Refills: 1 | Status: SHIPPED | OUTPATIENT
Start: 2025-03-01

## 2025-03-13 ENCOUNTER — RA CDI HCC (OUTPATIENT)
Dept: OTHER | Facility: HOSPITAL | Age: 85
End: 2025-03-13

## 2025-03-17 RX ORDER — MUPIROCIN 20 MG/G
OINTMENT TOPICAL
COMMUNITY
Start: 2025-01-21

## 2025-03-18 ENCOUNTER — OFFICE VISIT (OUTPATIENT)
Dept: FAMILY MEDICINE CLINIC | Facility: CLINIC | Age: 85
End: 2025-03-18
Payer: COMMERCIAL

## 2025-03-18 VITALS
SYSTOLIC BLOOD PRESSURE: 118 MMHG | OXYGEN SATURATION: 97 % | RESPIRATION RATE: 16 BRPM | DIASTOLIC BLOOD PRESSURE: 68 MMHG | HEIGHT: 69 IN | WEIGHT: 162 LBS | BODY MASS INDEX: 23.99 KG/M2 | HEART RATE: 74 BPM

## 2025-03-18 DIAGNOSIS — I10 BENIGN ESSENTIAL HYPERTENSION: ICD-10-CM

## 2025-03-18 DIAGNOSIS — Z00.00 MEDICARE ANNUAL WELLNESS VISIT, SUBSEQUENT: Primary | ICD-10-CM

## 2025-03-18 DIAGNOSIS — R00.1 BRADYCARDIA: ICD-10-CM

## 2025-03-18 DIAGNOSIS — Z85.46 PERSONAL HISTORY OF PROSTATE CANCER: ICD-10-CM

## 2025-03-18 DIAGNOSIS — Z91.09 ENVIRONMENTAL ALLERGIES: ICD-10-CM

## 2025-03-18 DIAGNOSIS — R60.9 DEPENDENT EDEMA: ICD-10-CM

## 2025-03-18 DIAGNOSIS — R73.01 IMPAIRED FASTING GLUCOSE: ICD-10-CM

## 2025-03-18 DIAGNOSIS — I73.00 RAYNAUD'S PHENOMENON WITHOUT GANGRENE: ICD-10-CM

## 2025-03-18 DIAGNOSIS — E55.9 VITAMIN D DEFICIENCY: ICD-10-CM

## 2025-03-18 PROBLEM — M17.10 ARTHRITIS OF KNEE: Status: RESOLVED | Noted: 2024-10-08 | Resolved: 2025-03-18

## 2025-03-18 PROCEDURE — G2211 COMPLEX E/M VISIT ADD ON: HCPCS | Performed by: FAMILY MEDICINE

## 2025-03-18 PROCEDURE — 99214 OFFICE O/P EST MOD 30 MIN: CPT | Performed by: FAMILY MEDICINE

## 2025-03-18 NOTE — ASSESSMENT & PLAN NOTE
Continue on beta-blocker.  Could be switched to calcium channel blocker however now coming out of the winter.

## 2025-03-18 NOTE — ASSESSMENT & PLAN NOTE
Hypertension remains very well-controlled on atenolol 50 mg once daily.  Continue same.  Reevaluate in 6 months

## 2025-03-18 NOTE — PROGRESS NOTES
Subjective:      Patient ID: Diego Dumont is a 85 y.o. male.    85-year-old male presents with his wife for 6-month follow-up of chronic conditions.  He did have left total knee arthroplasty done in October.  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  Labs reviewed from preop which were fine.  Postoperatively his hemoglobin dropped about 2 g        Past Medical History:   Diagnosis Date   • Actinic keratosis     Last assessed - 10/15/14   • Arthritis of knee 10/08/2024   • Cortical age-related cataract of both eyes 03/22/2022   • CPAP (continuous positive airway pressure) dependence    • Hypertension    • Hypokalemia     Last assessed - 8/13/14   • Personal history of COVID-19     09/2023 - mild symptoms   • Prostate cancer (HCC)     tx w radiation only   • Prostate cancer (HCC) 02/12/2014   • Sinus bradycardia     Last assessed - 8/13/14   • Sleep apnea        Family History   Problem Relation Age of Onset   • Prostate cancer Father    • No Known Problems Mother    • Liver cancer Brother         Adenocarcinoma    • No Known Problems Sister    • No Known Problems Sister    • No Known Problems Son    • No Known Problems Son    • Leukemia Daughter         Remission-Bone Marrow transplant   • No Known Problems Daughter        Past Surgical History:   Procedure Laterality Date   • CATARACT EXTRACTION Bilateral    • COLONOSCOPY      x 4   • HERNIA REPAIR     • [place] ARTHRP KNE CONDYLE&PLATU MEDIAL&LAT COMPARTMENTS Right 11/30/2023    Procedure: ARTHROPLASTY KNEE TOTAL W ROBOT;  Surgeon: Ace Zhu MD;  Location: [facility];  Service: Orthopedics   • [place] ARTHRP KNE CONDYLE&PLATU MEDIAL&LAT COMPARTMENTS Left 10/28/2024    Procedure: ARTHROPLASTY KNEE TOTAL W ROBOT;  Surgeon: Ace Zhu MD;  Location:  MAIN OR;  Service: Orthopedics   • SIGMOIDOSCOPY      (Fiberoptic, Therapeutic) 7/28/2014, 8/25/14         reports that he has never smoked. He has been exposed to tobacco smoke. He has never used smokeless tobacco. He reports that he does not drink alcohol and does not use drugs.      Current Outpatient Medications:   •  acetaminophen (TYLENOL) 500 mg tablet, Take 2 tablets (1,000 mg total) by mouth every 6 (six) hours as needed for mild pain, Disp: 90 tablet, Rfl: 0  •  atenolol (TENORMIN) 50 mg tablet, TAKE 1 TABLET BY MOUTH EVERY DAY, Disp: 90 tablet, Rfl: 1  •  Cholecalciferol (VITAMIN D3) 1000 UNIT/SPRAY LIQD, Take 1 tablet by mouth daily, Disp: , Rfl:   •  Coenzyme Q10 100 MG CHEW, Chew 200 mg, Disp: , Rfl:   •  Diclofenac Sodium (VOLTAREN) 1 %, Apply 2 g topically 4 (four) times a day, Disp: 240 g, Rfl: 2  •  methocarbamol (ROBAXIN) 500 mg tablet, Take 1 tablet (500 mg total) by mouth 3 (three) times a day as needed for muscle spasms, Disp: 60 tablet, Rfl: 0  •  mupirocin (BACTROBAN) 2 % ointment, APPLY TO OPEN AREA ON KNEE TWICE A DAY X 2 WEEKS, Disp: , Rfl:   •  Potassium 99 MG TABS, Take 1 tablet by mouth 2 (two) times a day, Disp: , Rfl:   •  sildenafil (VIAGRA) 100 mg tablet, Take 1 tablet (100 mg total) by mouth as needed for erectile dysfunction, Disp: 30 tablet, Rfl: 3    The following portions of the patient's history were reviewed and updated as appropriate: allergies, current medications, past family history, past medical history, past social history, past surgical history and problem list.    Review of Systems   Constitutional: Negative.    HENT: Negative.     Eyes: Negative.    Respiratory: Negative.     Cardiovascular:  Positive for leg swelling (Dependent edema bilateral lower extremities postoperatively).   Gastrointestinal: Negative.    Endocrine: Negative.    Genitourinary: Negative.    Musculoskeletal:  Positive for arthralgias.   Skin: Negative.    Allergic/Immunologic: Negative.    Neurological:  Positive for dizziness.   Hematological: Negative.    Psychiatric/Behavioral: Negative.     All  "other systems reviewed and are negative.          Objective:    /68   Pulse 74   Resp 16   Ht 5' 9\" (1.753 m)   Wt 73.5 kg (162 lb)   SpO2 97%   BMI 23.92 kg/m²      Physical Exam  Vitals and nursing note reviewed.   Constitutional:       General: He is not in acute distress.     Appearance: Normal appearance. He is well-developed and normal weight. He is not ill-appearing.   HENT:      Head: Normocephalic and atraumatic.      Right Ear: Tympanic membrane, ear canal and external ear normal.      Left Ear: Tympanic membrane, ear canal and external ear normal.      Nose: Nose normal.      Mouth/Throat:      Mouth: Mucous membranes are moist.   Eyes:      Extraocular Movements: Extraocular movements intact.      Conjunctiva/sclera: Conjunctivae normal.      Pupils: Pupils are equal, round, and reactive to light.   Cardiovascular:      Rate and Rhythm: Normal rate and regular rhythm.      Pulses: Normal pulses.      Heart sounds: Normal heart sounds. No murmur heard.  Pulmonary:      Effort: Pulmonary effort is normal.      Breath sounds: Normal breath sounds.   Abdominal:      General: Abdomen is flat. Bowel sounds are normal.      Palpations: Abdomen is soft.   Musculoskeletal:         General: Normal range of motion.      Cervical back: Normal range of motion and neck supple.      Right lower leg: Edema present.      Left lower leg: Edema present.   Skin:     General: Skin is warm and dry.   Neurological:      General: No focal deficit present.      Mental Status: He is alert and oriented to person, place, and time.   Psychiatric:         Mood and Affect: Mood normal.         Behavior: Behavior normal.         Thought Content: Thought content normal.         Judgment: Judgment normal.           No results found for this or any previous visit (from the past 6 weeks).    Assessment/Plan:    Benign essential hypertension  Hypertension remains very well-controlled on atenolol 50 mg once daily.  Continue same. "  Reevaluate in 6 months    Impaired fasting glucose  Patient did have mildly impaired fasting glucose with hemoglobin A1c at 6.0%.  Repeat A1c to be done in 6 months    Personal history of prostate cancer  Diagnostic PSA has been ordered by urology.  Follow-up with urology as scheduled    Bradycardia  Asymptomatic sinus bradycardia.  Sinus bradycardia for many years.  Completely asymptomatic.  Remains on atenolol    Dependent edema  Using compression socks.  Edema is worse    Environmental allergies  Continue with Astelin nasal spray    Raynaud's phenomenon without gangrene  Continue on beta-blocker.  Could be switched to calcium channel blocker however now coming out of the winter.          Problem List Items Addressed This Visit        Cardiovascular and Mediastinum    Benign essential hypertension    Hypertension remains very well-controlled on atenolol 50 mg once daily.  Continue same.  Reevaluate in 6 months         Relevant Orders    CBC and differential    Comprehensive metabolic panel    TSH, 3rd generation with Free T4 reflex       Endocrine    Impaired fasting glucose    Patient did have mildly impaired fasting glucose with hemoglobin A1c at 6.0%.  Repeat A1c to be done in 6 months         Relevant Orders    Hemoglobin A1C       Oncology    Personal history of prostate cancer    Diagnostic PSA has been ordered by urology.  Follow-up with urology as scheduled            Other    Bradycardia    Asymptomatic sinus bradycardia.  Sinus bradycardia for many years.  Completely asymptomatic.  Remains on atenolol         Dependent edema    Using compression socks.  Edema is worse         Environmental allergies    Continue with Astelin nasal spray         Raynaud's phenomenon without gangrene    Continue on beta-blocker.  Could be switched to calcium channel blocker however now coming out of the winter.         Vitamin D deficiency   Other Visit Diagnoses       Medicare annual wellness visit, subsequent    -   Primary    Relevant Orders    Lipid panel

## 2025-03-18 NOTE — ASSESSMENT & PLAN NOTE
Asymptomatic sinus bradycardia.  Sinus bradycardia for many years.  Completely asymptomatic.  Remains on atenolol

## 2025-03-18 NOTE — ASSESSMENT & PLAN NOTE
Patient did have mildly impaired fasting glucose with hemoglobin A1c at 6.0%.  Repeat A1c to be done in 6 months

## 2025-04-04 ENCOUNTER — APPOINTMENT (OUTPATIENT)
Dept: RADIOLOGY | Facility: MEDICAL CENTER | Age: 85
End: 2025-04-04
Payer: COMMERCIAL

## 2025-04-04 VITALS — HEIGHT: 69 IN | WEIGHT: 158 LBS | BODY MASS INDEX: 23.4 KG/M2

## 2025-04-04 DIAGNOSIS — Z47.1 AFTERCARE FOLLOWING LEFT KNEE JOINT REPLACEMENT SURGERY: Primary | ICD-10-CM

## 2025-04-04 DIAGNOSIS — Z47.1 AFTERCARE FOLLOWING LEFT KNEE JOINT REPLACEMENT SURGERY: ICD-10-CM

## 2025-04-04 DIAGNOSIS — Z96.652 AFTERCARE FOLLOWING LEFT KNEE JOINT REPLACEMENT SURGERY: Primary | ICD-10-CM

## 2025-04-04 DIAGNOSIS — Z96.652 AFTERCARE FOLLOWING LEFT KNEE JOINT REPLACEMENT SURGERY: ICD-10-CM

## 2025-04-04 PROCEDURE — 99213 OFFICE O/P EST LOW 20 MIN: CPT | Performed by: ORTHOPAEDIC SURGERY

## 2025-04-04 PROCEDURE — 73560 X-RAY EXAM OF KNEE 1 OR 2: CPT

## 2025-04-04 NOTE — PROGRESS NOTES
Encounter Provider: Ace Zhu MD   Encounter Date: 04/04/25  Encounter department: [facility]         ASSESSMENT & PLAN:  Assessment & Plan  Aftercare following left knee joint replacement surgery  6 months s/p left TKA, 10/28/2024.   He is doing well.    He is encouraged to remain active including HEP.    He should follow up in 6 months              To do next visit:  Return in about 6 months (around 10/4/2025) for re-check with x-rays of btoth knees.    Scribe Attestation      I,:  Hilario Jaime MA am acting as a scribe while in the presence of the attending physician.:       I,:  Ace Zhu MD personally performed the services described in this documentation    as scribed in my presence.:             ____________________________________________________  CHIEF COMPLAINT:  Chief Complaint   Patient presents with    Left Knee - Follow-up     6 month P.O          SUBJECTIVE:  Diego Dumont is a 85 y.o. male who presents nearly 6 months s/p left TKA, 10/28/2024.  He is doing well.  Today he complains of occasional mild soreness.  He is active without repercussion.             PAST MEDICAL HISTORY:  Past Medical History:   Diagnosis Date    Actinic keratosis     Last assessed - 10/15/14    Arthritis of knee 10/08/2024    Cortical age-related cataract of both eyes 03/22/2022    CPAP (continuous positive airway pressure) dependence     Hypertension     Hypokalemia     Last assessed - 8/13/14    Personal history of COVID-19     09/2023 - mild symptoms    Prostate cancer (HCC)     tx w radiation only    Prostate cancer (HCC) 02/12/2014    Sinus bradycardia     Last assessed - 8/13/14    Sleep apnea        PAST SURGICAL HISTORY:  Past Surgical History:   Procedure Laterality Date    CATARACT EXTRACTION Bilateral     COLONOSCOPY      x 4    HERNIA REPAIR      [place] ARTHRP KNE CONDYLE&PLATU MEDIAL&LAT COMPARTMENTS Right 11/30/2023    Procedure: ARTHROPLASTY KNEE  TOTAL W ROBOT;  Surgeon: Ace Zhu MD;  Location: [facility];  Service: Orthopedics    [place] ARTHRP KNE CONDYLE&PLATU MEDIAL&LAT COMPARTMENTS Left 10/28/2024    Procedure: ARTHROPLASTY KNEE TOTAL W ROBOT;  Surgeon: Ace Zhu MD;  Location:  MAIN OR;  Service: Orthopedics    SIGMOIDOSCOPY      (Fiberoptic, Therapeutic) 7/28/2014, 8/25/14       FAMILY HISTORY:  Family History   Problem Relation Age of Onset    Prostate cancer Father     No Known Problems Mother     Liver cancer Brother         Adenocarcinoma     No Known Problems Sister     No Known Problems Sister     No Known Problems Son     No Known Problems Son     Leukemia Daughter         Remission-Bone Marrow transplant    No Known Problems Daughter        SOCIAL HISTORY:  Social History     Tobacco Use    Smoking status: Never     Passive exposure: Past    Smokeless tobacco: Never    Tobacco comments:     Former smoker per [employer]    Vaping Use    Vaping status: Never Used   Substance Use Topics    Alcohol use: Never     Comment: Seldomly per [employer] - not in years    Drug use: No       MEDICATIONS:    Current Outpatient Medications:     acetaminophen (TYLENOL) 500 mg tablet, Take 2 tablets (1,000 mg total) by mouth every 6 (six) hours as needed for mild pain, Disp: 90 tablet, Rfl: 0    atenolol (TENORMIN) 50 mg tablet, TAKE 1 TABLET BY MOUTH EVERY DAY, Disp: 90 tablet, Rfl: 1    Cholecalciferol (VITAMIN D3) 1000 UNIT/SPRAY LIQD, Take 1 tablet by mouth daily, Disp: , Rfl:     Coenzyme Q10 100 MG CHEW, Chew 200 mg, Disp: , Rfl:     Diclofenac Sodium (VOLTAREN) 1 %, Apply 2 g topically 4 (four) times a day, Disp: 240 g, Rfl: 2    methocarbamol (ROBAXIN) 500 mg tablet, Take 1 tablet (500 mg total) by mouth 3 (three) times a day as needed for muscle spasms, Disp: 60 tablet, Rfl: 0    mupirocin (BACTROBAN) 2 % ointment, APPLY TO OPEN AREA ON KNEE TWICE A DAY X 2 WEEKS, Disp: , Rfl:     Potassium 99 MG TABS, Take 1 tablet by mouth 2  "(two) times a day, Disp: , Rfl:     sildenafil (VIAGRA) 100 mg tablet, Take 1 tablet (100 mg total) by mouth as needed for erectile dysfunction, Disp: 30 tablet, Rfl: 3    ALLERGIES:  No Known Allergies    LABS:  HgA1c:   Lab Results   Component Value Date    HGBA1C 6.0 (H) 10/03/2024     BMP:   Lab Results   Component Value Date    CALCIUM 8.7 10/29/2024    K 4.0 10/29/2024    CO2 25 10/29/2024     10/29/2024    BUN 18 10/29/2024    CREATININE 0.96 10/29/2024     CBC: No components found for: \"CBC\"    _____________________________________________________  PHYSICAL EXAMINATION:  Vital signs: Ht 5' 9\" (1.753 m)   Wt 71.7 kg (158 lb)   BMI 23.33 kg/m²   General: No acute distress, awake and alert  Psychiatric: Mood and affect appear appropriate  HEENT: Trachea Midline, No torticollis, no apparent facial trauma  Cardiovascular: No audible murmurs; Extremities appear perfused  Pulmonary: No audible wheezing or stridor  Skin: No open lesions; see further details (if any) below    Ortho Exam:  Left knee:  Well healed anterior incision  No erythema and no ecchymosis  Stable to varus and valgus stress  Extensor mechanism intact  Extension 0  Flexion 120  Calf compartments soft and supple  Sensation intact  Toes are warm sensate and mobile          _____________________________________________________  STUDIES REVIEWED:    The attending physician has personally reviewed the pertinent films in PACS and interpretation is as follows:  Left knee x-ray:  Well aligned prosthesis with no acute changes.      PROCEDURES PERFORMED:  Procedures      None Preformed       Portions of the record may have been created with voice recognition software.  Occasional wrong word or \"sound a like\" substitutions may have occurred due to the inherent limitations of voice recognition software.  Read the chart carefully and recognize, using context, where substitutions have occurred.        "

## (undated) DEVICE — 3 BONE CEMENT MIXER: Brand: MIXEVAC

## (undated) DEVICE — SUT VICRYL PLUS 2-0 CTB-1 27 IN VCPB259H

## (undated) DEVICE — HANDPIECE SET WITH RETRACTABLE COAXIAL FAN SPRAY TIP AND SUCTION TUBE: Brand: INTERPULSE

## (undated) DEVICE — CAPIT KNEE ATTUNE FB W/DOM AOX

## (undated) DEVICE — BETHLEHEM UNIV TOTAL KNEE, KIT: Brand: CARDINAL HEALTH

## (undated) DEVICE — STIRRUP STRAP ADULT DISP

## (undated) DEVICE — JP 3-SPRING RES W/10FR PVC DRAIN/TR: Brand: CARDINAL HEALTH

## (undated) DEVICE — 3M™ IOBAN™ 2 ANTIMICROBIAL INCISE DRAPE 6650EZ: Brand: IOBAN™ 2

## (undated) DEVICE — NO-SCRATCH ™ SMALL WHITNEY CURETTE ™ IS A SINGLE-USE, PLASTIC CURETTE FOR QUICKLY APPLYING, MANIPULATING AND REMOVING BONE CEMENT DURING HIP AND KNEE REPLACEMENT SURGERY. THE PLASTIC IS SOFTER THAN STEEL INSTRUMENTS, REDUCING THE RISK OF DAMAGING THE PROSTHESIS WITH METAL INSTRUMENTS.  THE CURETTE’S 6MM TIP REMOVES EXCESS CEMENT FROM REPLACEMENT HIPS AND KNEES. EASY-TO-MANEUVER, THE SMALL BLUE CURETTE LETS YOU REMOVE CEMENT FROM ALL EDGES OF THE PROSTHESIS.NO-SCRATCH WHITNEY SMALL CURETTE FEATURES:SAFER THAN STEEL- MADE OF PLASTIC - STURDY YET SOFTER THAN SURGICAL STEEL.HANDIER- EACH TOOL HAS A MOLDED-IN THUMB INDENTATION INSTANTLY ORIENTING THE TOOL.- EASIER TO MANEUVER IN HARD TO SEE PLACES.- COLOR-CODED FOR EASY IDENTIFICATION.FASTER- COMES INDIVIDUALLY PACKAGED IN STERILE, PEEL OPEN POUCH, READY TO GO.- APPLIES, MANIPULATES, OR REMOVES CEMENT WITH FINGERTIP PRECISION.ECONOMICAL- THE COST OF A SINGLE REVISION DWARFS THE COST OF A SINGLE-USE CURETTE. - DISPOSABLE – THERE’S NO NEED TO WASTE TIME REMOVING HARDENED CEMENT OR RE-STERILIZING TOOLS.- LESS EXPENSIVE TO BUY AND INVENTORY - ORDER ONLY THE TOOL YOU USE.- PACKAGED 25 INDIVIDUALLY WRAPPED TOOLS TO A CARTON FOR CONVENIENT SHELF STORAGE.: Brand: WHITNEY NO-SCRATCH CURETTE (SMALL)

## (undated) DEVICE — GLOVE INDICATOR PI UNDERGLOVE SZ 8.5 BLUE

## (undated) DEVICE — ACE WRAP 6 IN UNSTERILE

## (undated) DEVICE — SILVER-COATED ANTIMICROBIAL BARRIER DRESSING: Brand: ACTICOAT   4" X 8"

## (undated) DEVICE — PADDING CAST 4 IN  COTTON STRL

## (undated) DEVICE — GLOVE SRG BIOGEL 7.5

## (undated) DEVICE — GLOVE INDICATOR PI UNDERGLOVE SZ 7 BLUE

## (undated) DEVICE — BLADE SAW RECIP 12.5 X 76MM 0.9/0.9MM THCK

## (undated) DEVICE — DRAPE SHEET THREE QUARTER

## (undated) DEVICE — ABDOMINAL PAD: Brand: DERMACEA

## (undated) DEVICE — DRAPE SHEET X-LG

## (undated) DEVICE — GAUZE SPONGES,16 PLY: Brand: CURITY

## (undated) DEVICE — DRAPE EQUIPMENT RF WAND

## (undated) DEVICE — HOOD WITH PEEL AWAY FACE SHIELD: Brand: T7PLUS

## (undated) DEVICE — HOOD: Brand: T7PLUS

## (undated) DEVICE — STOCKINETTE REGULAR

## (undated) DEVICE — DUAL CUT SAGITTAL BLADE

## (undated) DEVICE — GLOVE INDICATOR PI UNDERGLOVE SZ 7.5 BLUE

## (undated) DEVICE — PROXIMATE PLUS MD MULTI-DIRECTIONAL RELEASE SKIN STAPLERS CONTAINS 35 STAINLESS STEEL STAPLES APPROXIMATE CLOSED DIMENSIONS: 6.9MM X 3.9MM WIDE: Brand: PROXIMATE

## (undated) DEVICE — GLOVE SRG BIOGEL 6.5

## (undated) DEVICE — TRAY FOLEY 16FR URIMETER SILICONE SURESTEP

## (undated) DEVICE — VELYS ROBOT-ASSISTED SOLUTION ARRAY DRILL PIN 125 MM X 4 MM: Brand: VELYS

## (undated) DEVICE — PENCIL ELECTROSURG E-Z CLEAN -0035H

## (undated) DEVICE — WET SKIN PREP TRAY: Brand: MEDLINE INDUSTRIES, INC.

## (undated) DEVICE — GLOVE SRG BIOGEL 8

## (undated) DEVICE — BAG POLY CLEAR 12 X 8 X 30

## (undated) DEVICE — ACE WRAP 6 IN STERILE

## (undated) DEVICE — VELYS ROBOT DRAPE

## (undated) DEVICE — VELYS SATEL DRAPE

## (undated) DEVICE — SKN PRP WNG SPNGE PVP SCRB STR: Brand: MEDLINE INDUSTRIES, INC.

## (undated) DEVICE — SAW BLADE OSCILLATING BRAZOL 167

## (undated) DEVICE — PAD CAST 6 IN COTTON NON STERILE

## (undated) DEVICE — RECIPROCATING BLADE, DOUBLE SIDED, OFFSET  (70.0 X 0.64 X 12.6MM)

## (undated) DEVICE — PLUMEPEN PRO 10FT

## (undated) DEVICE — VELYS BLADE SAW OSC 85 X 19 X 2MM

## (undated) DEVICE — COOL TEMP PAD

## (undated) DEVICE — SUT VICRYL PLUS 1 CTB-1 36 IN VCPB947H

## (undated) DEVICE — VELYS ROBOTIC-ASSISTED SOLUTION ARRAY SET - KNEE: Brand: VELYS

## (undated) DEVICE — GLOVE INDICATOR PI UNDERGLOVE SZ 8 BLUE

## (undated) DEVICE — TRAY FOLEY 16FR URIMETER SURESTEP

## (undated) DEVICE — CUFF TOURNIQUET 30 X 4 IN QUICK CONNECT DISP 1BLA

## (undated) DEVICE — SPINNING CEMENT MIXING BOWL

## (undated) DEVICE — ASTOUND STANDARD SURGICAL GOWN, XL: Brand: CONVERTORS